# Patient Record
Sex: FEMALE | HISPANIC OR LATINO | Employment: FULL TIME | URBAN - METROPOLITAN AREA
[De-identification: names, ages, dates, MRNs, and addresses within clinical notes are randomized per-mention and may not be internally consistent; named-entity substitution may affect disease eponyms.]

---

## 2017-07-14 ENCOUNTER — HOSPITAL ENCOUNTER (OUTPATIENT)
Dept: RADIOLOGY | Facility: MEDICAL CENTER | Age: 52
End: 2017-07-14
Attending: FAMILY MEDICINE
Payer: COMMERCIAL

## 2017-07-14 DIAGNOSIS — N93.9 VAGINAL HEMORRHAGE: ICD-10-CM

## 2017-07-14 PROCEDURE — 76830 TRANSVAGINAL US NON-OB: CPT

## 2017-09-09 ENCOUNTER — HOSPITAL ENCOUNTER (OUTPATIENT)
Dept: LAB | Facility: MEDICAL CENTER | Age: 52
End: 2017-09-09
Attending: FAMILY MEDICINE
Payer: COMMERCIAL

## 2017-09-09 LAB
ALBUMIN SERPL BCP-MCNC: 3.9 G/DL (ref 3.2–4.9)
ALBUMIN/GLOB SERPL: 1.3 G/DL
ALP SERPL-CCNC: 100 U/L (ref 30–99)
ALT SERPL-CCNC: 19 U/L (ref 2–50)
ANION GAP SERPL CALC-SCNC: 10 MMOL/L (ref 0–11.9)
AST SERPL-CCNC: 17 U/L (ref 12–45)
BILIRUB SERPL-MCNC: 0.6 MG/DL (ref 0.1–1.5)
BUN SERPL-MCNC: 16 MG/DL (ref 8–22)
CALCIUM SERPL-MCNC: 9.2 MG/DL (ref 8.5–10.5)
CHLORIDE SERPL-SCNC: 105 MMOL/L (ref 96–112)
CHOLEST SERPL-MCNC: 159 MG/DL (ref 100–199)
CO2 SERPL-SCNC: 25 MMOL/L (ref 20–33)
CREAT SERPL-MCNC: 0.61 MG/DL (ref 0.5–1.4)
GFR SERPL CREATININE-BSD FRML MDRD: >60 ML/MIN/1.73 M 2
GLOBULIN SER CALC-MCNC: 3 G/DL (ref 1.9–3.5)
GLUCOSE SERPL-MCNC: 108 MG/DL (ref 65–99)
HDLC SERPL-MCNC: 33 MG/DL
LDLC SERPL CALC-MCNC: 67 MG/DL
POTASSIUM SERPL-SCNC: 3.6 MMOL/L (ref 3.6–5.5)
PROT SERPL-MCNC: 6.9 G/DL (ref 6–8.2)
SODIUM SERPL-SCNC: 140 MMOL/L (ref 135–145)
TRIGL SERPL-MCNC: 295 MG/DL (ref 0–149)
TSH SERPL DL<=0.005 MIU/L-ACNC: 0.96 UIU/ML (ref 0.3–3.7)

## 2017-09-09 PROCEDURE — 36415 COLL VENOUS BLD VENIPUNCTURE: CPT

## 2017-09-09 PROCEDURE — 84443 ASSAY THYROID STIM HORMONE: CPT

## 2017-09-09 PROCEDURE — 80061 LIPID PANEL: CPT

## 2017-09-09 PROCEDURE — 80053 COMPREHEN METABOLIC PANEL: CPT

## 2017-09-16 ENCOUNTER — HOSPITAL ENCOUNTER (OUTPATIENT)
Dept: LAB | Facility: MEDICAL CENTER | Age: 52
End: 2017-09-16
Attending: OBSTETRICS & GYNECOLOGY
Payer: COMMERCIAL

## 2017-09-16 LAB
ESTRADIOL SERPL-MCNC: 32 PG/ML
FSH SERPL-ACNC: 24.7 MIU/ML
LH SERPL-ACNC: 17 IU/L

## 2017-09-16 PROCEDURE — 83001 ASSAY OF GONADOTROPIN (FSH): CPT

## 2017-09-16 PROCEDURE — 82670 ASSAY OF TOTAL ESTRADIOL: CPT

## 2017-09-16 PROCEDURE — 86695 HERPES SIMPLEX TYPE 1 TEST: CPT

## 2017-09-16 PROCEDURE — 86694 HERPES SIMPLEX NES ANTBDY: CPT

## 2017-09-16 PROCEDURE — 83002 ASSAY OF GONADOTROPIN (LH): CPT

## 2017-09-16 PROCEDURE — 86696 HERPES SIMPLEX TYPE 2 TEST: CPT

## 2017-09-16 PROCEDURE — 36415 COLL VENOUS BLD VENIPUNCTURE: CPT

## 2017-09-18 LAB
HSV1 GG IGG SER-ACNC: 44.6 IV
HSV1+2 IGG SER IA-ACNC: >22.4 IV
HSV2 GG IGG SER-ACNC: 19.5 IV

## 2018-01-31 ENCOUNTER — HOSPITAL ENCOUNTER (OUTPATIENT)
Dept: LAB | Facility: MEDICAL CENTER | Age: 53
End: 2018-01-31
Attending: OBSTETRICS & GYNECOLOGY
Payer: COMMERCIAL

## 2018-01-31 PROCEDURE — 87624 HPV HI-RISK TYP POOLED RSLT: CPT

## 2018-01-31 PROCEDURE — 88175 CYTOPATH C/V AUTO FLUID REDO: CPT

## 2018-02-02 LAB
CYTOLOGY REG CYTOL: NORMAL
HPV HR 12 DNA CVX QL NAA+PROBE: NEGATIVE
HPV16 DNA SPEC QL NAA+PROBE: NEGATIVE
HPV18 DNA SPEC QL NAA+PROBE: NEGATIVE
SPECIMEN SOURCE: NORMAL

## 2018-02-03 ENCOUNTER — HOSPITAL ENCOUNTER (OUTPATIENT)
Dept: LAB | Facility: MEDICAL CENTER | Age: 53
End: 2018-02-03
Attending: FAMILY MEDICINE
Payer: COMMERCIAL

## 2018-02-03 LAB
ALBUMIN SERPL BCP-MCNC: 4.2 G/DL (ref 3.2–4.9)
ALBUMIN/GLOB SERPL: 1.4 G/DL
ALP SERPL-CCNC: 83 U/L (ref 30–99)
ALT SERPL-CCNC: 17 U/L (ref 2–50)
ANION GAP SERPL CALC-SCNC: 9 MMOL/L (ref 0–11.9)
AST SERPL-CCNC: 20 U/L (ref 12–45)
BILIRUB SERPL-MCNC: 0.6 MG/DL (ref 0.1–1.5)
BUN SERPL-MCNC: 11 MG/DL (ref 8–22)
CALCIUM SERPL-MCNC: 9.2 MG/DL (ref 8.5–10.5)
CHLORIDE SERPL-SCNC: 103 MMOL/L (ref 96–112)
CHOLEST SERPL-MCNC: 202 MG/DL (ref 100–199)
CO2 SERPL-SCNC: 26 MMOL/L (ref 20–33)
CREAT SERPL-MCNC: 0.71 MG/DL (ref 0.5–1.4)
GLOBULIN SER CALC-MCNC: 3 G/DL (ref 1.9–3.5)
GLUCOSE SERPL-MCNC: 100 MG/DL (ref 65–99)
HDLC SERPL-MCNC: 40 MG/DL
LDLC SERPL CALC-MCNC: 125 MG/DL
POTASSIUM SERPL-SCNC: 3.6 MMOL/L (ref 3.6–5.5)
PROT SERPL-MCNC: 7.2 G/DL (ref 6–8.2)
SODIUM SERPL-SCNC: 138 MMOL/L (ref 135–145)
TRIGL SERPL-MCNC: 184 MG/DL (ref 0–149)

## 2018-02-03 PROCEDURE — 36415 COLL VENOUS BLD VENIPUNCTURE: CPT

## 2018-02-03 PROCEDURE — 80053 COMPREHEN METABOLIC PANEL: CPT

## 2018-02-03 PROCEDURE — 80061 LIPID PANEL: CPT

## 2018-02-05 ENCOUNTER — HOSPITAL ENCOUNTER (OUTPATIENT)
Dept: RADIOLOGY | Facility: MEDICAL CENTER | Age: 53
End: 2018-02-05

## 2018-02-08 ENCOUNTER — HOSPITAL ENCOUNTER (OUTPATIENT)
Dept: RADIOLOGY | Facility: MEDICAL CENTER | Age: 53
End: 2018-02-08
Attending: OBSTETRICS & GYNECOLOGY
Payer: COMMERCIAL

## 2018-02-08 DIAGNOSIS — Z12.39 SCREENING BREAST EXAMINATION: ICD-10-CM

## 2018-02-08 PROCEDURE — 77063 BREAST TOMOSYNTHESIS BI: CPT

## 2018-10-02 ENCOUNTER — IMMUNIZATION (OUTPATIENT)
Dept: SOCIAL WORK | Facility: CLINIC | Age: 53
End: 2018-10-02
Payer: COMMERCIAL

## 2018-10-02 DIAGNOSIS — Z23 NEED FOR VACCINATION: ICD-10-CM

## 2018-10-02 PROCEDURE — 90686 IIV4 VACC NO PRSV 0.5 ML IM: CPT | Performed by: REGISTERED NURSE

## 2018-10-02 PROCEDURE — 90471 IMMUNIZATION ADMIN: CPT | Performed by: REGISTERED NURSE

## 2018-12-04 ENCOUNTER — OFFICE VISIT (OUTPATIENT)
Dept: URGENT CARE | Facility: PHYSICIAN GROUP | Age: 53
End: 2018-12-04
Payer: COMMERCIAL

## 2018-12-04 ENCOUNTER — HOSPITAL ENCOUNTER (OUTPATIENT)
Dept: RADIOLOGY | Facility: MEDICAL CENTER | Age: 53
End: 2018-12-04
Attending: PHYSICIAN ASSISTANT
Payer: COMMERCIAL

## 2018-12-04 VITALS
HEIGHT: 57 IN | WEIGHT: 150 LBS | RESPIRATION RATE: 18 BRPM | DIASTOLIC BLOOD PRESSURE: 84 MMHG | SYSTOLIC BLOOD PRESSURE: 146 MMHG | HEART RATE: 78 BPM | TEMPERATURE: 98.2 F | BODY MASS INDEX: 32.36 KG/M2 | OXYGEN SATURATION: 97 %

## 2018-12-04 DIAGNOSIS — S69.92XA INJURY OF LEFT WRIST, INITIAL ENCOUNTER: ICD-10-CM

## 2018-12-04 DIAGNOSIS — S52.502A CLOSED FRACTURE OF DISTAL END OF LEFT RADIUS, UNSPECIFIED FRACTURE MORPHOLOGY, INITIAL ENCOUNTER: ICD-10-CM

## 2018-12-04 PROCEDURE — 99204 OFFICE O/P NEW MOD 45 MIN: CPT | Performed by: PHYSICIAN ASSISTANT

## 2018-12-04 PROCEDURE — 73110 X-RAY EXAM OF WRIST: CPT | Mod: LT

## 2018-12-04 ASSESSMENT — ENCOUNTER SYMPTOMS
TINGLING: 0
FALLS: 1
RESPIRATORY NEGATIVE: 1
NUMBNESS: 0
CARDIOVASCULAR NEGATIVE: 1
NEUROLOGICAL NEGATIVE: 1
MUSCLE WEAKNESS: 0
CONSTITUTIONAL NEGATIVE: 1
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
## 2018-12-04 NOTE — LETTER
December 4, 2018         Patient: Kary Kevin   YOB: 1965   Date of Visit: 12/4/2018           To Whom it May Concern:    Kary Kevin was seen in my clinic on 12/4/2018. She was diagnosed with a minor fracture of the left wrist. She may work but please limit heavy lifting.    If you have any questions or concerns, please don't hesitate to call.        Sincerely,           Gregorio Ventura P.A.-C.  Electronically Signed

## 2018-12-10 ENCOUNTER — OFFICE VISIT (OUTPATIENT)
Dept: MEDICAL GROUP | Facility: CLINIC | Age: 53
End: 2018-12-10
Payer: COMMERCIAL

## 2018-12-10 VITALS
HEART RATE: 82 BPM | SYSTOLIC BLOOD PRESSURE: 118 MMHG | DIASTOLIC BLOOD PRESSURE: 76 MMHG | TEMPERATURE: 97.6 F | WEIGHT: 150 LBS | HEIGHT: 57 IN | BODY MASS INDEX: 32.36 KG/M2 | RESPIRATION RATE: 18 BRPM | OXYGEN SATURATION: 96 %

## 2018-12-10 DIAGNOSIS — S52.509A: ICD-10-CM

## 2018-12-10 PROCEDURE — 25600 CLTX DST RDL FX/EPHYS SEP WO: CPT | Mod: LT | Performed by: FAMILY MEDICINE

## 2018-12-10 ASSESSMENT — ENCOUNTER SYMPTOMS
NAUSEA: 0
HEADACHES: 0
DIZZINESS: 1
SHORTNESS OF BREATH: 0
VOMITING: 0
CHILLS: 0
FEVER: 0

## 2018-12-10 NOTE — LETTER
December 10, 2018         Patient: Kary Kevin   YOB: 1965   Date of Visit: 12/10/2018           To Whom it May Concern:    Kary Kevin was seen in my clinic on 12/10/2018. She may return to work, but she has limited use of the LEFT hand until removal of her cast in 4 weeks.    If you have any questions or concerns, please don't hesitate to call.        Sincerely,           Dustin Benitez M.D.  Electronically Signed
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
## 2018-12-21 ENCOUNTER — OFFICE VISIT (OUTPATIENT)
Dept: MEDICAL GROUP | Facility: CLINIC | Age: 53
End: 2018-12-21
Payer: COMMERCIAL

## 2018-12-21 VITALS
RESPIRATION RATE: 20 BRPM | OXYGEN SATURATION: 96 % | WEIGHT: 150 LBS | DIASTOLIC BLOOD PRESSURE: 82 MMHG | HEART RATE: 84 BPM | SYSTOLIC BLOOD PRESSURE: 130 MMHG | HEIGHT: 57 IN | BODY MASS INDEX: 32.36 KG/M2 | TEMPERATURE: 97.8 F

## 2018-12-21 DIAGNOSIS — S52.509D: ICD-10-CM

## 2018-12-21 PROCEDURE — 99024 POSTOP FOLLOW-UP VISIT: CPT | Performed by: FAMILY MEDICINE

## 2018-12-21 PROCEDURE — 29075 APPL CST ELBW FNGR SHORT ARM: CPT | Mod: 58,LT | Performed by: FAMILY MEDICINE

## 2018-12-21 NOTE — LETTER
December 21, 2018         Patient: Kary Kevin   YOB: 1965   Date of Visit: 12/21/2018           To Whom it May Concern:    Kary Kevin was seen in my clinic on 12/21/2018. She may return to work, but she has limited use of her left hand secondary to injury/cast.  She should not be working overtime.    If you have any questions or concerns, please don't hesitate to call.        Sincerely,           Dustin Benitez M.D.  Electronically Signed
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
## 2019-01-07 ENCOUNTER — OFFICE VISIT (OUTPATIENT)
Dept: MEDICAL GROUP | Facility: CLINIC | Age: 54
End: 2019-01-07
Payer: COMMERCIAL

## 2019-01-07 VITALS
RESPIRATION RATE: 18 BRPM | TEMPERATURE: 98.7 F | HEART RATE: 81 BPM | BODY MASS INDEX: 32.36 KG/M2 | HEIGHT: 57 IN | OXYGEN SATURATION: 94 % | WEIGHT: 150 LBS

## 2019-01-07 DIAGNOSIS — S52.509D: ICD-10-CM

## 2019-01-07 DIAGNOSIS — M25.632 WRIST STIFFNESS, LEFT: ICD-10-CM

## 2019-01-07 PROCEDURE — 99212 OFFICE O/P EST SF 10 MIN: CPT | Mod: 24 | Performed by: FAMILY MEDICINE
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
## 2019-03-19 ENCOUNTER — HOSPITAL ENCOUNTER (OUTPATIENT)
Dept: LAB | Facility: MEDICAL CENTER | Age: 54
End: 2019-03-19
Attending: OBSTETRICS & GYNECOLOGY
Payer: COMMERCIAL

## 2019-03-19 PROCEDURE — 87624 HPV HI-RISK TYP POOLED RSLT: CPT

## 2019-03-19 PROCEDURE — 87491 CHLMYD TRACH DNA AMP PROBE: CPT

## 2019-03-19 PROCEDURE — 88175 CYTOPATH C/V AUTO FLUID REDO: CPT

## 2019-03-19 PROCEDURE — 87591 N.GONORRHOEAE DNA AMP PROB: CPT

## 2019-03-20 LAB — AMBIGUOUS DTTM AMBI4: NORMAL

## 2019-03-22 LAB
C TRACH DNA GENITAL QL NAA+PROBE: NEGATIVE
CYTOLOGY REG CYTOL: NORMAL
HPV HR 12 DNA CVX QL NAA+PROBE: NEGATIVE
HPV16 DNA SPEC QL NAA+PROBE: NEGATIVE
HPV18 DNA SPEC QL NAA+PROBE: NEGATIVE
N GONORRHOEA DNA GENITAL QL NAA+PROBE: NEGATIVE
SPECIMEN SOURCE: NORMAL
SPECIMEN SOURCE: NORMAL

## 2019-04-03 ENCOUNTER — HOSPITAL ENCOUNTER (OUTPATIENT)
Dept: RADIOLOGY | Facility: MEDICAL CENTER | Age: 54
End: 2019-04-03
Attending: OBSTETRICS & GYNECOLOGY
Payer: COMMERCIAL

## 2019-04-03 DIAGNOSIS — Z12.31 VISIT FOR SCREENING MAMMOGRAM: ICD-10-CM

## 2019-04-03 PROCEDURE — 77063 BREAST TOMOSYNTHESIS BI: CPT

## 2019-12-24 ENCOUNTER — OFFICE VISIT (OUTPATIENT)
Dept: URGENT CARE | Facility: PHYSICIAN GROUP | Age: 54
End: 2019-12-24
Payer: COMMERCIAL

## 2019-12-24 VITALS
HEART RATE: 69 BPM | TEMPERATURE: 97.8 F | DIASTOLIC BLOOD PRESSURE: 90 MMHG | WEIGHT: 156 LBS | OXYGEN SATURATION: 96 % | SYSTOLIC BLOOD PRESSURE: 152 MMHG | BODY MASS INDEX: 33.66 KG/M2 | HEIGHT: 57 IN

## 2019-12-24 DIAGNOSIS — H11.32 SUBCONJUNCTIVAL HEMORRHAGE OF LEFT EYE: ICD-10-CM

## 2019-12-24 PROCEDURE — 99214 OFFICE O/P EST MOD 30 MIN: CPT | Performed by: PHYSICIAN ASSISTANT

## 2019-12-24 RX ORDER — IBUPROFEN 200 MG
200 TABLET ORAL EVERY 6 HOURS PRN
COMMUNITY
End: 2020-02-26

## 2019-12-24 ASSESSMENT — ENCOUNTER SYMPTOMS
PALPITATIONS: 0
BLURRED VISION: 0
EYE REDNESS: 1
PHOTOPHOBIA: 0
HEADACHES: 0
CHILLS: 0
SHORTNESS OF BREATH: 0
DOUBLE VISION: 0
SORE THROAT: 0
FEVER: 0
SINUS PAIN: 0
COUGH: 0

## 2019-12-24 NOTE — PROGRESS NOTES
"Subjective:      Kary Kevin is a 54 y.o. female who presents with Eye Problem (left eye redness, poss scratched eye, feels like theres something on eyelid)            Eye Problem    The left eye is affected. This is a new problem. The current episode started yesterday (after rubbing her eyes). The problem has been unchanged. There was no injury mechanism. The patient is experiencing no pain. Associated symptoms include eye redness. Pertinent negatives include no blurred vision, double vision, fever or photophobia. She has tried nothing for the symptoms.       Review of Systems   Constitutional: Negative for chills, fever and malaise/fatigue.   HENT: Negative for congestion, ear pain, sinus pain and sore throat.    Eyes: Positive for redness. Negative for blurred vision, double vision and photophobia.   Respiratory: Negative for cough and shortness of breath.    Cardiovascular: Negative for chest pain and palpitations.   Skin: Negative for rash.   Neurological: Negative for headaches.   All other systems reviewed and are negative.    PMH:  has a past medical history of Depression.  MEDS:   Current Outpatient Medications:   •  Naproxen Sodium (ALEVE PO), Take  by mouth., Disp: , Rfl:   •  ibuprofen (MOTRIN) 200 MG Tab, Take 200 mg by mouth every 6 hours as needed., Disp: , Rfl:   ALLERGIES: No Known Allergies  SURGHX:   Past Surgical History:   Procedure Laterality Date   • PRIMARY C SECTION       SOCHX:  reports that she has never smoked. She has never used smokeless tobacco. She reports that she does not drink alcohol or use drugs.  FH: Family history was reviewed, no pertinent findings to report  Medications, Allergies, and current problem list reviewed today in Epic         Objective:     Blood Pressure 152/90   Pulse 69   Temperature 36.6 °C (97.8 °F)   Height 1.448 m (4' 9\")   Weight 70.8 kg (156 lb)   Oxygen Saturation 96%   Body Mass Index 33.76 kg/m²      Physical Exam  Vitals signs reviewed. "   Constitutional:       General: She is not in acute distress.     Appearance: She is well-developed. She is not ill-appearing or toxic-appearing.   HENT:      Head: Normocephalic and atraumatic.      Right Ear: Hearing, tympanic membrane, ear canal and external ear normal.      Left Ear: Hearing, tympanic membrane, ear canal and external ear normal.      Nose: Nose normal.      Mouth/Throat:      Pharynx: Uvula midline. No oropharyngeal exudate.   Eyes:      General: Lids are normal. Lids are everted, no foreign bodies appreciated.      Conjunctiva/sclera:      Left eye: Hemorrhage present.      Pupils: Pupils are equal, round, and reactive to light.   Neck:      Musculoskeletal: Full passive range of motion without pain, normal range of motion and neck supple.   Cardiovascular:      Rate and Rhythm: Regular rhythm.      Heart sounds: Normal heart sounds. No murmur. No friction rub. No gallop.    Pulmonary:      Effort: Pulmonary effort is normal. No respiratory distress.      Breath sounds: Normal breath sounds. No decreased breath sounds, wheezing or rales.   Chest:      Chest wall: No tenderness.   Musculoskeletal: Normal range of motion.         General: No tenderness or deformity.   Skin:     General: Skin is warm and dry.      Findings: No rash.   Neurological:      Mental Status: She is alert and oriented to person, place, and time.   Psychiatric:         Speech: Speech normal.         Behavior: Behavior normal.         Thought Content: Thought content normal.         Judgment: Judgment normal.                 Assessment/Plan:       1. Subconjunctival hemorrhage of left eye  -OTC supportive care    Differential diagnosis, natural history, supportive care discussed. Follow-up with primary care provider within 7-10 days, emergency room precautions discussed.  Patient and/or family appears understanding of information.  Handout and review of patients diagnosis and treatment was discussed extensively.

## 2019-12-24 NOTE — PATIENT INSTRUCTIONS
Hemorragia subconjuntival  (Subconjunctival Hemorrhage)  La hemorragia subconjuntival es el sangrado que se produce entre la parte maryam del low (esclerótica) y la membrana transparente que recubre la parte externa de emanuel órgano (conjuntiva). Cerca de la superficie del low hay muchos vasos sanguíneos diminutos. La hemorragia subconjuntival ocurre cuando marlee o más de estos vasos sanguíneos se rompen y sangran, lo que deriva en la aparición de zunilda cosme tomasa en el low. Esta es similar a un hematoma.  En función de la magnitud del sangrado, la cosme tomasa puede cubrir únicamente zunilda pequeña dennys del low o la totalidad de la parte visible de la esclerótica. Si se acumula mucha itzel debajo de la conjuntiva, también puede jalil inflamación. Las hemorragias subconjuntivales no afectan la visión ni causan dolor, katia puede jalil sensación de irritación ocular si hay inflamación. Generalmente, las hemorragias subconjuntivales no requieren tratamiento y desaparecen solas en el término de dos semanas.  CAUSAS  Esta afección puede ser causada por lo siguiente:  · Un traumatismo leve, timmy frotarse los ojos con mucha fuerza.  · Un traumatismo grave o zunilda contusión.  · Toser, estornudar o vomitar.  · Realizar esfuerzos, timmy ocurre al levantar un objeto pesado.  · Hipertensión arterial.  · Zunilda cirugía ocular reciente.  · Antecedentes de diabetes.  · Algunos medicamentos, especialmente los anticoagulantes.  · Otras afecciones, timmy los tumores en los ojos, los trastornos hemorrágicos o las anomalías de los vasos sanguíneos.  Las hemorragias subconjuntivales pueden producirse sin zunilda causa aparente.  SÍNTOMAS  Los síntomas de esta afección incluyen lo siguiente:  · Zunilda cosme de color lobo oscuro o brillante en la parte maryam del low.  ¨ La dennys enrojecida se puede extender hasta cubrir un área más frank del low antes de desaparecer.  ¨ La dennys enrojecida puede tornarse de color marrón amarillento antes de  desaparecer.  · Hinchazón.  · Irritación leve del low.  DIAGNÓSTICO  Esta afección se diagnostica mediante un examen físico. Si la hemorragia subconjuntival fue causada por un traumatismo, el médico puede derivarlo a un oculista (oftalmólogo) o a otro especialista para que lo examinen en busca de otras lesiones. Pueden hacerle otros estudios, por ejemplo:  · Un examen ocular.  · Un control de la presión arterial.  · Análisis de itzel para detectar la presencia de trastornos hemorrágicos.  Si la hemorragia subconjuntival fue causada por un traumatismo, pueden hacerle radiografías o zunilda tomografía computarizada (TC) para determinar si hay otras lesiones.  TRATAMIENTO  Por lo general, no se necesita tratamiento. El médico puede recomendarle que se aplique gotas oftálmicas o compresas frías para aliviar las molestias.  INSTRUCCIONES PARA EL CUIDADO EN EL HOGAR  · Mokena los medicamentos de venta josh y los recetados solamente timmy se lo haya indicado el médico.  · Aplique las gotas oftálmicas o las compresas frías para aliviar las molestias timmy se lo haya indicado el médico.  · Evite las actividades, las cosas y los entornos que pueden causarle irritación o lesiones en el low.  · Concurra a todas las visitas de control timmy se lo haya indicado el médico. Dur[place] es importante.  SOLICITE ATENCIÓN MÉDICA SI:  · Siente dolor en el low.  · El sangrado no desaparece en el término de 3 semanas.  · Sigue teniendo hemorragias subconjuntivales.  SOLICITE ATENCIÓN MÉDICA DE INMEDIATO SI:  · Tiene cambios en la visión o dificultad para raysa.  · Repentinamente, tiene mucha sensibilidad a la bere.  · Tiene dolor de jose intenso, vómitos persistentes, confusión o un cansancio que no es normal (letargia).  · Parece que el low sobresale o se protruye de la órbita.  · Le aparecen hematomas en el cuerpo sin motivo.  · Tiene sangrado en otra parte del cuerpo sin motivo.  Esta información no tiene timmy fin reemplazar el consejo del médico.  Asegúrese de hacerle al médico cualquier pregunta que tenga.  Document Released: 09/27/2006 Document Revised: 09/07/2016 Document Reviewed: 02/24/2016  Elsevier Interactive Patient Education © 2017 [employer]

## 2019-12-24 NOTE — LETTER
December 24, 2019         Patient: Kary Kevin   YOB: 1965   Date of Visit: 12/24/2019           To Whom it May Concern:    Kary Kevin was seen in my clinic on 12/24/2019. She may return to work on 12/26.  She does not have an infectious condition.  If you have any questions or concerns, please don't hesitate to call.        Sincerely,           Bala Buckner P.A.-C.  Electronically Signed

## 2020-02-26 ENCOUNTER — OFFICE VISIT (OUTPATIENT)
Dept: MEDICAL GROUP | Facility: PHYSICIAN GROUP | Age: 55
End: 2020-02-26
Payer: COMMERCIAL

## 2020-02-26 VITALS
HEART RATE: 86 BPM | OXYGEN SATURATION: 97 % | WEIGHT: 158 LBS | RESPIRATION RATE: 14 BRPM | TEMPERATURE: 97.9 F | HEIGHT: 57 IN | SYSTOLIC BLOOD PRESSURE: 124 MMHG | DIASTOLIC BLOOD PRESSURE: 70 MMHG | BODY MASS INDEX: 34.09 KG/M2

## 2020-02-26 DIAGNOSIS — R10.12 LEFT UPPER QUADRANT PAIN: ICD-10-CM

## 2020-02-26 DIAGNOSIS — Z12.11 SCREENING FOR COLORECTAL CANCER: ICD-10-CM

## 2020-02-26 DIAGNOSIS — Z00.00 PREVENTATIVE HEALTH CARE: ICD-10-CM

## 2020-02-26 DIAGNOSIS — Z12.12 SCREENING FOR COLORECTAL CANCER: ICD-10-CM

## 2020-02-26 DIAGNOSIS — Z76.89 ENCOUNTER TO ESTABLISH CARE: ICD-10-CM

## 2020-02-26 DIAGNOSIS — K21.9 GASTROESOPHAGEAL REFLUX DISEASE WITHOUT ESOPHAGITIS: ICD-10-CM

## 2020-02-26 DIAGNOSIS — M19.042 PRIMARY OSTEOARTHRITIS OF BOTH HANDS: ICD-10-CM

## 2020-02-26 DIAGNOSIS — Z13.29 SCREENING FOR ENDOCRINE, METABOLIC AND IMMUNITY DISORDER: ICD-10-CM

## 2020-02-26 DIAGNOSIS — Z11.59 NEED FOR HEPATITIS C SCREENING TEST: ICD-10-CM

## 2020-02-26 DIAGNOSIS — Z13.0 SCREENING FOR ENDOCRINE, METABOLIC AND IMMUNITY DISORDER: ICD-10-CM

## 2020-02-26 DIAGNOSIS — Z13.220 SCREENING FOR LIPID DISORDERS: ICD-10-CM

## 2020-02-26 DIAGNOSIS — M19.041 PRIMARY OSTEOARTHRITIS OF BOTH HANDS: ICD-10-CM

## 2020-02-26 DIAGNOSIS — I10 ESSENTIAL HYPERTENSION: ICD-10-CM

## 2020-02-26 DIAGNOSIS — Z23 NEED FOR VACCINATION: ICD-10-CM

## 2020-02-26 DIAGNOSIS — E66.9 OBESITY (BMI 30-39.9): ICD-10-CM

## 2020-02-26 DIAGNOSIS — Z13.228 SCREENING FOR ENDOCRINE, METABOLIC AND IMMUNITY DISORDER: ICD-10-CM

## 2020-02-26 PROCEDURE — 90471 IMMUNIZATION ADMIN: CPT | Performed by: NURSE PRACTITIONER

## 2020-02-26 PROCEDURE — 90715 TDAP VACCINE 7 YRS/> IM: CPT | Performed by: NURSE PRACTITIONER

## 2020-02-26 PROCEDURE — 99214 OFFICE O/P EST MOD 30 MIN: CPT | Mod: 25 | Performed by: NURSE PRACTITIONER

## 2020-02-26 RX ORDER — LISINOPRIL 10 MG/1
10 TABLET ORAL DAILY
Qty: 30 TAB | Refills: 1 | Status: SHIPPED | OUTPATIENT
Start: 2020-02-26 | End: 2020-03-25 | Stop reason: SDUPTHER

## 2020-02-26 RX ORDER — ZOSTER VACCINE RECOMBINANT, ADJUVANTED 50 MCG/0.5
0.5 KIT INTRAMUSCULAR ONCE
Qty: 0.5 ML | Refills: 0 | Status: SHIPPED
Start: 2020-02-26 | End: 2020-02-26

## 2020-02-26 ASSESSMENT — PATIENT HEALTH QUESTIONNAIRE - PHQ9: CLINICAL INTERPRETATION OF PHQ2 SCORE: 0

## 2020-02-26 NOTE — ASSESSMENT & PLAN NOTE
Chronic ongoing. New to examiner. Patient reports that she was diagnosed 5 years ago. Her previous PCP had referred to orthopaedics doctor who diagnosed with osteoarthritis. Patient is currently taking Naproxen BID for pain. Pain in hands worse at night after resting. 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

## 2020-02-26 NOTE — PROGRESS NOTES
Subjective:     CC:  Diagnoses of Essential hypertension, Primary osteoarthritis of both hands, Gastroesophageal reflux disease without esophagitis, Left upper quadrant pain, Obesity (BMI 30-39.9), Screening for colorectal cancer, Screening for lipid disorders, Screening for endocrine, metabolic and immunity disorder, Preventative health care, Need for hepatitis C screening test, Need for vaccination, and Encounter to establish care were pertinent to this visit.    HISTORY OF THE PRESENT ILLNESS: Patient is a 54 y.o. female. This pleasant patient is here today to establish care and discuss the following. Her prior PCP was Saumya Barnhart.    Primary osteoarthritis of both hands  Chronic ongoing. New to examiner. Patient reports that she was diagnosed 5 years ago. Her previous PCP had referred to orthopaedics doctor who diagnosed with osteoarthritis. Patient is currently taking Naproxen BID for pain. Pain in hands worse at night after resting. 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    Essential hypertension  Chronic ongoing. New to examiner. Patient is currently taking Lisinopril 10 mg daily. She has been unable to take medicine for the last 6 months. She had taken a prescription from [facility] that was equivalent to Lisinopril but ran out of prescription and was unable to get more refills. She does not check home blood pressures. She will occasionally use the store BP, but she does not remember the numbers.  Her BP today is 124/70.  Her last charted blood pressure from 12/24/2019 was 152/90.    Gastroesophageal reflux disease without esophagitis  Chronic onging. New to examiner. Patient is not currenlty taking any medication. She uses diet to control and avoids spicy foods. No issues today.     Left upper quadrant pain  Chronic ongoing.  New to examiner.  Patient reports that for the last 4 months she has had some generalized left upper abdominal pain with  decreased appetite.  She denies any history of falls or trauma to the area.  Denies any nausea, vomiting, fever, chills, constipation, loss of bowels or bladder function, hematuria, bloody stool, weight loss or night sweats.  She has not had any recent lab work in 2 years.    Obesity (BMI 30-39.9)  Chronic ongoing.  New to examiner.  BMI today 34.19.  Patient reports that she has been trying to watch her diet.  For breakfast she will try to drink smoothies made from fruit or green vegetables.  She does make her own cucumber, celery, and green vegetable salad that she eats.  She tries to eat lean meats.  She drinks mostly water.  She tries to avoid sugar as much as possible.  She is not currently exercising.      Allergies: Patient has no known allergies.    Current Outpatient Medications Ordered in Epic   Medication Sig Dispense Refill   • lisinopril (PRINIVIL) 10 MG Tab Take 1 Tab by mouth every day. 30 Tab 1   • Zoster Vac Recomb Adjuvanted (SHINGRIX) 50 MCG/0.5ML Recon Susp 0.5 mL by Intramuscular route Once for 1 dose. 0.5 mL 0   • Naproxen Sodium (ALEVE PO) Take  by mouth.       No current Epic-ordered facility-administered medications on file.        Past Medical History:   Diagnosis Date   • GERD (gastroesophageal reflux disease)    • Hypertension    • Osteoarthritis of both hands 2015       Past Surgical History:   Procedure Laterality Date   • PRIMARY C SECTION      x2       Social History     Tobacco Use   • Smoking status: Never Smoker   • Smokeless tobacco: Never Used   Substance Use Topics   • Alcohol use: No   • Drug use: No       Social History     Social History Narrative   • Not on file       Family History   Problem Relation Age of Onset   • Heart Disease Mother 64        heart attack   • Arthritis Mother    • Heart Disease Father 65        heart attack   • Other Sister         blood clot   • Heart Disease Brother         heart attack   • Heart Disease Brother         heart attack       Health  "Maintenance: Colonoscopy ordered.  Shingrix prescription given today.  Tdap vaccine given today.  Hepatitis C screen ordered today.  Last Pap 3/2019.    ROS:   Gen: no fevers/chills, no changes in weight, no temperature intolerances  Eyes: no changes in vision  ENT: no sore throat, no ear pain  Pulm: no sob, no cough  CV: no chest pain, no palpitations  GI: no nausea/vomiting, no diarrhea, no constipation, no loss of bowels, no melena, +left upper abdominal pain  : no dysuria, no hematuria  MSk: no myalgias, no trauma or fall, +bilateral finger joint pain  Skin: no rash  Neuro: no headaches, no numbness/tingling, no dizziness  Heme/Lymph: no easy bruising      Objective:     Vital signs reviewed.   Exam: /70 (BP Location: Right arm, Patient Position: Sitting, BP Cuff Size: Large adult)   Pulse 86   Temp 36.6 °C (97.9 °F) (Temporal)   Resp 14   Ht 1.448 m (4' 9\")   Wt 71.7 kg (158 lb)   SpO2 97%  Body mass index is 34.19 kg/m².    General: Normal appearing. No distress.  HENT: Normocephalic. Ears normal shape and contour, canals are clear bilaterally, tympanic membranes are benign, nasal mucosa benign, oropharynx is without erythema, edema or exudates. Upper partial in place.  Eyes: Eyes conjunctiva clear lids without ptosis, pupils equal and reactive to light accommodation, lids normal.  Neck: Supple without JVD. Thyroid is not enlarged.  Pulmonary: Clear to ausculation.  Normal effort. No rales, ronchi, or wheezing.  Cardiovascular: Regular rate and rhythm without murmur. Radial pulses are intact and equal bilaterally.  Abdomen: Soft, nontender, obese, nondistended. Normal bowel sounds. Liver and spleen are not palpable. No CVA tenderness. +left upper quadrant pain with deep palpation. No rebound tenderness or guarding. No pain with examiner bumping into exam table.   Neurologic: Grossly nonfocal  Lymph: No cervical or supraclavicular lymph nodes are palpable  Skin: Warm and dry.  No obvious " lesions.  Musculoskeletal: Normal gait. No extremity cyanosis, clubbing, or edema.  Psych: Normal mood and affect. Alert and oriented x3. Judgment and insight is normal.    Assessment & Plan:   54 y.o. female with the following -    1. Essential hypertension  Chronic stable.  New to me.  Continue lisinopril 10 mg daily.  Will have patient return in 1 month for blood pressure recheck.  BP is at goal today 124/70. Check labs, orders placed. Monitor and follow.  - lisinopril (PRINIVIL) 10 MG Tab; Take 1 Tab by mouth every day.  Dispense: 30 Tab; Refill: 1    2. Primary osteoarthritis of both hands  Chronic stable.  New to me.  Continue naproxen as needed for arthritis pain.  Discussed to not mix naproxen with ibuprofen, Aleve, Motrin, or Advil as these are all in the same medication class.  Patient verbalized understanding.  Monitor.    3. Gastroesophageal reflux disease without esophagitis  Chronic stable.  New to me.  Continue dietary modifications and avoiding spicy foods or triggers.  Monitor.    4. Left upper quadrant pain  Chronic stable. New to me. Patient does not exhibit any signs/symptoms of acute abdomen today. Patient is afebrile and normotensive. Abdominal exam negative for guarding or rebound tenderness. I will check labs, check liver and kidney function, electrolytes, cbc. If any abnormal's consider abdominal ultrasound. Monitor and follow.     5. Obesity (BMI 30-39.9)  Chronic stable.  New to me.  Discussed diet and lifestyle modifications.  Patient due for lab work.  Orders placed.  Monitor and follow.  - Patient identified as having weight management issue.  Appropriate orders and counseling given.  - LIPID PANEL  - VITAMIN D,25 HYDROXY; Future  - HEMOGLOBIN A1C; Future  - TSH; Future    6. Screening for colorectal cancer  Screening indicated.  Patient is agreement.  Orders placed for referral.  Monitor.  - REFERRAL TO GI FOR COLONOSCOPY    7. Screening for lipid disorders  Screening indicated.   Orders placed.  Monitor and follow.  - LIPID PANEL    8. Screening for endocrine, metabolic and immunity disorder  Screening indicated.  Orders placed.  Monitor and follow.  - VITAMIN D,25 HYDROXY; Future  - HEMOGLOBIN A1C; Future  - TSH; Future    9. Preventative health care  New issue to me.  Patient is due for lab work.  Last lab completed .  Orders placed.  Monitor and follow.  - CBC WITH DIFFERENTIAL; Future  - Comp Metabolic Panel; Future    10. Need for hepatitis C screening test  Screening indicated.  Patient is in agreement.  Orders placed.  Monitor and follow.  - HCV Scrn ( [id number] 1xLife); Future    11. Need for vaccination  Vaccine indicated.  Prescription given for Shingrix.  - Zoster Vac Recomb Adjuvanted (SHINGRIX) 50 MCG/0.5ML Recon Susp; 0.5 mL by Intramuscular route Once for 1 dose.  Dispense: 0.5 mL; Refill: 0    -Vaccine indicated.  Patient is in agreement.  I have placed the below orders and discussed them with an approved delegating provider. The MA is performing the below orders under the direction of Dr. Baca.  - Tdap Vaccine =>8YO IM    12. Encounter to establish care  New issue to me.  Care established.  Patient is due for lab work.  Orders placed.  Monitor and follow.       Return in about 1 month (around 3/26/2020) for HTN follow-up, left abdominal pain.    Please note that this dictation was created using voice recognition software. I have made every reasonable attempt to correct obvious errors, but I expect that there are errors of grammar and possibly content that I did not discover before finalizing the note.

## 2020-02-27 NOTE — ASSESSMENT & PLAN NOTE
Chronic ongoing.  New to examiner.  Patient reports that for the last 4 months she has had some generalized left upper abdominal pain with decreased appetite.  She denies any history of falls or trauma to the area.  Denies any nausea, vomiting, fever, chills, constipation, loss of bowels or bladder function, hematuria, bloody stool, weight loss or night sweats.  She has not had any recent lab work in 2 years.

## 2020-02-27 NOTE — ASSESSMENT & PLAN NOTE
Chronic ongoing. New to examiner. Patient is currently taking Lisinopril 10 mg daily. She has been unable to take medicine for the last 6 months. She had taken a prescription from [facility] that was equivalent to Lisinopril but ran out of prescription and was unable to get more refills. She does not check home blood pressures. She will occasionally use the store BP, but she does not remember the numbers.  Her BP today is 124/70.  Her last charted blood pressure from 12/24/2019 was 152/90.

## 2020-02-27 NOTE — ASSESSMENT & PLAN NOTE
Chronic ongoing.  New to examiner.  BMI today 34.19.  Patient reports that she has been trying to watch her diet.  For breakfast she will try to drink smoothies made from fruit or green vegetables.  She does make her own cucumber, celery, and green vegetable salad that she eats.  She tries to eat lean meats.  She drinks mostly water.  She tries to avoid sugar as much as possible.  She is not currently exercising.

## 2020-02-27 NOTE — ASSESSMENT & PLAN NOTE
Chronic onging. New to examiner. Patient is not currenlty taking any medication. She uses diet to control and avoids spicy foods. No issues today.

## 2020-02-29 ENCOUNTER — HOSPITAL ENCOUNTER (OUTPATIENT)
Dept: LAB | Facility: MEDICAL CENTER | Age: 55
End: 2020-02-29
Attending: NURSE PRACTITIONER
Payer: COMMERCIAL

## 2020-02-29 DIAGNOSIS — Z13.228 SCREENING FOR ENDOCRINE, METABOLIC AND IMMUNITY DISORDER: ICD-10-CM

## 2020-02-29 DIAGNOSIS — Z11.59 NEED FOR HEPATITIS C SCREENING TEST: ICD-10-CM

## 2020-02-29 DIAGNOSIS — Z00.00 PREVENTATIVE HEALTH CARE: ICD-10-CM

## 2020-02-29 DIAGNOSIS — Z13.29 SCREENING FOR ENDOCRINE, METABOLIC AND IMMUNITY DISORDER: ICD-10-CM

## 2020-02-29 DIAGNOSIS — Z13.0 SCREENING FOR ENDOCRINE, METABOLIC AND IMMUNITY DISORDER: ICD-10-CM

## 2020-02-29 DIAGNOSIS — E66.9 OBESITY (BMI 30-39.9): ICD-10-CM

## 2020-02-29 LAB
ALBUMIN SERPL BCP-MCNC: 3.6 G/DL (ref 3.2–4.9)
ALBUMIN/GLOB SERPL: 1.1 G/DL
ALP SERPL-CCNC: 102 U/L (ref 30–99)
ALT SERPL-CCNC: 24 U/L (ref 2–50)
ANION GAP SERPL CALC-SCNC: 9 MMOL/L (ref 0–11.9)
AST SERPL-CCNC: 21 U/L (ref 12–45)
BASOPHILS # BLD AUTO: 0.8 % (ref 0–1.8)
BASOPHILS # BLD: 0.05 K/UL (ref 0–0.12)
BILIRUB SERPL-MCNC: 0.7 MG/DL (ref 0.1–1.5)
BUN SERPL-MCNC: 11 MG/DL (ref 8–22)
CALCIUM SERPL-MCNC: 9 MG/DL (ref 8.5–10.5)
CHLORIDE SERPL-SCNC: 105 MMOL/L (ref 96–112)
CHOLEST SERPL-MCNC: 179 MG/DL (ref 100–199)
CO2 SERPL-SCNC: 25 MMOL/L (ref 20–33)
CREAT SERPL-MCNC: 0.63 MG/DL (ref 0.5–1.4)
EOSINOPHIL # BLD AUTO: 0.2 K/UL (ref 0–0.51)
EOSINOPHIL NFR BLD: 3.2 % (ref 0–6.9)
ERYTHROCYTE [DISTWIDTH] IN BLOOD BY AUTOMATED COUNT: 41.8 FL (ref 35.9–50)
EST. AVERAGE GLUCOSE BLD GHB EST-MCNC: 189 MG/DL
FASTING STATUS PATIENT QL REPORTED: NORMAL
GLOBULIN SER CALC-MCNC: 3.4 G/DL (ref 1.9–3.5)
GLUCOSE SERPL-MCNC: 189 MG/DL (ref 65–99)
HBA1C MFR BLD: 8.2 % (ref 0–5.6)
HCT VFR BLD AUTO: 42.2 % (ref 37–47)
HCV AB S/CO SERPL IA: NEGATIVE
HDLC SERPL-MCNC: 34 MG/DL
HGB BLD-MCNC: 14.6 G/DL (ref 12–16)
IMM GRANULOCYTES # BLD AUTO: 0.04 K/UL (ref 0–0.11)
IMM GRANULOCYTES NFR BLD AUTO: 0.6 % (ref 0–0.9)
LDLC SERPL CALC-MCNC: 87 MG/DL
LYMPHOCYTES # BLD AUTO: 1.1 K/UL (ref 1–4.8)
LYMPHOCYTES NFR BLD: 17.7 % (ref 22–41)
MCH RBC QN AUTO: 31.5 PG (ref 27–33)
MCHC RBC AUTO-ENTMCNC: 34.6 G/DL (ref 33.6–35)
MCV RBC AUTO: 90.9 FL (ref 81.4–97.8)
MONOCYTES # BLD AUTO: 0.55 K/UL (ref 0–0.85)
MONOCYTES NFR BLD AUTO: 8.9 % (ref 0–13.4)
NEUTROPHILS # BLD AUTO: 4.26 K/UL (ref 2–7.15)
NEUTROPHILS NFR BLD: 68.8 % (ref 44–72)
NRBC # BLD AUTO: 0 K/UL
NRBC BLD-RTO: 0 /100 WBC
PLATELET # BLD AUTO: 242 K/UL (ref 164–446)
PMV BLD AUTO: 10.4 FL (ref 9–12.9)
POTASSIUM SERPL-SCNC: 4 MMOL/L (ref 3.6–5.5)
PROT SERPL-MCNC: 7 G/DL (ref 6–8.2)
RBC # BLD AUTO: 4.64 M/UL (ref 4.2–5.4)
SODIUM SERPL-SCNC: 139 MMOL/L (ref 135–145)
TRIGL SERPL-MCNC: 292 MG/DL (ref 0–149)
TSH SERPL DL<=0.005 MIU/L-ACNC: 1.91 UIU/ML (ref 0.38–5.33)
WBC # BLD AUTO: 6.2 K/UL (ref 4.8–10.8)

## 2020-02-29 PROCEDURE — 80061 LIPID PANEL: CPT

## 2020-02-29 PROCEDURE — 36415 COLL VENOUS BLD VENIPUNCTURE: CPT

## 2020-02-29 PROCEDURE — G0472 HEP C SCREEN HIGH RISK/OTHER: HCPCS

## 2020-02-29 PROCEDURE — 85025 COMPLETE CBC W/AUTO DIFF WBC: CPT

## 2020-02-29 PROCEDURE — 84443 ASSAY THYROID STIM HORMONE: CPT

## 2020-02-29 PROCEDURE — 80053 COMPREHEN METABOLIC PANEL: CPT

## 2020-02-29 PROCEDURE — 82306 VITAMIN D 25 HYDROXY: CPT

## 2020-02-29 PROCEDURE — 83036 HEMOGLOBIN GLYCOSYLATED A1C: CPT

## 2020-03-02 DIAGNOSIS — E11.9 TYPE 2 DIABETES MELLITUS WITHOUT COMPLICATION, WITHOUT LONG-TERM CURRENT USE OF INSULIN (HCC): ICD-10-CM

## 2020-03-02 LAB — 25(OH)D3 SERPL-MCNC: 15 NG/ML (ref 30–100)

## 2020-03-03 NOTE — PROGRESS NOTES
A1c 8.2%.  I will start metformin 500 mg twice daily, she will start 500 mg daily for the first week then increase to twice a day.  I will place referral today but educator.

## 2020-03-04 ENCOUNTER — TELEPHONE (OUTPATIENT)
Dept: MEDICAL GROUP | Facility: PHYSICIAN GROUP | Age: 55
End: 2020-03-04

## 2020-03-04 NOTE — TELEPHONE ENCOUNTER
----- Message from ESTEBAN Dooley sent at 3/2/2020  6:49 PM PST -----  Please call patient and inform her that her her hepatitis screen is negative, her thyroid level is stable, her blood count is stable, her electrolytes, kidney function and liver functions are stable.  Her blood sugar is elevated on her chemistry panel as well as her A1c.  Her A1c is 8.2%, this means she has diabetes.  I will send a prescription to her pharmacy to start metformin 500 mg twice a day.  She will start for the first week taking 500 mg once daily and then increase the second week to twice a day.  I will refer her to a diabetes educator as well.  Her vitamin D level is low, I recommend starting over-the-counter vitamin D supplement 1000 units daily.  We can discuss this further at her appointment 3/25/2020.

## 2020-03-04 NOTE — LETTER
March 10, 2020         Kary Kevin  [address] [place] [id number]        Dear Kary:  The test results show that:   Your hepatitis screen is negative, your thyroid level is stable, your blood count is stable, your electrolytes, kidney function and liver functions are stable.  Your blood sugar is elevated on your chemistry panel as well as your A1c.  Your A1c is 8.2%, this means you have diabetes.  I will send a prescription to your pharmacy to start metformin 500 mg twice a day.  You will start for the first week taking 500 mg once daily and then increase the second week to twice a day.  I will refer you to a diabetes educator as well.  Your vitamin D level is low, I recommend starting over-the-counter vitamin D supplement 1000 units daily.  We can discuss this further at your appointment 3/25/2020.    Below are the results from your recent visit:    Resulted Orders   HCV Scrn ( [id number] 1xLife)   Result Value Ref Range    Hepatitis C Antibody Negative Negative      Comment:      The [employer] Cent[facility] HCV assay is limited to the detection of IgG  antibodies to hepatitis C virus in human serum.  The results  from this or any other diagnostic kit should be used and interpreted  only in the context of the overall clinical picture.  A negative  test result does not exclude the possibility of exposure to  hepatitis C virus.      Narrative    Request patient fasting?->Yes  Fasting Instructions:->Fast 8-10 hours, OK to drink water as  needed during fast, take medications per your provider's  instruction.   TSH   Result Value Ref Range    TSH 1.910 0.380 - 5.330 uIU/mL      Comment:      Please note new reference ranges effective 2017 10:00 AM  Pregnant Females, 1st Trimester  0.050-3.700  Pregnant Females, 2nd Trimester  0.310-4.350  Pregnant Females, 3rd Trimester  0.410-5.180      Narrative    Request patient fasting?->Yes  Fasting Instructions:->Fast 8-10 hours, OK to drink water as  needed  during fast, take medications per your provider's  instruction.   HEMOGLOBIN A1C   Result Value Ref Range    Glycohemoglobin 8.2 (H) 0.0 - 5.6 %      Comment:      Increased risk for diabetes:  5.7 -6.4%  Diabetes:  >6.4%  Glycemic control for adults with diabetes:  <7.0%  The above interpretations are per ADA guidelines.  Diagnosis  of diabetes mellitus on the basis of elevated Hemoglobin A1c  should be confirmed by repeating the Hb A1c test.      Est Avg Glucose 189 mg/dL      Comment:      The eAG calculation is based on the A1c-Derived Daily Glucose  (ADAG) study.  See the ADA's website for additional information.      Narrative    Request patient fasting?->Yes  Fasting Instructions:->Fast 8-10 hours, OK to drink water as  needed during fast, take medications per your provider's  instruction.   VITAMIN D,25 HYDROXY   Result Value Ref Range    25-Hydroxy   Vitamin D 25 15 (L) 30 - 100 ng/mL      Comment:      Adult Ranges:   <20 ng/mL - Deficiency  20-29 ng/mL - Insufficiency   ng/mL - Sufficiency  The [employer] Centaur Vitamin D Assay is standardized to the  [facility] reference measurement procedures, a  reference method for the Vitamin D Standardization Program  (VDSP).  The VDSP aligns patient results among 25 (OH)  Vitamin D methods.      Narrative    Request patient fasting?->Yes  Fasting Instructions:->Fast 8-10 hours, OK to drink water as  needed during fast, take medications per your provider's  instruction.   Comp Metabolic Panel   Result Value Ref Range    Sodium 139 135 - 145 mmol/L    Potassium 4.0 3.6 - 5.5 mmol/L    Chloride 105 96 - 112 mmol/L    Co2 25 20 - 33 mmol/L    Anion Gap 9.0 0.0 - 11.9    Glucose 189 (H) 65 - 99 mg/dL    Bun 11 8 - 22 mg/dL    Creatinine 0.63 0.50 - 1.40 mg/dL    Calcium 9.0 8.5 - 10.5 mg/dL    AST(SGOT) 21 12 - 45 U/L    ALT(SGPT) 24 2 - 50 U/L    Alkaline Phosphatase 102 (H) 30 - 99 U/L    Total Bilirubin 0.7 0.1 - 1.5 mg/dL    Albumin 3.6 3.2 - 4.9 g/dL     Total Protein 7.0 6.0 - 8.2 g/dL    Globulin 3.4 1.9 - 3.5 g/dL    A-G Ratio 1.1 g/dL    Narrative    Request patient fasting?->Yes  Fasting Instructions:->Fast 8-10 hours, OK to drink water as  needed during fast, take medications per your provider's  instruction.   CBC WITH DIFFERENTIAL   Result Value Ref Range    WBC 6.2 4.8 - 10.8 K/uL    RBC 4.64 4.20 - 5.40 M/uL    Hemoglobin 14.6 12.0 - 16.0 g/dL    Hematocrit 42.2 37.0 - 47.0 %    MCV 90.9 81.4 - 97.8 fL    MCH 31.5 27.0 - 33.0 pg    MCHC 34.6 33.6 - 35.0 g/dL    RDW 41.8 35.9 - 50.0 fL    Platelet Count 242 164 - 446 K/uL    MPV 10.4 9.0 - 12.9 fL    Neutrophils-Polys 68.80 44.00 - 72.00 %    Lymphocytes 17.70 (L) 22.00 - 41.00 %    Monocytes 8.90 0.00 - 13.40 %    Eosinophils 3.20 0.00 - 6.90 %    Basophils 0.80 0.00 - 1.80 %    Immature Granulocytes 0.60 0.00 - 0.90 %    Nucleated RBC 0.00 /100 WBC    Neutrophils (Absolute) 4.26 2.00 - 7.15 K/uL      Comment:      Includes immature neutrophils, if present.    Lymphs (Absolute) 1.10 1.00 - 4.80 K/uL    Monos (Absolute) 0.55 0.00 - 0.85 K/uL    Eos (Absolute) 0.20 0.00 - 0.51 K/uL    Baso (Absolute) 0.05 0.00 - 0.12 K/uL    Immature Granulocytes (abs) 0.04 0.00 - 0.11 K/uL    NRBC (Absolute) 0.00 K/uL    Narrative    Request patient fasting?->Yes  Fasting Instructions:->Fast 8-10 hours, OK to drink water as  needed during fast, take medications per your provider's  instruction.   FASTING STATUS   Result Value Ref Range    Fasting Status Fasting     Narrative    Request patient fasting?->Yes  Fasting Instructions:->Fast 8-10 hours, OK to drink water as  needed during fast, take medications per your provider's  instruction.   Lipid Profile   Result Value Ref Range    Cholesterol,Tot 179 100 - 199 mg/dL    Triglycerides 292 (H) 0 - 149 mg/dL    HDL 34 (A) >=40 mg/dL    LDL 87 <100 mg/dL    Narrative    Request patient fasting?->Yes  Fasting Instructions:->Fast 8-10 hours, OK to drink water as  needed during  fast, take medications per your provider's  instruction.   ESTIMATED GFR   Result Value Ref Range    GFR If African American >60 >60 mL/min/1.73 m 2    GFR If Non African American >60 >60 mL/min/1.73 m 2    Narrative    Request patient fasting?->Yes  Fasting Instructions:->Fast 8-10 hours, OK to drink water as  needed during fast, take medications per your provider's  instruction.         If you have any questions or concerns, please don't hesitate to call.        Sincerely,      SURYA Dooley.    Electronically Signed

## 2020-03-04 NOTE — TELEPHONE ENCOUNTER
Phone Number Called: [phone] (home)       Call outcome: Did not leave a detailed message. Requested patient to call back.    Message: asked pt to return call

## 2020-03-05 NOTE — TELEPHONE ENCOUNTER
Phone Number Called: [phone] (home)       Call outcome: Did not leave a detailed message. Requested patient to call back.    Message: Results

## 2020-03-09 NOTE — TELEPHONE ENCOUNTER
Phone Number Called:[phone] (home)        Call outcome: Did not leave a detailed message. Requested patient to call back.    Message: Results

## 2020-03-11 DIAGNOSIS — E55.9 VITAMIN D DEFICIENCY: ICD-10-CM

## 2020-03-11 RX ORDER — ERGOCALCIFEROL 1.25 MG/1
50000 CAPSULE ORAL
Qty: 8 CAP | Refills: 0 | Status: SHIPPED | OUTPATIENT
Start: 2020-03-11 | End: 2020-04-30

## 2020-03-11 NOTE — TELEPHONE ENCOUNTER
Phone Number Called: [phone] (home)       Call outcome: Did not leave a detailed message. Requested patient to call back.

## 2020-03-11 NOTE — TELEPHONE ENCOUNTER
Phone Number Called: [phone] (home)       Call outcome: Spoke to patient regarding message below.    Message: Pt advised of result note. Pt was wondering if you would be able to send in a prescription for vitamin d 1000 units.

## 2020-03-11 NOTE — PROGRESS NOTES
Patient requested rx. Order placed for ergocalciferol 50,000 units weekly x 8 weeks. Vitamin D level is 15.

## 2020-03-12 NOTE — TELEPHONE ENCOUNTER
Phone Number Called: [phone] (home)       Call outcome: Left detailed message for patient. Informed to call back with any additional questions.

## 2020-03-25 ENCOUNTER — OFFICE VISIT (OUTPATIENT)
Dept: MEDICAL GROUP | Facility: PHYSICIAN GROUP | Age: 55
End: 2020-03-25
Payer: COMMERCIAL

## 2020-03-25 VITALS
DIASTOLIC BLOOD PRESSURE: 74 MMHG | SYSTOLIC BLOOD PRESSURE: 118 MMHG | OXYGEN SATURATION: 97 % | HEART RATE: 83 BPM | TEMPERATURE: 97.5 F | BODY MASS INDEX: 33.87 KG/M2 | WEIGHT: 157 LBS | HEIGHT: 57 IN | RESPIRATION RATE: 14 BRPM

## 2020-03-25 DIAGNOSIS — E11.9 TYPE 2 DIABETES MELLITUS WITHOUT COMPLICATION, WITHOUT LONG-TERM CURRENT USE OF INSULIN (HCC): ICD-10-CM

## 2020-03-25 DIAGNOSIS — M25.541 ARTHRALGIA OF BOTH HANDS: ICD-10-CM

## 2020-03-25 DIAGNOSIS — H04.203 WATERY EYES: ICD-10-CM

## 2020-03-25 DIAGNOSIS — M25.542 ARTHRALGIA OF BOTH HANDS: ICD-10-CM

## 2020-03-25 DIAGNOSIS — I10 ESSENTIAL HYPERTENSION: ICD-10-CM

## 2020-03-25 DIAGNOSIS — M19.042 PRIMARY OSTEOARTHRITIS OF BOTH HANDS: ICD-10-CM

## 2020-03-25 DIAGNOSIS — M19.041 PRIMARY OSTEOARTHRITIS OF BOTH HANDS: ICD-10-CM

## 2020-03-25 DIAGNOSIS — R10.12 LEFT UPPER QUADRANT PAIN: ICD-10-CM

## 2020-03-25 DIAGNOSIS — Z23 NEED FOR VACCINATION: ICD-10-CM

## 2020-03-25 PROCEDURE — 99214 OFFICE O/P EST MOD 30 MIN: CPT | Mod: 25 | Performed by: NURSE PRACTITIONER

## 2020-03-25 PROCEDURE — 90686 IIV4 VACC NO PRSV 0.5 ML IM: CPT | Performed by: NURSE PRACTITIONER

## 2020-03-25 PROCEDURE — 90471 IMMUNIZATION ADMIN: CPT | Performed by: NURSE PRACTITIONER

## 2020-03-25 RX ORDER — LISINOPRIL 10 MG/1
10 TABLET ORAL DAILY
Qty: 90 TAB | Refills: 3 | Status: SHIPPED | OUTPATIENT
Start: 2020-03-25 | End: 2021-07-28

## 2020-03-25 ASSESSMENT — FIBROSIS 4 INDEX: FIB4 SCORE: 0.97

## 2020-03-25 NOTE — PROGRESS NOTES
Subjective:     CC: eye problem, abdominal pain, and medication refill.     HPI:   Kary presents today with her son for:     Essential hypertension  Chronic medical problem.  Patient currently taking lisinopril 10 mg daily.  She would like a medication refill.  She does not check her blood pressure at home.  She will occasionally check her BP at [facility] and states her readings are 140s or less over 60s.  Denies any chest pain, shortness of breath, palpitations, or dizziness.    Left upper quadrant pain  Chronic medical problem.  Patient reports that she has scheduled EGD with [facility] for 4/1/2020 which will evaluate her left upper quadrant pain.  Her pain is intermittent and resolves on its own.  Denies any nausea, vomiting, fever, chills, night sweats, bloody stools, or unexplained weight loss.    Arthralgia of both hands  Chronic medical problem.  New to examiner.  Patient reports that her arthritis has worsened.  She reports pain to her fingers and hand.  She does have history of osteoarthritis in both hands.  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  She would like to know if there other medications for pain she can use.  She has been able to decrease her work to 4 days a week and 8-hour days.    Watery eyes  Acute medical problem. New to examiner. Patient reports and when she wakes up in the morning she will have watery eyes. Denies recent sick contacts, new face lotions, itchiness, crusting, or drainage. No history of allergies.     Type 2 diabetes mellitus without complication, without long-term current use of insulin ([facility])  Chronic medical problem.  Patient had lab work completed on 2/29/2020 which showed an A1c of 8.2%.  Patient started her metformin 500 mg daily last week and has recently started taking metformin 500 mg twice a week.  She has not had follow-up with diabetic education at this time.      Past Medical  "History:   Diagnosis Date   • GERD (gastroesophageal reflux disease)    • Hypertension    • Osteoarthritis of both hands 2015       Social History     Tobacco Use   • Smoking status: Never Smoker   • Smokeless tobacco: Never Used   Substance Use Topics   • Alcohol use: No   • Drug use: No       Current Outpatient Medications Ordered in Epic   Medication Sig Dispense Refill   • lisinopril (PRINIVIL) 10 MG Tab Take 1 Tab by mouth every day. 90 Tab 3   • Diclofenac Sodium 1 % Gel Apply 1 Application to affected area(s) 4 times a day as needed (joint pain to hands). 1 Tube 1   • ergocalciferol (DRISDOL) 83995 UNIT capsule Take 1 Cap by mouth every 7 days for 8 doses. 8 Cap 0   • metFORMIN (GLUCOPHAGE) 500 MG Tab Take 1 Tab by mouth 2 times a day, with meals. For the first week take 500 mg by mouth daily, the second week increase to 2 times a day. 60 Tab 2   • Naproxen Sodium (ALEVE PO) Take  by mouth.       No current Epic-ordered facility-administered medications on file.        Allergies:  Patient has no known allergies.    Health Maintenance: Up-to-date.  She has colonoscopy scheduled for 4/20/2020.    ROS:  Gen: no fevers/chills, no unexplained weight loss, no night sweats  Eyes: no changes in vision  ENT: no sore throat, no ear pain, no congestion, no itchy eyes, no eye drainage, + watery eyes  Pulm: no sob, no cough  CV: no chest pain, no palpitations  GI: no nausea/vomiting, no diarrhea, no bloody stools  : no dysuria, no urgency, no increased frequency  MSk: no myalgias  Skin: no rash  Neuro: no headaches, no dizziness  Heme/Lymph: no easy bruising      Objective:     Vital signs reviewed  Exam:  /74   Pulse 83   Temp 36.4 °C (97.5 °F) (Temporal)   Resp 14   Ht 1.448 m (4' 9\")   Wt 71.2 kg (157 lb)   SpO2 97%   BMI 33.97 kg/m²  Body mass index is 33.97 kg/m².    Gen: Alert and oriented, No apparent distress.  EYES: Extraocular movements intact, pupils equal and reactive to light. No erythema, " swelling, drainage or crusting present.   Neck: Neck is supple without lymphadenopathy.  Lungs: Normal effort, CTA bilaterally, no wheezes, rhonchi, or rales  CV: Regular rate and rhythm. No murmurs, rubs, or gallops.  Ext: No clubbing, cyanosis, edema. Tenderness on palpation to first and fourth digit DIP and PIP on both right and left hands. Tenderness on palpation to first and fourth metacarpals on both right and left hands. bilaterl DIP on first finger has nodule.      Assessment & Plan:     54 y.o. female with the following -     1. Type 2 diabetes mellitus without complication, without long-term current use of insulin (HCC)  Chronic unstable. Continue metformin.  She will be due for A1c repeat around 6/1/2020.  I reprinted referral information so patient may schedule appointment with diabetes educator.  Monitor and follow.    2. Left upper quadrant pain  Chronic stable. Continue follow-up with GI.  Monitor and follow.    3. Arthralgia of both hands  Chronic unstable.  New to me.  Patient reports having increased pain to both metacarpals, PIP and DIP.  She does have a history of osteoarthritis.  I will order labs and x-rays rule out RA.  For pain improvement, will try diclofenac gel.  Monitor and follow.  - RHEUMATOID ARTHRITIS FACTOR; Future  - CCP; Future  - Sed Rate; Future  - DX-JOINT SURVEY-HANDS SINGLE VIEW; Future  - Diclofenac Sodium 1 % Gel; Apply 1 Application to affected area(s) 4 times a day as needed (joint pain to hands).  Dispense: 1 Tube; Refill: 1    4. Essential hypertension  Chronic stable.  Continue lisinopril, medication refilled.  Initial BP was 140/78, on repeat at end of exam /74.  Monitor and follow.  - lisinopril (PRINIVIL) 10 MG Tab; Take 1 Tab by mouth every day.  Dispense: 90 Tab; Refill: 3    5. Primary osteoarthritis of both hands  Chronic unstable.  Continue naproxen.  Patient having intermittent increased pain, will trial diclofenac to see if this helps improve.  Monitor  and follow.  - Diclofenac Sodium 1 % Gel; Apply 1 Application to affected area(s) 4 times a day as needed (joint pain to hands).  Dispense: 1 Tube; Refill: 1    6. Watery eyes  Acute unstable.  New to me.  I do not believe this is infectious.  Exam within normal limits.  Discussed the patient she may trial warm compresses and washing with baby soap to see if this helps improve.  Patient verbalized understanding.  Monitor and follow.    7. Need for vaccination  Patient requesting to have influenza vaccine given.  I have placed the below orders and discussed them with an approved delegating provider. The MA is performing the below orders under the direction of Dr. Buckley.  - Influenza Vaccine Quad Injection (PF)      Return in about 3 months (around 6/25/2020) for Diabetes, A1C, HTN.    Please note that this dictation was created using voice recognition software. I have made every reasonable attempt to correct obvious errors, but I expect that there are errors of grammar and possibly content that I did not discover before finalizing the note.

## 2020-03-25 NOTE — PATIENT INSTRUCTIONS
Diabetic Education      [facility]  [address], [address]  [place] [address]  Phone: [phone]

## 2020-03-26 ENCOUNTER — HOSPITAL ENCOUNTER (OUTPATIENT)
Dept: RADIOLOGY | Facility: MEDICAL CENTER | Age: 55
End: 2020-03-26
Attending: NURSE PRACTITIONER
Payer: COMMERCIAL

## 2020-03-26 ENCOUNTER — HOSPITAL ENCOUNTER (OUTPATIENT)
Dept: LAB | Facility: MEDICAL CENTER | Age: 55
End: 2020-03-26
Attending: NURSE PRACTITIONER
Payer: COMMERCIAL

## 2020-03-26 DIAGNOSIS — M25.541 ARTHRALGIA OF BOTH HANDS: ICD-10-CM

## 2020-03-26 DIAGNOSIS — M25.542 ARTHRALGIA OF BOTH HANDS: ICD-10-CM

## 2020-03-26 LAB
ERYTHROCYTE [SEDIMENTATION RATE] IN BLOOD BY WESTERGREN METHOD: 7 MM/HOUR (ref 0–30)
RHEUMATOID FACT SER IA-ACNC: 83 IU/ML (ref 0–14)

## 2020-03-26 PROCEDURE — 36415 COLL VENOUS BLD VENIPUNCTURE: CPT

## 2020-03-26 PROCEDURE — 86200 CCP ANTIBODY: CPT

## 2020-03-26 PROCEDURE — 85652 RBC SED RATE AUTOMATED: CPT

## 2020-03-26 PROCEDURE — 77077 JOINT SURVEY SINGLE VIEW: CPT

## 2020-03-26 PROCEDURE — 86431 RHEUMATOID FACTOR QUANT: CPT

## 2020-03-26 NOTE — ASSESSMENT & PLAN NOTE
Chronic medical problem.  Patient had lab work completed on 2/29/2020 which showed an A1c of 8.2%.  Patient started her metformin 500 mg daily last week and has recently started taking metformin 500 mg twice a week.  She has not had follow-up with diabetic education at this time.

## 2020-03-26 NOTE — ASSESSMENT & PLAN NOTE
Chronic medical problem.  Patient reports that she has scheduled EGD with [facility] for 4/1/2020 which will evaluate her left upper quadrant pain.  Her pain is intermittent and resolves on its own.  Denies any nausea, vomiting, fever, chills, night sweats, bloody stools, or unexplained weight loss.

## 2020-03-26 NOTE — ASSESSMENT & PLAN NOTE
Acute medical problem. New to examiner. Patient reports and when she wakes up in the morning she will have watery eyes. Denies recent sick contacts, new face lotions, itchiness, crusting, or drainage. No history of allergies.

## 2020-03-26 NOTE — ASSESSMENT & PLAN NOTE
Chronic medical problem.  New to examiner.  Patient reports that her arthritis has worsened.  She reports pain to her fingers and hand.  She does have history of osteoarthritis in both hands.  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  She would like to know if there other medications for pain she can use.  She has been able to decrease her work to 4 days a week and 8-hour days.

## 2020-03-28 LAB — CCP IGG SERPL-ACNC: 93 UNITS (ref 0–19)

## 2020-03-30 ENCOUNTER — TELEPHONE (OUTPATIENT)
Dept: MEDICAL GROUP | Facility: PHYSICIAN GROUP | Age: 55
End: 2020-03-30

## 2020-03-30 DIAGNOSIS — R76.8 POSITIVE ANTI-CCP TEST: ICD-10-CM

## 2020-03-30 DIAGNOSIS — R76.8 RHEUMATOID FACTOR POSITIVE: ICD-10-CM

## 2020-03-30 NOTE — TELEPHONE ENCOUNTER
Phone Number Called: [phone] (home)       Call outcome: Spoke to patient regarding message below.    Message: Pt advised of note. Pt was wondering if there is any medication that will help with her inflammation in her hands? Kary would also like to thank you for caring about her health so much. She is very thankful that she has found you as a primary.

## 2020-03-30 NOTE — TELEPHONE ENCOUNTER
----- Message from ESTEBAN Dooley sent at 3/30/2020  9:47 AM PDT -----  Please call patient and inform her that her lab results are highly suspicious for rheumatoid arthritis, an autoimmune disease that affects the joints. Her hand xray's did not show signs of rheumatoid arthritis but did show osteoarthritis (which she already has).  I will refer her to Rheumatology.   Thank you  ESTEBAN Dooley

## 2020-04-01 NOTE — TELEPHONE ENCOUNTER
Phone Number Called: [phone] (home)       Call outcome: Left detailed message for patient. Informed to call back with any additional questions.    Message: left vm

## 2020-07-01 ENCOUNTER — OFFICE VISIT (OUTPATIENT)
Dept: MEDICAL GROUP | Facility: PHYSICIAN GROUP | Age: 55
End: 2020-07-01
Payer: COMMERCIAL

## 2020-07-01 VITALS
SYSTOLIC BLOOD PRESSURE: 120 MMHG | BODY MASS INDEX: 32.58 KG/M2 | OXYGEN SATURATION: 94 % | RESPIRATION RATE: 14 BRPM | HEART RATE: 85 BPM | WEIGHT: 151 LBS | TEMPERATURE: 98 F | DIASTOLIC BLOOD PRESSURE: 64 MMHG | HEIGHT: 57 IN

## 2020-07-01 DIAGNOSIS — Z12.31 ENCOUNTER FOR SCREENING MAMMOGRAM FOR BREAST CANCER: ICD-10-CM

## 2020-07-01 DIAGNOSIS — R76.8 CYCLIC CITRULLINATED PEPTIDE (CCP) ANTIBODY POSITIVE: ICD-10-CM

## 2020-07-01 DIAGNOSIS — R42 DIZZINESS: ICD-10-CM

## 2020-07-01 DIAGNOSIS — E55.9 VITAMIN D DEFICIENCY: ICD-10-CM

## 2020-07-01 DIAGNOSIS — H61.21 RIGHT EAR IMPACTED CERUMEN: ICD-10-CM

## 2020-07-01 DIAGNOSIS — I10 ESSENTIAL HYPERTENSION: ICD-10-CM

## 2020-07-01 DIAGNOSIS — E11.9 TYPE 2 DIABETES MELLITUS WITHOUT COMPLICATION, WITHOUT LONG-TERM CURRENT USE OF INSULIN (HCC): ICD-10-CM

## 2020-07-01 LAB
HBA1C MFR BLD: 7.1 % (ref 0–5.6)
INT CON NEG: NEGATIVE
INT CON POS: POSITIVE

## 2020-07-01 PROCEDURE — 92250 FUNDUS PHOTOGRAPHY W/I&R: CPT | Mod: TC | Performed by: NURSE PRACTITIONER

## 2020-07-01 PROCEDURE — 83036 HEMOGLOBIN GLYCOSYLATED A1C: CPT | Performed by: NURSE PRACTITIONER

## 2020-07-01 PROCEDURE — 99214 OFFICE O/P EST MOD 30 MIN: CPT | Performed by: NURSE PRACTITIONER

## 2020-07-01 RX ORDER — PANTOPRAZOLE SODIUM 20 MG/1
TABLET, DELAYED RELEASE ORAL
COMMUNITY
Start: 2020-05-26 | End: 2021-07-21

## 2020-07-01 RX ORDER — DICLOFENAC SODIUM 75 MG/1
TABLET, DELAYED RELEASE ORAL
COMMUNITY
Start: 2020-06-29 | End: 2021-07-21

## 2020-07-01 RX ORDER — ERGOCALCIFEROL 1.25 MG/1
50000 CAPSULE ORAL
Qty: 12 CAP | Refills: 0 | Status: SHIPPED | OUTPATIENT
Start: 2020-07-01 | End: 2021-01-07

## 2020-07-01 ASSESSMENT — FIBROSIS 4 INDEX: FIB4 SCORE: 0.97

## 2020-07-01 NOTE — ASSESSMENT & PLAN NOTE
Chronic medical problem.  She has followed up with [facility] for her elevated CCP antibody.  She reports that she was recently prescribed hydro chloroquine by her rheumatologist.  She has not started the medication, she will  the new medication tomorrow. She has questions regarding side effects from the medication and vision loss. She has not reached out to her prescribing rheumatologist.  Her next appointment is in September.

## 2020-07-01 NOTE — PROGRESS NOTES
Subjective:     CC: Diabetes, medication problem, medication refill, and dizziness    HPI:   Kary presents today with the following:  A  was used for beginning of the visit until the  device stopped working.  's name was Sydnee and her number was [id number].      Type 2 diabetes mellitus without complication, without long-term current use of insulin (HCC)  Chronic medical problem.  She is taking metformin 500 mg twice daily with meals.  She is tolerating medication.  Denies any diarrhea.  She is here today for diabetes health maintenance.  She is checking her blood sugars twice a day and reports that her morning fasting blood sugars are in the 130s. She would like a medication refill. She reports that she has made diet changes. She has decreased her tortilla and rice intake. She is eating mostly salads, banana, orange and drinking water. She is down 6 pounds from her last office visit.     Cyclic citrullinated peptide (CCP) antibody positive  Chronic medical problem.  She has followed up with [facility] for her elevated CCP antibody.  She reports that she was recently prescribed hydro chloroquine by her rheumatologist.  She has not started the medication, she will  the new medication tomorrow. She has questions regarding side effects from the medication and vision loss. She has not reached out to her prescribing rheumatologist.  Her next appointment is in September.    Dizziness  New problem to examiner.  Patient states originally started 4 days ago.  The dizziness is intermittent.  She states the room does not spin.  She has not fallen.  She has not started any new medications recently.    Essential hypertension  Chronic medical problem.  She is taking lisinopril 10 mg daily.  Her BP is at goal today.  She does not check her blood pressure at home.  Denies any chest pain, shortness of breath, palpitations, or headaches.  She does have intermittent dizziness  "for the last 4 days.      Past Medical History:   Diagnosis Date   • GERD (gastroesophageal reflux disease)    • Hypertension    • Osteoarthritis of both hands 2015       Social History     Tobacco Use   • Smoking status: Never Smoker   • Smokeless tobacco: Never Used   Substance Use Topics   • Alcohol use: No   • Drug use: No       Current Outpatient Medications Ordered in Epic   Medication Sig Dispense Refill   • metFORMIN (GLUCOPHAGE) 500 MG Tab Take 1 Tab by mouth 2 times a day, with meals. 180 Tab 2   • ergocalciferol (DRISDOL) 31982 UNIT capsule Take 1 Cap by mouth every 7 days. 12 Cap 0   • carbamide peroxide (DEBROX) 6.5 % Solution Place 6 Drops in right ear 2 times a day. Administer drops in both ears. 1 Bottle 1   • lisinopril (PRINIVIL) 10 MG Tab Take 1 Tab by mouth every day. 90 Tab 3   • Diclofenac Sodium 1 % Gel Apply 1 Application to affected area(s) 4 times a day as needed (joint pain to hands). 1 Tube 1   • diclofenac EC (VOLTAREN) 75 MG Tablet Delayed Response      • pantoprazole (PROTONIX) 20 MG tablet TAKE 1 TABLET BY MOUTH ONCE DAILY 30 MINUTES BEFORE BREAKFAST MEAL       No current Epic-ordered facility-administered medications on file.        Allergies:  Patient has no known allergies.    Health Maintenance: Due for diabetes health maintenance.  Due for mammogram.  Due for zoster and Pneumovax vaccine.    ROS:  Gen: no fevers/chills, no changes in weight  Eyes: no changes in vision  ENT: no sore throat, no ear pain  Pulm: no sob, no cough  CV: no chest pain, no palpitations, no leg swelling  GI: no nausea/vomiting, no diarrhea  : no dysuria  MSk: no myalgias, no falls, + bilateral hand pain  Skin: no rash  Neuro: no headaches, + dizziness      Objective:     Vital signs reviewed  Exam:  /64 (BP Location: Right arm, Patient Position: Sitting, BP Cuff Size: Large adult)   Pulse 85   Temp 36.7 °C (98 °F) (Temporal)   Resp 14   Ht 1.448 m (4' 9\")   Wt 68.5 kg (151 lb)   SpO2 94%   " BMI 32.68 kg/m²  Body mass index is 32.68 kg/m².    Gen: Alert and oriented, No apparent distress.  Neck: Neck is supple without lymphadenopathy.  Ears:   Left ear canal is clear, tympanic membrane pearly gray.  Right ear canal has impacted cerumen and unable to visualize tympanic membrane.  Lungs: Normal effort, CTA bilaterally, no wheezes, rhonchi, or rales  CV: Regular rate and rhythm. No murmurs, rubs, or gallops.  Ext: No clubbing, cyanosis, edema.    Monofilament testing with a 10 gram force: sensation intact: intact bilaterally  Visual Inspection: Feet without maceration, ulcers, fissures.  She does have dry heels.  Pedal pulses: intact bilaterally      Labs: A1C 7.1%    Assessment & Plan:     54 y.o. female with the following -     1. Type 2 diabetes mellitus without complication, without long-term current use of insulin ([facility])  Chronic stable medical problem.  Her A1c today has improved to 7.1%.  She will continue with her metformin, medication refilled.  Continue diet lifestyle modifications.  We did order diabetic health maintenance today as below.  - POCT A1C  - metFORMIN (GLUCOPHAGE) 500 MG Tab; Take 1 Tab by mouth 2 times a day, with meals.  Dispense: 180 Tab; Refill: 2  - Diabetic Monofilament Lower Extremity Exam  - MICROALBUMIN CREAT RATIO URINE (LAB COLLECT); Future  - POCT Retinal Eye Exam    2. Essential hypertension  Chronic stable medical problem.  Continue lisinopril.  BP at goal today.  Monitor and follow.    3. Right ear impacted cerumen  New problem to examiner.  Discussed starting Debrox to her right ear.  See if this helps her dizziness.  Red flags discussed.  Monitor and follow.  - carbamide peroxide (DEBROX) 6.5 % Solution; Place 6 Drops in right ear 2 times a day. Administer drops in both ears.  Dispense: 1 Bottle; Refill: 1    4. Dizziness  New problem to examiner.  Her BP is at goal today.  Her labs from 2/29/2020 were stable.  Try Debrox to right ear with impacted cerumen and see if  this helps.  Red flags discussed.  Monitor and follow.    5. Cyclic citrullinated peptide (CCP) antibody positive  Chronic stable medical problem.  Continue follow-up with rheumatology.  Start Plaquenil per rheumatology.  I did discuss with her common side effects of the medication.  Discussed that she should follow-up with optometrist to get baseline vision to help monitor during course of Plaquenil medication.  She verbalized understanding.  Monitor and follow.    6. Vitamin D deficiency  Chronic unstable medical problem.  She was not taking over-the-counter vitamin D.  She is asking for prescription vitamin D.  Start ergocalciferol.  Repeat labs after 12 weeks.  Monitor and follow.  - ergocalciferol (DRISDOL) 00536 UNIT capsule; Take 1 Cap by mouth every 7 days.  Dispense: 12 Cap; Refill: 0  - VITAMIN D,25 HYDROXY; Future    7. Encounter for screening mammogram for breast cancer  New problem to examiner.  Screening indicated.  Patient in agreement.  Orders placed.  Monitor and follow.  - MA-SCREENING MAMMO BILAT W/CAD; Future    Return in about 6 months (around 1/1/2021) for Diabetes, A1C.    Please note that this dictation was created using voice recognition software. I have made every reasonable attempt to correct obvious errors, but I expect that there are errors of grammar and possibly content that I did not discover before finalizing the note.

## 2020-07-01 NOTE — ASSESSMENT & PLAN NOTE
Chronic medical problem.  She is taking metformin 500 mg twice daily with meals.  She is tolerating medication.  Denies any diarrhea.  She is here today for diabetes health maintenance.  She is checking her blood sugars twice a day and reports that her morning fasting blood sugars are in the 130s. She would like a medication refill. She reports that she has made diet changes. She has decreased her tortilla and rice intake. She is eating mostly salads, banana, orange and drinking water. She is down 6 pounds from her last office visit.

## 2020-07-01 NOTE — LETTER
[facility]  ESTEBAN Dooley  [address]  [facility] [address]  Fax: [phone]   Authorization for Release/Disclosure of   Protected Health Information   Name: KARY TERESA : 1965 SSN: [id number]   Address: [address] [place] [id number] Phone:    [phone] (home)    I authorize the entity listed below to release/disclose the PHI below to:   [facility]/ESTEBAN Dooley and ESTEBAN Dooley   Provider or Entity Name:  [facility]   Address   City, State, Zip   Phone:      Fax:     Reason for request: continuity of care   Information to be released:    [  ] LAST COLONOSCOPY,  including any PATH REPORT and follow-up  [  ] LAST FIT/COLOGUARD RESULT [  ] LAST DEXA  [  ] LAST MAMMOGRAM  [  ] LAST PAP  [  ] LAST LABS [  ] RETINA EXAM REPORT  [  ] IMMUNIZATION RECORDS  [  ] Release all info      [  ] Check here and initial the line next to each item to release ALL health information INCLUDING  _____ Care and treatment for drug and / or alcohol abuse  _____ HIV testing, infection status, or AIDS  _____ Genetic Testing    DATES OF SERVICE OR TIME PERIOD TO BE DISCLOSED: _____________  I understand and acknowledge that:  * This Authorization may be revoked at any time by you in writing, except if your health information has already been used or disclosed.  * Your health information that will be used or disclosed as a result of you signing this authorization could be re-disclosed by the recipient. If this occurs, your re-disclosed health information may no longer be protected by State or Federal laws.  * You may refuse to sign this Authorization. Your refusal will not affect your ability to obtain treatment.  * This Authorization becomes effective upon signing and will  on (date) __________.      If no date is indicated, this Authorization will  one (1) year from the signature date.    Name: Kary  Dorita    Signature:   Date:     7/1/2020       PLEASE FAX REQUESTED RECORDS BACK TO: [phone]

## 2020-07-01 NOTE — PATIENT INSTRUCTIONS
Stop Aleve, use Diclofenac    Repeat Vitamin D level in 3 months after medication completed     medications:  Debrox  Diclofenac   Metformin  Plaquenil  Ergocalciferol (Vitamin D)

## 2020-07-02 LAB — RETINAL SCREEN: NEGATIVE

## 2020-07-02 NOTE — ASSESSMENT & PLAN NOTE
New problem to examiner.  Patient states originally started 4 days ago.  The dizziness is intermittent.  She states the room does not spin.  She has not fallen.  She has not started any new medications recently.

## 2020-07-02 NOTE — ASSESSMENT & PLAN NOTE
Chronic medical problem.  She is taking lisinopril 10 mg daily.  Her BP is at goal today.  She does not check her blood pressure at home.  Denies any chest pain, shortness of breath, palpitations, or headaches.  She does have intermittent dizziness for the last 4 days.

## 2020-07-06 ENCOUNTER — TELEPHONE (OUTPATIENT)
Dept: MEDICAL GROUP | Facility: PHYSICIAN GROUP | Age: 55
End: 2020-07-06

## 2020-07-06 NOTE — TELEPHONE ENCOUNTER
----- Message from ESTEBAN Dooley sent at 7/2/2020  6:01 PM PDT -----  Please call patient and inform her that she does not have diabetic retinopathy. This is good. We can continue to check this annually.     Thank you

## 2020-07-06 NOTE — TELEPHONE ENCOUNTER
Phone Number Called:[phone] (home)        Call outcome: Spoke to patient regarding message below.    Message: Results of eye exam

## 2020-07-13 ENCOUNTER — NON-PROVIDER VISIT (OUTPATIENT)
Dept: MEDICAL GROUP | Facility: PHYSICIAN GROUP | Age: 55
End: 2020-07-13
Payer: COMMERCIAL

## 2020-07-13 DIAGNOSIS — Z23 NEED FOR VACCINATION: ICD-10-CM

## 2020-07-13 PROCEDURE — 90471 IMMUNIZATION ADMIN: CPT | Performed by: NURSE PRACTITIONER

## 2020-07-13 PROCEDURE — 90732 PPSV23 VACC 2 YRS+ SUBQ/IM: CPT | Performed by: NURSE PRACTITIONER

## 2020-07-13 NOTE — NON-PROVIDER
"Kary Kevin is a 55 y.o. female here for a non-provider visit for:   PNEUMOVAX (PPSV23)    Reason for immunization: Overdue/Provider Recommended  Immunization records indicate need for vaccine: Yes, confirmed with Epic  Minimum interval has been met for this vaccine: Yes  ABN completed: Not Indicated    Order and dose verified by: ml  VIS Dated  10/30/19 was given to patient: Yes  All IAC Questionnaire questions were answered \"No.\"    Patient tolerated injection and no adverse effects were observed or reported: Yes    Pt scheduled for next dose in series: Not Indicated      "

## 2020-07-25 ENCOUNTER — HOSPITAL ENCOUNTER (OUTPATIENT)
Dept: RADIOLOGY | Facility: MEDICAL CENTER | Age: 55
End: 2020-07-25
Attending: OBSTETRICS & GYNECOLOGY
Payer: COMMERCIAL

## 2020-07-25 DIAGNOSIS — Z12.31 ENCOUNTER FOR MAMMOGRAM TO ESTABLISH BASELINE MAMMOGRAM: ICD-10-CM

## 2020-07-25 PROCEDURE — 77067 SCR MAMMO BI INCL CAD: CPT

## 2020-10-01 ENCOUNTER — IMMUNIZATION (OUTPATIENT)
Dept: SOCIAL WORK | Facility: CLINIC | Age: 55
End: 2020-10-01
Payer: COMMERCIAL

## 2020-10-01 DIAGNOSIS — Z23 NEED FOR VACCINATION: ICD-10-CM

## 2020-10-01 PROCEDURE — 90686 IIV4 VACC NO PRSV 0.5 ML IM: CPT | Performed by: REGISTERED NURSE

## 2020-10-01 PROCEDURE — 90471 IMMUNIZATION ADMIN: CPT | Performed by: REGISTERED NURSE

## 2020-11-16 ENCOUNTER — OFFICE VISIT (OUTPATIENT)
Dept: URGENT CARE | Facility: PHYSICIAN GROUP | Age: 55
End: 2020-11-16
Payer: COMMERCIAL

## 2020-11-16 VITALS
HEIGHT: 59 IN | BODY MASS INDEX: 29.43 KG/M2 | RESPIRATION RATE: 14 BRPM | TEMPERATURE: 96.7 F | OXYGEN SATURATION: 94 % | HEART RATE: 78 BPM | DIASTOLIC BLOOD PRESSURE: 80 MMHG | WEIGHT: 146 LBS | SYSTOLIC BLOOD PRESSURE: 132 MMHG

## 2020-11-16 DIAGNOSIS — M25.511 ACUTE PAIN OF RIGHT SHOULDER: ICD-10-CM

## 2020-11-16 PROCEDURE — 99214 OFFICE O/P EST MOD 30 MIN: CPT | Performed by: PHYSICIAN ASSISTANT

## 2020-11-16 RX ORDER — DICLOFENAC SODIUM 75 MG/1
75 TABLET, DELAYED RELEASE ORAL 2 TIMES DAILY
Qty: 60 TAB | Refills: 0 | Status: SHIPPED | OUTPATIENT
Start: 2020-11-16 | End: 2021-01-07

## 2020-11-16 ASSESSMENT — ENCOUNTER SYMPTOMS
JOINT SWELLING: 0
ABDOMINAL PAIN: 0
TINGLING: 0
HEADACHES: 0
DOUBLE VISION: 0
DIZZINESS: 0
FALLS: 0
NAUSEA: 0
NUMBNESS: 0
FATIGUE: 0
MYALGIAS: 1
COUGH: 0
SENSORY CHANGE: 0
FEVER: 0
BLURRED VISION: 0
NECK PAIN: 0
PALPITATIONS: 0
WEAKNESS: 1
SHORTNESS OF BREATH: 0
CHILLS: 0
DIAPHORESIS: 0
VOMITING: 0

## 2020-11-16 ASSESSMENT — FIBROSIS 4 INDEX: FIB4 SCORE: 0.99

## 2020-11-16 NOTE — PROGRESS NOTES
Subjective:   Kary Kevin is a 55 y.o. female who presents for Shoulder Pain (R shoulder [id number])      Shoulder Pain  This is a new (Right shoulder.  Patient was lifting a heavy bag of groceries and noticed sharp pain to the lateral aspect of his shoulder over the deltoid.  Patient has had continued pain over the last 24 hours.  Difficulty with shoulder flexion or abduction.  ) problem. The current episode started yesterday. The problem occurs constantly. The problem has been unchanged. Associated symptoms include myalgias and weakness (Right shoulder). Pertinent negatives include no abdominal pain, chest pain, chills, coughing, diaphoresis, fatigue, fever, headaches, joint swelling, nausea, neck pain, numbness or vomiting. Exacerbated by: Shoulder flexion and abduction.  Palpation over the bicipital groove and lateral deltoid/supraspinatus. She has tried acetaminophen for the symptoms. The treatment provided mild relief.       Review of Systems   Constitutional: Negative for chills, diaphoresis, fatigue, fever and malaise/fatigue.   Eyes: Negative for blurred vision and double vision.   Respiratory: Negative for cough and shortness of breath.    Cardiovascular: Negative for chest pain and palpitations.   Gastrointestinal: Negative for abdominal pain, nausea and vomiting.   Musculoskeletal: Positive for joint pain (Right shoulder pain) and myalgias. Negative for falls, joint swelling and neck pain.   Skin:        Denies any overlying erythema or ecchymosis.   Neurological: Positive for weakness (Right shoulder). Negative for dizziness, tingling, sensory change, numbness and headaches.       Medications:    • carbamide peroxide Soln  • diclofenac DR Tbec  • Diclofenac Sodium Gel  • ergocalciferol  • lisinopril Tabs  • metFORMIN Tabs  • pantoprazole    Allergies: Patient has no known allergies.    Problem List: Kary Kevin has Primary osteoarthritis of both hands; Essential hypertension;  "Obesity (BMI 30-39.9); Gastroesophageal reflux disease without esophagitis; Left upper quadrant pain; Arthralgia of both hands; Watery eyes; Type 2 diabetes mellitus without complication, without long-term current use of insulin ([facility]); Cyclic citrullinated peptide (CCP) antibody positive; and Dizziness on their problem list.    Surgical History:  Past Surgical History:   Procedure Laterality Date   • PRIMARY C SECTION      x2       Past Social Hx: Kary Kevin  reports that she has never smoked. She has never used smokeless tobacco. She reports that she does not drink alcohol or use drugs.     Past Family Hx:  Kary Kevin family history includes Arthritis in her mother; Heart Disease in her brother and brother; Heart Disease (age of onset: 64) in her mother; Heart Disease (age of onset: 65) in her father; Other in her sister.     Problem list, medications, and allergies reviewed by myself today in Epic.     Objective:     /80   Pulse 78   Temp 35.9 °C (96.7 °F) (Temporal)   Resp 14   Ht 1.499 m (4' 11\")   Wt 66.2 kg (146 lb)   SpO2 94%   BMI 29.49 kg/m²     Physical Exam  Constitutional:       General: She is not in acute distress.     Appearance: Normal appearance. She is not ill-appearing, toxic-appearing or diaphoretic.   HENT:      Head: Normocephalic and atraumatic.   Eyes:      Conjunctiva/sclera: Conjunctivae normal.   Neck:      Musculoskeletal: Normal range of motion. No neck rigidity or muscular tenderness.   Cardiovascular:      Rate and Rhythm: Normal rate and regular rhythm.      Pulses: Normal pulses.      Heart sounds: Normal heart sounds.   Pulmonary:      Effort: Pulmonary effort is normal.      Breath sounds: Normal breath sounds. No wheezing.   Musculoskeletal:      Right shoulder: She exhibits decreased range of motion and tenderness. She exhibits no bony tenderness, no swelling, no effusion and no crepitus.        Arms:       Comments: Right shoulder:  No bony " tenderness to palpation.  No obvious joint effusion or ecchymosis.  Tenderness to palpation over the bicipital groove.  Tenderness to palpation over the lateral deltoid and supraspinatus insertion.  Active range of motion limited to 90 degrees shoulder flexion and 70 degrees shoulder abduction.  Full passive range of motion.  Full elbow flexion and extension/supination pronation   strength 5/5.  Positive empty can.  Positive speeds test   Skin:     General: Skin is warm.      Capillary Refill: Capillary refill takes less than 2 seconds.   Neurological:      General: No focal deficit present.      Mental Status: She is alert. Mental status is at baseline.   Psychiatric:         Mood and Affect: Mood normal.         Behavior: Behavior normal.         Thought Content: Thought content normal.           Assessment/Plan:     Diagnosis and associated orders:     1. Acute pain of right shoulder  REFERRAL TO SPORTS MEDICINE    diclofenac DR (VOLTAREN) 75 MG Tablet Delayed Response      Comments/MDM:       • Patient had a nontraumatic right shoulder injury occurred yesterday.  Patient was lifting a bag of groceries when she noticed immediate pain to the lateral aspect of her right shoulder.  Denies any direct trauma.  Patient had tenderness over the bicipital groove and supraspinatus insertion.  Limited active range of motion to shoulder flexion and abduction.  Positive speeds.  Positive empty cans.  Differentials include biceps tendinitis versus supraspinatus injury.  A referral was placed to follow-up with sports medicine.  • May take anti-inflammatory medication twice daily as prescribed.  Tylenol for any breakthrough pain.  • Recommended alternating ice and heat.  • Gentle range of motion and pendulum exercises encouraged.           Differential diagnosis, natural history, supportive care, and indications for immediate follow-up discussed.    Advised the patient to follow-up with the primary care physician for  recheck, reevaluation, and consideration of further management.    Please note that this dictation was created using voice recognition software. I have made reasonable attempt to correct obvious errors, but I expect that there are errors of grammar and possibly content that I did not discover before finalizing the note.    This note was electronically signed by MITESH Smallwood PA-C

## 2020-11-16 NOTE — LETTER
[facility] [place]  [address] [address]     November 16, 2020    Patient: Kary Kevin   YOB: 1965   Date of Visit: 11/16/2020       To Whom It May Concern:    Kary Kevin was seen and treated in our department on 11/16/2020.  Please excuse from work on 11/16/2020 through 11/18/2020.  Please call with any questions or concerns at [phone].    Sincerely,     Janes Smallwood P.A.-C.

## 2020-11-18 ENCOUNTER — TELEPHONE (OUTPATIENT)
Dept: URGENT CARE | Facility: PHYSICIAN GROUP | Age: 55
End: 2020-11-18

## 2020-11-18 NOTE — TELEPHONE ENCOUNTER
Kary Brannon called today, her work needs a letter saying that she can go back to work with her shoulder.

## 2021-01-07 ENCOUNTER — OFFICE VISIT (OUTPATIENT)
Dept: MEDICAL GROUP | Facility: PHYSICIAN GROUP | Age: 56
End: 2021-01-07
Payer: COMMERCIAL

## 2021-01-07 VITALS
OXYGEN SATURATION: 92 % | HEIGHT: 57 IN | BODY MASS INDEX: 32.15 KG/M2 | SYSTOLIC BLOOD PRESSURE: 150 MMHG | RESPIRATION RATE: 16 BRPM | HEART RATE: 75 BPM | WEIGHT: 149 LBS | TEMPERATURE: 97.3 F | DIASTOLIC BLOOD PRESSURE: 80 MMHG

## 2021-01-07 DIAGNOSIS — I10 ESSENTIAL HYPERTENSION: ICD-10-CM

## 2021-01-07 DIAGNOSIS — E11.9 TYPE 2 DIABETES MELLITUS WITHOUT COMPLICATION, WITHOUT LONG-TERM CURRENT USE OF INSULIN (HCC): ICD-10-CM

## 2021-01-07 DIAGNOSIS — M25.542 ARTHRALGIA OF BOTH HANDS: ICD-10-CM

## 2021-01-07 DIAGNOSIS — M79.18 PAIN IN DELTOID, BILATERAL: ICD-10-CM

## 2021-01-07 DIAGNOSIS — M25.541 ARTHRALGIA OF BOTH HANDS: ICD-10-CM

## 2021-01-07 DIAGNOSIS — E55.9 VITAMIN D DEFICIENCY: ICD-10-CM

## 2021-01-07 PROBLEM — M79.602 BILATERAL ARM PAIN: Status: ACTIVE | Noted: 2021-01-07

## 2021-01-07 PROBLEM — M79.601 BILATERAL ARM PAIN: Status: ACTIVE | Noted: 2021-01-07

## 2021-01-07 LAB
HBA1C MFR BLD: 7.8 % (ref 0–5.6)
INT CON NEG: NEGATIVE
INT CON POS: POSITIVE

## 2021-01-07 PROCEDURE — 83036 HEMOGLOBIN GLYCOSYLATED A1C: CPT | Performed by: NURSE PRACTITIONER

## 2021-01-07 PROCEDURE — 99214 OFFICE O/P EST MOD 30 MIN: CPT | Performed by: NURSE PRACTITIONER

## 2021-01-07 ASSESSMENT — PATIENT HEALTH QUESTIONNAIRE - PHQ9: CLINICAL INTERPRETATION OF PHQ2 SCORE: 0

## 2021-01-07 ASSESSMENT — FIBROSIS 4 INDEX: FIB4 SCORE: 0.99

## 2021-01-07 NOTE — PROGRESS NOTES
Subjective:     CC: bilateral arm pain, diabetes follow-up, medication refill.       HPI:   Kary presents today with the following.     A Wallisian interpretor Farzad ID number [id number] was used for this appointment.     Pain in deltoid, bilateral  New problem to examiner. 2 months ago she was seen in Urgent Care. She was prescribed diclofenac and referral placed to sports therapy. She reported relief of pain with diclofenac. She has not followed up with sports therapy. She reports that her pain is improving.    Arthralgia of both hands  Chronic medical problem. She has followed up with rheumatology. She was prescribed diclofenac but unable to take diclofenac due to her stomach. She has not had follow-up. She does not have pain today.    Type 2 diabetes mellitus without complication, without long-term current use of insulin ([facility])  Chronic medical problem. She is taking metformin 500 mg BID with meals. She is tolerating the medication, no diarrhea. She is due for A1C. She will be due for labs in February 2021.     Essential hypertension  Chronic medical problem. She is taking lisinopril 10 mg daily. She does not check her blood pressure at home. Her initial BP today is 150/72.     Vitamin D deficiency  Chronic medical problem.  She has completed her ergocalciferol course. She would like a medication refill. She did not complete her labs and is due for updated labs.      Past Medical History:   Diagnosis Date   • GERD (gastroesophageal reflux disease)    • Hypertension    • Osteoarthritis of both hands 2015       Social History     Tobacco Use   • Smoking status: Never Smoker   • Smokeless tobacco: Never Used   Substance Use Topics   • Alcohol use: No   • Drug use: No       Current Outpatient Medications Ordered in Epic   Medication Sig Dispense Refill   • Omega-3 Fatty Acids (OMEGA 3 PO) Take  by mouth.     • metFORMIN (GLUCOPHAGE) 850 MG Tab Take 1 Tab by mouth 2 times a day, with meals. 180 Tab 1   • pantoprazole  "(PROTONIX) 20 MG tablet TAKE 1 TABLET BY MOUTH ONCE DAILY 30 MINUTES BEFORE BREAKFAST MEAL     • lisinopril (PRINIVIL) 10 MG Tab Take 1 Tab by mouth every day. 90 Tab 3   • Diclofenac Sodium 1 % Gel Apply 1 Application to affected area(s) 4 times a day as needed (joint pain to hands). 1 Tube 1   • diclofenac EC (VOLTAREN) 75 MG Tablet Delayed Response        No current Epic-ordered facility-administered medications on file.        Allergies:  Patient has no known allergies.    Health Maintenance: Due for diabetes health maintenance in February 2021.  Due for A1c screening.  Due for colonoscopy, she will call and schedule.    ROS:  Gen: no fevers/chills, no changes in weight  Eyes: no changes in vision  ENT: no sore throat  Pulm: no sob, no cough  CV: no chest pain, no palpitations  GI: no nausea/vomiting, no diarrhea  : no dysuria  MSk: + Improving bilateral bicep pain  Skin: no rash  Neuro: no headaches, no dizziness    Objective:     Vital signs reviewed   Exam:  /80   Pulse 75   Temp 36.3 °C (97.3 °F) (Temporal)   Resp 16   Ht 1.448 m (4' 9\")   Wt 67.6 kg (149 lb)   SpO2 92%   BMI 32.24 kg/m²  Body mass index is 32.24 kg/m².    Gen: Alert and oriented, No apparent distress.  Neck: Neck is supple without lymphadenopathy.  Lungs: Normal effort, CTA bilaterally, no wheezes, rhonchi, or rales  CV: Regular rate and rhythm. No murmurs, rubs, or gallops.  Ext: No clubbing, cyanosis, edema.  Left shoulder: normal ROM. No pain with palpation. Negative empty can test.   Right shoulder: normal ROM. No pain with palpation. Negative empty can test.     Labs: A1C 7.8%.  Discussed and reviewed with patient.    Assessment & Plan:     55 y.o. female with the following -     1. Type 2 diabetes mellitus without complication, without long-term current use of insulin ([facility])  Chronic unstable medical problem.  A1c increased to 7.8%.  Will increase Metformin 250 mg twice a day with meals.  Discussed diet last " modifications with patient.  Discussed to avoid regular soda, she has been drinking regular soda.  She is due for health maintenance labs around February 2021, orders placed.  She will return in 3 months for A1c.  Monitor and follow.  - POCT Hemoglobin A1C  - metFORMIN (GLUCOPHAGE) 850 MG Tab; Take 1 Tab by mouth 2 times a day, with meals.  Dispense: 180 Tab; Refill: 1  - CBC WITH DIFFERENTIAL; Future  - Comp Metabolic Panel; Future  - Lipid Profile; Future  - MICROALBUMIN CREAT RATIO URINE; Future    2. Essential hypertension  Chronic unstable medical problem.  On repeat by me her BP was 150/80.  She will continue with her lisinopril 10 mg daily.  She will return in 1 week for MA visit.  If her BP is greater than 140/90 we will increase her lisinopril to 20 mg daily.  She is in agreement.  Red flags discussed.  Monitor and follow.    3. Pain in deltoid, bilateral  New problem to examiner.  Continue rest, heat pack, ice, and stretching.  Continue diclofenac as needed.  Discussed scheduling appointment with sports therapy, she will schedule appointment.  Monitor and follow.    4. Arthralgia of both hands  Chronic stable medical problem.  Continue NSAIDs as needed.  Discussed following up with rheumatology.  Monitor and follow.    5. Vitamin D deficiency  Chronic unstable medical problem.  Discussed with patient that she should repeat her labs before medication is refilled.  She verbalized understanding.  Monitor and follow.  - VITAMIN D,25 HYDROXY; Future      Return in about 3 months (around 4/7/2021) for Diabetes, MA visit in 1 week for BP.    Please note that this dictation was created using voice recognition software. I have made every reasonable attempt to correct obvious errors, but I expect that there are errors of grammar and possibly content that I did not discover before finalizing the note.

## 2021-01-07 NOTE — ASSESSMENT & PLAN NOTE
Chronic medical problem. She is taking metformin 500 mg BID with meals. She is tolerating the medication, no diarrhea. She is due for A1C. She will be due for labs in February 2021.

## 2021-01-07 NOTE — ASSESSMENT & PLAN NOTE
Chronic medical problem. She has followed up with rheumatology. She was prescribed diclofenac but unable to take diclofenac due to her stomach. She has not had follow-up. She does not have pain today.

## 2021-01-07 NOTE — PATIENT INSTRUCTIONS
DR. ELAM   [facility]  [address] [place] [address]  Phone: [phone]      COLONOSOCOPY   [facility]  [address] [place] [address]. [address]  Phone: [phone]  Fax: [phone]      DIABETES EDUCATION   [facility]  [address], [address]  [place] [address]  Phone: [phone]

## 2021-01-07 NOTE — ASSESSMENT & PLAN NOTE
New problem to examiner. 2 months ago she was seen in Urgent Care. She was prescribed diclofenac and referral placed to sports therapy. She reported relief of pain with diclofenac. She has not followed up with sports therapy. She reports that her pain is improving.

## 2021-01-07 NOTE — ASSESSMENT & PLAN NOTE
Chronic medical problem.  She has completed her ergocalciferol course. She would like a medication refill. She did not complete her labs and is due for updated labs.

## 2021-01-07 NOTE — ASSESSMENT & PLAN NOTE
Chronic medical problem. She is taking lisinopril 10 mg daily. She does not check her blood pressure at home. Her initial BP today is 150/72.

## 2021-01-15 ENCOUNTER — NON-PROVIDER VISIT (OUTPATIENT)
Dept: MEDICAL GROUP | Facility: PHYSICIAN GROUP | Age: 56
End: 2021-01-15
Payer: COMMERCIAL

## 2021-01-15 VITALS — DIASTOLIC BLOOD PRESSURE: 78 MMHG | SYSTOLIC BLOOD PRESSURE: 130 MMHG

## 2021-01-15 NOTE — NON-PROVIDER
Kary PatricJudy is a 55 y.o. female here for a non-provider visit for bp check    Vitals:    01/15/21 1413   BP: 130/78   BP Location: Right arm   Patient Position: Sitting   BP Cuff Size: Adult     If abnormal, was an in office provider notified today? Not Indicated  Routed to PCP? Yes

## 2021-01-15 NOTE — Clinical Note
Pt states that she feels good on the medication. Pt was wondering if her dosage needs to be increased or remain the same. She also mentions that you haven't prescribed her the medication because you wanted to see how she would do with medication first?

## 2021-02-12 ENCOUNTER — OFFICE VISIT (OUTPATIENT)
Dept: MEDICAL GROUP | Facility: CLINIC | Age: 56
End: 2021-02-12
Payer: COMMERCIAL

## 2021-02-12 ENCOUNTER — APPOINTMENT (OUTPATIENT)
Dept: RADIOLOGY | Facility: IMAGING CENTER | Age: 56
End: 2021-02-12
Attending: FAMILY MEDICINE
Payer: COMMERCIAL

## 2021-02-12 VITALS
BODY MASS INDEX: 32.15 KG/M2 | HEIGHT: 57 IN | DIASTOLIC BLOOD PRESSURE: 74 MMHG | WEIGHT: 149 LBS | SYSTOLIC BLOOD PRESSURE: 118 MMHG | TEMPERATURE: 98.9 F | RESPIRATION RATE: 18 BRPM | OXYGEN SATURATION: 95 % | HEART RATE: 90 BPM

## 2021-02-12 DIAGNOSIS — M25.512 ACUTE PAIN OF LEFT SHOULDER: ICD-10-CM

## 2021-02-12 DIAGNOSIS — M75.82 TENDINITIS OF LEFT ROTATOR CUFF: ICD-10-CM

## 2021-02-12 PROCEDURE — 73030 X-RAY EXAM OF SHOULDER: CPT | Mod: TC,LT | Performed by: FAMILY MEDICINE

## 2021-02-12 PROCEDURE — 99214 OFFICE O/P EST MOD 30 MIN: CPT | Mod: 25 | Performed by: FAMILY MEDICINE

## 2021-02-12 RX ORDER — TRIAMCINOLONE ACETONIDE 40 MG/ML
40 INJECTION, SUSPENSION INTRA-ARTICULAR; INTRAMUSCULAR ONCE
Status: COMPLETED | OUTPATIENT
Start: 2021-02-12 | End: 2021-02-12

## 2021-02-12 RX ADMIN — TRIAMCINOLONE ACETONIDE 40 MG: 40 INJECTION, SUSPENSION INTRA-ARTICULAR; INTRAMUSCULAR at 15:44

## 2021-02-12 ASSESSMENT — ENCOUNTER SYMPTOMS
VOMITING: 0
CHILLS: 0
SHORTNESS OF BREATH: 0
FEVER: 0
DIZZINESS: 0
NAUSEA: 0

## 2021-02-12 ASSESSMENT — FIBROSIS 4 INDEX: FIB4 SCORE: 0.99

## 2021-02-12 NOTE — PROGRESS NOTES
Subjective:      Kary Kevin is a 55 y.o. female who presents with Arm Pain (EP/ Bilateral arm pain )       NEW problem BILATERAL arm pain    HPI   BILATERAL arm pain (L > R)  3 months, intermittent   Unable to abduct the LEFT shoulder  No specific trauma  Worse at night when sleeping on the LEFT side and worse with use of the LEFT shoulder  Improved with rest and keeping it still  Has tried anti-inflammatories as well as topical medications including diclofenac, Arnica and Tylenol which did NOT help  Denies any remote issues with her shoulders  Denies any cervical spine issues although she has had some cervical spine discomfort in the recent past    She has seen rheumatology in the past for BILATERAL hand pain    She works as a seamstress    Review of Systems   Constitutional: Negative for chills and fever.   Respiratory: Negative for shortness of breath.    Cardiovascular: Negative for chest pain.   Gastrointestinal: Negative for nausea and vomiting.   Neurological: Negative for dizziness.     PMH:  has a past medical history of GERD (gastroesophageal reflux disease), Hypertension, and Osteoarthritis of both hands (2015).  MEDS:   Current Outpatient Medications:   •  Omega-3 Fatty Acids (OMEGA 3 PO), Take  by mouth., Disp: , Rfl:   •  metFORMIN (GLUCOPHAGE) 850 MG Tab, Take 1 Tab by mouth 2 times a day, with meals., Disp: 180 Tab, Rfl: 1  •  diclofenac EC (VOLTAREN) 75 MG Tablet Delayed Response, , Disp: , Rfl:   •  pantoprazole (PROTONIX) 20 MG tablet, TAKE 1 TABLET BY MOUTH ONCE DAILY 30 MINUTES BEFORE BREAKFAST MEAL, Disp: , Rfl:   •  lisinopril (PRINIVIL) 10 MG Tab, Take 1 Tab by mouth every day., Disp: 90 Tab, Rfl: 3  •  Diclofenac Sodium 1 % Gel, Apply 1 Application to affected area(s) 4 times a day as needed (joint pain to hands)., Disp: 1 Tube, Rfl: 1  ALLERGIES: No Known Allergies  SURGHX:   Past Surgical History:   Procedure Laterality Date   • PRIMARY C SECTION      x2     SOCHX:  reports that  "she has never smoked. She has never used smokeless tobacco. She reports that she does not drink alcohol and does not use drugs.  FH: Family history was reviewed, no pertinent findings to report       Objective:     /74 (BP Location: Left arm, Patient Position: Sitting, BP Cuff Size: Adult)   Pulse 90   Temp 37.2 °C (98.9 °F) (Temporal)   Resp 18   Ht 1.448 m (4' 9\")   Wt 67.6 kg (149 lb)   SpO2 95%   BMI 32.24 kg/m²      Physical Exam     No acute distress    Cervical spine:  Range of motion INTACT  Spurling's testing POSITIVE for local cervical spine discomfort and some periscapular discomfort on the LEFT compared to the right   No cervical spine tenderness  Strength testing NORMAL deltoid, bicep, tricep, wrist extension, wrist flexion and finger abduction  DTRs: 2 out of 4 bilaterally for biceps, brachial radialis  Hutchinson's testing NEGATIVE    BILATERAL shoulder exam:  Range of motion markedly decreased on the LEFT compared to right  Strength testing 3/5 with empty can on the LEFT compared to 5/5, internal rotation, external rotation  Lift off testing was difficult to perform secondary to patient discomfort  NO tenderness of the supraspinatus, infraspinatus or biceps tendon  Apprehension testing is positive on the LEFT compared to right     Assessment/Plan:         1. Tendinitis of left rotator cuff  DX-SHOULDER 2+ LEFT    triamcinolone acetonide (KENALOG-40) injection 40 mg    REFERRAL TO PHYSICAL THERAPY     3 months, intermittent   Unable to abduct the LEFT shoulder  No specific injury, symptoms began gradually and were worse over time    Corticosteroid injection of the LEFT subacromial bursa performed the office TODAY (February 12, 2021)    Physical therapy was ordered and patient was provided with home exercise program to start while she gets set up with therapy    Return in about 4 weeks (around 3/12/2021).  See how she is doing after LEFT subacromial corticosteroid injection and see if she " started her formal physical therapy        2/12/2021 3:00 PM     HISTORY/REASON FOR EXAM:  Atraumatic Pain/Swelling/Deformity.  LEFT shoulder pain.     TECHNIQUE/EXAM DESCRIPTION AND NUMBER OF VIEWS:  3 views of the LEFT shoulder.     COMPARISON: None     FINDINGS:  Clavicle is intact.  AC joint is preserved.  Visualized proximal humerus is intact and normally located.  Mild narrowing of glenohumeral joint with minimal osteophyte formation inferiorly.  Visualized LEFT chest is unremarkable.     IMPRESSION:     1.  No fracture or dislocation of LEFT shoulder.  2.  Mild degenerative changes.        Interpreted in the office today with the patient    Thank you MITESH Smallwood PA-C for allowing me to participate in caring for your patient.

## 2021-02-20 ENCOUNTER — HOSPITAL ENCOUNTER (OUTPATIENT)
Dept: LAB | Facility: MEDICAL CENTER | Age: 56
End: 2021-02-20
Attending: NURSE PRACTITIONER
Payer: COMMERCIAL

## 2021-02-20 DIAGNOSIS — E55.9 VITAMIN D DEFICIENCY: ICD-10-CM

## 2021-02-20 DIAGNOSIS — E11.9 TYPE 2 DIABETES MELLITUS WITHOUT COMPLICATION, WITHOUT LONG-TERM CURRENT USE OF INSULIN (HCC): ICD-10-CM

## 2021-02-20 LAB
25(OH)D3 SERPL-MCNC: 29 NG/ML (ref 30–100)
ALBUMIN SERPL BCP-MCNC: 3.8 G/DL (ref 3.2–4.9)
ALBUMIN/GLOB SERPL: 1.3 G/DL
ALP SERPL-CCNC: 106 U/L (ref 30–99)
ALT SERPL-CCNC: 19 U/L (ref 2–50)
ANION GAP SERPL CALC-SCNC: 9 MMOL/L (ref 7–16)
AST SERPL-CCNC: 14 U/L (ref 12–45)
BASOPHILS # BLD AUTO: 0.5 % (ref 0–1.8)
BASOPHILS # BLD: 0.05 K/UL (ref 0–0.12)
BILIRUB SERPL-MCNC: 0.7 MG/DL (ref 0.1–1.5)
BUN SERPL-MCNC: 18 MG/DL (ref 8–22)
CALCIUM SERPL-MCNC: 9.3 MG/DL (ref 8.5–10.5)
CHLORIDE SERPL-SCNC: 101 MMOL/L (ref 96–112)
CHOLEST SERPL-MCNC: 192 MG/DL (ref 100–199)
CO2 SERPL-SCNC: 26 MMOL/L (ref 20–33)
CREAT SERPL-MCNC: 0.67 MG/DL (ref 0.5–1.4)
CREAT UR-MCNC: 98.16 MG/DL
EOSINOPHIL # BLD AUTO: 0.09 K/UL (ref 0–0.51)
EOSINOPHIL NFR BLD: 0.9 % (ref 0–6.9)
ERYTHROCYTE [DISTWIDTH] IN BLOOD BY AUTOMATED COUNT: 47 FL (ref 35.9–50)
FASTING STATUS PATIENT QL REPORTED: NORMAL
GLOBULIN SER CALC-MCNC: 2.9 G/DL (ref 1.9–3.5)
GLUCOSE SERPL-MCNC: 140 MG/DL (ref 65–99)
HCT VFR BLD AUTO: 42.8 % (ref 37–47)
HDLC SERPL-MCNC: 44 MG/DL
HGB BLD-MCNC: 14.6 G/DL (ref 12–16)
IMM GRANULOCYTES # BLD AUTO: 0.12 K/UL (ref 0–0.11)
IMM GRANULOCYTES NFR BLD AUTO: 1.2 % (ref 0–0.9)
LDLC SERPL CALC-MCNC: 108 MG/DL
LYMPHOCYTES # BLD AUTO: 1.82 K/UL (ref 1–4.8)
LYMPHOCYTES NFR BLD: 18.4 % (ref 22–41)
MCH RBC QN AUTO: 32.2 PG (ref 27–33)
MCHC RBC AUTO-ENTMCNC: 34.1 G/DL (ref 33.6–35)
MCV RBC AUTO: 94.3 FL (ref 81.4–97.8)
MICROALBUMIN UR-MCNC: 2 MG/DL
MICROALBUMIN/CREAT UR: 20 MG/G (ref 0–30)
MONOCYTES # BLD AUTO: 0.88 K/UL (ref 0–0.85)
MONOCYTES NFR BLD AUTO: 8.9 % (ref 0–13.4)
NEUTROPHILS # BLD AUTO: 6.95 K/UL (ref 2–7.15)
NEUTROPHILS NFR BLD: 70.1 % (ref 44–72)
NRBC # BLD AUTO: 0 K/UL
NRBC BLD-RTO: 0 /100 WBC
PLATELET # BLD AUTO: 298 K/UL (ref 164–446)
PMV BLD AUTO: 10 FL (ref 9–12.9)
POTASSIUM SERPL-SCNC: 4 MMOL/L (ref 3.6–5.5)
PROT SERPL-MCNC: 6.7 G/DL (ref 6–8.2)
RBC # BLD AUTO: 4.54 M/UL (ref 4.2–5.4)
SODIUM SERPL-SCNC: 136 MMOL/L (ref 135–145)
TRIGL SERPL-MCNC: 199 MG/DL (ref 0–149)
WBC # BLD AUTO: 9.9 K/UL (ref 4.8–10.8)

## 2021-02-20 PROCEDURE — 82306 VITAMIN D 25 HYDROXY: CPT

## 2021-02-20 PROCEDURE — 82043 UR ALBUMIN QUANTITATIVE: CPT

## 2021-02-20 PROCEDURE — 80053 COMPREHEN METABOLIC PANEL: CPT

## 2021-02-20 PROCEDURE — 82570 ASSAY OF URINE CREATININE: CPT

## 2021-02-20 PROCEDURE — 36415 COLL VENOUS BLD VENIPUNCTURE: CPT

## 2021-02-20 PROCEDURE — 80061 LIPID PANEL: CPT

## 2021-02-20 PROCEDURE — 85025 COMPLETE CBC W/AUTO DIFF WBC: CPT

## 2021-02-25 ENCOUNTER — TELEPHONE (OUTPATIENT)
Dept: MEDICAL GROUP | Facility: PHYSICIAN GROUP | Age: 56
End: 2021-02-25

## 2021-02-26 NOTE — TELEPHONE ENCOUNTER
----- Message from Jagdish Quezada Ass't sent at 2/23/2021  8:32 AM PST -----    ----- Message -----  From: ESTEBAN Dooley  Sent: 2/22/2021   6:21 PM PST  To: Christiano Lemons    Pu[place]ican-speaking    Please call patient and inform her that her urine microalbumin test for diabetes is normal.  Her vitamin D level is one-point below normal range of 30.  She can take over-the-counter vitamin D supplementation 1000 units daily.  Her electrolytes, kidney function, liver function and blood counts are stable.  Her cholesterol has improved.  Her LDL, bad cholesterol, a little bit elevated. We recommend decreasing saturated fat which are found in meats that come from a cow or pig, and found in creams, cheeses, butter, mazariegos, and fried foods. We recommend more vegetables, fruits, fish, nuts, olive oil and exercising 5 times a week for 30 minutes.  We can discuss more at her upcoming appointment in April.    Thank you

## 2021-02-26 NOTE — TELEPHONE ENCOUNTER
Phone Number Called:[phone] (home)        Call outcome: Did not leave a detailed message. Requested patient to call back.    Message: Please return our call to go over results

## 2021-03-12 ENCOUNTER — OFFICE VISIT (OUTPATIENT)
Dept: MEDICAL GROUP | Facility: CLINIC | Age: 56
End: 2021-03-12
Payer: COMMERCIAL

## 2021-03-12 VITALS
BODY MASS INDEX: 32.15 KG/M2 | HEART RATE: 98 BPM | WEIGHT: 149 LBS | RESPIRATION RATE: 16 BRPM | TEMPERATURE: 98.6 F | SYSTOLIC BLOOD PRESSURE: 124 MMHG | DIASTOLIC BLOOD PRESSURE: 84 MMHG | HEIGHT: 57 IN | OXYGEN SATURATION: 98 %

## 2021-03-12 DIAGNOSIS — M75.82 TENDINITIS OF LEFT ROTATOR CUFF: ICD-10-CM

## 2021-03-12 PROCEDURE — 99213 OFFICE O/P EST LOW 20 MIN: CPT | Performed by: FAMILY MEDICINE

## 2021-03-12 ASSESSMENT — FIBROSIS 4 INDEX: FIB4 SCORE: 0.6

## 2021-03-12 NOTE — PROGRESS NOTES
"Subjective:      Kary Kevin is a 55 y.o. female who presents with Arm Pain (EP/ Bilateral arm pain )      NEW problem BILATERAL arm pain    HPI   BILATERAL arm pain (L > R)  intermittent since about December 2020  Unable to abduct the LEFT shoulder  No specific trauma  Worse at night when sleeping on the LEFT side and worse with use of the LEFT shoulder  Improved with rest and keeping it still    Corticosteroid injection performed at initial visit HELPED    She has seen rheumatology in the past for BILATERAL hand pain    She works as a seamstress     Objective:     /84 (BP Location: Left arm, Patient Position: Sitting, BP Cuff Size: Adult)   Pulse 98   Temp 37 °C (98.6 °F) (Temporal)   Resp 16   Ht 1.448 m (4' 9\")   Wt 67.6 kg (149 lb)   SpO2 98%   BMI 32.24 kg/m²     Physical Exam     No acute distress    BILATERAL shoulder exam:  Range of motion markedly decreased on the LEFT compared to right  Strength testing 5/5 with empty can on the LEFT compared to 5/5, internal rotation, external rotation  Lift off testing was difficult to perform secondary to patient discomfort  NO tenderness of the supraspinatus, infraspinatus or biceps tendon  Apprehension testing is positive on the LEFT compared to right     Assessment/Plan:     1. Tendinitis of left rotator cuff       intermittent since about December 2020  Unable to abduct the LEFT shoulder  No specific injury, symptoms began gradually and were worse over time    Corticosteroid injection of the LEFT subacromial bursa (February 12, 2021) HELPED    Physical therapy and she will be starting the next 1 to 2 weeks    Follow-up as needed        2/12/2021 3:00 PM     HISTORY/REASON FOR EXAM:  Atraumatic Pain/Swelling/Deformity.  LEFT shoulder pain.     TECHNIQUE/EXAM DESCRIPTION AND NUMBER OF VIEWS:  3 views of the LEFT shoulder.     COMPARISON: None     FINDINGS:  Clavicle is intact.  AC joint is preserved.  Visualized proximal humerus is intact and " normally located.  Mild narrowing of glenohumeral joint with minimal osteophyte formation inferiorly.  Visualized LEFT chest is unremarkable.     IMPRESSION:     1.  No fracture or dislocation of LEFT shoulder.  2.  Mild degenerative changes.        Thank you MITESH Smallwood PA-C for allowing me to participate in caring for your patient.

## 2021-03-12 NOTE — Clinical Note
Gustavo SAGASTUME,  We saw Kary in follow-up today.  Fortunately, her shoulder is doing BETTER after her subacromial injection.  She also has upcoming physical therapy evaluation.  Since she is doing better at this point, we are going to have her follow-up as needed if she has having issues after starting therapy.  Thanks!  L

## 2021-03-15 DIAGNOSIS — Z23 NEED FOR VACCINATION: ICD-10-CM

## 2021-03-24 ENCOUNTER — APPOINTMENT (OUTPATIENT)
Dept: PHYSICAL THERAPY | Facility: REHABILITATION | Age: 56
End: 2021-03-24
Attending: FAMILY MEDICINE
Payer: COMMERCIAL

## 2021-03-26 ENCOUNTER — APPOINTMENT (OUTPATIENT)
Dept: PHYSICAL THERAPY | Facility: REHABILITATION | Age: 56
End: 2021-03-26
Attending: FAMILY MEDICINE
Payer: COMMERCIAL

## 2021-03-30 ENCOUNTER — APPOINTMENT (OUTPATIENT)
Dept: PHYSICAL THERAPY | Facility: REHABILITATION | Age: 56
End: 2021-03-30
Attending: FAMILY MEDICINE
Payer: COMMERCIAL

## 2021-04-02 ENCOUNTER — APPOINTMENT (OUTPATIENT)
Dept: PHYSICAL THERAPY | Facility: REHABILITATION | Age: 56
End: 2021-04-02
Attending: FAMILY MEDICINE
Payer: COMMERCIAL

## 2021-04-06 ENCOUNTER — APPOINTMENT (OUTPATIENT)
Dept: PHYSICAL THERAPY | Facility: REHABILITATION | Age: 56
End: 2021-04-06
Attending: FAMILY MEDICINE
Payer: COMMERCIAL

## 2021-04-08 ENCOUNTER — OFFICE VISIT (OUTPATIENT)
Dept: MEDICAL GROUP | Facility: PHYSICIAN GROUP | Age: 56
End: 2021-04-08
Payer: COMMERCIAL

## 2021-04-08 VITALS
WEIGHT: 146 LBS | OXYGEN SATURATION: 96 % | TEMPERATURE: 97.8 F | HEIGHT: 56 IN | HEART RATE: 77 BPM | SYSTOLIC BLOOD PRESSURE: 150 MMHG | DIASTOLIC BLOOD PRESSURE: 76 MMHG | BODY MASS INDEX: 32.84 KG/M2

## 2021-04-08 DIAGNOSIS — E78.5 DYSLIPIDEMIA: ICD-10-CM

## 2021-04-08 DIAGNOSIS — E11.9 TYPE 2 DIABETES MELLITUS WITHOUT COMPLICATION, WITHOUT LONG-TERM CURRENT USE OF INSULIN (HCC): ICD-10-CM

## 2021-04-08 DIAGNOSIS — L85.3 DRY SKIN: ICD-10-CM

## 2021-04-08 DIAGNOSIS — I10 ESSENTIAL HYPERTENSION: ICD-10-CM

## 2021-04-08 LAB
HBA1C MFR BLD: 6.9 % (ref 0–5.6)
INT CON NEG: ABNORMAL
INT CON POS: ABNORMAL

## 2021-04-08 PROCEDURE — 83036 HEMOGLOBIN GLYCOSYLATED A1C: CPT | Performed by: NURSE PRACTITIONER

## 2021-04-08 PROCEDURE — 99214 OFFICE O/P EST MOD 30 MIN: CPT | Performed by: NURSE PRACTITIONER

## 2021-04-08 RX ORDER — ATORVASTATIN CALCIUM 10 MG/1
10 TABLET, FILM COATED ORAL EVERY EVENING
Qty: 90 TABLET | Refills: 1 | Status: SHIPPED | OUTPATIENT
Start: 2021-04-08 | End: 2021-07-21

## 2021-04-08 RX ORDER — METFORMIN HYDROCHLORIDE 750 MG/1
750 TABLET, EXTENDED RELEASE ORAL DAILY
Qty: 90 TABLET | Refills: 1 | Status: SHIPPED | OUTPATIENT
Start: 2021-04-08 | End: 2021-07-23

## 2021-04-08 ASSESSMENT — FIBROSIS 4 INDEX: FIB4 SCORE: 0.6

## 2021-04-08 NOTE — PROGRESS NOTES
Subjective:     CC: Rash on hands, diabetes, hypertension    HPI:   Kary presents today with the following:    Type 2 diabetes mellitus without complication, without long-term current use of insulin (HCC)  Chronic medical problem.  She is taking Metformin 850 mg twice daily with meals. Since starting the medication she has noticed that she is having 2 diarrhea stools a day. She is due for A1c today. She is exercising around her house. Last lab results:  Results for KARY ORTIZ (MRN [id number]) as of 4/8/2021 14:25   Ref. Range 1/7/2021 16:08   Glycohemoglobin Latest Ref Range: 0.0 - 5.6 % 7.8 (A)       Essential hypertension  Chronic medical problem.  She is taking lisinopril 10 mg daily.  Her initial blood pressure today is 160/84. She does not check her blood pressure at home. Denies chest pain, shortness of breath, dizziness or headaches.     Dry skin  Acute medical problem. She is having dry skin and peeling to both of her hands. This problem started 1 month ago.  At this not cause any pain other than when she uses hot water to wash her hands.  She initially tried Eucerin lotion that did not help. She has recently started CeraVe diabetes skin relief hand lotion. At home when she cleans using  she wears gloves. She does use hand  frequently when at work.  She denies any fever, chill, new soaps, new lotions, new detergents or any new chemical products at work.    Dyslipidemia  Chronic medical problem.  She does not take any medication. The 10-year ASCVD risk score ([employer] EVI Jr., et al., 2013) is: 9%.  Last lab results:  Results for KARY ORTIZ (MRN [id number]) as of 4/8/2021 15:05   Ref. Range 2/20/2021 08:08   Cholesterol,Tot Latest Ref Range: 100 - 199 mg/dL 192   Triglycerides Latest Ref Range: 0 - 149 mg/dL 199 (H)   HDL Latest Ref Range: >=40 mg/dL 44   LDL Latest Ref Range: <100 mg/dL 108 (H)           Past Medical History:   Diagnosis Date   • GERD (gastroesophageal reflux  "disease)    • Hypertension    • Osteoarthritis of both hands 2015       Social History     Tobacco Use   • Smoking status: Never Smoker   • Smokeless tobacco: Never Used   Substance Use Topics   • Alcohol use: No   • Drug use: No       Current Outpatient Medications Ordered in Epic   Medication Sig Dispense Refill   • metFORMIN ER (GLUCOPHAGE XR) 750 MG TABLET SR 24 HR Take 1 tablet by mouth every day. 90 tablet 1   • atorvastatin (LIPITOR) 10 MG Tab Take 1 tablet by mouth every evening. 90 tablet 1   • Omega-3 Fatty Acids (OMEGA 3 PO) Take  by mouth.     • diclofenac EC (VOLTAREN) 75 MG Tablet Delayed Response      • pantoprazole (PROTONIX) 20 MG tablet TAKE 1 TABLET BY MOUTH ONCE DAILY 30 MINUTES BEFORE BREAKFAST MEAL     • lisinopril (PRINIVIL) 10 MG Tab Take 1 Tab by mouth every day. 90 Tab 3   • Diclofenac Sodium 1 % Gel Apply 1 Application to affected area(s) 4 times a day as needed (joint pain to hands). 1 Tube 1     No current Epic-ordered facility-administered medications on file.       Allergies:  Patient has no known allergies.    Health Maintenance: She has upcoming Covid vaccine scheduled, will hold off on vaccines at this time.    ROS:  Per HPI above    Objective:     Vital signs reviewed  Exam:  /76   Pulse 77   Temp 36.6 °C (97.8 °F) (Temporal)   Ht 1.425 m (4' 8.1\")   Wt 66.2 kg (146 lb)   SpO2 96%   BMI 32.61 kg/m²  Body mass index is 32.61 kg/m².    Gen: Alert and oriented, No apparent distress.  Neck: Neck is supple without lymphadenopathy.  Lungs: Normal effort, CTA bilaterally, no wheezes, rhonchi, or rales  CV: Regular rate and rhythm. No murmurs, rubs, or gallops.  Ext: No clubbing, cyanosis, edema.  Bilateral palms with dry excoriated skin with several fissures to fingers.  No increased redness, warmth, or discharge present.    Labs: A1C is 6.9%      Assessment & Plan:     55 y.o. female with the following -     1. Type 2 diabetes mellitus without complication, without long-term " current use of insulin (HCC)  Chronic exacerbated problem.  We will have her stop her Metformin immediate release and will start her on extended release.  Discussed and reviewed her A1c today.  Discussed diet last modifications.  She will due for updated A1c in 3 months.  - POCT Hemoglobin A1C  - metFORMIN ER (GLUCOPHAGE XR) 750 MG TABLET SR 24 HR; Take 1 tablet by mouth every day.  Dispense: 90 tablet; Refill: 1  - atorvastatin (LIPITOR) 10 MG Tab; Take 1 tablet by mouth every evening.  Dispense: 90 tablet; Refill: 1  - HEMOGLOBIN A1C; Future  - Lipid Profile; Future    2. Essential hypertension  Chronic exacerbated problem.  On repeat by me her blood pressure is 150/76.  She will return in 1 week for MA BP check.  She will continue with her lisinopril.  Discussed that if her blood pressure remains elevated at MA appointment we will need to increase lisinopril, she reports understanding.  She had recent labs that we reviewed today.    3. Dyslipidemia  Chronic medical problem.  She had recent labs to review today.  She does have history of diabetes.  She is in agreement to start atorvastatin. The 10-year ASCVD risk score ([place] DC Jr., et al., 2013) is: 9%.  She will repeat her labs in 3 months.  - atorvastatin (LIPITOR) 10 MG Tab; Take 1 tablet by mouth every evening.  Dispense: 90 tablet; Refill: 1  - Comp Metabolic Panel; Future  - Lipid Profile; Future    4. Dry skin  Acute uncomplicated problem.  Discussed that she should use more creams and ointment based products.  Recommended she try over-the-counter Cetaphil or CeraVe.  Discussed she may also use Vaseline at nighttime and wear close to help with increased absorption.  She verbalized understanding.        Return in about 3 months (around 7/8/2021) for Labs, Diabetes, cholesterol. 1 week MA visit for BP.    Please note that this dictation was created using voice recognition software. I have made every reasonable attempt to correct obvious errors, but I expect  that there are errors of grammar and possibly content that I did not discover before finalizing the note.

## 2021-04-08 NOTE — ASSESSMENT & PLAN NOTE
Chronic medical problem.  She does not take any medication. The 10-year ASCVD risk score ([place] EVI Jr., et al., 2013) is: 9%.  Last lab results:  Results for MIRTA ORTIZ (MRN [id number]) as of 4/8/2021 15:05   Ref. Range 2/20/2021 08:08   Cholesterol,Tot Latest Ref Range: 100 - 199 mg/dL 192   Triglycerides Latest Ref Range: 0 - 149 mg/dL 199 (H)   HDL Latest Ref Range: >=40 mg/dL 44   LDL Latest Ref Range: <100 mg/dL 108 (H)

## 2021-04-08 NOTE — ASSESSMENT & PLAN NOTE
Acute medical problem. She is having dry skin and peeling to both of her hands. This problem started 1 month ago.  At this not cause any pain other than when she uses hot water to wash her hands.  She initially tried Eucerin lotion that did not help. She has recently started CeraVe diabetes skin relief hand lotion. At home when she cleans using  she wears gloves. She does use hand  frequently when at work.  She denies any fever, chill, new soaps, new lotions, new detergents or any new chemical products at work.

## 2021-04-08 NOTE — ASSESSMENT & PLAN NOTE
Chronic medical problem.  She is taking Metformin 850 mg twice daily with meals. Since starting the medication she has noticed that she is having 2 diarrhea stools a day. She is due for A1c today. She is exercising around her house. Last lab results:  Results for MIRTA ORTIZ (MRN [id number]) as of 4/8/2021 14:25   Ref. Range 1/7/2021 16:08   Glycohemoglobin Latest Ref Range: 0.0 - 5.6 % 7.8 (A)

## 2021-04-08 NOTE — ASSESSMENT & PLAN NOTE
Chronic medical problem.  She is taking lisinopril 10 mg daily.  Her initial blood pressure today is 160/84. She does not check her blood pressure at home. Denies chest pain, shortness of breath, dizziness or headaches.

## 2021-04-09 ENCOUNTER — APPOINTMENT (OUTPATIENT)
Dept: PHYSICAL THERAPY | Facility: REHABILITATION | Age: 56
End: 2021-04-09
Attending: FAMILY MEDICINE
Payer: COMMERCIAL

## 2021-05-17 ENCOUNTER — HOSPITAL ENCOUNTER (OUTPATIENT)
Dept: RADIOLOGY | Facility: MEDICAL CENTER | Age: 56
End: 2021-05-17
Attending: FAMILY MEDICINE
Payer: COMMERCIAL

## 2021-05-17 ENCOUNTER — OFFICE VISIT (OUTPATIENT)
Dept: MEDICAL GROUP | Facility: CLINIC | Age: 56
End: 2021-05-17
Payer: COMMERCIAL

## 2021-05-17 VITALS
HEIGHT: 56 IN | WEIGHT: 146 LBS | DIASTOLIC BLOOD PRESSURE: 82 MMHG | RESPIRATION RATE: 18 BRPM | BODY MASS INDEX: 32.84 KG/M2 | OXYGEN SATURATION: 97 % | TEMPERATURE: 98.6 F | SYSTOLIC BLOOD PRESSURE: 142 MMHG | HEART RATE: 94 BPM

## 2021-05-17 DIAGNOSIS — S80.02XA CONTUSION OF LEFT KNEE, INITIAL ENCOUNTER: ICD-10-CM

## 2021-05-17 DIAGNOSIS — M79.672 FOOT PAIN, LEFT: ICD-10-CM

## 2021-05-17 DIAGNOSIS — M25.531 WRIST PAIN, RIGHT: ICD-10-CM

## 2021-05-17 DIAGNOSIS — M79.642 HAND PAIN, LEFT: ICD-10-CM

## 2021-05-17 DIAGNOSIS — M25.461 KNEE EFFUSION, RIGHT: ICD-10-CM

## 2021-05-17 PROCEDURE — 73562 X-RAY EXAM OF KNEE 3: CPT | Mod: RT

## 2021-05-17 PROCEDURE — 73110 X-RAY EXAM OF WRIST: CPT | Mod: RT

## 2021-05-17 PROCEDURE — 73130 X-RAY EXAM OF HAND: CPT | Mod: RT

## 2021-05-17 PROCEDURE — 73630 X-RAY EXAM OF FOOT: CPT | Mod: LT

## 2021-05-17 PROCEDURE — 73562 X-RAY EXAM OF KNEE 3: CPT | Mod: LT

## 2021-05-17 PROCEDURE — 99214 OFFICE O/P EST MOD 30 MIN: CPT | Performed by: FAMILY MEDICINE

## 2021-05-17 RX ORDER — TRAMADOL HYDROCHLORIDE 50 MG/1
50 TABLET ORAL EVERY 8 HOURS PRN
Qty: 30 TABLET | Refills: 0 | Status: SHIPPED | OUTPATIENT
Start: 2021-05-17 | End: 2021-05-27

## 2021-05-17 ASSESSMENT — FIBROSIS 4 INDEX: FIB4 SCORE: 0.6

## 2021-05-17 NOTE — PROGRESS NOTES
"Subjective:      Kary Kevin is a 55 y.o. female who presents with Arm Pain (EP/ Bilateral arm pain )     Patient is here for NEW problem  Fall approximately 6 wks ago   Inversion injury of the LEFT after stumbling on a uneven piece of concrete falling onto the concrete outside her home onto BILATERAL flexed knees and onto an outstretched hands    Inversion of the LEFT FOOt with swelling and both knees/landed on flexed knee  Swelling of the RIGHT knee  Landed on flexed knees  Pain is predominantly at the medial aspect of the LEFT knee and posteriorly RIGHT knee  Pressure and achiness    BILATERAL knee pain with swelling on the RIGHT  Achy and sharp pain at the anterior and medial knees  Pressure in the back of the RIGHT knee  No radiation  Worse with ambulation and flexion of the knee  Improved with rest  Denies any prior issues with the knee    RIGHT wrist pain  Ulnar aspect of the wrist  Achy  Sharp with palpation  Worse with movement and use of the wrist  Improved with rest and immobilization    LEFT hand pain associated with swelling  Pain along the metacarpal and MCP joint regions  Worse RIGHT pinky finger which is painfull and achy with gripping  Ibuprofen and meloxicam NOT helping    Now having numbness and tingling of both hands at HS  Unable to  due to pain with swelling    When she fell she also twisted her LEFT ankle  She has been experiencing pain in the lateral aspect of the LEFT foot  Associated with swelling  Worse with ambulation  Improved with rest and elevation  She has failed ice and oral medications as well as topical medications    She works as a seamstress     Objective:     /82 (BP Location: Left arm, Patient Position: Sitting, BP Cuff Size: Adult)   Pulse 94   Temp 37 °C (98.6 °F) (Temporal)   Resp 18   Ht 1.425 m (4' 8.1\")   Wt 66.2 kg (146 lb)   SpO2 97%   BMI 32.62 kg/m²     Physical Exam    No acute distress  Normal respiratory effort    Knee exam:  RIGHT " KNEE:  Normal alignment  POSITIVE visual swelling with 3+ effusion   Anterior knee tender  Minimal apprehension  POSITIVE joint line tenderness medially and mild tenderness laterally  Lucas's testing is negative medially and laterally  Lachman's testing is trace with good endpoint  No laxity to varus and valgus stress  The legs otherwise neurovascularly intact    LEFT KNEE:  Normal alignment  No visual swelling or deformity  Anterior knee with POSITIVE tenderness  POSITIVE apprehension  No  joint line tenderness medially or laterally  Lucas's testing is negative medially and laterally  Lachman's testing is trace with good endpoint  No laxity to varus and valgus stress  The legs otherwise neurovascularly intact    BILATERAL WRIST exam  Range of motion limited all planes on the RIGHT compared to left  No tenderness along scaphoid, TFCC insertion, distal radius with POSITIVE tenderness at the distal ulna on the RIGHT compared to the left  Tinel's testing is NEGATIVE  The hand is otherwise neurovascularly intact    LEFT ANKLE:  There is NO swelling noted at the ankle  Range of motion intact with dorsiflexion and plantarflexion, inversion and eversion  There is NO tenderness of the ATFL, CF or PTF ligament  There is NO tenderness of the lateral malleolus or medial malleolus  Anterior drawer testing is NEGATIVE  Talar tilt testing is NEGATIVE  The foot and ankle is otherwise neurovascularly intact    LEFT FOOT:  There is POSITIVE swelling noted at the foot  There is POSITIVE tenderness at the base of the fifth metatarsal, without tenderness at the cuboid, or tarsal navicular  There is POSITIVE pain with metatarsal squeeze test    NEUTRAL stance  Able to ambulate with ANTALGIC gait     Assessment/Plan:     1. Knee effusion, right  traMADol (ULTRAM) 50 MG Tab    DX-KNEE 3 VIEWS RIGHT   2. Contusion of left knee, initial encounter  traMADol (ULTRAM) 50 MG Tab    DX-KNEE 3 VIEWS LEFT   3. Wrist pain, right (ulnar-sided)   traMADol (ULTRAM) 50 MG Tab    DX-WRIST-COMPLETE 3+ RIGHT   4. Hand pain, left  traMADol (ULTRAM) 50 MG Tab    DX-HAND 3+ RIGHT   5. Foot pain, left  traMADol (ULTRAM) 50 MG Tab    DX-FOOT-COMPLETE 3+ LEFT     Fall approximately 6 wks ago   Inversion injury of the LEFT after stumbling on a uneven piece of concrete falling onto the concrete outside her home onto BILATERAL flexed knees and onto an outstretched hands    She has significant bony tenderness of the RIGHT wrist on the ulnar side, the LEFT fifth metatarsal base as well as the LEFT fourth metacarpal  L hand (MCP joints    Patient has failed over-the-counter medications including topical medication.  She has failed meloxicam.  She continues to have bony pain and tenderness as well as swelling.  Tramadol prescribed    Check x-rays  Follow-up after x-rays to discuss results and further management option  We will likely perform an ultrasound-guided aspiration/injection of the RIGHT knee given her significant effusion          2/12/2021 3:00 PM     HISTORY/REASON FOR EXAM:  Atraumatic Pain/Swelling/Deformity.  LEFT shoulder pain.     TECHNIQUE/EXAM DESCRIPTION AND NUMBER OF VIEWS:  3 views of the LEFT shoulder.     COMPARISON: None     FINDINGS:  Clavicle is intact.  AC joint is preserved.  Visualized proximal humerus is intact and normally located.  Mild narrowing of glenohumeral joint with minimal osteophyte formation inferiorly.  Visualized LEFT chest is unremarkable.     IMPRESSION:     1.  No fracture or dislocation of LEFT shoulder.  2.  Mild degenerative changes.         Thank you MITESH Smallwood PA-C for allowing me to participate in caring for your patient.    Greater than 30 minutes was spent reviewing patient history, discussing current issue, physical examination, reviewing results and documenting the visit.

## 2021-05-20 ENCOUNTER — OFFICE VISIT (OUTPATIENT)
Dept: MEDICAL GROUP | Facility: CLINIC | Age: 56
End: 2021-05-20
Payer: COMMERCIAL

## 2021-05-20 VITALS
HEART RATE: 104 BPM | SYSTOLIC BLOOD PRESSURE: 150 MMHG | DIASTOLIC BLOOD PRESSURE: 88 MMHG | RESPIRATION RATE: 12 BRPM | BODY MASS INDEX: 29.84 KG/M2 | OXYGEN SATURATION: 99 % | TEMPERATURE: 98.7 F | HEIGHT: 59 IN | WEIGHT: 148 LBS

## 2021-05-20 DIAGNOSIS — M79.642 HAND PAIN, LEFT: ICD-10-CM

## 2021-05-20 DIAGNOSIS — M79.672 FOOT PAIN, LEFT: ICD-10-CM

## 2021-05-20 DIAGNOSIS — M25.50 MULTIPLE JOINT PAIN: ICD-10-CM

## 2021-05-20 DIAGNOSIS — M25.531 WRIST PAIN, RIGHT: ICD-10-CM

## 2021-05-20 DIAGNOSIS — M25.461 KNEE EFFUSION, RIGHT: ICD-10-CM

## 2021-05-20 DIAGNOSIS — S80.02XA CONTUSION OF LEFT KNEE, INITIAL ENCOUNTER: ICD-10-CM

## 2021-05-20 PROCEDURE — 20611 DRAIN/INJ JOINT/BURSA W/US: CPT | Mod: RT | Performed by: FAMILY MEDICINE

## 2021-05-20 PROCEDURE — 99214 OFFICE O/P EST MOD 30 MIN: CPT | Mod: 25 | Performed by: FAMILY MEDICINE

## 2021-05-20 RX ORDER — TRIAMCINOLONE ACETONIDE 40 MG/ML
40 INJECTION, SUSPENSION INTRA-ARTICULAR; INTRAMUSCULAR ONCE
Status: COMPLETED | OUTPATIENT
Start: 2021-05-20 | End: 2021-05-20

## 2021-05-20 RX ORDER — PREDNISONE 50 MG/1
50 TABLET ORAL DAILY
Qty: 14 TABLET | Refills: 0 | Status: SHIPPED | OUTPATIENT
Start: 2021-05-20 | End: 2021-05-25

## 2021-05-20 RX ADMIN — TRIAMCINOLONE ACETONIDE 40 MG: 40 INJECTION, SUSPENSION INTRA-ARTICULAR; INTRAMUSCULAR at 15:50

## 2021-05-20 ASSESSMENT — FIBROSIS 4 INDEX: FIB4 SCORE: 0.6

## 2021-05-20 NOTE — PROCEDURES
PROCEDURE NOTE:  right knee ASPIRATION/WITH CORTICOSTEROID  injection  Consent was obtained, using sterile technique the knee was prepped  Supra-lateral patellar approach.   18 G needle for aspiration of 25 cc of straw colored fluid and the needle withdrawn.    Using the same port 5 cc marcaine and 40 mg kenalog injected into the knee joint  The procedure was well tolerated.    Watch for fever, or increased swelling or persistent pain in knee. Call or return to clinic prn if such symptoms occur or the knee fails to improve as anticipated.

## 2021-05-20 NOTE — LETTER
May 20, 2021         Patient: Kary Shah   YOB: 1965   Date of Visit: 5/20/2021           To Whom it May Concern:    Kary Shah was seen in my clinic on 5/20/2021. She must use wrist brace and has limited use of her RIGHT hand until re-evaluation in 2 weeks.    If you have any questions or concerns, please don't hesitate to call.        Sincerely,           Dustin Benitez M.D.  Electronically Signed

## 2021-05-20 NOTE — PROGRESS NOTES
"Subjective:      Kary Kevin is a 55 y.o. female who presents with Arm Pain (EP/ Bilateral arm pain )     Follow-up for multiple joint pain   fall approximately early April 2021  Inversion injury of the LEFT after stumbling on a uneven piece of concrete falling onto the concrete outside her home onto BILATERAL flexed knees and onto an outstretched hands    Inversion of the LEFT FOOt with swelling and both knees/landed on flexed knee  Swelling of the RIGHT knee  Landed on flexed knees  Pain is predominantly at the medial aspect of the LEFT knee and posteriorly RIGHT knee  Pressure and achiness    BILATERAL knee pain with swelling on the RIGHT  Achy and sharp pain at the anterior and medial knees  Pressure in the back of the RIGHT knee persists  No radiation  Worse with ambulation and flexion of the knee  Improved with rest  Denies any prior issues with the knee    RIGHT wrist pain  Ulnar aspect of the wrist  Achy  Sharp with palpation  Worse with movement and use of the wrist  Improved with rest and immobilization    LEFT hand pain associated with swelling  Pain along the metacarpal and MCP joint regions  Worse RIGHT pinky finger which is painfull and achy with gripping  Ibuprofen and meloxicam NOT helping    Now having numbness and tingling of both hands at HS  Unable to  due to pain with swelling    When she fell she also twisted her LEFT ankle  She has been experiencing pain in the lateral aspect of the LEFT foot  Associated with swelling  Worse with ambulation  Improved with rest and elevation  She has failed ice and oral medications as well as topical medications    She has been unable to work secondary to her pain  She works as a seamstress     Objective:     /88   Pulse (!) 104   Temp 37.1 °C (98.7 °F) (Temporal)   Resp 12   Ht 1.499 m (4' 11\")   Wt 67.1 kg (148 lb)   SpO2 99%   BMI 29.89 kg/m²     Physical Exam    No acute distress  Normal respiratory effort    Knee exam:  RIGHT " KNEE:  Normal alignment  POSITIVE visual swelling with 3+ effusion   Anterior knee tender  Minimal apprehension  POSITIVE joint line tenderness medially and mild tenderness laterally  Lucas's testing is negative medially and laterally  Lachman's testing is trace with good endpoint  No laxity to varus and valgus stress  The legs otherwise neurovascularly intact    LEFT KNEE:  Normal alignment  No visual swelling or deformity  Anterior knee with POSITIVE tenderness  POSITIVE apprehension  No  joint line tenderness medially or laterally  Lucas's testing is negative medially and laterally  Lachman's testing is trace with good endpoint  No laxity to varus and valgus stress  The legs otherwise neurovascularly intact    BILATERAL WRIST exam  Range of motion limited all planes on the RIGHT compared to left  No tenderness along scaphoid, TFCC insertion, distal radius with POSITIVE tenderness at the distal ulna on the RIGHT compared to the left  Tinel's testing is NEGATIVE  The hand is otherwise neurovascularly intact    NEUTRAL stance  Able to ambulate with normal gait in cam walker boot      Assessment/Plan:     1. Knee effusion, right  triamcinolone acetonide (KENALOG-40) injection 40 mg   2. Contusion of left knee, initial encounter     3. Wrist pain, right (ulnar-sided)     4. Hand pain, left     5. Foot pain, left     6. Multiple joint pain  predniSONE (DELTASONE) 50 MG Tab     Fall approximately early April 2021  Inversion injury of the LEFT after stumbling on a uneven piece of concrete falling onto the concrete outside her home onto BILATERAL flexed knees and onto an outstretched hands    She continues to have RIGHT ulnar-sided wrist pain and swelling  She was fitted with a regular wrist splint in the office    Tramadol did NOT help her pain  We prescribed prednisone, 50 mg to be taken daily    ultrasound-guided aspiration/injection of the RIGHT knee was performed in the office TODAY (May 28, 2021), Aspiration  of 25 cc, straw-colored fluid and injected corticosteroid into the RIGHT knee under ultrasound guidance    Work limitations include limited use of RIGHT hand and wrist splint     Return in about 2 weeks (around 6/3/2021).  To see how she is doing            5/17/2021 5:06 PM     HISTORY/REASON FOR EXAM:  Pain/Deformity Following Trauma; knee effusion and patellofemoral pain        TECHNIQUE/EXAM DESCRIPTION AND NUMBER OF VIEWS:  3 views of the RIGHT knee.     COMPARISON: None     FINDINGS:  There is no evidence of fracture or dislocation. Alignment is maintained. There is a small joint effusion. No periosteal new bone formation or osseous erosion is identified.     IMPRESSION:     No evidence of acute fracture or dislocation.     Small joint effusion.                       5/17/2021 5:05 PM     HISTORY/REASON FOR EXAM:  Atraumatic Pain/Swelling/Deformity; patellofemoral pain        TECHNIQUE/EXAM DESCRIPTION AND NUMBER OF VIEWS:  3 views of the LEFT knee.     COMPARISON: None     FINDINGS:  There is no evidence of fracture or dislocation. Alignment is maintained. No joint effusion is seen. No periosteal new bone formation or osseous erosion is identified.     IMPRESSION:     No evidence of acute fracture or dislocation.            5/17/2021 5:06 PM     HISTORY/REASON FOR EXAM:  Pain/Deformity Following Trauma; Wrist pain after fall, distal ulna tenderness.  Pain after fall     TECHNIQUE/EXAM DESCRIPTION AND NUMBER OF VIEWS:  4 views of the RIGHT wrist.     COMPARISON: None     FINDINGS:  There is no fracture or dislocation.  The visualized osseous structures are in anatomic alignment.  The joint spaces are preserved.  Bone mineralization is decreased..        IMPRESSION:     No acute osseous abnormality.               5/17/2021 5:05 PM     HISTORY/REASON FOR EXAM:  Pain/Deformity Following Trauma; Hand swelling and fourth metacarpal tenderness after fall        TECHNIQUE/EXAM DESCRIPTION AND NUMBER OF VIEWS:  3  views of the RIGHT hand.     COMPARISON:  3/26/2020     FINDINGS:  There is no evidence of fracture or dislocation. Bones are demineralized, limiting evaluation. There is joint space narrowing and spurring particularly at the distal interphalangeal joints of the third and fifth digits. There is mild radiocarpal joint   space narrowing. There is soft tissue swelling of the distal third digit.        IMPRESSION:     No evidence of acute fracture or dislocation.     Osteoarthritis as above described particularly involving the distal interphalangeal joints of the third and fifth digits.     Mild soft tissue swelling about the distal third digit.     Demineralization which limits evaluation.           5/17/2021 5:05 PM     HISTORY/REASON FOR EXAM:  Pain/Deformity Following Trauma; Base of fifth metatarsal tenderness after inversion injury        TECHNIQUE/EXAM DESCRIPTION AND NUMBER OF VIEWS:  3 nonweightbearing views of the LEFT foot.     COMPARISON:  None     FINDINGS:  There is no evidence of fracture or dislocation. There is joint space narrowing and spurring at the first MTP joint. There is interphalangeal joint space narrowing. There is soft tissue swelling along the medial foot. There is spurring of the calcaneus   at the insertion of the Achilles tendon and plantar fascia.        IMPRESSION:     Degenerative changes as above described, most prominent at the first MTP joint.     Soft tissue swelling along the medial foot.     Calcaneal spurring.        2/12/2021 3:00 PM     HISTORY/REASON FOR EXAM:  Atraumatic Pain/Swelling/Deformity.  LEFT shoulder pain.     TECHNIQUE/EXAM DESCRIPTION AND NUMBER OF VIEWS:  3 views of the LEFT shoulder.     COMPARISON: None     FINDINGS:  Clavicle is intact.  AC joint is preserved.  Visualized proximal humerus is intact and normally located.  Mild narrowing of glenohumeral joint with minimal osteophyte formation inferiorly.  Visualized LEFT chest is  unremarkable.     IMPRESSION:     1.  No fracture or dislocation of LEFT shoulder.  2.  Mild degenerative changes.         Thank you MITESH Smallwood PA-C for allowing me to participate in caring for your patient.

## 2021-06-08 ENCOUNTER — OFFICE VISIT (OUTPATIENT)
Dept: MEDICAL GROUP | Facility: CLINIC | Age: 56
End: 2021-06-08
Payer: COMMERCIAL

## 2021-06-08 VITALS
BODY MASS INDEX: 29.84 KG/M2 | TEMPERATURE: 97.2 F | HEART RATE: 101 BPM | OXYGEN SATURATION: 96 % | SYSTOLIC BLOOD PRESSURE: 128 MMHG | RESPIRATION RATE: 18 BRPM | WEIGHT: 148 LBS | DIASTOLIC BLOOD PRESSURE: 78 MMHG | HEIGHT: 59 IN

## 2021-06-08 DIAGNOSIS — M25.461 KNEE EFFUSION, RIGHT: ICD-10-CM

## 2021-06-08 DIAGNOSIS — M05.79 RHEUMATOID ARTHRITIS INVOLVING MULTIPLE SITES WITH POSITIVE RHEUMATOID FACTOR (HCC): ICD-10-CM

## 2021-06-08 DIAGNOSIS — M79.642 HAND PAIN, LEFT: ICD-10-CM

## 2021-06-08 DIAGNOSIS — M25.50 MULTIPLE JOINT PAIN: ICD-10-CM

## 2021-06-08 DIAGNOSIS — M25.531 WRIST PAIN, RIGHT: ICD-10-CM

## 2021-06-08 DIAGNOSIS — S80.02XA CONTUSION OF LEFT KNEE, INITIAL ENCOUNTER: ICD-10-CM

## 2021-06-08 DIAGNOSIS — M79.672 FOOT PAIN, LEFT: ICD-10-CM

## 2021-06-08 PROCEDURE — 99214 OFFICE O/P EST MOD 30 MIN: CPT | Performed by: FAMILY MEDICINE

## 2021-06-08 RX ORDER — PREDNISONE 20 MG/1
20 TABLET ORAL DAILY
Qty: 30 TABLET | Refills: 1 | Status: SHIPPED | OUTPATIENT
Start: 2021-06-08 | End: 2021-07-02 | Stop reason: SDUPTHER

## 2021-06-08 ASSESSMENT — FIBROSIS 4 INDEX: FIB4 SCORE: 0.6

## 2021-06-08 NOTE — PROGRESS NOTES
"Subjective:      Kary Kevin is a 55 y.o. female who presents with Arm Pain (EP/ Bilateral arm pain )     Follow-up for multiple joint pain   fall approximately early April 2021  Inversion injury of the LEFT after stumbling on a uneven piece of concrete falling onto the concrete outside her home onto BILATERAL flexed knees and onto an outstretched hands    Inversion of the LEFT FOOt with swelling and both knees/landed on flexed knee  LEFT ankle/foot pain is IMPROVED  Mostly localized to the lateral ankle  Achy pain with minimal swelling  Not limiting activity  Overall, multijoint pain has IMPROVED with prednisone    She has been unable to work secondary to her pain  She works as a seamstress     Objective:     /78 (BP Location: Left arm, Patient Position: Sitting, BP Cuff Size: Adult)   Pulse (!) 101   Temp 36.2 °C (97.2 °F) (Temporal)   Resp 18   Ht 1.499 m (4' 11\")   Wt 67.1 kg (148 lb)   SpO2 96%   BMI 29.89 kg/m²     Physical Exam    No acute distress  Normal respiratory effort    Knee exam:  RIGHT KNEE:  Normal alignment  NO swelling    BILATERAL WRIST exam  Range of motion limited all planes on the RIGHT compared to left    LEFT ANKLE:  There is minimal swelling noted at the ankle  Range of motion intact with dorsiflexion and plantarflexion, inversion and eversion  There is minimal tenderness of the tenderness of the ATFL, without tenderness of the CF or PTF ligament  There is NO tenderness of the lateral malleolus or medial malleolus  Anterior drawer testing is NEGATIVE  Talar tilt testing is NEGATIVE  The foot and ankle is otherwise neurovascularly intact    LEFT FOOT:  There is NO swelling noted at the foot  There is NO tenderness at the base of the fifth metatarsal, cuboid, or tarsal navicular  There is NO pain with metatarsal squeeze test    NEUTRAL stance  Able to ambulate with NORMAL gait     Assessment/Plan:     1. Knee effusion, right     2. Contusion of left knee, initial " encounter     3. Wrist pain, right (ulnar-sided)     4. Hand pain, left     5. Foot pain, left     6. Multiple joint pain  predniSONE (DELTASONE) 20 MG Tab   7. Rheumatoid arthritis involving multiple sites with positive rheumatoid factor (HCC)  predniSONE (DELTASONE) 20 MG Tab     Tramadol did NOT help her pain  We prescribed prednisone, 50 mg to be taken daily    Fortunately, Deltasone has helped  We will decrease her dose to 20 mg/day    ultrasound-guided aspiration/injection of the RIGHT knee was performed (May 28, 2021), Aspiration of 25 cc, straw-colored fluid and injected corticosteroid into the RIGHT knee under ultrasound guidance  HELPED     Work limitations include limited use of RIGHT hand and wrist splint     Return in about 3 weeks (around 6/29/2021).  To see how she is doing on Deltasone 20 mg/day      Ccp Antibodies 0 - 19 Units 93High       Rheumatoid Factor -Neph- 0 - 14 IU/mL 83High                     5/17/2021 5:06 PM     HISTORY/REASON FOR EXAM:  Pain/Deformity Following Trauma; knee effusion and patellofemoral pain        TECHNIQUE/EXAM DESCRIPTION AND NUMBER OF VIEWS:  3 views of the RIGHT knee.     COMPARISON: None     FINDINGS:  There is no evidence of fracture or dislocation. Alignment is maintained. There is a small joint effusion. No periosteal new bone formation or osseous erosion is identified.     IMPRESSION:     No evidence of acute fracture or dislocation.     Small joint effusion.                       5/17/2021 5:05 PM     HISTORY/REASON FOR EXAM:  Atraumatic Pain/Swelling/Deformity; patellofemoral pain        TECHNIQUE/EXAM DESCRIPTION AND NUMBER OF VIEWS:  3 views of the LEFT knee.     COMPARISON: None     FINDINGS:  There is no evidence of fracture or dislocation. Alignment is maintained. No joint effusion is seen. No periosteal new bone formation or osseous erosion is identified.     IMPRESSION:     No evidence of acute fracture or dislocation.            5/17/2021 5:06  PM     HISTORY/REASON FOR EXAM:  Pain/Deformity Following Trauma; Wrist pain after fall, distal ulna tenderness.  Pain after fall     TECHNIQUE/EXAM DESCRIPTION AND NUMBER OF VIEWS:  4 views of the RIGHT wrist.     COMPARISON: None     FINDINGS:  There is no fracture or dislocation.  The visualized osseous structures are in anatomic alignment.  The joint spaces are preserved.  Bone mineralization is decreased..        IMPRESSION:     No acute osseous abnormality.               5/17/2021 5:05 PM     HISTORY/REASON FOR EXAM:  Pain/Deformity Following Trauma; Hand swelling and fourth metacarpal tenderness after fall        TECHNIQUE/EXAM DESCRIPTION AND NUMBER OF VIEWS:  3 views of the RIGHT hand.     COMPARISON:  3/26/2020     FINDINGS:  There is no evidence of fracture or dislocation. Bones are demineralized, limiting evaluation. There is joint space narrowing and spurring particularly at the distal interphalangeal joints of the third and fifth digits. There is mild radiocarpal joint   space narrowing. There is soft tissue swelling of the distal third digit.        IMPRESSION:     No evidence of acute fracture or dislocation.     Osteoarthritis as above described particularly involving the distal interphalangeal joints of the third and fifth digits.     Mild soft tissue swelling about the distal third digit.     Demineralization which limits evaluation.           5/17/2021 5:05 PM     HISTORY/REASON FOR EXAM:  Pain/Deformity Following Trauma; Base of fifth metatarsal tenderness after inversion injury        TECHNIQUE/EXAM DESCRIPTION AND NUMBER OF VIEWS:  3 nonweightbearing views of the LEFT foot.     COMPARISON:  None     FINDINGS:  There is no evidence of fracture or dislocation. There is joint space narrowing and spurring at the first MTP joint. There is interphalangeal joint space narrowing. There is soft tissue swelling along the medial foot. There is spurring of the calcaneus   at the insertion of the Achilles  tendon and plantar fascia.        IMPRESSION:     Degenerative changes as above described, most prominent at the first MTP joint.     Soft tissue swelling along the medial foot.     Calcaneal spurring.        2/12/2021 3:00 PM     HISTORY/REASON FOR EXAM:  Atraumatic Pain/Swelling/Deformity.  LEFT shoulder pain.     TECHNIQUE/EXAM DESCRIPTION AND NUMBER OF VIEWS:  3 views of the LEFT shoulder.     COMPARISON: None     FINDINGS:  Clavicle is intact.  AC joint is preserved.  Visualized proximal humerus is intact and normally located.  Mild narrowing of glenohumeral joint with minimal osteophyte formation inferiorly.  Visualized LEFT chest is unremarkable.     IMPRESSION:     1.  No fracture or dislocation of LEFT shoulder.  2.  Mild degenerative changes.         Thank you MITESH Smallwood PA-C for allowing me to participate in caring for your patient.

## 2021-07-02 ENCOUNTER — OFFICE VISIT (OUTPATIENT)
Dept: MEDICAL GROUP | Facility: CLINIC | Age: 56
End: 2021-07-02
Payer: COMMERCIAL

## 2021-07-02 VITALS
SYSTOLIC BLOOD PRESSURE: 128 MMHG | TEMPERATURE: 98.3 F | RESPIRATION RATE: 18 BRPM | HEIGHT: 59 IN | HEART RATE: 86 BPM | OXYGEN SATURATION: 96 % | BODY MASS INDEX: 29.84 KG/M2 | WEIGHT: 148 LBS | DIASTOLIC BLOOD PRESSURE: 78 MMHG

## 2021-07-02 DIAGNOSIS — M25.50 MULTIPLE JOINT PAIN: ICD-10-CM

## 2021-07-02 DIAGNOSIS — M05.79 RHEUMATOID ARTHRITIS INVOLVING MULTIPLE SITES WITH POSITIVE RHEUMATOID FACTOR (HCC): ICD-10-CM

## 2021-07-02 PROCEDURE — 99213 OFFICE O/P EST LOW 20 MIN: CPT | Performed by: FAMILY MEDICINE

## 2021-07-02 RX ORDER — PREDNISONE 20 MG/1
20 TABLET ORAL DAILY
Qty: 90 TABLET | Refills: 0 | Status: SHIPPED | OUTPATIENT
Start: 2021-07-02 | End: 2021-07-23

## 2021-07-02 ASSESSMENT — FIBROSIS 4 INDEX: FIB4 SCORE: 0.6

## 2021-07-02 NOTE — PROGRESS NOTES
"Subjective:      Kary Kevin is a 55 y.o. female who presents with Arm Pain (EP/ Bilateral arm pain )     Follow-up for multiple joint pain   fall approximately early April 2021  Inversion injury of the LEFT after stumbling on a uneven piece of concrete falling onto the concrete outside her home onto BILATERAL flexed knees and onto an outstretched hands    Inversion of the LEFT FOOt with swelling and both knees/landed on flexed knee  Overall, multijoint pain has IMPROVED with prednisone    She is now down to 20 mg/day and is pleased with her progress    She has been tolerating work  She works as a seamstress     Objective:     /78 (BP Location: Left arm, Patient Position: Sitting, BP Cuff Size: Adult)   Pulse 86   Temp 36.8 °C (98.3 °F) (Temporal)   Resp 18   Ht 1.499 m (4' 11\")   Wt 67.1 kg (148 lb)   SpO2 96%   BMI 29.89 kg/m²       Physical Exam    No acute distress  Normal respiratory effort    HIP EXAM:  NORMAL gait    Right hip: Range of motion is intact  NEGATIVE pain with internal rotation  sam's test is NEGATIVE  NO tenderness of the trochanteric bursa  NO tenderness of the gluteus medius  Mary's test is NEGATIVE    Left hip: Range of motion is intact  NEGATIVE pain with internal rotation  sam's test is NEGATIVE  NO tenderness of the trochanteric bursa  NO tenderness of the gluteus medius  Mary's test is NEGATIVE    Knee exam:  RIGHT KNEE:  Normal alignment  NO swelling    LEFT FOOT:  There is NO swelling noted at the foot  There is NO tenderness at the base of the fifth metatarsal, cuboid, or tarsal navicular  There is NO pain with metatarsal squeeze test    NEUTRAL stance  Able to ambulate with NORMAL gait     Assessment/Plan:     1. Multiple joint pain  predniSONE (DELTASONE) 20 MG Tab   2. Rheumatoid arthritis involving multiple sites with positive rheumatoid factor (HCC)  predniSONE (DELTASONE) 20 MG Tab     Tramadol did NOT help her pain  Diclofenac did NOT help her " pain    She initially started prednisone, 50 mg to be taken daily  She is now down to 20 mg of prednisone per day    She has been encouraged to cut down to 10 mg/day and possibly discontinue if she can tolerate that.    ultrasound-guided aspiration/injection of the RIGHT knee was performed (May 28, 2021), Aspiration of 25 cc, straw-colored fluid and injected corticosteroid into the RIGHT knee under ultrasound guidance  HELPED     Return in about 3 months (around 10/2/2021).   To see how she is doing on Deltasone 20 mg/day, and hopefully she will be down to 10 mg/day or discontinued at that point      Ccp Antibodies 0 - 19 Units 93High       Rheumatoid Factor -Neph- 0 - 14 IU/mL 83High                     5/17/2021 5:06 PM     HISTORY/REASON FOR EXAM:  Pain/Deformity Following Trauma; knee effusion and patellofemoral pain        TECHNIQUE/EXAM DESCRIPTION AND NUMBER OF VIEWS:  3 views of the RIGHT knee.     COMPARISON: None     FINDINGS:  There is no evidence of fracture or dislocation. Alignment is maintained. There is a small joint effusion. No periosteal new bone formation or osseous erosion is identified.     IMPRESSION:     No evidence of acute fracture or dislocation.     Small joint effusion.                       5/17/2021 5:05 PM     HISTORY/REASON FOR EXAM:  Atraumatic Pain/Swelling/Deformity; patellofemoral pain        TECHNIQUE/EXAM DESCRIPTION AND NUMBER OF VIEWS:  3 views of the LEFT knee.     COMPARISON: None     FINDINGS:  There is no evidence of fracture or dislocation. Alignment is maintained. No joint effusion is seen. No periosteal new bone formation or osseous erosion is identified.     IMPRESSION:     No evidence of acute fracture or dislocation.            5/17/2021 5:06 PM     HISTORY/REASON FOR EXAM:  Pain/Deformity Following Trauma; Wrist pain after fall, distal ulna tenderness.  Pain after fall     TECHNIQUE/EXAM DESCRIPTION AND NUMBER OF VIEWS:  4 views of the RIGHT wrist.     COMPARISON:  None     FINDINGS:  There is no fracture or dislocation.  The visualized osseous structures are in anatomic alignment.  The joint spaces are preserved.  Bone mineralization is decreased..        IMPRESSION:     No acute osseous abnormality.               5/17/2021 5:05 PM     HISTORY/REASON FOR EXAM:  Pain/Deformity Following Trauma; Hand swelling and fourth metacarpal tenderness after fall        TECHNIQUE/EXAM DESCRIPTION AND NUMBER OF VIEWS:  3 views of the RIGHT hand.     COMPARISON:  3/26/2020     FINDINGS:  There is no evidence of fracture or dislocation. Bones are demineralized, limiting evaluation. There is joint space narrowing and spurring particularly at the distal interphalangeal joints of the third and fifth digits. There is mild radiocarpal joint   space narrowing. There is soft tissue swelling of the distal third digit.        IMPRESSION:     No evidence of acute fracture or dislocation.     Osteoarthritis as above described particularly involving the distal interphalangeal joints of the third and fifth digits.     Mild soft tissue swelling about the distal third digit.     Demineralization which limits evaluation.           5/17/2021 5:05 PM     HISTORY/REASON FOR EXAM:  Pain/Deformity Following Trauma; Base of fifth metatarsal tenderness after inversion injury        TECHNIQUE/EXAM DESCRIPTION AND NUMBER OF VIEWS:  3 nonweightbearing views of the LEFT foot.     COMPARISON:  None     FINDINGS:  There is no evidence of fracture or dislocation. There is joint space narrowing and spurring at the first MTP joint. There is interphalangeal joint space narrowing. There is soft tissue swelling along the medial foot. There is spurring of the calcaneus   at the insertion of the Achilles tendon and plantar fascia.        IMPRESSION:     Degenerative changes as above described, most prominent at the first MTP joint.     Soft tissue swelling along the medial foot.     Calcaneal spurring.        2/12/2021 3:00  PM     HISTORY/REASON FOR EXAM:  Atraumatic Pain/Swelling/Deformity.  LEFT shoulder pain.     TECHNIQUE/EXAM DESCRIPTION AND NUMBER OF VIEWS:  3 views of the LEFT shoulder.     COMPARISON: None     FINDINGS:  Clavicle is intact.  AC joint is preserved.  Visualized proximal humerus is intact and normally located.  Mild narrowing of glenohumeral joint with minimal osteophyte formation inferiorly.  Visualized LEFT chest is unremarkable.     IMPRESSION:     1.  No fracture or dislocation of LEFT shoulder.  2.  Mild degenerative changes.         Thank you MITESH Smallwood PA-C for allowing me to participate in caring for your patient.

## 2021-07-17 ENCOUNTER — HOSPITAL ENCOUNTER (OUTPATIENT)
Dept: LAB | Facility: MEDICAL CENTER | Age: 56
End: 2021-07-17
Attending: NURSE PRACTITIONER
Payer: COMMERCIAL

## 2021-07-17 DIAGNOSIS — E78.5 DYSLIPIDEMIA: ICD-10-CM

## 2021-07-17 DIAGNOSIS — E11.9 TYPE 2 DIABETES MELLITUS WITHOUT COMPLICATION, WITHOUT LONG-TERM CURRENT USE OF INSULIN (HCC): ICD-10-CM

## 2021-07-17 LAB
ALBUMIN SERPL BCP-MCNC: 3.8 G/DL (ref 3.2–4.9)
ALBUMIN/GLOB SERPL: 1.4 G/DL
ALP SERPL-CCNC: 126 U/L (ref 30–99)
ALT SERPL-CCNC: 10 U/L (ref 2–50)
ANION GAP SERPL CALC-SCNC: 13 MMOL/L (ref 7–16)
AST SERPL-CCNC: 9 U/L (ref 12–45)
BILIRUB SERPL-MCNC: 0.7 MG/DL (ref 0.1–1.5)
BUN SERPL-MCNC: 10 MG/DL (ref 8–22)
CALCIUM SERPL-MCNC: 8.9 MG/DL (ref 8.5–10.5)
CHLORIDE SERPL-SCNC: 103 MMOL/L (ref 96–112)
CHOLEST SERPL-MCNC: 206 MG/DL (ref 100–199)
CO2 SERPL-SCNC: 23 MMOL/L (ref 20–33)
CREAT SERPL-MCNC: 0.49 MG/DL (ref 0.5–1.4)
EST. AVERAGE GLUCOSE BLD GHB EST-MCNC: 229 MG/DL
GLOBULIN SER CALC-MCNC: 2.7 G/DL (ref 1.9–3.5)
GLUCOSE SERPL-MCNC: 216 MG/DL (ref 65–99)
HBA1C MFR BLD: 9.6 % (ref 4–5.6)
HDLC SERPL-MCNC: 40 MG/DL
LDLC SERPL CALC-MCNC: 118 MG/DL
POTASSIUM SERPL-SCNC: 3.9 MMOL/L (ref 3.6–5.5)
PROT SERPL-MCNC: 6.5 G/DL (ref 6–8.2)
SODIUM SERPL-SCNC: 139 MMOL/L (ref 135–145)
TRIGL SERPL-MCNC: 238 MG/DL (ref 0–149)

## 2021-07-17 PROCEDURE — 80061 LIPID PANEL: CPT

## 2021-07-17 PROCEDURE — 36415 COLL VENOUS BLD VENIPUNCTURE: CPT

## 2021-07-17 PROCEDURE — 83036 HEMOGLOBIN GLYCOSYLATED A1C: CPT

## 2021-07-17 PROCEDURE — 80053 COMPREHEN METABOLIC PANEL: CPT

## 2021-07-21 ENCOUNTER — TELEPHONE (OUTPATIENT)
Dept: MEDICAL GROUP | Facility: PHYSICIAN GROUP | Age: 56
End: 2021-07-21

## 2021-07-21 ENCOUNTER — OFFICE VISIT (OUTPATIENT)
Dept: URGENT CARE | Facility: PHYSICIAN GROUP | Age: 56
End: 2021-07-21
Payer: COMMERCIAL

## 2021-07-21 VITALS
OXYGEN SATURATION: 95 % | TEMPERATURE: 96.8 F | DIASTOLIC BLOOD PRESSURE: 72 MMHG | SYSTOLIC BLOOD PRESSURE: 122 MMHG | RESPIRATION RATE: 16 BRPM | WEIGHT: 152 LBS | BODY MASS INDEX: 30.64 KG/M2 | HEIGHT: 59 IN | HEART RATE: 78 BPM

## 2021-07-21 DIAGNOSIS — M25.542 ARTHRALGIA OF BOTH HANDS: ICD-10-CM

## 2021-07-21 DIAGNOSIS — M25.541 ARTHRALGIA OF BOTH HANDS: ICD-10-CM

## 2021-07-21 DIAGNOSIS — E11.69 TYPE 2 DIABETES MELLITUS WITH OTHER SPECIFIED COMPLICATION, WITHOUT LONG-TERM CURRENT USE OF INSULIN (HCC): ICD-10-CM

## 2021-07-21 PROCEDURE — 99214 OFFICE O/P EST MOD 30 MIN: CPT | Performed by: PHYSICIAN ASSISTANT

## 2021-07-21 RX ORDER — HYDROXYCHLOROQUINE SULFATE 200 MG/1
TABLET, FILM COATED ORAL
COMMUNITY
Start: 2021-05-10 | End: 2021-07-23

## 2021-07-21 RX ORDER — NAPROXEN 250 MG/1
250 TABLET ORAL 2 TIMES DAILY WITH MEALS
COMMUNITY
End: 2022-01-04

## 2021-07-21 RX ORDER — HYDROCODONE BITARTRATE AND ACETAMINOPHEN 5; 325 MG/1; MG/1
1 TABLET ORAL EVERY 8 HOURS PRN
Qty: 15 TABLET | Refills: 0 | Status: SHIPPED | OUTPATIENT
Start: 2021-07-21 | End: 2021-07-26

## 2021-07-21 ASSESSMENT — PAIN SCALES - GENERAL: PAINLEVEL: 10=SEVERE PAIN

## 2021-07-21 ASSESSMENT — FIBROSIS 4 INDEX: FIB4 SCORE: 0.54

## 2021-07-21 ASSESSMENT — ENCOUNTER SYMPTOMS
FEVER: 0
FOCAL WEAKNESS: 1
FALLS: 0
JOINT SWELLING: 1

## 2021-07-21 NOTE — TELEPHONE ENCOUNTER
----- Message from ESTEBAN Dooley sent at 7/19/2021  6:01 PM PDT -----  Please call patient and inform her that we will discuss her recent labs at her upcoming appointment on 7/23/2021.  Thank you.

## 2021-07-21 NOTE — PROGRESS NOTES
Subjective:      Kary Shah is a 56 y.o. female who presents with Hand Swelling (bilateral hand/arm swelling, [id number] )  Of note an  was utilized throughout the duration of the visit today.          Other  This is a new problem. The current episode started 1 to 4 weeks ago. The problem occurs constantly. The problem has been gradually worsening. Associated symptoms include joint swelling. Pertinent negatives include no fever. Exacerbated by: Using her hands, gripping. Treatments tried: As above.     Patient is a 56-year-old female who presents to urgent care with worsening pain to her bilateral hands-left wrist in particular along with the second third and fourth digits on the right hand.  Patient is tearful and reports that pain has significantly worsened over the last week now keeping her up at night.  It does appear that patient has had had history of same in the past-she has been followed by sports medicine over the last few months status post fall-of which it was documented at her last visit earlier this month that patient is with RA with multiple joint site involvement.  Patient continues on low-dose prednisone at this time 10 mg.  She also reports that she is utilizing ibuprofen and intermittent usage of diclofenac.  Specifically she denies any fall, trauma or new injury.  She does report associated joint swelling as well.  Patient has taken tramadol in the past for similar symptoms however this has not made a difference in the past.  She is inquiring about injections in office today.    Also of note patient does have history of type 2 diabetes currently on Metformin of which she is compliant with medication-A1c in April was 6.9-unfortunately recent A1c reveals 9.6 drawn 3 days ago.  She does have upcoming appointment with her PCP in 2 days.  She denies history of gout that she is aware of.        Review of Systems   Constitutional: Negative for fever.   Musculoskeletal: Positive for  "joint pain and joint swelling. Negative for falls.   Neurological: Positive for focal weakness.        Reports weakness to bilateral    All other systems reviewed and are negative.         Objective:     /72   Pulse 78   Temp 36 °C (96.8 °F) (Temporal)   Resp 16   Ht 1.499 m (4' 11\")   Wt 68.9 kg (152 lb)   SpO2 95%   BMI 30.70 kg/m²    PMH:  has a past medical history of GERD (gastroesophageal reflux disease), Hypertension, and Osteoarthritis of both hands (2015).  MEDS: Reviewed .   ALLERGIES: No Known Allergies  SURGHX:   Past Surgical History:   Procedure Laterality Date   • PRIMARY C SECTION      x2     SOCHX:  reports that she has never smoked. She has never used smokeless tobacco. She reports that she does not drink alcohol and does not use drugs.  FH: Family history was reviewed, no pertinent findings to report    Physical Exam  Vitals reviewed.   Constitutional:       General: She is not in acute distress.     Appearance: She is well-developed.   HENT:      Head: Normocephalic and atraumatic.   Eyes:      Conjunctiva/sclera: Conjunctivae normal.      Pupils: Pupils are equal, round, and reactive to light.   Neck:      Trachea: No tracheal deviation.   Cardiovascular:      Rate and Rhythm: Normal rate.   Pulmonary:      Effort: Pulmonary effort is normal. No respiratory distress.      Breath sounds: Normal breath sounds.   Musculoskeletal:         General: Swelling and deformity present.      Cervical back: Normal range of motion and neck supple.      Comments: Left wrist-hand: Mild swelling to the left wrist with slight increased warmth extending to the hand.  Noted swelling with deformity to the left second DIP.  Notable swelling, faint erythema and increased warmth to the third and fourth PIP joints.  Diffusely tender.   is weak.  Right hand: Mild swelling to the second third and fourth metacarpals-without bony step-off or noted deformity.  Deformity noted to the third and fourth " DIPs.   Skin:     General: Skin is warm.   Neurological:      Mental Status: She is alert and oriented to person, place, and time.   Psychiatric:         Behavior: Behavior normal.         Thought Content: Thought content normal.         Judgment: Judgment normal.                        Assessment/Plan:        1. Arthralgia of both hands  - HYDROcodone-acetaminophen (NORCO) 5-325 MG Tab per tablet; Take 1 tablet by mouth every 8 hours as needed (Caution as may cause sedation, avoid while driving.) for up to 5 days.  Dispense: 15 tablet; Refill: 0    2. Type 2 diabetes mellitus with other specified complication, without long-term current use of insulin (HCC)    Appears that patient is most likely with both RA and osteoarthritic changes-age-related.  Does appear current flare is more related to RA as patient is with swelling, warmth, faint redness.  Unfortunately patient has had significant improvement in the past with prednisone although her A1c is significantly climbed and is uncontrolled.  She is taken tramadol without relief in the past along with NSAIDs.  She does have upcoming appointment with her PCP in 2 days.  Will write for short course of the above and will defer any further steroid usage to her PCP as of course this will make her sugars worse.  Also strongly encourage patient to follow-up with sports medicine for further discussion of various injections if patient would like to pursue such.    NARXCHECK was reviewed by myself-  Document does not reveal any concerning patterns. Pt. was advised to avoid the operation of heavy machine along with driving while on such medications. Finally pt. was advised to use medication only as prescribed.     DDX, Supportive care, and indications for immediate follow-up discussed with patient.    Instructed to return to clinic or nearest emergency department if we are not available for any change in condition, further concerns, or worsening of symptoms.  Of note greater  than 30 min. Used discussing the above, reviewing previous x-rays, previous visits with sports medicine.    The patient and/or guardian demonstrated a good understanding and agreed with the treatment plan.    Please note that this dictation was created using voice recognition software. I have made every reasonable attempt to correct obvious errors, but I expect that there are errors of grammar and possibly content that I did not discover before finalizing the note.

## 2021-07-21 NOTE — TELEPHONE ENCOUNTER
Phone Number Called: [phone] (home)       Call outcome: Left detailed message for patient. Informed to call back with any additional questions.    Message: Please call patient and inform her that we will discuss her recent labs at her upcoming appointment on 7/23/2021.  Thank you.

## 2021-07-21 NOTE — LETTER
July 21, 2021         Patient: Kary Shah   YOB: 1965   Date of Visit: 7/21/2021           To Whom it May Concern:    Kary Shah was seen in my clinic on 7/21/2021. Please excuse her from work on 7/21/2021 and  7/22/2021.  If you have any questions or concerns, please don't hesitate to call.        Sincerely,           Jan Horta P.A.-C.  Electronically Signed

## 2021-07-23 ENCOUNTER — OFFICE VISIT (OUTPATIENT)
Dept: MEDICAL GROUP | Facility: PHYSICIAN GROUP | Age: 56
End: 2021-07-23
Payer: COMMERCIAL

## 2021-07-23 VITALS
WEIGHT: 136 LBS | TEMPERATURE: 98.3 F | RESPIRATION RATE: 18 BRPM | BODY MASS INDEX: 27.42 KG/M2 | HEIGHT: 59 IN | OXYGEN SATURATION: 91 % | HEART RATE: 94 BPM | DIASTOLIC BLOOD PRESSURE: 68 MMHG | SYSTOLIC BLOOD PRESSURE: 138 MMHG

## 2021-07-23 DIAGNOSIS — M25.541 ARTHRALGIA OF BOTH HANDS: ICD-10-CM

## 2021-07-23 DIAGNOSIS — M25.542 ARTHRALGIA OF BOTH HANDS: ICD-10-CM

## 2021-07-23 DIAGNOSIS — M05.9 SEROPOSITIVE RHEUMATOID ARTHRITIS (HCC): ICD-10-CM

## 2021-07-23 DIAGNOSIS — E78.5 DYSLIPIDEMIA: ICD-10-CM

## 2021-07-23 DIAGNOSIS — E11.65 TYPE 2 DIABETES MELLITUS WITH HYPERGLYCEMIA, WITHOUT LONG-TERM CURRENT USE OF INSULIN (HCC): ICD-10-CM

## 2021-07-23 PROCEDURE — 99214 OFFICE O/P EST MOD 30 MIN: CPT | Performed by: NURSE PRACTITIONER

## 2021-07-23 RX ORDER — PREDNISONE 20 MG/1
20 TABLET ORAL DAILY
Qty: 90 TABLET | Refills: 0
Start: 2021-07-23 | End: 2021-10-21

## 2021-07-23 RX ORDER — PREDNISONE 20 MG/1
10 TABLET ORAL DAILY
Qty: 45 TABLET | Refills: 0
Start: 2021-07-23 | End: 2021-07-23

## 2021-07-23 RX ORDER — HYDROXYCHLOROQUINE SULFATE 200 MG/1
200 TABLET, FILM COATED ORAL DAILY
Qty: 30 TABLET | Refills: 2 | Status: SHIPPED | OUTPATIENT
Start: 2021-07-23 | End: 2021-10-19 | Stop reason: SDUPTHER

## 2021-07-23 RX ORDER — METFORMIN HYDROCHLORIDE 500 MG/1
1000 TABLET, EXTENDED RELEASE ORAL DAILY
Qty: 90 TABLET | Refills: 1 | Status: SHIPPED | OUTPATIENT
Start: 2021-07-23 | End: 2021-10-19

## 2021-07-23 RX ORDER — IBUPROFEN 200 MG
200 TABLET ORAL EVERY 6 HOURS PRN
COMMUNITY
End: 2022-01-04

## 2021-07-23 ASSESSMENT — FIBROSIS 4 INDEX: FIB4 SCORE: 0.54

## 2021-07-23 ASSESSMENT — PAIN SCALES - GENERAL: PAINLEVEL: 10=SEVERE PAIN

## 2021-07-23 NOTE — LETTER
[facility]  [address] [address]     July 23, 2021    Patient: Kary Shah   YOB: 1965   Date of Visit: 7/23/2021       To Whom It May Concern:    Kary Shah was seen and treated in our department on 7/23/2021. Please excuse her from work today 7/23/2021.      Sincerely,         SURYA Dooley.

## 2021-07-23 NOTE — ASSESSMENT & PLAN NOTE
Chronic medical problem. She has seen the rheumatologist once and did not return as she did not like the rheumatologist. She has had arthralgia of both of her hands for 10 days. She has been to Urgent Care who prescribed Norco and was advised to stop her steroid. She was prescribed prednisone 20 mg daily by sports medicine that was helping with her pain but was told to take half dose as did not want her being used to the high dose. She reports that she has been taking 10 mg prednisone since July appointment. She was seen by [facility] last in July 2020 but has not followed up. She completed 3 months of hydroxychloroquine that was initially prescribed by [facility] and has been off medication after she ran a refills.

## 2021-07-23 NOTE — ASSESSMENT & PLAN NOTE
Chronic medical problem.  She is not any lipid-lowering medication at this time.  She had recent labs completed that she like to review today. The 10-year ASCVD risk score (Bravo EVI Jr., et al., 2013) is: 9.6%. Last lab results:  Results for MIRTA ORTIZ (MRN [id number]) as of 7/23/2021 15:04   Ref. Range 7/17/2021 10:52   Cholesterol,Tot Latest Ref Range: 100 - 199 mg/dL 206 (H)   Triglycerides Latest Ref Range: 0 - 149 mg/dL 238 (H)   HDL Latest Ref Range: >=40 mg/dL 40   LDL Latest Ref Range: <100 mg/dL 118 (H)

## 2021-07-23 NOTE — ASSESSMENT & PLAN NOTE
Chronic medical problem.  She is taking metformin  mg daily.  She has recently been on prednisone 10 mg for her rheumatoid arthritis.  Last lab results:  Results for MIRTA ORTIZ (MRN [id number]) as of 7/23/2021 15:04   Ref. Range 7/17/2021 10:52   Glycohemoglobin Latest Ref Range: 4.0 - 5.6 % 9.6 (H)   Estim. Avg Glu Latest Units: mg/dL 229

## 2021-07-23 NOTE — PROGRESS NOTES
Subjective:     CC: Hand pain and work note    HPI:   Kary presents today with the following.  A  was used for this appointment Chloe, ID number [id number].    Arthralgia of both hands  Chronic medical problem. She has seen the rheumatologist once and did not return as she did not like the rheumatologist. She has had arthralgia of both of her hands for 10 days. She has been to Urgent Care who prescribed Norco and was advised to stop her steroid. She was prescribed prednisone 20 mg daily by sports medicine that was helping with her pain but was told to take half dose as did not want her being used to the high dose. She reports that she has been taking 10 mg prednisone since July appointment. She was seen by [facility] last in July 2020 but has not followed up. She completed 3 months of hydroxychloroquine that was initially prescribed by [facility] and has been off medication after she ran a refills.     Type 2 diabetes mellitus with hyperglycemia, without long-term current use of insulin (HCC)  Chronic medical problem.  She is taking metformin  mg daily.  She has recently been on prednisone 10 mg for her rheumatoid arthritis.  Last lab results:  Results for KARY ORTIZ (MRN [id number]) as of 7/23/2021 15:04   Ref. Range 7/17/2021 10:52   Glycohemoglobin Latest Ref Range: 4.0 - 5.6 % 9.6 (H)   Estim. Avg Glu Latest Units: mg/dL 229       Dyslipidemia  Chronic medical problem.  She is not any lipid-lowering medication at this time.  She had recent labs completed that she like to review today. The 10-year ASCVD risk score (Bravo EVI Jr., et al., 2013) is: 9.6%. Last lab results:  Results for KARY ORTIZ (MRN [id number]) as of 7/23/2021 15:04   Ref. Range 7/17/2021 10:52   Cholesterol,Tot Latest Ref Range: 100 - 199 mg/dL 206 (H)   Triglycerides Latest Ref Range: 0 - 149 mg/dL 238 (H)   HDL Latest Ref Range: >=40 mg/dL 40   LDL Latest Ref Range: <100 mg/dL 118 (H)  "      Past Medical History:   Diagnosis Date   • GERD (gastroesophageal reflux disease)    • Hypertension    • Osteoarthritis of both hands 2015       Social History     Tobacco Use   • Smoking status: Never Smoker   • Smokeless tobacco: Never Used   Vaping Use   • Vaping Use: Never used   Substance Use Topics   • Alcohol use: No   • Drug use: No       Current Outpatient Medications Ordered in Epic   Medication Sig Dispense Refill   • hydroxychloroquine (PLAQUENIL) 200 MG Tab Take 1 tablet by mouth every day. 30 tablet 2   • metFORMIN ER (GLUCOPHAGE XR) 500 MG TABLET SR 24 HR Take 2 Tablets by mouth every day. 90 tablet 1   • predniSONE (DELTASONE) 20 MG Tab Take 0.5 Tablets by mouth every day for 90 days. 45 tablet 0   • HYDROcodone-acetaminophen (NORCO) 5-325 MG Tab per tablet Take 1 tablet by mouth every 8 hours as needed (Caution as may cause sedation, avoid while driving.) for up to 5 days. 15 tablet 0   • ibuprofen (MOTRIN) 200 MG Tab Take 200 mg by mouth every 6 hours as needed. (Patient not taking: Reported on 7/23/2021)     • naproxen (NAPROSYN) 250 MG Tab Take 250 mg by mouth 2 times a day with meals. (Patient not taking: Reported on 7/23/2021)     • lisinopril (PRINIVIL) 10 MG Tab Take 1 Tab by mouth every day. (Patient not taking: Reported on 7/23/2021) 90 Tab 3     No current Epic-ordered facility-administered medications on file.       Allergies:  Patient has no known allergies.    Health Maintenance: Deferred    ROS:  Gen: no fevers/chills, no changes in weight  Eyes: no changes in vision  Pulm: no shortness of breath  CV: no chest pain, no palpitations  GI: no nausea/vomiting  MSk: no myalgias, + arthralgia of Both hands  Skin: no rash  Neuro: no headaches, no dizziness    Objective:     Vital signs reviewed  Exam:  /68 (BP Location: Left arm, Patient Position: Sitting, BP Cuff Size: Adult)   Pulse 94   Temp 36.8 °C (98.3 °F) (Temporal)   Resp 18   Ht 1.499 m (4' 11\")   Wt 61.7 kg (136 lb) "   SpO2 91%   BMI 27.47 kg/m²  Body mass index is 27.47 kg/m².    Gen: Alert and oriented, No apparent distress.  Tearful in exam room due to the pain in her hands.  Neck: Neck is supple without lymphadenopathy.  Lungs: Normal effort, CTA bilaterally, no wheezes, rhonchi, or rales  CV: Regular rate and rhythm. No murmurs, rubs, or gallops.  Ext: No clubbing, cyanosis, edema.  Left hand with mild swelling and tender to touch to second-fifth DIP, weak .  Right hand with mild swelling and tender to touch to the second-fifth DIP, weak .    Assessment & Plan:     56 y.o. female with the following -     1. Type 2 diabetes mellitus with hyperglycemia, without long-term current use of insulin (HCC)  Chronic exacerbated problem.  Discussed reviewed her recent A1c.  A1c has increased, believe this is due to her long-term prednisone use.  We will increase her Metformin ER to 1000 mg daily.  She will continue with diet last modifications.  She will recheck her A1c in 3 months.  - metFORMIN ER (GLUCOPHAGE XR) 500 MG TABLET SR 24 HR; Take 2 Tablets by mouth every day.  Dispense: 90 tablet; Refill: 1  - Lipid Profile; Future  - HEMOGLOBIN A1C; Future    2. Seropositive rheumatoid arthritis (HCC)  Chronic exacerbated problem.  I encouraged her to follow-up with rheumatology.  New referral is in place that she did like her previous rheumatologist.  We will start her back on her hydroxychloroquine.  I discussed with patient that she will need to establish with rheumatology for long-term management of her rheumatoid arthritis.  She verbalized understanding.  We discussed continuing with her prednisone 20 mg daily dosing she did have previous pain relief with the higher dose.  We have increased her Metformin dose given her increase in A1c.  - REFERRAL TO RHEUMATOLOGY  - hydroxychloroquine (PLAQUENIL) 200 MG Tab; Take 1 tablet by mouth every day.  Dispense: 30 tablet; Refill: 2  - predniSONE (DELTASONE) 20 MG Tab; Take 1 tablet  by mouth every day for 90 days.  Dispense: 90 tablet; Refill: 0    3. Arthralgia of both hands  See #2 above  - REFERRAL TO RHEUMATOLOGY  - predniSONE (DELTASONE) 20 MG Tab; Take 1 tablet by mouth every day for 90 days.  Dispense: 90 tablet; Refill: 0    4. Dyslipidemia  Chronic exacerbated problem.  Discussed and reviewed her recent lab results.  She has had recent increase in A1c due to her chronic prednisone use.  I have increased her Metformin and added on hydroxychloroquine.  I will have her recheck her labs in 3 months and if still elevated would start cholesterol medicine.  I will hold off on starting cholesterol medicine at this time as I am starting 2 new medications for patient.  - Lipid Profile; Future        Return in about 3 months (around 10/23/2021) for Diabetes, A1C.    Educated in proper administration of medication(s) ordered today including safety, possible SE, risks, benefits, rationale and alternatives to therapy.     Please note that this dictation was created using voice recognition software. I have made every reasonable attempt to correct obvious errors, but I expect that there are errors of grammar and possibly content that I did not discover before finalizing the note.

## 2021-07-27 DIAGNOSIS — I10 ESSENTIAL HYPERTENSION: ICD-10-CM

## 2021-07-28 RX ORDER — LISINOPRIL 10 MG/1
TABLET ORAL
Qty: 90 TABLET | Refills: 3 | Status: SHIPPED | OUTPATIENT
Start: 2021-07-28 | End: 2023-01-25 | Stop reason: SDUPTHER

## 2021-07-28 NOTE — TELEPHONE ENCOUNTER
Requested Prescriptions     Signed Prescriptions Disp Refills   • lisinopril (PRINIVIL) 10 MG Tab 90 tablet 3     Sig: Take 1 tablet by mouth once daily     Authorizing Provider: BASSAM RODRIGUEZ A.P.R.N.

## 2021-10-19 ENCOUNTER — OFFICE VISIT (OUTPATIENT)
Dept: MEDICAL GROUP | Facility: PHYSICIAN GROUP | Age: 56
End: 2021-10-19
Payer: COMMERCIAL

## 2021-10-19 VITALS
WEIGHT: 137 LBS | HEART RATE: 92 BPM | TEMPERATURE: 98.2 F | DIASTOLIC BLOOD PRESSURE: 70 MMHG | OXYGEN SATURATION: 98 % | HEIGHT: 59 IN | BODY MASS INDEX: 27.62 KG/M2 | SYSTOLIC BLOOD PRESSURE: 152 MMHG

## 2021-10-19 DIAGNOSIS — Z12.12 SCREENING FOR COLORECTAL CANCER: ICD-10-CM

## 2021-10-19 DIAGNOSIS — Z12.11 SCREENING FOR COLORECTAL CANCER: ICD-10-CM

## 2021-10-19 DIAGNOSIS — I10 ESSENTIAL HYPERTENSION: ICD-10-CM

## 2021-10-19 DIAGNOSIS — E78.5 DYSLIPIDEMIA: ICD-10-CM

## 2021-10-19 DIAGNOSIS — Z12.31 ENCOUNTER FOR SCREENING MAMMOGRAM FOR BREAST CANCER: ICD-10-CM

## 2021-10-19 DIAGNOSIS — M05.9 SEROPOSITIVE RHEUMATOID ARTHRITIS (HCC): ICD-10-CM

## 2021-10-19 DIAGNOSIS — E11.65 TYPE 2 DIABETES MELLITUS WITH HYPERGLYCEMIA, WITHOUT LONG-TERM CURRENT USE OF INSULIN (HCC): ICD-10-CM

## 2021-10-19 DIAGNOSIS — Z23 NEED FOR VACCINATION: ICD-10-CM

## 2021-10-19 PROBLEM — R10.12 LEFT UPPER QUADRANT PAIN: Status: RESOLVED | Noted: 2020-02-26 | Resolved: 2021-10-19

## 2021-10-19 LAB
HBA1C MFR BLD: 8 % (ref 0–5.6)
INT CON NEG: NEGATIVE
INT CON POS: POSITIVE

## 2021-10-19 PROCEDURE — 83036 HEMOGLOBIN GLYCOSYLATED A1C: CPT | Performed by: NURSE PRACTITIONER

## 2021-10-19 PROCEDURE — 99215 OFFICE O/P EST HI 40 MIN: CPT | Mod: 25 | Performed by: NURSE PRACTITIONER

## 2021-10-19 PROCEDURE — 90471 IMMUNIZATION ADMIN: CPT | Performed by: NURSE PRACTITIONER

## 2021-10-19 PROCEDURE — 90686 IIV4 VACC NO PRSV 0.5 ML IM: CPT | Performed by: NURSE PRACTITIONER

## 2021-10-19 RX ORDER — KETOROLAC TROMETHAMINE 10 MG/1
TABLET, FILM COATED ORAL
COMMUNITY
Start: 2021-08-02 | End: 2021-10-19

## 2021-10-19 RX ORDER — FOLIC ACID 1 MG/1
1 TABLET ORAL
COMMUNITY
Start: 2021-08-19 | End: 2022-03-07 | Stop reason: SDUPTHER

## 2021-10-19 RX ORDER — HYDROXYCHLOROQUINE SULFATE 200 MG/1
400 TABLET, FILM COATED ORAL DAILY
Qty: 30 TABLET | Refills: 2 | Status: SHIPPED | OUTPATIENT
Start: 2021-10-19 | End: 2022-03-07 | Stop reason: SDUPTHER

## 2021-10-19 RX ORDER — ATORVASTATIN CALCIUM 20 MG/1
20 TABLET, FILM COATED ORAL EVERY EVENING
Qty: 90 TABLET | Refills: 1 | Status: SHIPPED | OUTPATIENT
Start: 2021-10-19 | End: 2023-01-25 | Stop reason: SDUPTHER

## 2021-10-19 RX ORDER — METFORMIN HYDROCHLORIDE 750 MG/1
750 TABLET, EXTENDED RELEASE ORAL 2 TIMES DAILY
Qty: 180 TABLET | Refills: 1 | Status: SHIPPED | OUTPATIENT
Start: 2021-10-19 | End: 2023-01-25 | Stop reason: SDUPTHER

## 2021-10-19 ASSESSMENT — FIBROSIS 4 INDEX: FIB4 SCORE: 0.54

## 2021-10-19 NOTE — ASSESSMENT & PLAN NOTE
Chronic medical problem.  Her initial blood pressure today is 152/70.  She is taking lisinopril 10 mg daily. She has had her dose today. She does not take her blood pressure at home. Denies chest pain, shortness of pain, dizziness, blurry vision or headache.

## 2021-10-19 NOTE — PROGRESS NOTES
Subjective:     CC: diabetes and arthritis     HPI:   Kary presents today with the following. A [place] interpretor Cheo, ID number [id number], was used during this appointment.       Essential hypertension  Chronic medical problem.  Her initial blood pressure today is 152/70.  She is taking lisinopril 10 mg daily. She has had her dose today. She does not take her blood pressure at home. Denies chest pain, shortness of pain, dizziness, blurry vision or headache.      Type 2 diabetes mellitus with hyperglycemia, without long-term current use of insulin (HCC)  Chronic medical problem.  She is taking Metformin ER 1000 mg daily.  She is due for A1c today.  She has been watching her diet.  She is not eating any red meat. She is walking in her house and outside 40 minutes daily. She ran out of Metformin  mg tablets for one week and has been taking an old prescription of Metformin  mg BID for 1 week.     Seropositive rheumatoid arthritis (HCC)  Chronic medical problem. She continues with prednisone 20 mg and reports she is taking hydroxychloroquine 200 mg BID.  She states that the bottle says twice a day dosing. When she has pain she has shortness of breath and can not move her hands. Letting her arms dangle helps with the pain. She has not followed up with a rheumatologist. She states that she had her appointment cancelled the day of the appointment. She is a poor historian with her medications.    Dyslipidemia  Chronic medical problem.  She does not take any cholesterol medication.  She does have history of diabetes.      Past Medical History:   Diagnosis Date   • GERD (gastroesophageal reflux disease)    • Hypertension    • Osteoarthritis of both hands 2015       Social History     Tobacco Use   • Smoking status: Never Smoker   • Smokeless tobacco: Never Used   Vaping Use   • Vaping Use: Never used   Substance Use Topics   • Alcohol use: No   • Drug use: No       Current Outpatient Medications Ordered in  "[facility]   Medication Sig Dispense Refill   • VITAMIN D PO Take  by mouth.     • folic acid (FOLVITE) 1 MG Tab Take 1 mg by mouth.     • hydroxychloroquine (PLAQUENIL) 200 MG Tab Take 2 Tablets by mouth every day. 30 Tablet 2   • metFORMIN ER (GLUCOPHAGE XR) 750 MG TABLET SR 24 HR Take 1 Tablet by mouth 2 times a day. 180 Tablet 1   • atorvastatin (LIPITOR) 20 MG Tab Take 1 Tablet by mouth every evening. 90 Tablet 1   • lisinopril (PRINIVIL) 10 MG Tab Take 1 tablet by mouth once daily 90 tablet 3   • ibuprofen (MOTRIN) 200 MG Tab Take 200 mg by mouth every 6 hours as needed.     • predniSONE (DELTASONE) 20 MG Tab Take 1 tablet by mouth every day for 90 days. 90 tablet 0   • naproxen (NAPROSYN) 250 MG Tab Take 250 mg by mouth 2 times a day with meals.       No current Epic-ordered facility-administered medications on file.       Allergies:  Patient has no known allergies.    Health Maintenance: Reviewed    ROS:  Per HPI    Objective:     Vital signs reviewed   Exam:  /70 (BP Location: Left arm, Patient Position: Sitting, BP Cuff Size: Adult)   Pulse 92   Temp 36.8 °C (98.2 °F) (Temporal)   Ht 1.499 m (4' 11\")   Wt 62.1 kg (137 lb)   SpO2 98%   BMI 27.67 kg/m²  Body mass index is 27.67 kg/m².    Gen: Alert and oriented, No apparent distress. Tearful when discussing her hand pain, tearful frequently throughout the appointment.   Neck: Neck is supple, full ROM.  Lungs: Normal effort, no audible wheezes  CV: Skin pink, warm and dry. Pedal pulses 2+ bilaterally.  Ext: No clubbing, cyanosis, edema.    Monofilament testing with a 10 gram force: sensation intact: intact bilaterally  Visual Inspection: Feet without maceration, ulcers, fissures.  Pedal pulses: intact bilaterally      Labs:     Results for MIRTA ORTIZ (MRN [id number]) as of 10/19/2021 15:27   Ref. Range 10/19/2021 15:18   Glycohemoglobin Latest Ref Range: 0.0 - 5.6 % 8.0 (A)       Assessment & Plan:     56 y.o. female with the following -     1. " Type 2 diabetes mellitus with hyperglycemia, without long-term current use of insulin (HCC)  Chronic stable problem.  Reviewed her A1c today.  Her A1c is improved but is still above goal of 7%.  We did discuss that in the future she should not take any medications for all prescriptions without clarification from her providers, she verbalized understanding.  We discussed increasing her Metformin extended release to 750 mg twice daily.  Monofilament exam completed today.  We discussed restarting cholesterol medication given her diabetes, she is in agreement.  She will be due for updated A1c and lipid panel in 3 months around January 2022.  She will return in 3 months.  We attempted to do her retinal screening today but our retinal machine is out of order.  - POCT Hemoglobin A1C  - metFORMIN ER (GLUCOPHAGE XR) 750 MG TABLET SR 24 HR; Take 1 Tablet by mouth 2 times a day.  Dispense: 180 Tablet; Refill: 1  - Diabetic Monofilament Lower Extremity Exam  - atorvastatin (LIPITOR) 20 MG Tab; Take 1 Tablet by mouth every evening.  Dispense: 90 Tablet; Refill: 1  - HEMOGLOBIN A1C; Future  - Lipid Profile; Future    2. Essential hypertension  Chronic exacerbated problem.  Encouraged her to continue with her lisinopril 10 mg daily, limit her salt intake and continue with exercise.    3. Seropositive rheumatoid arthritis (HCC)  Chronic exacerbated problem.  Discussed with patient at length that the only prescription on file currently that is twice a day dosing is her naproxen.  She states that she will clarify her prescription bottle when she gets home.  I encouraged her to continue calling rheumatology to schedule an establishing appointment.  She verbalized understanding.  I will increase her hydroxychloroquine to 400 mg daily.  She will return in 3 months for follow-up.  - hydroxychloroquine (PLAQUENIL) 200 MG Tab; Take 2 Tablets by mouth every day.  Dispense: 30 Tablet; Refill: 2    4. Dyslipidemia  Chronic exacerbated  problem.  Will restart atorvastatin 20 mg every evening.  She will check updated labs in 3 months.  - atorvastatin (LIPITOR) 20 MG Tab; Take 1 Tablet by mouth every evening.  Dispense: 90 Tablet; Refill: 1  - Lipid Profile; Future    5. Encounter for screening mammogram for breast cancer  Chronic stable problem. Screening indicated.  Patient is in agreement.  Orders placed.  - MA-SCREENING MAMMO BILAT W/TOMOSYNTHESIS W/CAD; Future    6. Screening for colorectal cancer  Chronic stable problem.  Screening indicated.  Patient is in agreement.  Orders placed.  - OCCULT BLOOD FECES IMMUNOASSAY (FIT); Future    7. Need for vaccination  Acute uncomplicated problem.  Vaccine indicated.  Patient is in agreement. I have placed the below orders and discussed them with an approved delegating provider.  The MA is performing the below orders under the direction of Dr. Bronson.  - INFLUENZA VACCINE QUAD INJ (PF)      Total 33 minutes face-to-face time spent with patient, with greater than 50% of the total time discussing patient's issues and symptoms as listed above in assessment and plan, as well as managing coordination of care for future evaluation and treatment.      Return in 3 months (on 1/19/2022) for Diabetes.    Please note that this dictation was created using voice recognition software. I have made every reasonable attempt to correct obvious errors, but I expect that there are errors of grammar and possibly content that I did not discover before finalizing the note.

## 2021-10-19 NOTE — ASSESSMENT & PLAN NOTE
Chronic medical problem.  She is taking Metformin ER 1000 mg daily.  She is due for A1c today.  She has been watching her diet.  She is not eating any red meat. She is walking in her house and outside 40 minutes daily. She ran out of Metformin  mg tablets for one week and has been taking an old prescription of Metformin  mg BID for 1 week.

## 2021-10-19 NOTE — ASSESSMENT & PLAN NOTE
Chronic medical problem. She continues with prednisone 20 mg and reports she is taking hydroxychloroquine 200 mg BID.  She states that the bottle says twice a day dosing. When she has pain she has shortness of breath and can not move her hands. Letting her arms dangle helps with the pain. She has not followed up with a rheumatologist. She states that she had her appointment cancelled the day of the appointment. She is a poor historian with her medications.

## 2021-10-19 NOTE — ASSESSMENT & PLAN NOTE
Chronic medical problem.  She does not take any cholesterol medication.  She does have history of diabetes.

## 2021-10-22 ENCOUNTER — HOSPITAL ENCOUNTER (OUTPATIENT)
Dept: LAB | Facility: MEDICAL CENTER | Age: 56
End: 2021-10-22
Attending: NURSE PRACTITIONER
Payer: COMMERCIAL

## 2021-10-22 DIAGNOSIS — E11.65 TYPE 2 DIABETES MELLITUS WITH HYPERGLYCEMIA, WITHOUT LONG-TERM CURRENT USE OF INSULIN (HCC): ICD-10-CM

## 2021-10-22 DIAGNOSIS — E78.5 DYSLIPIDEMIA: ICD-10-CM

## 2021-10-22 LAB
CHOLEST SERPL-MCNC: 186 MG/DL (ref 100–199)
EST. AVERAGE GLUCOSE BLD GHB EST-MCNC: 174 MG/DL
FASTING STATUS PATIENT QL REPORTED: NORMAL
HBA1C MFR BLD: 7.7 % (ref 4–5.6)
HDLC SERPL-MCNC: 40 MG/DL
LDLC SERPL CALC-MCNC: 112 MG/DL
TRIGL SERPL-MCNC: 168 MG/DL (ref 0–149)

## 2021-10-22 PROCEDURE — 83036 HEMOGLOBIN GLYCOSYLATED A1C: CPT

## 2021-10-22 PROCEDURE — 36415 COLL VENOUS BLD VENIPUNCTURE: CPT

## 2021-10-22 PROCEDURE — 80061 LIPID PANEL: CPT

## 2021-10-23 ENCOUNTER — HOSPITAL ENCOUNTER (OUTPATIENT)
Facility: MEDICAL CENTER | Age: 56
End: 2021-10-23
Attending: NURSE PRACTITIONER
Payer: COMMERCIAL

## 2021-10-23 PROCEDURE — 82274 ASSAY TEST FOR BLOOD FECAL: CPT

## 2021-10-23 NOTE — RESULT ENCOUNTER NOTE
Please call the patient and let her know that her lipid profile has improved from 3 months ago. She should continue atorvastatin 20 mg nightly and follow up with [facility] if she has any questions.    Thank you,    SURYA Villarreal.

## 2021-10-25 ENCOUNTER — TELEPHONE (OUTPATIENT)
Dept: MEDICAL GROUP | Facility: PHYSICIAN GROUP | Age: 56
End: 2021-10-25

## 2021-10-25 NOTE — LETTER
October 25, 2021         Kary Shah  [address] [place] [id number]        Dear Kary:      Below are the results from your recent visit:    Please call the patient and let her know that her lipid profile has improved from 3 months ago. She should continue atorvastatin 20 mg nightly and follow up with El[facility] if she has any questions.     Thank you,     ESTEBAN Villarreal     Resulted Orders   Lipid Profile   Result Value Ref Range    Cholesterol,Tot 186 100 - 199 mg/dL    Triglycerides 168 (H) 0 - 149 mg/dL    HDL 40 >=40 mg/dL     (H) <100 mg/dL    Narrative    Request patient fasting?->Yes  Fasting Instructions:->Fast 8-10 hours, OK to drink water as  needed during fast, take medications per your provider's  instruction.   HEMOGLOBIN A1C   Result Value Ref Range    Glycohemoglobin 7.7 (H) 4.0 - 5.6 %      Comment:      Increased risk for diabetes:  5.7 -6.4%  Diabetes:  >6.4%  Glycemic control for adults with diabetes:  <7.0%    The above interpretations are per ADA guidelines.  Diagnosis  of diabetes mellitus on the basis of elevated Hemoglobin A1c  should be confirmed by repeating the Hb A1c test.      Est Avg Glucose 174 mg/dL      Comment:      The eAG calculation is based on the A1c-Derived Daily Glucose  (ADAG) study.  See the ADA's website for additional information.      Narrative    Request patient fasting?->Yes  Fasting Instructions:->Fast 8-10 hours, OK to drink water as  needed during fast, take medications per your provider's  instruction.   FASTING STATUS   Result Value Ref Range    Fasting Status Fasting     Narrative    Request patient fasting?->Yes  Fasting Instructions:->Fast 8-10 hours, OK to drink water as  needed during fast, take medications per your provider's  instruction.     If you have any questions or concerns, please don't hesitate to call.    Electronically Signed

## 2021-10-25 NOTE — TELEPHONE ENCOUNTER
----- Message from ESTEBAN Villarreal sent at 10/23/2021 11:41 AM PDT -----  Please call the patient and let her know that her lipid profile has improved from 3 months ago. She should continue atorvastatin 20 mg nightly and follow up with [facility] if she has any questions.    Thank you,    ESTEBAN Villarreal

## 2021-10-26 DIAGNOSIS — Z12.12 SCREENING FOR COLORECTAL CANCER: ICD-10-CM

## 2021-10-26 DIAGNOSIS — Z12.11 SCREENING FOR COLORECTAL CANCER: ICD-10-CM

## 2021-10-26 LAB — AMBIGUOUS SPECIMEN AMBIS: NORMAL

## 2021-10-28 LAB — IMM ASSAY OCC BLD FITOB: NEGATIVE

## 2021-11-05 ENCOUNTER — OFFICE VISIT (OUTPATIENT)
Dept: SPORTS MEDICINE | Facility: CLINIC | Age: 56
End: 2021-11-05
Payer: COMMERCIAL

## 2021-11-05 VITALS
RESPIRATION RATE: 18 BRPM | BODY MASS INDEX: 27.62 KG/M2 | TEMPERATURE: 97.1 F | HEART RATE: 97 BPM | WEIGHT: 137 LBS | DIASTOLIC BLOOD PRESSURE: 78 MMHG | OXYGEN SATURATION: 93 % | HEIGHT: 59 IN | SYSTOLIC BLOOD PRESSURE: 118 MMHG

## 2021-11-05 DIAGNOSIS — M25.50 MULTIPLE JOINT PAIN: ICD-10-CM

## 2021-11-05 DIAGNOSIS — M25.541 ARTHRALGIA OF BOTH HANDS: ICD-10-CM

## 2021-11-05 DIAGNOSIS — M05.9 SEROPOSITIVE RHEUMATOID ARTHRITIS (HCC): ICD-10-CM

## 2021-11-05 DIAGNOSIS — M25.542 ARTHRALGIA OF BOTH HANDS: ICD-10-CM

## 2021-11-05 PROCEDURE — 99215 OFFICE O/P EST HI 40 MIN: CPT | Performed by: FAMILY MEDICINE

## 2021-11-05 RX ORDER — FOLIC ACID 1 MG/1
1 TABLET ORAL DAILY
Qty: 90 TABLET | Refills: 0 | Status: SHIPPED | OUTPATIENT
Start: 2021-11-05 | End: 2022-02-03

## 2021-11-05 ASSESSMENT — FIBROSIS 4 INDEX: FIB4 SCORE: 0.54

## 2021-11-05 NOTE — Clinical Note
"Gustavo Rowe,  I saw Kary for follow-up for her multijoint pain.  She seems to be getting worse and her pain is starting to affect her daily life/function, work as well as sleep.  It seems the corticosteroid is not helping and I noticed that you started hydroxychloroquine which does not seem to be helping either.  I am wondering if we should start methotrexate, but honestly this is not something I do regularly in my practice.  She should be managed by rheumatology and it sounds like she had a scheduled appointment which was canceled due to provider not being available the day she had her appointment scheduled.  They have told her \"they will call her\" to schedule her annual appointment, but she continues to wait and she is desperate.  I am not sure what is going on with the rheumatology office, but I was hoping we can come up with a plan together to help her.  Any thoughts?  L"

## 2021-11-05 NOTE — PROGRESS NOTES
"Subjective:      Kary Kevin is a 55 y.o. female who presents with Arm Pain (EP/ Bilateral arm pain )     Follow-up for multiple joint pain   fall approximately early April 2021    BILATERAL knee pain and particularly BILATERAL hand and wrist pain is WORSENING  She is no longer seeing benefit from prednisone  She was instructed by another provider to discontinue  She is starting to experience thinning her lower extremities and noticing water retention    She seems to be getting WORSE   Pain predominantly at the hands which is debilitating, she cannot do her work or even  simple things    She is now down to 20 mg/day and is pleased with her progress    She has been working, but they have downgraded her position to avoid needing to use her hands so aggressively  She works as a seamstress     Objective:     /78 (BP Location: Left arm, Patient Position: Sitting, BP Cuff Size: Adult)   Pulse 97   Temp 36.2 °C (97.1 °F) (Temporal)   Resp 18   Ht 1.499 m (4' 11\")   Wt 62.1 kg (137 lb)   SpO2 93%   BMI 27.67 kg/m²       Physical Exam    No acute distress  Normal respiratory effort    HIP EXAM:  NORMAL gait    Deformed arthritic hands    NEUTRAL stance  Able to ambulate with NORMAL gait     Assessment/Plan:     1. Seropositive rheumatoid arthritis (HCC)  Methotrexate Sodium 7.5 MG Tab   2. Multiple joint pain        Tramadol did NOT help her pain  Diclofenac did NOT help her pain    20 mg of prednisone per day is NO longer helping her pain  Has been advised to discontinue/wean prednisone    She did have a scheduled appointment with rheumatology, but apparently the provider did not show up the day of her appointment for personal reasons and patient has NOW been rescheduled  She was advised that they would \"call her\" to reschedule, but she is still waiting      Hydroxychloroquine by her PCP which does NOT seem to be helping  Patient is in some much pain she has agreed to proceed with trying " methotrexate.  We also refilled her folic acid to be taken 1 mg daily    ultrasound-guided aspiration/injection of the RIGHT knee was performed (May 28, 2021), helped        Ccp Antibodies 0 - 19 Units 93High       Rheumatoid Factor -Neph- 0 - 14 IU/mL 83High                     5/17/2021 5:06 PM     HISTORY/REASON FOR EXAM:  Pain/Deformity Following Trauma; knee effusion and patellofemoral pain        TECHNIQUE/EXAM DESCRIPTION AND NUMBER OF VIEWS:  3 views of the RIGHT knee.     COMPARISON: None     FINDINGS:  There is no evidence of fracture or dislocation. Alignment is maintained. There is a small joint effusion. No periosteal new bone formation or osseous erosion is identified.     IMPRESSION:     No evidence of acute fracture or dislocation.     Small joint effusion.                       5/17/2021 5:05 PM     HISTORY/REASON FOR EXAM:  Atraumatic Pain/Swelling/Deformity; patellofemoral pain        TECHNIQUE/EXAM DESCRIPTION AND NUMBER OF VIEWS:  3 views of the LEFT knee.     COMPARISON: None     FINDINGS:  There is no evidence of fracture or dislocation. Alignment is maintained. No joint effusion is seen. No periosteal new bone formation or osseous erosion is identified.     IMPRESSION:     No evidence of acute fracture or dislocation.            5/17/2021 5:06 PM     HISTORY/REASON FOR EXAM:  Pain/Deformity Following Trauma; Wrist pain after fall, distal ulna tenderness.  Pain after fall     TECHNIQUE/EXAM DESCRIPTION AND NUMBER OF VIEWS:  4 views of the RIGHT wrist.     COMPARISON: None     FINDINGS:  There is no fracture or dislocation.  The visualized osseous structures are in anatomic alignment.  The joint spaces are preserved.  Bone mineralization is decreased..        IMPRESSION:     No acute osseous abnormality.               5/17/2021 5:05 PM     HISTORY/REASON FOR EXAM:  Pain/Deformity Following Trauma; Hand swelling and fourth metacarpal tenderness after fall        TECHNIQUE/EXAM DESCRIPTION AND  NUMBER OF VIEWS:  3 views of the RIGHT hand.     COMPARISON:  3/26/2020     FINDINGS:  There is no evidence of fracture or dislocation. Bones are demineralized, limiting evaluation. There is joint space narrowing and spurring particularly at the distal interphalangeal joints of the third and fifth digits. There is mild radiocarpal joint   space narrowing. There is soft tissue swelling of the distal third digit.        IMPRESSION:     No evidence of acute fracture or dislocation.     Osteoarthritis as above described particularly involving the distal interphalangeal joints of the third and fifth digits.     Mild soft tissue swelling about the distal third digit.     Demineralization which limits evaluation.           5/17/2021 5:05 PM     HISTORY/REASON FOR EXAM:  Pain/Deformity Following Trauma; Base of fifth metatarsal tenderness after inversion injury        TECHNIQUE/EXAM DESCRIPTION AND NUMBER OF VIEWS:  3 nonweightbearing views of the LEFT foot.     COMPARISON:  None     FINDINGS:  There is no evidence of fracture or dislocation. There is joint space narrowing and spurring at the first MTP joint. There is interphalangeal joint space narrowing. There is soft tissue swelling along the medial foot. There is spurring of the calcaneus   at the insertion of the Achilles tendon and plantar fascia.        IMPRESSION:     Degenerative changes as above described, most prominent at the first MTP joint.     Soft tissue swelling along the medial foot.     Calcaneal spurring.        2/12/2021 3:00 PM     HISTORY/REASON FOR EXAM:  Atraumatic Pain/Swelling/Deformity.  LEFT shoulder pain.     TECHNIQUE/EXAM DESCRIPTION AND NUMBER OF VIEWS:  3 views of the LEFT shoulder.     COMPARISON: None     FINDINGS:  Clavicle is intact.  AC joint is preserved.  Visualized proximal humerus is intact and normally located.  Mild narrowing of glenohumeral joint with minimal osteophyte formation inferiorly.  Visualized LEFT chest is  unremarkable.     IMPRESSION:     1.  No fracture or dislocation of LEFT shoulder.  2.  Mild degenerative changes.         Thank you MITESH Smallwood PA-C for allowing me to participate in caring for your patient.      Greater than 40 minutes was spent reviewing patient prior treatment, discussing current issue, physical examination, reviewing results, coordinating with her PCP and documenting the visit.

## 2021-11-05 NOTE — PROGRESS NOTES
Phone Number Called: [phone] (home)       Call outcome: Did not leave a detailed message. Requested patient to call back.    Message: I left a message for Kary to return my call

## 2021-11-05 NOTE — PROGRESS NOTES
Received message from Dr. Benitez who states that patient continues to have severe pain in her hands that is now affecting her daily functions.  Her previous rheumatology referral was as routine I am updating to urgent.  She has been rescheduled and has not been able to schedule an appointment with rheumatology.

## 2021-11-10 ENCOUNTER — TELEPHONE (OUTPATIENT)
Dept: MEDICAL GROUP | Facility: PHYSICIAN GROUP | Age: 56
End: 2021-11-10

## 2021-11-10 ENCOUNTER — TELEPHONE (OUTPATIENT)
Dept: SPORTS MEDICINE | Facility: CLINIC | Age: 56
End: 2021-11-10

## 2021-11-10 NOTE — TELEPHONE ENCOUNTER
----- Message from ESTEBAN Dooley sent at 11/10/2021  1:52 PM PST -----  Regarding: FW: Starting methotrexate  [phone] and see if we can schedule an appointment ASAP for her? The Urgent referral is still under review but her previous referral has been approved.     Thanks,   Soledad  ----- Message -----  From: Dustin Benitez M.D.  Sent: 11/5/2021   5:53 PM PST  To: ESTEBAN Dooley  Subject: Starting methotrexate                            Hi Soledad,  Thank you for your response.  Patient seems so miserable that I think I am going to start her on methotrexate/lowest dose.  I did look to see if there was an interaction with hydroxychloroquine which there does NOT seem to be.    I appreciate you and your staff looking into the rheumatology referral, as I feel that that is likely where she needs to be.    Meanwhile, let me know if you have any concerns/thoughts regarding the plan.    Thanks!  SB

## 2021-11-10 NOTE — TELEPHONE ENCOUNTER
Phone Number Called: [phone]    Call outcome: Left detailed message for Rheumatology    Message: Informed the manager of the situation and to please call patient and schedule them or provided my number to call back.

## 2021-11-11 NOTE — TELEPHONE ENCOUNTER
[facility] called in regards to the stat referral and trying to schedule an appointment. There was some confusion re: the prior appointment scheduled and the provider was not in clinic (due to an emergency). The patient and son showed up to the visit and instructions were provided for the patient to call back to reschedule.  Due to the language barrier, the patient may have misunderstood what she needed to do. The referral as been accepted by their office. Per [facility], they will try to contact the son to schedule an appointment.    Please recheck with the patient to see if they have an appointment scheduled.    Delia

## 2022-01-03 ENCOUNTER — OFFICE VISIT (OUTPATIENT)
Dept: RHEUMATOLOGY | Facility: MEDICAL CENTER | Age: 57
End: 2022-01-03
Payer: COMMERCIAL

## 2022-01-03 VITALS
SYSTOLIC BLOOD PRESSURE: 160 MMHG | HEART RATE: 68 BPM | TEMPERATURE: 97.9 F | WEIGHT: 140 LBS | HEIGHT: 57 IN | RESPIRATION RATE: 14 BRPM | DIASTOLIC BLOOD PRESSURE: 72 MMHG | OXYGEN SATURATION: 97 % | BODY MASS INDEX: 30.2 KG/M2

## 2022-01-03 DIAGNOSIS — Z79.631 METHOTREXATE, LONG TERM, CURRENT USE: ICD-10-CM

## 2022-01-03 DIAGNOSIS — E11.65 TYPE 2 DIABETES MELLITUS WITH HYPERGLYCEMIA, WITHOUT LONG-TERM CURRENT USE OF INSULIN (HCC): ICD-10-CM

## 2022-01-03 DIAGNOSIS — I10 ESSENTIAL HYPERTENSION: ICD-10-CM

## 2022-01-03 DIAGNOSIS — M19.90 OSTEOARTHRITIS, UNSPECIFIED OSTEOARTHRITIS TYPE, UNSPECIFIED SITE: ICD-10-CM

## 2022-01-03 DIAGNOSIS — M06.9 RHEUMATOID ARTHRITIS, INVOLVING UNSPECIFIED SITE, UNSPECIFIED WHETHER RHEUMATOID FACTOR PRESENT (HCC): ICD-10-CM

## 2022-01-03 DIAGNOSIS — Z79.899 LONG-TERM USE OF PLAQUENIL: ICD-10-CM

## 2022-01-03 PROCEDURE — 99205 OFFICE O/P NEW HI 60 MIN: CPT | Performed by: INTERNAL MEDICINE

## 2022-01-03 RX ORDER — FOLIC ACID 1 MG/1
1 TABLET ORAL DAILY
Qty: 90 TABLET | Refills: 3 | Status: SHIPPED | OUTPATIENT
Start: 2022-01-03 | End: 2022-01-04

## 2022-01-03 RX ORDER — LEUCOVORIN CALCIUM 10 MG/1
TABLET ORAL
Qty: 12 TABLET | Refills: 3 | Status: SHIPPED | OUTPATIENT
Start: 2022-01-03 | End: 2022-01-04

## 2022-01-03 RX ORDER — METHOTREXATE 2.5 MG/1
TABLET ORAL
Qty: 10 TABLET | Refills: 0 | Status: SHIPPED | OUTPATIENT
Start: 2022-01-03 | End: 2022-03-07 | Stop reason: SDUPTHER

## 2022-01-03 ASSESSMENT — JOINT PAIN
TOTAL NUMBER OF TENDER JOINTS: 2
TOTAL NUMBER OF SWOLLEN JOINTS: 0

## 2022-01-03 ASSESSMENT — FIBROSIS 4 INDEX: FIB4 SCORE: 0.54

## 2022-01-03 NOTE — ASSESSMENT & PLAN NOTE
All information done through     This is a new patient evaluation.  Patient referred for evaluation of RA. Patient was dx with RA about 3 months ago by PCP with symptoms of finfer swelling and found to have a positive RF and CCP.. Patient states symptoms started 4/2021 post fall at her home. Currently on MTX 7.5 mg po qweek for 2 months    PMHX  Diabetes  HTN      Fam Hx  F-passed CAD/MI  M-passed CAD/MI, RA  Sx3 one passed from CVA/blood clot in brain  Bx3 one passed secondary ETOH and CAD/MI    Soc Hx  No tob  ETOH once/year  No blood tx  No tattoos    RF 83 3/2020  CCP 93 3/2020

## 2022-01-03 NOTE — PROGRESS NOTES
Chief Complaint-joint pain and swelling    Chief Complaint   Patient presents with   • New Patient     Kary Shah is a 56 y.o. female here as a new Rheumatology Consult referred by SURYA Dooley.  HPI:     All information done through     This is a new patient evaluation.  This is a new patient referral, patient referred for evaluation of rheumatoid arthritis.  Patient states she is told she has rheumatoid arthritis about 3 months ago by her PCP with symptoms of finger swelling as well as ankle swelling.  Patient states she has had symptoms since about April 2021 status post fall at her home with resulting knee swelling for which symptoms have persisted and progressed to hands and feet.  Patient has been on methotrexate 7.5 mg p.o. weekly as well as Plaquenil 200 mg p.o. twice daily started by patient's PCP, patient's been on this now for about 2 months.   Patient denies any side effects from the medication, denies any unexplained weight loss, denies any fevers of unknown etiology, denies any GI upset, denies any rashes, denies any new joint swelling, denies recurrent infections.  There is a positive history of rheumatoid arthritis in the mother.    PMHX  Diabetes  HTN      Fam Hx  F-passed CAD/MI  M-passed CAD/MI, RA  Sx3 one passed from CVA/blood clot in brain  Bx3 one passed secondary ETOH and CAD/MI    Soc Hx  No tob  ETOH once/year  No blood tx  No tattoos      Hemoglobin A1c 7.7 10/2021  RF 83 3/2020  CCP 93 3/2020  Right Hand x-rays 5/2021-IMPRESSION:   No evidence of acute fracture or dislocation.   Osteoarthritis as above described particularly involving the distal interphalangeal joints of the third and fifth digits.  Mild soft tissue swelling about the distal third digit.  Demineralization which limits evaluation.  Hand x-rays 3/2020-IMPRESSION:  1.  Asymmetric degenerative change of the IP joints diffusely which may represent asymmetric osteoarthritic change.  Differential diagnosis includes psoriatic arthritis.  2.  No erosive arthritic change.    Left Foot x-ray 5/2021-IMPRESSION:  Degenerative changes as above described, most prominent at the first MTP joint.  Soft tissue swelling along the medial foot.  Calcaneal spurring.-      Current medicines (including changes today)  Current Outpatient Medications   Medication Sig Dispense Refill   • methotrexate 2.5 MG tablet 6 tabs po qweek (every Sunday) 10 Tablet 0   • Leucovorin Calcium 10 MG Tab 1 tab po qweek 12 hours after methotrexate dose 12 Tablet 3   • folic acid (FOLVITE) 1 MG Tab Take 1 Tablet by mouth every day. 90 Tablet 3   • Methotrexate Sodium 7.5 MG Tab Take 1 Tablet by mouth every 7 days. (Patient taking differently: Take 7.5 mg by mouth every 7 days. 3tablets weekly) 4 Tablet 2   • folic acid (FOLVITE) 1 MG Tab Take 1 Tablet by mouth every day for 90 days. 90 Tablet 0   • VITAMIN D PO Take  by mouth.     • folic acid (FOLVITE) 1 MG Tab Take 1 mg by mouth.     • metFORMIN ER (GLUCOPHAGE XR) 750 MG TABLET SR 24 HR Take 1 Tablet by mouth 2 times a day. 180 Tablet 1   • atorvastatin (LIPITOR) 20 MG Tab Take 1 Tablet by mouth every evening. 90 Tablet 1   • lisinopril (PRINIVIL) 10 MG Tab Take 1 tablet by mouth once daily 90 tablet 3   • ibuprofen (MOTRIN) 200 MG Tab Take 200 mg by mouth every 6 hours as needed.     • naproxen (NAPROSYN) 250 MG Tab Take 250 mg by mouth 2 times a day with meals.     • hydroxychloroquine (PLAQUENIL) 200 MG Tab Take 2 Tablets by mouth every day. (Patient not taking: Reported on 1/3/2022) 30 Tablet 2     No current facility-administered medications for this visit.     She  has a past medical history of GERD (gastroesophageal reflux disease), Hypertension, and Osteoarthritis of both hands (2015).  She  has a past surgical history that includes primary c section.  Family History   Problem Relation Age of Onset   • Heart Disease Mother 64        heart attack   • Arthritis Mother    •  "Heart Disease Father 65        heart attack   • Other Sister         blood clot   • Heart Disease Brother         heart attack   • Heart Disease Brother         heart attack     Family Status   Relation Name Status   • Mo     • Fa     • Sis  (Not Specified)   • Bro     • Bro       Social History     Tobacco Use   • Smoking status: Never Smoker   • Smokeless tobacco: Never Used   Vaping Use   • Vaping Use: Never used   Substance Use Topics   • Alcohol use: No   • Drug use: No     Social History     Social History Narrative   • Not on file       ROS   Other than what is mentioned in HPI or physical exam, there is no history of headaches, double vision or blurred vision. No temporal tenderness or jaw claudication.  No trouble swallowing difficulties or sore throats.  No chest complaints including chest pain, cough or sputum production. No GI complaints including nausea, vomiting, change in bowel habits, or past peptic ulcer disease. No history of blood in the stools. No urinary complaints including dysuria or frequency. No history of alopecia, photosensitivity, oral ulcerations, Raynaud's phenomena.       Objective:     /72   Pulse 68   Temp 36.6 °C (97.9 °F) (Temporal)   Resp 14   Ht 1.448 m (4' 9\")   Wt 63.5 kg (140 lb)   SpO2 97%  Body mass index is 30.3 kg/m².  Physical Exam:    Constitutional: Alert and oriented X3, no distress.Skin: Warm, dry, good turgor, no rashes in visible areas evidence of scleredema patible with poor diabetes control on upper back and lower extremities  Eye: Equal, round and reactive, conjunctiva clear, lids normal EOM intactENMT: Lips without lesions, good dentition, no oropharyngeal ulcers, moist buccal mucosa, pinna without deformityNeck: Trachea midline, no masses, no thyromegaly.Lymph:  No cervical lymphadenopathy, no axillary lymphadenopathy, no supraclavicular lymphadenopathyRespiratory: Unlabored respiratory effort, lungs clear to " auscultation, no wheezes, no ronchi.Cardiovascular: Normal S1, S2, no murmur, no edema.Abdomen: Soft, non-tender, no masses, no hepatosplenomegaly, significant central obesity.Psych: Alert and oriented x3, normal affect and mood.Neuro Cranial nerves 2-12 are grossly intact, no loss of sensation LEExt:no joint laxity noted in bilateral arms, no joint laxity noted in bilateral legs, Heberden's nodes on most DIP joints both hands, hands without any swan-neck or boutonniere deformities no sausage digits, toes without crossover toes without splay toes, very poor range of motion of both shoulders abduction about 90 degrees bilaterally    Lab Results   Component Value Date/Time    HEPCAB Negative 02/29/2020 07:20 AM     Lab Results   Component Value Date/Time    SODIUM 139 07/17/2021 10:52 AM    POTASSIUM 3.9 07/17/2021 10:52 AM    CHLORIDE 103 07/17/2021 10:52 AM    CO2 23 07/17/2021 10:52 AM    GLUCOSE 216 (H) 07/17/2021 10:52 AM    BUN 10 07/17/2021 10:52 AM    CREATININE 0.49 (L) 07/17/2021 10:52 AM      Lab Results   Component Value Date/Time    WBC 9.9 02/20/2021 08:08 AM    RBC 4.54 02/20/2021 08:08 AM    HEMOGLOBIN 14.6 02/20/2021 08:08 AM    HEMATOCRIT 42.8 02/20/2021 08:08 AM    MCV 94.3 02/20/2021 08:08 AM    MCH 32.2 02/20/2021 08:08 AM    MCHC 34.1 02/20/2021 08:08 AM    MPV 10.0 02/20/2021 08:08 AM    NEUTSPOLYS 70.10 02/20/2021 08:08 AM    LYMPHOCYTES 18.40 (L) 02/20/2021 08:08 AM    MONOCYTES 8.90 02/20/2021 08:08 AM    EOSINOPHILS 0.90 02/20/2021 08:08 AM    BASOPHILS 0.50 02/20/2021 08:08 AM      Lab Results   Component Value Date/Time    CALCIUM 8.9 07/17/2021 10:52 AM    ASTSGOT 9 (L) 07/17/2021 10:52 AM    ALTSGPT 10 07/17/2021 10:52 AM    ALKPHOSPHAT 126 (H) 07/17/2021 10:52 AM    TBILIRUBIN 0.7 07/17/2021 10:52 AM    ALBUMIN 3.8 07/17/2021 10:52 AM    TOTPROTEIN 6.5 07/17/2021 10:52 AM     Lab Results   Component Value Date/Time    RHEUMFACTN 83 (H) 03/26/2020 02:20 PM    CCPANTIBODY 93 (H)  03/26/2020 02:20 PM     Lab Results   Component Value Date/Time    SEDRATEWES 7 03/26/2020 02:20 PM     Lab Results   Component Value Date/Time    HBA1C 7.7 (H) 10/22/2021 08:59 AM     Lab Results   Component Value Date/Time    25HYDROXY 29 (L) 02/20/2021 08:08 AM     Lab Results   Component Value Date/Time    TSHULTRASEN 1.910 02/29/2020 07:20 AM   Results for orders placed during the hospital encounter of 03/26/20    DX-JOINT SURVEY-HANDS SINGLE VIEW    Impression  1.  Asymmetric degenerative change of the IP joints diffusely which may represent asymmetric osteoarthritic change. Differential diagnosis includes psoriatic arthritis.    2.  No erosive arthritic change.    Results for orders placed during the hospital encounter of 05/17/21    DX-FOOT-COMPLETE 3+ LEFT    Impression  Degenerative changes as above described, most prominent at the first MTP joint.    Soft tissue swelling along the medial foot.    Calcaneal spurring.      Results for orders placed in visit on 02/12/21    DX-SHOULDER 2+ LEFT    Impression  1.  No fracture or dislocation of LEFT shoulder.  2.  Mild degenerative changes.    Results for orders placed during the hospital encounter of 05/17/21    DX-HAND 3+ RIGHT    Impression  No evidence of acute fracture or dislocation.    Osteoarthritis as above described particularly involving the distal interphalangeal joints of the third and fifth digits.    Mild soft tissue swelling about the distal third digit.    Demineralization which limits evaluation.    Results for orders placed during the hospital encounter of 05/17/21    DX-WRIST-COMPLETE 3+ RIGHT    Impression  No acute osseous abnormality.        Assessment and Plan:  1. Rheumatoid arthritis, involving unspecified site, unspecified whether rheumatoid factor present ([facility])  Recommend increasing methotrexate from 7.5 mg p.o. weekly to 15 mg p.o. weekly, continue folic acid 1 mg a day and leucovorin 10 mg 12 hours after methotrexate dose.  Also  continue Plaquenil 200 mg p.o. twice daily.  - methotrexate 2.5 MG tablet; 6 tabs po qweek (every Sunday)  Dispense: 10 Tablet; Refill: 0  - Leucovorin Calcium 10 MG Tab; 1 tab po qweek 12 hours after methotrexate dose  Dispense: 12 Tablet; Refill: 3  - folic acid (FOLVITE) 1 MG Tab; Take 1 Tablet by mouth every day.  Dispense: 90 Tablet; Refill: 3  - Referral to Ophthalmology  - Comp Metabolic Panel; Standing  - CBC WITH DIFFERENTIAL; Standing  - Sed Rate; Standing    2. Methotrexate, long term, current use  Currently on methotrexate at 7.5 mg p.o. weekly, we will increase to methotrexate 15 mg p.o. weekly, continue folic acid 1 mg a day, add leucovorin 10 mg 12 hours after methotrexate dose.  Patient will need monitoring labs every 3 months, patient due for labs this month January 2022, standing orders written for patient  - Referral to Ophthalmology  - Comp Metabolic Panel; Standing  - CBC WITH DIFFERENTIAL; Standing  - Sed Rate; Standing    3. Long-term use of Plaquenil  On Plaquenil 200 mg p.o. twice daily, we will continue as such, check G6PD levels,  Patient does need ophthalmology evaluation every year, patient states her last eye exam was about 2020, referral submitted for ophthalmology evaluation  Patient will need monitoring labs every 6 months, next labs due about April 2022, will order at next visit  - Referral to Ophthalmology  - Comp Metabolic Panel; Standing  - CBC WITH DIFFERENTIAL; Standing  - Sed Rate; Standing    4. Osteoarthritis, unspecified osteoarthritis type, unspecified site  As seen by Heberden's nodes on DIP joints most fingers, Tylenol or NSAIDs as needed  - Referral to Ophthalmology  - Comp Metabolic Panel; Standing  - CBC WITH DIFFERENTIAL; Standing  - Sed Rate; Standing    5. Essential hypertension  May impact the type of medications we can use for this patient's arthritis. We will have to keep this under advisement.    6. Type 2 diabetes mellitus with hyperglycemia, without long-term  current use of insulin (HCC)  Followed by patients PCP, high blood sugar can exacerbate inflammatory state of patient's arthritis, discussed with patient the need to monitor and control blood sugars, patient states understanding    Records requested.  Followup: Return in about 2 months (around 3/3/2022). or sooner prn    Patient was seen 60 minutes face-to-face (excluding time for procedures)  of which more than 50% the time was spent counseling the patient regarding  rheumatological conditions and care. Therapy was discussed in detail.  Thank you for this referral.    Please note that this dictation was created using voice recognition software. I have made every reasonable attempt to correct obvious errors, but I expect that there are errors of grammar and possibly content that I did not discover before finalizing the note.

## 2022-01-03 NOTE — LETTER
[facility]   [address] [place] [address], [address]  [place] [address]  Phone: [phone]  Fax: [phone]              Encounter Date: 1/3/2022    Dear Dr. Martel,    It was a pleasure seeing your patient, Kary Shah, on 1/3/2022. Diagnoses of Rheumatoid arthritis, involving unspecified site, unspecified whether rheumatoid factor present (HCC), Methotrexate, long term, current use, Long-term use of Plaquenil, Osteoarthritis, unspecified osteoarthritis type, unspecified site, Essential hypertension, and Type 2 diabetes mellitus with hyperglycemia, without long-term current use of insulin (HCC) were pertinent to this visit.     Please find attached progress note which includes the history I obtained from Ms. Patric Shah, my physical examination findings, my impression and recommendations.      Once again, it was a pleasure participating in your patient's care.  Please feel free to contact me if you have any questions or if I can be of any further assistance to your patients.      Sincerely,    Malathi Che M.D.  Electronically Signed          PROGRESS NOTE:  No notes on file

## 2022-01-03 NOTE — LETTER
January 3, 2022        Kary Shah    To Whom it may Concern,  This patient was seen in Rheumaology Clinic today, this AM 1/3/2022      Malathi Che M.D.

## 2022-01-04 ENCOUNTER — HOSPITAL ENCOUNTER (OUTPATIENT)
Dept: RADIOLOGY | Facility: MEDICAL CENTER | Age: 57
End: 2022-01-04
Attending: NURSE PRACTITIONER
Payer: OTHER MISCELLANEOUS

## 2022-01-04 ENCOUNTER — HOSPITAL ENCOUNTER (EMERGENCY)
Facility: MEDICAL CENTER | Age: 57
End: 2022-01-04
Attending: STUDENT IN AN ORGANIZED HEALTH CARE EDUCATION/TRAINING PROGRAM
Payer: OTHER MISCELLANEOUS

## 2022-01-04 ENCOUNTER — OCCUPATIONAL MEDICINE (OUTPATIENT)
Dept: URGENT CARE | Facility: PHYSICIAN GROUP | Age: 57
End: 2022-01-04
Payer: OTHER MISCELLANEOUS

## 2022-01-04 VITALS
TEMPERATURE: 97 F | BODY MASS INDEX: 30.2 KG/M2 | HEART RATE: 74 BPM | SYSTOLIC BLOOD PRESSURE: 158 MMHG | WEIGHT: 140 LBS | HEIGHT: 57 IN | RESPIRATION RATE: 16 BRPM | OXYGEN SATURATION: 95 % | DIASTOLIC BLOOD PRESSURE: 93 MMHG

## 2022-01-04 VITALS
HEART RATE: 88 BPM | BODY MASS INDEX: 30.2 KG/M2 | TEMPERATURE: 98.3 F | RESPIRATION RATE: 18 BRPM | SYSTOLIC BLOOD PRESSURE: 138 MMHG | HEIGHT: 57 IN | OXYGEN SATURATION: 97 % | WEIGHT: 140 LBS | DIASTOLIC BLOOD PRESSURE: 68 MMHG

## 2022-01-04 DIAGNOSIS — Z02.1 PRE-EMPLOYMENT DRUG SCREENING: Primary | ICD-10-CM

## 2022-01-04 DIAGNOSIS — M79.601 PAIN OF RIGHT UPPER EXTREMITY: ICD-10-CM

## 2022-01-04 DIAGNOSIS — S42.213A: Primary | ICD-10-CM
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
## 2022-01-04 DIAGNOSIS — M25.519 ACUTE SHOULDER PAIN, UNSPECIFIED LATERALITY: ICD-10-CM
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
## 2022-01-04 DIAGNOSIS — S42.201A CLOSED FRACTURE OF PROXIMAL END OF RIGHT HUMERUS, UNSPECIFIED FRACTURE MORPHOLOGY, INITIAL ENCOUNTER: ICD-10-CM

## 2022-01-04 LAB
BREATH ALCOHOL COMMENT: NORMAL
POC BREATHALIZER: 0 PERCENT (ref 0–0.01)

## 2022-01-04 PROCEDURE — 99284 EMERGENCY DEPT VISIT MOD MDM: CPT

## 2022-01-04 PROCEDURE — 82075 ASSAY OF BREATH ETHANOL: CPT | Mod: 29 | Performed by: NURSE PRACTITIONER

## 2022-01-04 PROCEDURE — 73030 X-RAY EXAM OF SHOULDER: CPT | Mod: RT

## 2022-01-04 PROCEDURE — A9270 NON-COVERED ITEM OR SERVICE: HCPCS | Performed by: STUDENT IN AN ORGANIZED HEALTH CARE EDUCATION/TRAINING PROGRAM

## 2022-01-04 PROCEDURE — 73060 X-RAY EXAM OF HUMERUS: CPT | Mod: RT

## 2022-01-04 PROCEDURE — 700102 HCHG RX REV CODE 250 W/ 637 OVERRIDE(OP): Performed by: STUDENT IN AN ORGANIZED HEALTH CARE EDUCATION/TRAINING PROGRAM

## 2022-01-04 PROCEDURE — 99214 OFFICE O/P EST MOD 30 MIN: CPT | Mod: 29 | Performed by: NURSE PRACTITIONER

## 2022-01-04 RX ORDER — OXYCODONE HYDROCHLORIDE AND ACETAMINOPHEN 5; 325 MG/1; MG/1
1 TABLET ORAL ONCE
Status: COMPLETED | OUTPATIENT
Start: 2022-01-04 | End: 2022-01-04

## 2022-01-04 RX ORDER — ACETAMINOPHEN 500 MG
1000 TABLET ORAL ONCE
Status: COMPLETED | OUTPATIENT
Start: 2022-01-04 | End: 2022-01-04

## 2022-01-04 RX ORDER — OXYCODONE HYDROCHLORIDE AND ACETAMINOPHEN 5; 325 MG/1; MG/1
1 TABLET ORAL EVERY 4 HOURS PRN
Qty: 8 TABLET | Refills: 0 | Status: SHIPPED | OUTPATIENT
Start: 2022-01-04 | End: 2022-01-12

## 2022-01-04 RX ADMIN — Medication 1000 MG: at 13:05

## 2022-01-04 RX ADMIN — OXYCODONE HYDROCHLORIDE AND ACETAMINOPHEN 1 TABLET: 5; 325 TABLET ORAL at 20:03

## 2022-01-04 ASSESSMENT — ENCOUNTER SYMPTOMS
NAUSEA: 0
NECK PAIN: 0
VOMITING: 0
SORE THROAT: 0
FEVER: 0
BLURRED VISION: 0
ABDOMINAL PAIN: 0
DOUBLE VISION: 0
CHILLS: 0
SENSORY CHANGE: 0
FALLS: 1
NECK PAIN: 0
HEADACHES: 0
SPEECH CHANGE: 0
COUGH: 0
MYALGIAS: 0
LOSS OF CONSCIOUSNESS: 0
LOSS OF CONSCIOUSNESS: 0
FALLS: 0
TINGLING: 0
SHORTNESS OF BREATH: 0

## 2022-01-04 ASSESSMENT — LIFESTYLE VARIABLES: SUBSTANCE_ABUSE: 0

## 2022-01-04 ASSESSMENT — FIBROSIS 4 INDEX
FIB4 SCORE: 0.54
FIB4 SCORE: 0.54

## 2022-01-04 ASSESSMENT — PAIN SCALES - GENERAL: PAINLEVEL: 10=SEVERE PAIN

## 2022-01-04 NOTE — ED TRIAGE NOTES
Kary Shah  56 y.o.  female  Chief Complaint   Patient presents with   • Arm Injury     Today, tripped over a string when getting up from her desk & fell on right side, injuring right arm.   • Sent from Urgent Care     Xrays done at  today , shows right humeral fracture. Given tylenol. In sling. CSM intact. Pain 10/10.     Patient educated on triage process, to alert staff if any changes in condition.    Pt NPO since 10AM.

## 2022-01-04 NOTE — LETTER
[facility]  [address] [place] [address]  Phone:  [phone] - Fax:  [phone]   [facility] Progress Report and Disability Certification  Date of Service: 1/4/2022   No Show:  No  Date / Time of Next Visit:   Go to  ER for further treatment    Claim Information   Patient Name: Kary Shah  Claim Number:     Employer:   [employer] Date of Injury: 1/4/2022     Insurer / TPA: Misc Workers Comp  ID / SSN:     Occupation: COSTURERA  Diagnosis: Diagnoses of Fall due to stumbling, initial encounter, Acute shoulder pain, unspecified laterality, Pain of right upper extremity, Work related injury, and Closed fracture of proximal end of right humerus, unspecified fracture morphology, initial encounter were pertinent to this visit.    Medical Information   Related to Industrial Injury? Yes    Subjective Complaints:  DOI 01/04/21 @ 0600 ( initial visit)   ELLE:  She was being called by her supervisor. She stood up and her foot caught on a string which caused her to fall. She fell onto cement floor. She was assisted up. No LOC.pain in shoulder started 25 min after and then she started to sweat. Denies numbness or tingling in hands arm. Cannot lift arm. Pain from elbow to shoulder, severe 10/10.   Treatments tried: warm pack, spray to put on her skin.The heat helped a little.   No previous injuries.   Denies head, neck or back pain.   No other employers.   She is right hand dominant.   Pain in her right shoulder.    Objective Findings: Physical Exam  Vitals and nursing note reviewed.   Constitutional:       General: She is not in acute distress.     Appearance: She is well-developed. She is not toxic-appearing.   HENT:      Head: Normocephalic.   Cardiovascular:      Rate and Rhythm: Normal rate and regular rhythm.      Pulses: Normal pulses.      Heart sounds: Normal heart sounds.   Pulmonary:      Effort: Pulmonary effort is normal.      Breath sounds: Normal  breath sounds.   Musculoskeletal:      Right shoulder: Swelling, tenderness, bony tenderness and crepitus present. No deformity or effusion. Decreased range of motion. Decreased strength. Normal pulse.      Right upper arm: Swelling, edema, tenderness and bony tenderness present.      Right elbow: Normal.      Right wrist: Normal.      Cervical back: Neck supple. No rigidity or tenderness.   Skin:     General: Skin is warm and dry.      Capillary Refill: Capillary refill takes less than 2 seconds.   Neurological:      General: No focal deficit present.      Mental Status: She is alert and oriented to person, place, and time.      Sensory: Sensation is intact.      Motor: Motor function is intact.      Coordination: Coordination is intact.      Gait: Gait is intact.   Psychiatric:         Mood and Affect: Mood normal.         Behavior: Behavior normal. Behavior is cooperative.        Pre-Existing Condition(s):     Assessment:   Initial Visit    Status: Additional Care Required  Comments:Patient is being sent to the emergency room for additional evaluation management  Permanent Disability:No    Plan:      Diagnostics: X-ray    Comments:       Disability Information   Status: Temporarily Totally Disabled  Comments:Patient is being sent to the emergency room for additional evaluation and management.  Disability will be be determined at that time.    From:  1/4/2022  Through:   Restrictions are: Temporary   Physical Restrictions   Sitting:    Standing:    Stooping:    Bending:      Squatting:    Walking:    Climbing:    Pushing:      Pulling:    Other:    Reaching Above Shoulder (L):   Reaching Above Shoulder (R):       Reaching Below Shoulder (L):    Reaching Below Shoulder (R):      Not to exceed Weight Limits   Carrying(hrs):   Weight Limit(lb):   Lifting(hrs):   Weight  Limit(lb):     Comments: Shoulder sling placed for comfort.  Tylenol given in urgent care today for pain.  Patient is being sent to the emergency  room for additional evaluation and management of her acute humeral fracture.      Repetitive Actions   Hands: i.e. Fine Manipulations from Grasping:     Feet: i.e. Operating Foot Controls:     Driving / Operate Machinery:     Health Care Provider’s Original or Electronic Signature  MICHELET Guadarrama Health Care Provider’s Original or Electronic Signature    Martin Vigil MD         Clinic Name / Location: [address] [address] Clinic Phone Number: Dept: [phone]   Appointment Time: 8:35 Am Visit Start Time: 9:26 AM   Check-In Time:  8:50 Am Visit Discharge Time:  11:51am    Original-Treating Physician or Chiropractor    Page 2-Insurer/TPA    Page 3-Employer    Page 4-Employee

## 2022-01-04 NOTE — LETTER
"  FORM C-4:  EMPLOYEE’S CLAIM FOR COMPENSATION/ REPORT OF INITIAL TREATMENT  EMPLOYEE’S CLAIM - PROVIDE ALL INFORMATION REQUESTED   First Name Kary Last Name Patric Shah Birthdate 1965  Sex female Claim Number   Home Address [address]             Zip [address]                                   Age  56 y.o. 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 (if applicable) ESTABA TRABAJANDO Y CUANDO ME LEVANTE ME ENREDE EL PIE EN [place]    How did this injury or occupational disease occur? Be specific and answer in detail. Use additional sheet if necessary)  ME LLAMABA MI SUPERVISORA Y CUANDO ME LEVANTE DE [place] SEME ERREDO EL PIE EM [place] BENNY   If you believe that you have an occupational disease, when did you first have knowledge of the disability and it relationship to your employment? NO Witnesses to the Accident  SEB, KARLI, ANDREA   Nature of Injury or Occupational Disease  Workers' Compensation Part(s) of Body Injured or Affected  Shoulder (R), N/A, N/A    I CERTIFY THAT THE ABOVE IS TRUE AND CORRECT TO THE BEST OF MY KNOWLEDGE AND THAT I HAVE PROVIDED THIS INFORMATION IN ORDER TO OBTAIN THE BENEFITS OF [place] " [place]  I HEREBY AUTHORIZE ANY PHYSICIAN, CHIROPRACTOR, SURGEON, PRACTITIONER, OR OTHER PERSON, ANY HOSPITAL, INCLUDING [facility] OR [facility], ANY MEDICAL SERVICE ORGANIZATION, ANY INSURANCE COMPANY, OR OTHER INSTITUTION OR ORGANIZATION TO RELEASE TO EACH OTHER, ANY MEDICAL OR OTHER INFORMATION, INCLUDING BENEFITS PAID OR PAYABLE, PERTINENT TO THIS INJURY OR DISEASE, EXCEPT INFORMATION RELATIVE TO DIAGNOSIS, TREATMENT AND/OR COUNSELING FOR AIDS, PSYCHOLOGICAL CONDITIONS, ALCOHOL OR CONTROLLED SUBSTANCES, FOR WHICH I MUST GIVE SPECIFIC AUTHORIZATION.  A PHOTOSTAT OF THIS AUTHORIZATION SHALL BE AS VALID AS THE ORIGINAL.  Date                                      Place                                                                             Employee’s Signature   THIS REPORT MUST BE COMPLETED AND MAILED WITHIN 3 WORKING DAYS OF TREATMENT   Place [facility], EMERGENCY DEPT                       Name of Facility [facility]   Date  1/4/2022 Diagnosis  (S42.213A) Closed displaced fracture of surgical neck of humerus, unspecified fracture morphology, unspecified laterality, initial encounter  (primary encounter diagnosis) Is there evidence the injured employee was under the influence of alcohol and/or another controlled substance at the time of accident?   Hour  7:35 PM Description of Injury or Disease  Closed displaced fracture of surgical neck of humerus, unspecified fracture morphology, unspecified laterality, initial encounter     Treatment     Have you advised the patient to remain off work five days or more?             X-Ray Findings    If Yes   From Date    To Date      From information given by the employee, together with medical evidence, can you directly connect this injury or occupational disease as job incurred?   If No, is employee capable of: Full Duty    Modified Duty    "   Is additional medical care by a physician indicated?   If Modified Duty, Specify any Limitations / Restrictions       Do you know of any previous injury or disease contributing to this condition or occupational disease?      Date 1/4/2022 Print Doctor’s Name Bassem Shi LAVERNE certify the employer’s copy of this form was mailed on:   Address [address] [place] [address]  [place] [id number] [phone] INSURER’S USE ONLY   Provider’s Tax ID Number [id number] Telephone Dept: [phone]    Doctor’s Signature   Degree        Form C-4 (rev.10/07)                                                                         BRIEF DESCRIPTION OF RIGHTS AND BENEFITS  (Pursuant to [place] 616[place])    Notice of Injury or Occupational Disease (Incident Report Form C-1): If an injury or occupational disease (OD) arises out of and in the course of employment, you must provide written notice to your employer as soon as practicable, but no later than 7 days after the accident or OD. Your employer shall maintain a sufficient supply of the required forms.    Claim for Compensation (Form C-4): If medical treatment is sought, the form C-4 is available at the place of initial treatment. A completed \"Claim for Compensation\" (Form C-4) must be filed within 90 days after an accident or OD. The treating physician or chiropractor must, within 3 working days after treatment, complete and mail to the employer, the employer's insurer and third-party , the Claim for Compensation.    Medical Treatment: If you require medical treatment for your on-the-job injury or OD, you may be required to select a physician or chiropractor from a list provided by your workers’ compensation insurer, if it has contracted with an Organization for Managed Care (MCO) or Preferred Provider Organization (PPO) or providers of health care. If your employer has not entered into a contract with an MCO or PPO, you may select a physician or chiropractor from the Panel " of Physicians and Chiropractors. Any medical costs related to your industrial injury or OD will be paid by your insurer.    Temporary Total Disability (TTD): If your doctor has certified that you are unable to work for a period of at least 5 consecutive days, or 5 cumulative days in a 20-day period, or places restrictions on you that your employer does not accommodate, you may be entitled to TTD compensation.    Temporary Partial Disability (TPD): If the wage you receive upon reemployment is less than the compensation for TTD to which you are entitled, the insurer may be required to pay you TPD compensation to make up the difference. TPD can only be paid for a maximum of 24 months.    Permanent Partial Disability (PPD): When your medical condition is stable and there is an indication of a PPD as a result of your injury or OD, within 30 days, your insurer must arrange for an evaluation by a rating physician or chiropractor to determine the degree of your PPD. The amount of your PPD award depends on the date of injury, the results of the PPD evaluation, your age and wage.    Permanent Total Disability (PTD): If you are medically certified by a treating physician or chiropractor as permanently and totally disabled and have been granted a PTD status by your insurer, you are entitled to receive monthly benefits not to exceed 66 2/3% of your average monthly wage. The amount of your PTD payments is subject to reduction if you previously received a lump-sum PPD award.    Vocational Rehabilitation Services: You may be eligible for vocational rehabilitation services if you are unable to return to the job due to a permanent physical impairment or permanent restrictions as a result of your injury or occupational disease.    Transportation and Per [place] Reimbursement: You may be eligible for travel expenses and per [place] associated with medical treatment.    Reopening: You may be able to reopen your claim if your condition worsens  after claim closure.     Appeal Process: If you disagree with a written determination issued by the insurer or the insurer does not respond to your request, you may appeal to the Department of Administration, , by following the instructions contained in your determination letter. You must appeal the determination within 70 days from the date of the determination letter at [address], [address], [place], [place] [address], or [address], [place], [place] [address]. If you disagree with the  decision, you may appeal to the Department of Administration, . You must file your appeal within 30 days from the date of the  decision letter at [address], [address], [place], [place] [address], or [address] [facility] [address] [facility] [address], [place], [place] [address]. If you disagree with a decision of an , you may file a petition for judicial review with the District Court. You must do so within 30 days of the Appeal Officer’s decision. You may be represented by an  at your own expense or you may contact the [facility] for possible representation.    [place]  for Injured Workers (NAIW): If you disagree with a  decision, you may request that NAIW represent you without charge at an  Hearing. For information regarding denial of benefits, you may contact the NA at: [address], [address], [place] [address], [phone], or [address] [facility] [facility] [address], [address], [place] [address], [phone]    To File a Complaint with the Division: If you wish to file a complaint with the  of the Division of Industrial Relations (DIR),  please contact the Workers’ Compensation Section, [address], [address], [place], [place] [address], telephone (907) [phone], or [address], [place], [place] [address], telephone [phone].    For assistance  with Workers’ Compensation Issues: You may contact the [facility] Office for Consumer Health Assistance, [facility] [facility] [address], [facility] [address] [facility] [address], [place] [address], Toll Free 1-369.224.4892, Web site: http://[facility].[place].gov/Programs/BENNIE E-mail: bennie@[facility].nv.gov  D-2 (rev. 10/20)              __________________________________________________________________                                    _________________            Employee Name / Signature                                                                                                                            Date

## 2022-01-04 NOTE — LETTER
"EMPLOYEE’S CLAIM FOR COMPENSATION/ REPORT OF INITIAL TREATMENT  FORM C-4    EMPLOYEE’S CLAIM - PROVIDE ALL INFORMATION REQUESTED   First Name  Kary Last Name  Patric Shah Birthdate                    1965                Sex  female Claim Number (Insurer’s Use Only)    Home Address  [address] Age  56 y.o. Height  1.448 m (4' 9\") Weight  63.5 kg (140 lb) [facility]     [facility] Zip  [address] Telephone  [phone] (home)    Mailing Address  [address] [facility] Zip  [address] Primary Language Spoken  Hungarian    Insurer   Third-Party   Misc Workers Comp   Employee's Occupation (Job Title) When Injury or Occupational Disease Occurred  [employer]    Employer's Name/Company Name    [employer]  Telephone  [phone]    Office Mail Address (Number and Street)   [address] [facility]  Zip  [address]    Date of Injury  1/4/2022               Hours Injury  6:00 AM Date Employer Notified  1/4/2022 Last Day of Work after Injury     or Occupational Disease  1/4/2022 Supervisor to Whom Injury     Reported  SEB   Address or Location of Accident (if applicable)  Work [1]   What were you doing at the time of accident? (if applicable)  ESTABA TRABAJANDO Y CUANDO ME LEVANTE ME ENREDE EL PIE EN [place]    How did this injury or occupational disease occur? (Be specific an answer in detail. Use additional sheet if necessary)  ME LLAMABA MI SUPERVISORA Y CUANDO ME LEVANTE DE [place] SEME ERREDO EL PIE EM [place] BEN[place]   If you believe that you have an occupational disease, when did you first have knowledge of the disability and it relationship to your employment?  NO Witnesses to the Accident  SEB, KARLI, ANDREA      Nature of Injury or Occupational Disease  Workers' Compensation  Part(s) of Body Injured or Affected  Shoulder (R), N/A, N/A    I certify that the above is true and " correct to the best of my knowledge and that I have provided this information in order to obtain the benefits of [place]  I hereby authorize any physician, chiropractor, surgeon, practitioner, or other person, any hospital, including [facility] or [facility], any medical service organization, any insurance company, or other institution or organization to release to each other, any medical or other information, including benefits paid or payable, pertinent to this injury or disease, except information relative to diagnosis, treatment and/or counseling for AIDS, psychological conditions, alcohol or controlled substances, for which I must give specific authorization.  A Photostat of this authorization shall be as valid as the original.     Date   Place Employee’s Original or  *Electronic Signature   THIS REPORT MUST BE COMPLETED AND MAILED WITHIN 3 WORKING DAYS OF TREATMENT   Place  [facility]  Name of Facility  [facility]   Date  1/4/2022 Diagnosis and Description of Injury or Occupational Disease  (W01.0XXA) Fall due to stumbling, initial encounter  (M25.519) Acute shoulder pain, unspecified laterality  (M79.601) Pain of right upper extremity  (Y99.0) Work related injury  (S42.201A) Closed fracture of proximal end of right humerus, unspecified fracture morphology, initial encounter Is there evidence the injured employee was under the influence of alcohol and/or another controlled substance at the time of accident?  ? No ? Yes (if yes, please explain)    Hour  9:26 AM   Diagnoses of Fall due to stumbling, initial encounter, Acute shoulder pain, unspecified laterality, Pain of right upper extremity, Work related injury, and Closed fracture of proximal end of right humerus, unspecified fracture morphology, initial encounter were pertinent to this visit. No   Treatment  To the emergency  room for further evaluation and management of acute upper arm fracture  Patient is currently considered totally disabled but that will need to be reevaluated when she is seen by orthopedic in the emergency room today.  Have you advised the patient to remain off work five days or     more?    X-Ray Findings  Positive   ? Yes Indicate dates:   From 1/4/2022 To      From information given by the employee, together with medical evidence, can        you directly connect this injury or occupational disease as job incurred?  Yes ? No If no, is the injured employee capable of:  ? full duty  No ? modified duty  No   Is additional medical care by a physician indicated?  Yes If Modified Duty, Specify any Limitations / Restrictions      Do you know of any previous injury or disease contributing to this condition or occupational disease?  ? Yes ? No (Explain if yes)                          No   Date  1/4/2022 Print Health Care Provider's   MICHELET Guadarrama I certify the employer’s copy of  this form was mailed on:   Address  [address] Insurer’s Use Only     [facility] Zip  [address]    Provider’s Tax ID Number  [id number] Telephone  Dept: [phone]             Health Care Provider’s Original or Electronic Signature  [id number] JeanPVAHE Degree (MD,DO, DC,PA-C,APRN)   APRN      * Complete and attach Release of Information (Form C-4A) when injured employee signs C-4 Form electronically  ORIGINAL - TREATING HEALTHCARE PROVIDER PAGE 2 - INSURER/TPA PAGE 3 - EMPLOYER PAGE 4 - EMPLOYEE             Form C-4 (rev.08/21)           BRIEF DESCRIPTION OF RIGHTS AND BENEFITS  (Pursuant to [place] 616C.050)    Notice of Injury or Occupational Disease (Incident Report Form C-1): If an injury or occupational disease (OD) arises out of and in the course of employment, you must provide written notice to your employer as soon as practicable, but no later than 7 days after the accident or  "OD. Your employer shall maintain a sufficient supply of the required forms.    Claim for Compensation (Form C-4): If medical treatment is sought, the form C-4 is available at the place of initial treatment. A completed \"Claim for Compensation\" (Form C-4) must be filed within 90 days after an accident or OD. The treating physician or chiropractor must, within 3 working days after treatment, complete and mail to the employer, the employer's insurer and third-party , the Claim for Compensation.    Medical Treatment: If you require medical treatment for your on-the-job injury or OD, you may be required to select a physician or chiropractor from a list provided by your workers’ compensation insurer, if it has contracted with an Organization for Managed Care (MCO) or Preferred Provider Organization (PPO) or providers of health care. If your employer has not entered into a contract with an MCO or PPO, you may select a physician or chiropractor from the Panel of Physicians and Chiropractors. Any medical costs related to your industrial injury or OD will be paid by your insurer.    Temporary Total Disability (TTD): If your doctor has certified that you are unable to work for a period of at least 5 consecutive days, or 5 cumulative days in a 20-day period, or places restrictions on you that your employer does not accommodate, you may be entitled to TTD compensation.    Temporary Partial Disability (TPD): If the wage you receive upon reemployment is less than the compensation for TTD to which you are entitled, the insurer may be required to pay you TPD compensation to make up the difference. TPD can only be paid for a maximum of 24 months.    Permanent Partial Disability (PPD): When your medical condition is stable and there is an indication of a PPD as a result of your injury or OD, within 30 days, your insurer must arrange for an evaluation by a rating physician or chiropractor to determine the degree of your " PPD. The amount of your PPD award depends on the date of injury, the results of the PPD evaluation, your age and wage.    Permanent Total Disability (PTD): If you are medically certified by a treating physician or chiropractor as permanently and totally disabled and have been granted a PTD status by your insurer, you are entitled to receive monthly benefits not to exceed 66 2/3% of your average monthly wage. The amount of your PTD payments is subject to reduction if you previously received a lump-sum PPD award.    Vocational Rehabilitation Services: You may be eligible for vocational rehabilitation services if you are unable to return to the job due to a permanent physical impairment or permanent restrictions as a result of your injury or occupational disease.    Transportation and Per [place] Reimbursement: You may be eligible for travel expenses and per [place] associated with medical treatment.    Reopening: You may be able to reopen your claim if your condition worsens after claim closure.     Appeal Process: If you disagree with a written determination issued by the insurer or the insurer does not respond to your request, you may appeal to the Department of Administration, , by following the instructions contained in your determination letter. You must appeal the determination within 70 days from the date of the determination letter at [address], [address], [place], [place] [address], or [address], [place] [address]. If you disagree with the  decision, you may appeal to the Department of Administration, . You must file your appeal within 30 days from the date of the  decision letter at [address], [address], [place] [address], or [address] [facility] [facility] [address], [place] [address]. If you disagree with a decision of an , you may file a petition for judicial review with the  [facility] Court. You must do so within 30 days of the Appeal Officer’s decision. You may be represented by an  at your own expense or you may contact the [facility] for possible representation.    [place]  for Injured Workers (NAIW): If you disagree with a  decision, you may request that NAIW represent you without charge at an  Hearing. For information regarding denial of benefits, you may contact the [facility] at: [address], [address], [place] [address], [phone], or [address] [facility], [address], [place] [address], [phone]    To File a Complaint with the Division: If you wish to file a complaint with the  of the Division of Industrial Relations (DIR),  please contact the Workers’ Compensation Section, [address], [address], [place], [place] [address], telephone [phone], or [address], [address], [place], [place] [address], telephone [phone].    For assistance with Workers’ Compensation Issues: You may contact the [facility] Office for Consumer Health Assistance, [address] [facility] [address], [address], [place], [place] [address], Toll Free 1-463.323.4667, Web site: http://[facility].nv.gov/Programs/ABRAM E-mail: abram@[facility].[email or web address].[email or web address]              __________________________________________________________________                                    _________________            Employee Name / Signature                                                                                                                            Date                                                                                                                                                                                                                              D-2 (rev. 10/20)

## 2022-01-04 NOTE — Clinical Note
Kary Shah was seen and treated in our emergency department on 1/4/2022.  She may return to work on 01/20/2022.  Patient may return when she has been cleared by orthopaedic surgery.     If you have any questions or concerns, please don't hesitate to call.      Bassem Shi M.D.

## 2022-01-04 NOTE — PROGRESS NOTES
"Subjective     Kary Shah is a 56 y.o. female who presents with Fall (rigth arm pain post fall at work today)   used through the out the entire visit      DOI 01/04/21 @ 0600 ( initial visit)   ELLE:  She was being called by her supervisor. She stood up and her foot caught on a string which caused her to fall. She fell onto cement floor. She was assisted up. No LOC.pain in shoulder started 25 min after and then she started to sweat. Denies numbness or tingling in hands arm. Cannot lift arm. Pain from elbow to shoulder, severe 10/10.   Treatments tried: warm pack, spray to put on her skin.The heat helped a little.   No previous injuries.   Denies head, neck or back pain.   No other employers.   She is right hand dominant.   Pain in her right shoulder.      HPI    Review of Systems   Musculoskeletal: Positive for falls and joint pain. Negative for myalgias and neck pain.   Neurological: Negative for tingling, sensory change, speech change and loss of consciousness.   Psychiatric/Behavioral: Negative for substance abuse.              Objective     /68   Pulse 88   Temp 36.8 °C (98.3 °F)   Resp 18   Ht 1.448 m (4' 9\")   Wt 63.5 kg (140 lb)   SpO2 97%   BMI 30.30 kg/m²      Physical Exam  Vitals and nursing note reviewed.   Constitutional:       General: She is not in acute distress.     Appearance: She is well-developed. She is not toxic-appearing.   HENT:      Head: Normocephalic.   Cardiovascular:      Rate and Rhythm: Normal rate and regular rhythm.      Pulses: Normal pulses.      Heart sounds: Normal heart sounds.   Pulmonary:      Effort: Pulmonary effort is normal.      Breath sounds: Normal breath sounds.   Musculoskeletal:      Right shoulder: Swelling, tenderness, bony tenderness and crepitus present. No deformity or effusion. Decreased range of motion. Decreased strength. Normal pulse.      Right upper arm: Swelling, edema, tenderness and bony tenderness present.      " Right elbow: Normal.      Right wrist: Normal.      Cervical back: Neck supple. No rigidity or tenderness.   Skin:     General: Skin is warm and dry.      Capillary Refill: Capillary refill takes less than 2 seconds.   Neurological:      General: No focal deficit present.      Mental Status: She is alert and oriented to person, place, and time.      Sensory: Sensation is intact.      Motor: Motor function is intact.      Coordination: Coordination is intact.      Gait: Gait is intact.   Psychiatric:         Mood and Affect: Mood normal.         Behavior: Behavior normal. Behavior is cooperative.                      RADIOLOGY RESULTS   DX-HUMERUS 2+ RIGHT    Result Date: 1/4/2022 1/4/2022 10:41 AM HISTORY/REASON FOR EXAM: Injury with pain TECHNIQUE/EXAM DESCRIPTION AND NUMBER OF VIEWS:  2 views of the RIGHT humerus. COMPARISON: None FINDINGS: Please refer to the shoulder report for details related to the proximal humerus No displaced fracture is seen. There is likely osteopenia No foreign body or soft tissue swelling     No radiographic evidence of acute traumatic injury.  ------------------------------------------------------------------------------      DX-SHOULDER 2+ RIGHT    Result Date: 1/4/2022 1/4/2022 10:41 AM HISTORY/REASON FOR EXAM: Pain. Pain/Deformity Following Trauma TECHNIQUE/EXAM DESCRIPTION AND NUMBER OF VIEWS:  3 views of the RIGHT shoulder. COMPARISON: None FINDINGS: There is no evidence of  dislocation. There is cortical buckling at the humeral neck/greater tuberosity junction seen best on the Grashey view. There is degrees rotation of the fracture indicating considerable impaction. No acromioclavicular joint widening. There is lateral acromial downsloping and inferior spurring There is mild glenohumeral marginal spurring No acute abnormality of the visualized hemithorax is noted.     Surgical proximal humeral neck impacted fracture with extension to the greater tuberosity and up to 90 degrees  rotation of fragments                  Assessment & Plan        1. Fall due to stumbling, initial encounter  DX-HUMERUS 2+ RIGHT    DX-SHOULDER 2+ RIGHT    acetaminophen (TYLENOL) tablet 1,000 mg   2. Acute shoulder pain, unspecified laterality  DX-HUMERUS 2+ RIGHT    DX-SHOULDER 2+ RIGHT    acetaminophen (TYLENOL) tablet 1,000 mg   3. Pain of right upper extremity  DX-HUMERUS 2+ RIGHT    DX-SHOULDER 2+ RIGHT    acetaminophen (TYLENOL) tablet 1,000 mg   4. Work related injury     5. Closed fracture of proximal end of right humerus, unspecified fracture morphology, initial encounter         TO ER for further evaluation and management of the proximal right humerus fracture.  Patient is advised to remain n.p.o. until she is seen by ERP/orthopedic at the ER.  She verbalizes understanding.    Pt's clinical presentation and exam today indicate a need for higher level of care with further evaluation and/or diagnostics and speciality care..   [facility] transfer center was  called to arrange transfer to higher level of care in ER.  Pt is to be transported via POV with her friend.

## 2022-01-04 NOTE — LETTER
"  FORM C-4:  EMPLOYEE’S CLAIM FOR COMPENSATION/ REPORT OF INITIAL TREATMENT  EMPLOYEE’S CLAIM - PROVIDE ALL INFORMATION REQUESTED   First Name Kary Last Name Patric Shah Birthdate 1965  Sex female Claim Number   Home Address [address]             [place] [address]                                   Age  56 y.o. 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 Telephone [phone]    Employer Address [address] [facility] [[address]] Zip [address]   Date of Injury  1/4/2022       Hour of Injury  6:00 AM Date Employer Notified  1/4/2022 Last Day of Work after Injury or Occupational Disease  1/4/2022 Supervisor to Whom Injury Reported  SEB   Address or Location of Accident (if applicable) Work [1]   What were you doing at the time of accident? (if applicable) ESTABA TRABAJANDO Y CUANDO ME LEVANTE ME ENREDE EL PIE EN [place]    How did this injury or occupational disease occur? Be specific and answer in detail. Use additional sheet if necessary)  ME LLAMABA MI SUPERVISORA Y CUANDO ME LEVANTE DE [place] SEME ERREDO EL PIE EM [place] BENNY   If you believe that you have an occupational disease, when did you first have knowledge of the disability and it relationship to your employment? NO Witnesses to the Accident  SEB, KARLI, ANDREA   Nature of Injury or Occupational Disease  Workers' Compensation Part(s) of Body Injured or Affected  Shoulder (R), N/A, N/A    I CERTIFY THAT THE ABOVE IS TRUE AND CORRECT TO THE BEST OF MY KNOWLEDGE AND THAT I HAVE PROVIDED THIS INFORMATION IN ORDER TO OBTAIN THE BENEFITS OF [place]).  I HEREBY AUTHORIZE ANY PHYSICIAN, CHIROPRACTOR, SURGEON, PRACTITIONER, OR OTHER PERSON, ANY HOSPITAL, INCLUDING [facility] OR [facility], ANY MEDICAL SERVICE ORGANIZATION, ANY INSURANCE COMPANY, OR OTHER INSTITUTION OR ORGANIZATION TO RELEASE TO EACH OTHER, ANY MEDICAL OR OTHER INFORMATION, INCLUDING BENEFITS PAID OR PAYABLE, PERTINENT TO THIS INJURY OR DISEASE, EXCEPT INFORMATION RELATIVE TO DIAGNOSIS, TREATMENT AND/OR COUNSELING FOR AIDS, PSYCHOLOGICAL CONDITIONS, ALCOHOL OR CONTROLLED SUBSTANCES, FOR WHICH I MUST GIVE SPECIFIC AUTHORIZATION.  A PHOTOSTAT OF THIS AUTHORIZATION SHALL BE AS VALID AS THE ORIGINAL.  Date      01/04/2022                      Place                         [facility]                                        Employee’s Signature   THIS REPORT MUST BE COMPLETED AND MAILED WITHIN 3 WORKING DAYS OF TREATMENT   Place [facility], EMERGENCY DEPT                       Name of Facility [facility]   Date  1/4/2022 Diagnosis  (S42.213A) Closed displaced fracture of surgical neck of humerus, unspecified fracture morphology, unspecified laterality, initial encounter  (primary encounter diagnosis) Is there evidence the injured employee was under the influence of alcohol and/or another controlled substance at the time of accident?   Hour  7:43 PM Description of Injury or Disease  Closed displaced fracture of surgical neck of humerus, unspecified fracture morphology, unspecified laterality, initial encounter No   Treatment  Pain control, sling, follow up with orthopaedic surgery  Have you advised the patient to remain off work five days or more?         Yes   X-Ray Findings  Positive If Yes   From Date  1/4/2022 To Date  1/18/2022   From information given by the employee, together with medical evidence, can you directly connect this injury or occupational  "disease as job incurred? Yes If No, is employee capable of: Full Duty  No Modified Duty  No   Is additional medical care by a physician indicated? Yes If Modified Duty, Specify any Limitations / Restrictions       Do you know of any previous injury or disease contributing to this condition or occupational disease? No    Date 1/4/2022 Print Doctor’s Name Jose Guadalupe Ailyn GALLOWAY certify the employer’s copy of this form was mailed on:   Address [address] [place] [id number] [phone] INSURER’S USE ONLY   Provider’s Tax ID Number [id number] Telephone Dept: [phone]    Doctor’s Signature e-AILYN Christianson M.D. Degree  MD      Form C-4 (rev.10/07)                                                                         BRIEF DESCRIPTION OF RIGHTS AND BENEFITS  (Pursuant to [place] 616[place].050)    Notice of Injury or Occupational Disease (Incident Report Form C-1): If an injury or occupational disease (OD) arises out of and in the course of employment, you must provide written notice to your employer as soon as practicable, but no later than 7 days after the accident or OD. Your employer shall maintain a sufficient supply of the required forms.    Claim for Compensation (Form C-4): If medical treatment is sought, the form C-4 is available at the place of initial treatment. A completed \"Claim for Compensation\" (Form C-4) must be filed within 90 days after an accident or OD. The treating physician or chiropractor must, within 3 working days after treatment, complete and mail to the employer, the employer's insurer and third-party , the Claim for Compensation.    Medical Treatment: If you require medical treatment for your on-the-job injury or OD, you may be required to select a physician or chiropractor from a list provided by your workers’ compensation insurer, if it has contracted with an Organization for Managed Care (MCO) or Preferred Provider Organization (PPO) or providers of health care. " If your employer has not entered into a contract with an MCO or PPO, you may select a physician or chiropractor from the Panel of Physicians and Chiropractors. Any medical costs related to your industrial injury or OD will be paid by your insurer.    Temporary Total Disability (TTD): If your doctor has certified that you are unable to work for a period of at least 5 consecutive days, or 5 cumulative days in a 20-day period, or places restrictions on you that your employer does not accommodate, you may be entitled to TTD compensation.    Temporary Partial Disability (TPD): If the wage you receive upon reemployment is less than the compensation for TTD to which you are entitled, the insurer may be required to pay you TPD compensation to make up the difference. TPD can only be paid for a maximum of 24 months.    Permanent Partial Disability (PPD): When your medical condition is stable and there is an indication of a PPD as a result of your injury or OD, within 30 days, your insurer must arrange for an evaluation by a rating physician or chiropractor to determine the degree of your PPD. The amount of your PPD award depends on the date of injury, the results of the PPD evaluation, your age and wage.    Permanent Total Disability (PTD): If you are medically certified by a treating physician or chiropractor as permanently and totally disabled and have been granted a PTD status by your insurer, you are entitled to receive monthly benefits not to exceed 66 2/3% of your average monthly wage. The amount of your PTD payments is subject to reduction if you previously received a lump-sum PPD award.    Vocational Rehabilitation Services: You may be eligible for vocational rehabilitation services if you are unable to return to the job due to a permanent physical impairment or permanent restrictions as a result of your injury or occupational disease.    Transportation and Per Yamila Reimbursement: You may be eligible for travel  expenses and per eloina associated with medical treatment.    Reopening: You may be able to reopen your claim if your condition worsens after claim closure.     Appeal Process: If you disagree with a written determination issued by the insurer or the insurer does not respond to your request, you may appeal to the Department of Administration, , by following the instructions contained in your determination letter. You must appeal the determination within 70 days from the date of the determination letter at [address], [address], [place], [place] [address], or [address], [address], [place], [place] [address]. If you disagree with the  decision, you may appeal to the Department of Administration, . You must file your appeal within 30 days from the date of the  decision letter at [address], [address], [place], [place] [address], or [address], [facility] [address] [facility] [address], [place], [place] [address]. If you disagree with a decision of an , you may file a petition for judicial review with the District Court. You must do so within 30 days of the Appeal Officer’s decision. You may be represented by an  at your own expense or you may contact the [facility] for possible representation.    [place]  for Injured Workers (NAIW): If you disagree with a  decision, you may request that NAIW represent you without charge at an  Hearing. For information regarding denial of benefits, you may contact the [facility] at: [address], [address], [place] [address], [phone], or [address], [facility] [address], [place] [address], [phone]    To File a Complaint with the Division: If you wish to file a complaint with the  of the Division of Industrial Relations (DIR),  please contact the Workers’ Compensation Section, [address], [address], [place], [place] [address],  telephone [phone]) [phone], or [address], [address], [place], [place] [address], telephone [phone].    For assistance with Workers’ Compensation Issues: You may contact the [facility] for Consumer Health Assistance, [address], [address], [place] [address], Toll Free [phone], Web site: http://[facility].[place].gov/Programs/BENNIE E-mail: bennie@[facility].[place].gov  D-2 (rev. 10/20)              __________________________________________________________________                                    _________________            Employee Name / Signature                                                                                                                            Date

## 2022-01-05 NOTE — ED PROVIDER NOTES
ED Provider Note    Chief Complaint:   Shoulder pain    HPI:  Kary Shah is a very pleasant 56-year-old female with past medical history of hypertension and prediabetes and osteoarthritis who presents following ground-level fall with pain to the right shoulder which is severe but improved with Tylenol.  Patient denies numbness or weakness of the right upper extremity distally.  Patient reports diagnosis of a shoulder fracture at urgent care and been referred here for surgery.  Patient reports dull pain in the right shoulder which worsens with movement, moderate intensity at this time, occurred immediately after the injury.    Review of Systems:  Review of Systems   Constitutional: Negative for chills and fever.   HENT: Negative for congestion and sore throat.    Eyes: Negative for blurred vision and double vision.   Respiratory: Negative for cough and shortness of breath.    Cardiovascular: Negative for chest pain and leg swelling.   Gastrointestinal: Negative for abdominal pain, nausea and vomiting.   Genitourinary: Negative for dysuria and hematuria.   Musculoskeletal: Positive for joint pain. Negative for falls and neck pain.   Skin: Negative for itching and rash.   Neurological: Negative for loss of consciousness and headaches.       Past Medical History:   has a past medical history of GERD (gastroesophageal reflux disease), Hypertension, and Osteoarthritis of both hands (2015).    Social History:  Social History     Tobacco Use   • Smoking status: Never Smoker   • Smokeless tobacco: Never Used   Vaping Use   • Vaping Use: Never used   Substance and Sexual Activity   • Alcohol use: No   • Drug use: No   • Sexual activity: Not Currently       Surgical History:   has a past surgical history that includes primary c section.    Current Medications:  Home Medications     Reviewed by Lenore Chadwick R.N. (Registered Nurse) on 01/04/22 at 1406  Med List Status: Not Addressed   Medication Last Dose Status  "  atorvastatin (LIPITOR) 20 MG Tab  Active   folic acid (FOLVITE) 1 MG Tab  Active   folic acid (FOLVITE) 1 MG Tab  Active   hydroxychloroquine (PLAQUENIL) 200 MG Tab  Active   lisinopril (PRINIVIL) 10 MG Tab  Active   metFORMIN ER (GLUCOPHAGE XR) 750 MG TABLET SR 24 HR  Active   methotrexate 2.5 MG tablet  Active                Allergies:  No Known Allergies    Physical Exam:  Vital Signs: /95   Pulse 72   Temp 36.1 °C (96.9 °F) (Temporal)   Resp 18   Ht 1.448 m (4' 9\")   Wt 63.5 kg (140 lb)   SpO2 97%   BMI 30.30 kg/m²   Physical Exam  Vitals and nursing note reviewed.   Constitutional:       Comments: Patient is lying in bed supine, pleasant, conversant, speaking in complete sentences   HENT:      Head: Normocephalic and atraumatic.   Eyes:      Extraocular Movements: Extraocular movements intact.      Conjunctiva/sclera: Conjunctivae normal.      Pupils: Pupils are equal, round, and reactive to light.   Cardiovascular:      Pulses: Normal pulses.      Comments: HR 82  Pulmonary:      Effort: Pulmonary effort is normal. No respiratory distress.   Musculoskeletal:         General: No swelling. Normal range of motion.      Cervical back: Normal range of motion. No rigidity.      Comments: Patient's right upper extremity is in a sling, tenderness to palpation over the right humeral head. Distal affected extremity demonstrates intact sensation, motor, cap refill less than 2 seconds and 2+ pulses, warm and well perfused, no signs of tendon rupture, no crepitus on palpation.     Skin:     General: Skin is warm and dry.      Capillary Refill: Capillary refill takes less than 2 seconds.   Neurological:      Mental Status: She is alert.         Medical records reviewed for continuity of care.     Results for orders placed or performed in visit on 01/04/22   POCT Breath Alcohol Test   Result Value Ref Range    POC Breathalizer 0 0.00 - 0.01 Percent    Breath Alcohol Comment valid        Radiology:  No orders " to display        ED Medications Administered:  Medications - No data to display    MDM:  I spoke to Dr. Montesinos of orthopedic surgery who recommends follow-up in 1 to 2 weeks, patient is nonop per his recommendation at this time.  Patient is neurovascularly intact on reevaluation.  Patient given instructions for Tylenol, ibuprofen and Percocet for breakthrough pain.    Electronically signed by: Bassem Shi M.D., 1/4/2022, 6:47 PM    Repeat physical exam benign.  I doubt any serious emergency process at this time.  Patient and/or family, friends given strict return precautions and care instructions. They have demonstrated understanding of discharge instructions through teach back mechanism. Advised PCP follow-up in 1-2 days.  Patient/family/friend expresses understanding and agrees to plan.    This dictation has been created using voice recognition software. I am continuously working with the software to minimize the number of voice recognition errors and I have made every attempt to manually correct the errors within my dictation. However errors  related to this voice recognition software may still exist and should be interpreted within the appropriate context.     Electronically signed by: Bassem Shi M.D., 1/4/2022 7:48 PM        Disposition:  Home    Final Impression:  1. Closed displaced fracture of surgical neck of humerus, unspecified fracture morphology, unspecified laterality, initial encounter

## 2022-01-05 NOTE — ED NOTES
Patient provided with discharge instructions. Education complete, all questions answered. Patient medicated per MAR and verbalized that she will not be driving.   Vitals stable. All personal belongings accounted for.   Patient ambulated out of the ER with family.

## 2022-01-05 NOTE — ED NOTES
Pt brought back to [address] from triage. Pt able to transfer self to bed, call light in reach. ERP at bedside.

## 2022-01-14 PROBLEM — S42.254A CLOSED NONDISPLACED FRACTURE OF GREATER TUBEROSITY OF RIGHT HUMERUS: Status: ACTIVE | Noted: 2022-01-14

## 2022-03-07 ENCOUNTER — OFFICE VISIT (OUTPATIENT)
Dept: RHEUMATOLOGY | Facility: MEDICAL CENTER | Age: 57
End: 2022-03-07
Payer: COMMERCIAL

## 2022-03-07 VITALS
DIASTOLIC BLOOD PRESSURE: 60 MMHG | OXYGEN SATURATION: 98 % | HEART RATE: 88 BPM | RESPIRATION RATE: 14 BRPM | WEIGHT: 136 LBS | SYSTOLIC BLOOD PRESSURE: 140 MMHG | TEMPERATURE: 98.3 F | BODY MASS INDEX: 29.43 KG/M2

## 2022-03-07 DIAGNOSIS — I10 ESSENTIAL HYPERTENSION: ICD-10-CM

## 2022-03-07 DIAGNOSIS — M19.90 OSTEOARTHRITIS, UNSPECIFIED OSTEOARTHRITIS TYPE, UNSPECIFIED SITE: ICD-10-CM

## 2022-03-07 DIAGNOSIS — Z79.631 METHOTREXATE, LONG TERM, CURRENT USE: ICD-10-CM

## 2022-03-07 DIAGNOSIS — E11.65 TYPE 2 DIABETES MELLITUS WITH HYPERGLYCEMIA, WITHOUT LONG-TERM CURRENT USE OF INSULIN (HCC): ICD-10-CM

## 2022-03-07 DIAGNOSIS — M06.9 RHEUMATOID ARTHRITIS, INVOLVING UNSPECIFIED SITE, UNSPECIFIED WHETHER RHEUMATOID FACTOR PRESENT (HCC): ICD-10-CM

## 2022-03-07 DIAGNOSIS — Z79.899 LONG-TERM USE OF PLAQUENIL: ICD-10-CM

## 2022-03-07 PROCEDURE — 99214 OFFICE O/P EST MOD 30 MIN: CPT | Performed by: INTERNAL MEDICINE

## 2022-03-07 RX ORDER — HYDROXYCHLOROQUINE SULFATE 200 MG/1
TABLET, FILM COATED ORAL
Qty: 180 TABLET | Refills: 0 | Status: SHIPPED | OUTPATIENT
Start: 2022-03-07 | End: 2022-06-09 | Stop reason: SDUPTHER

## 2022-03-07 RX ORDER — FOLIC ACID 1 MG/1
TABLET ORAL
Qty: 90 TABLET | Refills: 3 | Status: SHIPPED | OUTPATIENT
Start: 2022-03-07 | End: 2022-06-09 | Stop reason: SDUPTHER

## 2022-03-07 RX ORDER — SULINDAC 200 MG/1
TABLET ORAL
Qty: 180 TABLET | Refills: 0 | Status: SHIPPED | OUTPATIENT
Start: 2022-03-07 | End: 2023-01-25

## 2022-03-07 RX ORDER — METHOTREXATE 2.5 MG/1
TABLET ORAL
Qty: 72 TABLET | Refills: 0 | Status: SHIPPED | OUTPATIENT
Start: 2022-03-07 | End: 2022-06-09 | Stop reason: SDUPTHER

## 2022-03-07 ASSESSMENT — JOINT PAIN
TOTAL NUMBER OF TENDER JOINTS: 15
TOTAL NUMBER OF SWOLLEN JOINTS: 0
TOTAL NUMBER OF TENDER JOINTS: 11

## 2022-03-07 ASSESSMENT — FIBROSIS 4 INDEX: FIB4 SCORE: 0.54

## 2022-03-07 NOTE — PROGRESS NOTES
Chief Complaint- joint pain     Subjective:   Kary Shah is a 56 y.o. female here today for follow up of rheumatological issues      All information done through        This is a follow-up visit, second visit with us for this patient who is seen in this clinic for serologically positive rheumatoid arthritis.  Patient was diagnosed with rheumatoid arthritis about October 2021 with symptoms of finger swelling and ankle swelling and positive serologies.  Patient states that she thinks she has had symptoms since about April 2021.  Patient had been on methotrexate at 7.5 mg p.o. weekly with Plaquenil 20 mg p.o. twice daily but that was not managing patient's arthritis at last visit we increased patient's methotrexate to 15 mg p.o. weekly in combination with the Plaquenil 200 mg p.o. twice daily.  However complicating factors is that patient lost her insurance temporarily and was unable to start medications.  Patient's been off medication since January 2022.  Patient now has her insurance again and wants to start medications.  Of note patient's last ophthalmology evaluation coming up March 2022.    Comorbidities include fracture of right clavicle status post evaluation by orthopedics currently healing.       PMHX  Diabetes  HTN        Fam Hx  F-passed CAD/MI  M-passed CAD/MI, RA  Sx3 one passed from CVA/blood clot in brain  Bx3 one passed secondary ETOH and CAD/MI     Soc Hx  No tob  ETOH once/year  No blood tx  No tattoos    Addendum 3/24/2022  G6PD 13.9 adequate 3/2022        Hemoglobin A1c 7.7 10/2021  RF 83 3/2020  CCP 93 3/2020  Hand x-rays 3/2020-IMPRESSION:  1.  Asymmetric degenerative change of the IP joints diffusely which may represent asymmetric osteoarthritic change. Differential diagnosis includes psoriatic arthritis.  2.  No erosive arthritic change.  Right Hand x-rays 5/2021-IMPRESSION:   No evidence of acute fracture or dislocation.   Osteoarthritis as above described  particularly involving the distal interphalangeal joints of the third and fifth digits.  Mild soft tissue swelling about the distal third digit.  Demineralization which limits evaluation.    Left Foot x-ray 5/2021-IMPRESSION:  Degenerative changes as above described, most prominent at the first MTP joint.  Soft tissue swelling along the medial foot.  Calcaneal spurring.-     Current Outpatient Medications   Medication Sig Dispense Refill   • methotrexate 2.5 MG tablet 6 tabs po qweek (every Sunday) 72 Tablet 0   • folic acid (FOLVITE) 1 MG Tab 1 tab po qday 90 Tablet 3   • hydroxychloroquine (PLAQUENIL) 200 MG Tab 1 tab po bid 180 Tablet 0   • sulindac (CLINORIL) 200 MG Tab 1 tab po bid prn with food joint pain 180 Tablet 0   • metFORMIN ER (GLUCOPHAGE XR) 750 MG TABLET SR 24 HR Take 1 Tablet by mouth 2 times a day. 180 Tablet 1   • atorvastatin (LIPITOR) 20 MG Tab Take 1 Tablet by mouth every evening. 90 Tablet 1   • lisinopril (PRINIVIL) 10 MG Tab Take 1 tablet by mouth once daily 90 tablet 3     No current facility-administered medications for this visit.     She  has a past medical history of GERD (gastroesophageal reflux disease), Hypertension, and Osteoarthritis of both hands (2015).    ROS   Other than what is mentioned in HPI or physical exam, there is no history of headaches, double vision or blurred vision. No temporal tenderness or jaw claudication. No trouble swallowing difficulties or sore throats.  No chest complaints including chest pain, cough or sputum production. No GI complaints including nausea, vomiting, change in bowel habits, or past peptic ulcer disease. No history of blood in the stools. No urinary complaints including dysuria or frequency. No history of alopecia, photosensitivity, oral ulcerations, Raynaud's phenomena.       Objective:     /60   Pulse 88   Temp 36.8 °C (98.3 °F) (Temporal)   Resp 14   Wt 61.7 kg (136 lb)   SpO2 98%  Body mass index is 29.43 kg/m².   Physical  Exam:    Constitutional: Alert and oriented X3, patient is talkative with good eye contact.Skin: Warm, dry, good turgor, no rashes in visible areas.Eye: Equal, round and reactive, conjunctiva clear, lids normal EOM intactENMT: Lips without lesions, good dentition, no oropharyngeal ulcers, moist buccal mucosa, pinna without deformityNeck: Trachea midline, no masses, no thyromegaly.Lymph:  No cervical lymphadenopathy, no axillary lymphadenopathy, no supraclavicular lymphadenopathyRespiratory: Unlabored respiratory effort, lungs clear to auscultation, no wheezes, no ronchi.Cardiovascular: Normal S1, S2, .Abdomen: Soft, non-tender, no masses, no hepatosplenomegaly.Psych: Alert and oriented x3, normal affect and mood.Neuro: Cranial nerves 2-12 are grossly intact, no loss of sensation LEExt:no joint laxity noted in bilateral arms, no joint laxity noted in bilateral legs, full range of motion of left shoulder but poor muscular architecture, right shoulder limited range of motion secondary to clavicular fracture, patient does have synovitis in the right knee as well as synovial thickening of bilateral ankles        Lab Results   Component Value Date/Time    HEPCAB Negative 02/29/2020 07:20 AM     Lab Results   Component Value Date/Time    SODIUM 139 07/17/2021 10:52 AM    POTASSIUM 3.9 07/17/2021 10:52 AM    CHLORIDE 103 07/17/2021 10:52 AM    CO2 23 07/17/2021 10:52 AM    GLUCOSE 216 (H) 07/17/2021 10:52 AM    BUN 10 07/17/2021 10:52 AM    CREATININE 0.49 (L) 07/17/2021 10:52 AM      Lab Results   Component Value Date/Time    WBC 9.9 02/20/2021 08:08 AM    RBC 4.54 02/20/2021 08:08 AM    HEMOGLOBIN 14.6 02/20/2021 08:08 AM    HEMATOCRIT 42.8 02/20/2021 08:08 AM    MCV 94.3 02/20/2021 08:08 AM    MCH 32.2 02/20/2021 08:08 AM    MCHC 34.1 02/20/2021 08:08 AM    MPV 10.0 02/20/2021 08:08 AM    NEUTSPOLYS 70.10 02/20/2021 08:08 AM    LYMPHOCYTES 18.40 (L) 02/20/2021 08:08 AM    MONOCYTES 8.90 02/20/2021 08:08 AM    EOSINOPHILS  0.90 02/20/2021 08:08 AM    BASOPHILS 0.50 02/20/2021 08:08 AM      Lab Results   Component Value Date/Time    CALCIUM 8.9 07/17/2021 10:52 AM    ASTSGOT 9 (L) 07/17/2021 10:52 AM    ALTSGPT 10 07/17/2021 10:52 AM    ALKPHOSPHAT 126 (H) 07/17/2021 10:52 AM    TBILIRUBIN 0.7 07/17/2021 10:52 AM    ALBUMIN 3.8 07/17/2021 10:52 AM    TOTPROTEIN 6.5 07/17/2021 10:52 AM     Lab Results   Component Value Date/Time    RHEUMFACTN 83 (H) 03/26/2020 02:20 PM    CCPANTIBODY 93 (H) 03/26/2020 02:20 PM     Lab Results   Component Value Date/Time    SEDRATEWES 7 03/26/2020 02:20 PM     Lab Results   Component Value Date/Time    HBA1C 7.7 (H) 10/22/2021 08:59 AM     Assessment and Plan:     1. Rheumatoid arthritis, involving unspecified site, unspecified whether rheumatoid factor present (HCC)  Restart methotrexate 15 mg p.o. weekly folic acid 1 mg a day and Plaquenil at 200 mg p.o. twice daily, prescriptions resubmitted to patient's pharmacy  Offered a Medrol Dosepak but patient opted out for fear of increasing her blood sugar levels.  We opted do a trial of sulindac 150 mg p.o. twice daily as an anti-inflammatory while the methotrexate and Plaquenil become established  - methotrexate 2.5 MG tablet; 6 tabs po qweek (every Sunday)  Dispense: 72 Tablet; Refill: 0  - folic acid (FOLVITE) 1 MG Tab; 1 tab po qday  Dispense: 90 Tablet; Refill: 3  - hydroxychloroquine (PLAQUENIL) 200 MG Tab; 1 tab po bid  Dispense: 180 Tablet; Refill: 0  - sulindac (CLINORIL) 200 MG Tab; 1 tab po bid prn with food joint pain  Dispense: 180 Tablet; Refill: 0    2. Methotrexate, long term, current use  Restart methotrexate 15 mg p.o. weekly with folic acid 1 mg a day  Patient needs monitoring labs every 3 months, next labs due now, labs ordered at last visit but not done, labs reprinted out for patient to do now.  - methotrexate 2.5 MG tablet; 6 tabs po qweek (every Sunday)  Dispense: 72 Tablet; Refill: 0  - folic acid (FOLVITE) 1 MG Tab; 1 tab po qday   Dispense: 90 Tablet; Refill: 3  - hydroxychloroquine (PLAQUENIL) 200 MG Tab; 1 tab po bid  Dispense: 180 Tablet; Refill: 0  - sulindac (CLINORIL) 200 MG Tab; 1 tab po bid prn with food joint pain  Dispense: 180 Tablet; Refill: 0    3. Long-term use of Plaquenil  Restart Plaquenil 200 mg p.o. twice daily, at last visit G6PD levels were ordered but not done as of yet, we reprinted orders out for patient to do, patient also due for CMP and CBC those lab orders were ordered at last visit not done those were reprinted for patient to do.  Patient needs ophthalmology evaluation every year, patient has ophthalmology evaluation pending March 2022.  - methotrexate 2.5 MG tablet; 6 tabs po qweek (every Sunday)  Dispense: 72 Tablet; Refill: 0  - folic acid (FOLVITE) 1 MG Tab; 1 tab po qday  Dispense: 90 Tablet; Refill: 3  - hydroxychloroquine (PLAQUENIL) 200 MG Tab; 1 tab po bid  Dispense: 180 Tablet; Refill: 0  - sulindac (CLINORIL) 200 MG Tab; 1 tab po bid prn with food joint pain  Dispense: 180 Tablet; Refill: 0    4. Osteoarthritis, unspecified osteoarthritis type, unspecified site  Do trial of sulindac 150 mg p.o. twice daily as needed, will need to check labs about every 6 months, patient due for labs now, labs ordered at last visit but not done, labs reprinted for patient to do now.  - methotrexate 2.5 MG tablet; 6 tabs po qweek (every Sunday)  Dispense: 72 Tablet; Refill: 0  - folic acid (FOLVITE) 1 MG Tab; 1 tab po qday  Dispense: 90 Tablet; Refill: 3  - hydroxychloroquine (PLAQUENIL) 200 MG Tab; 1 tab po bid  Dispense: 180 Tablet; Refill: 0  - sulindac (CLINORIL) 200 MG Tab; 1 tab po bid prn with food joint pain  Dispense: 180 Tablet; Refill: 0    5. Essential hypertension  May impact the type of medications we can use for this patient's arthritis. We will have to keep this under advisement.  - methotrexate 2.5 MG tablet; 6 tabs po qweek (every Sunday)  Dispense: 72 Tablet; Refill: 0  - folic acid (FOLVITE) 1 MG Tab;  1 tab po qday  Dispense: 90 Tablet; Refill: 3  - hydroxychloroquine (PLAQUENIL) 200 MG Tab; 1 tab po bid  Dispense: 180 Tablet; Refill: 0  - sulindac (CLINORIL) 200 MG Tab; 1 tab po bid prn with food joint pain  Dispense: 180 Tablet; Refill: 0    6. Type 2 diabetes mellitus with hyperglycemia, without long-term current use of insulin (HCC)  Followed by patients PCP, high blood sugar can exacerbate inflammatory state of patient's arthritis, discussed with patient the need to monitor and control blood sugars, patient states understanding    Followup: Return in about 3 months (around 6/7/2022). or sooner prn    Kary Shah  was seen 30 minutes face-to-face of which more than 50% of the time was spent counseling the patient (excluding time for procedures)  regarding  rheumatological condition and care. Therapy was discussed in detail.      Please note that this dictation was created using voice recognition software. I have made every reasonable attempt to correct obvious errors, but I expect that there are errors of grammar and possibly content that I did not discover before finalizing the note.

## 2022-03-12 ENCOUNTER — HOSPITAL ENCOUNTER (OUTPATIENT)
Dept: LAB | Facility: MEDICAL CENTER | Age: 57
End: 2022-03-12
Attending: INTERNAL MEDICINE
Payer: COMMERCIAL

## 2022-03-12 DIAGNOSIS — Z79.631 METHOTREXATE, LONG TERM, CURRENT USE: ICD-10-CM

## 2022-03-12 DIAGNOSIS — M19.90 OSTEOARTHRITIS, UNSPECIFIED OSTEOARTHRITIS TYPE, UNSPECIFIED SITE: ICD-10-CM

## 2022-03-12 DIAGNOSIS — M06.9 RHEUMATOID ARTHRITIS, INVOLVING UNSPECIFIED SITE, UNSPECIFIED WHETHER RHEUMATOID FACTOR PRESENT (HCC): ICD-10-CM

## 2022-03-12 DIAGNOSIS — Z79.899 LONG-TERM USE OF PLAQUENIL: ICD-10-CM

## 2022-03-12 LAB
ALBUMIN SERPL BCP-MCNC: 4 G/DL (ref 3.2–4.9)
ALBUMIN/GLOB SERPL: 1.7 G/DL
ALP SERPL-CCNC: 104 U/L (ref 30–99)
ALT SERPL-CCNC: 12 U/L (ref 2–50)
ANION GAP SERPL CALC-SCNC: 13 MMOL/L (ref 7–16)
AST SERPL-CCNC: 17 U/L (ref 12–45)
BASOPHILS # BLD AUTO: 0.5 % (ref 0–1.8)
BASOPHILS # BLD: 0.04 K/UL (ref 0–0.12)
BILIRUB SERPL-MCNC: 0.3 MG/DL (ref 0.1–1.5)
BUN SERPL-MCNC: 13 MG/DL (ref 8–22)
CALCIUM SERPL-MCNC: 9.4 MG/DL (ref 8.5–10.5)
CHLORIDE SERPL-SCNC: 102 MMOL/L (ref 96–112)
CO2 SERPL-SCNC: 23 MMOL/L (ref 20–33)
CREAT SERPL-MCNC: 0.55 MG/DL (ref 0.5–1.4)
EOSINOPHIL # BLD AUTO: 0.12 K/UL (ref 0–0.51)
EOSINOPHIL NFR BLD: 1.5 % (ref 0–6.9)
ERYTHROCYTE [DISTWIDTH] IN BLOOD BY AUTOMATED COUNT: 45.7 FL (ref 35.9–50)
ERYTHROCYTE [SEDIMENTATION RATE] IN BLOOD BY WESTERGREN METHOD: 40 MM/HOUR (ref 0–25)
GLOBULIN SER CALC-MCNC: 2.3 G/DL (ref 1.9–3.5)
GLUCOSE SERPL-MCNC: 138 MG/DL (ref 65–99)
HCT VFR BLD AUTO: 39.7 % (ref 37–47)
HGB BLD-MCNC: 12.7 G/DL (ref 12–16)
IMM GRANULOCYTES # BLD AUTO: 0.06 K/UL (ref 0–0.11)
IMM GRANULOCYTES NFR BLD AUTO: 0.8 % (ref 0–0.9)
LYMPHOCYTES # BLD AUTO: 0.9 K/UL (ref 1–4.8)
LYMPHOCYTES NFR BLD: 11.5 % (ref 22–41)
MCH RBC QN AUTO: 29.5 PG (ref 27–33)
MCHC RBC AUTO-ENTMCNC: 32 G/DL (ref 33.6–35)
MCV RBC AUTO: 92.3 FL (ref 81.4–97.8)
MONOCYTES # BLD AUTO: 0.57 K/UL (ref 0–0.85)
MONOCYTES NFR BLD AUTO: 7.3 % (ref 0–13.4)
NEUTROPHILS # BLD AUTO: 6.15 K/UL (ref 2–7.15)
NEUTROPHILS NFR BLD: 78.4 % (ref 44–72)
NRBC # BLD AUTO: 0 K/UL
NRBC BLD-RTO: 0 /100 WBC
PLATELET # BLD AUTO: 342 K/UL (ref 164–446)
PMV BLD AUTO: 10.1 FL (ref 9–12.9)
POTASSIUM SERPL-SCNC: 4.2 MMOL/L (ref 3.6–5.5)
PROT SERPL-MCNC: 6.3 G/DL (ref 6–8.2)
RBC # BLD AUTO: 4.3 M/UL (ref 4.2–5.4)
SODIUM SERPL-SCNC: 138 MMOL/L (ref 135–145)
WBC # BLD AUTO: 7.8 K/UL (ref 4.8–10.8)

## 2022-03-12 PROCEDURE — 85025 COMPLETE CBC W/AUTO DIFF WBC: CPT

## 2022-03-12 PROCEDURE — 82955 ASSAY OF G6PD ENZYME: CPT

## 2022-03-12 PROCEDURE — 80053 COMPREHEN METABOLIC PANEL: CPT

## 2022-03-12 PROCEDURE — 36415 COLL VENOUS BLD VENIPUNCTURE: CPT

## 2022-03-12 PROCEDURE — 85652 RBC SED RATE AUTOMATED: CPT

## 2022-03-19 LAB — G6PD RBC-CCNC: 13.9 U/G HB (ref 9.9–16.6)

## 2022-05-10 ENCOUNTER — APPOINTMENT (OUTPATIENT)
Dept: RADIOLOGY | Facility: MEDICAL CENTER | Age: 57
DRG: 854 | End: 2022-05-10
Attending: STUDENT IN AN ORGANIZED HEALTH CARE EDUCATION/TRAINING PROGRAM
Payer: COMMERCIAL

## 2022-05-10 ENCOUNTER — APPOINTMENT (OUTPATIENT)
Dept: RADIOLOGY | Facility: MEDICAL CENTER | Age: 57
DRG: 854 | End: 2022-05-10
Attending: INTERNAL MEDICINE
Payer: COMMERCIAL

## 2022-05-10 ENCOUNTER — HOSPITAL ENCOUNTER (INPATIENT)
Facility: MEDICAL CENTER | Age: 57
LOS: 22 days | DRG: 854 | End: 2022-06-01
Attending: STUDENT IN AN ORGANIZED HEALTH CARE EDUCATION/TRAINING PROGRAM | Admitting: INTERNAL MEDICINE
Payer: COMMERCIAL

## 2022-05-10 DIAGNOSIS — M17.11 ARTHRITIS OF RIGHT KNEE: ICD-10-CM

## 2022-05-10 DIAGNOSIS — M19.041 PRIMARY OSTEOARTHRITIS OF BOTH HANDS: ICD-10-CM

## 2022-05-10 DIAGNOSIS — M05.9 SEROPOSITIVE RHEUMATOID ARTHRITIS (HCC): ICD-10-CM

## 2022-05-10 DIAGNOSIS — S42.254A CLOSED NONDISPLACED FRACTURE OF GREATER TUBEROSITY OF RIGHT HUMERUS, INITIAL ENCOUNTER: Primary | ICD-10-CM

## 2022-05-10 DIAGNOSIS — M19.042 PRIMARY OSTEOARTHRITIS OF BOTH HANDS: ICD-10-CM

## 2022-05-10 PROBLEM — A41.9 SEPSIS (HCC): Status: ACTIVE | Noted: 2022-05-10

## 2022-05-10 LAB
ALBUMIN SERPL BCP-MCNC: 3.8 G/DL (ref 3.2–4.9)
ALBUMIN/GLOB SERPL: 1.1 G/DL
ALP SERPL-CCNC: 136 U/L (ref 30–99)
ALT SERPL-CCNC: 7 U/L (ref 2–50)
ANION GAP SERPL CALC-SCNC: 17 MMOL/L (ref 7–16)
AST SERPL-CCNC: 9 U/L (ref 12–45)
BASOPHILS # BLD AUTO: 0.3 % (ref 0–1.8)
BASOPHILS # BLD: 0.05 K/UL (ref 0–0.12)
BILIRUB SERPL-MCNC: 0.8 MG/DL (ref 0.1–1.5)
BUN SERPL-MCNC: 17 MG/DL (ref 8–22)
CALCIUM SERPL-MCNC: 9.6 MG/DL (ref 8.5–10.5)
CHLORIDE SERPL-SCNC: 98 MMOL/L (ref 96–112)
CO2 SERPL-SCNC: 17 MMOL/L (ref 20–33)
CREAT SERPL-MCNC: 0.61 MG/DL (ref 0.5–1.4)
CRP SERPL HS-MCNC: 5.03 MG/DL (ref 0–0.75)
EKG IMPRESSION: NORMAL
EOSINOPHIL # BLD AUTO: 0.13 K/UL (ref 0–0.51)
EOSINOPHIL NFR BLD: 0.8 % (ref 0–6.9)
ERYTHROCYTE [DISTWIDTH] IN BLOOD BY AUTOMATED COUNT: 47.1 FL (ref 35.9–50)
GFR SERPLBLD CREATININE-BSD FMLA CKD-EPI: 104 ML/MIN/1.73 M 2
GLOBULIN SER CALC-MCNC: 3.4 G/DL (ref 1.9–3.5)
GLUCOSE SERPL-MCNC: 219 MG/DL (ref 65–99)
HCT VFR BLD AUTO: 38.2 % (ref 37–47)
HGB BLD-MCNC: 13.1 G/DL (ref 12–16)
IMM GRANULOCYTES # BLD AUTO: 0.09 K/UL (ref 0–0.11)
IMM GRANULOCYTES NFR BLD AUTO: 0.5 % (ref 0–0.9)
INR PPP: 1.25 (ref 0.87–1.13)
LACTATE BLD-SCNC: 2.4 MMOL/L (ref 0.5–2)
LACTATE BLD-SCNC: 3 MMOL/L (ref 0.5–2)
LACTATE BLD-SCNC: 3.1 MMOL/L (ref 0.5–2)
LYMPHOCYTES # BLD AUTO: 0.21 K/UL (ref 1–4.8)
LYMPHOCYTES NFR BLD: 1.3 % (ref 22–41)
MCH RBC QN AUTO: 30.1 PG (ref 27–33)
MCHC RBC AUTO-ENTMCNC: 34.3 G/DL (ref 33.6–35)
MCV RBC AUTO: 87.8 FL (ref 81.4–97.8)
MONOCYTES # BLD AUTO: 0.22 K/UL (ref 0–0.85)
MONOCYTES NFR BLD AUTO: 1.3 % (ref 0–13.4)
NEUTROPHILS # BLD AUTO: 15.67 K/UL (ref 2–7.15)
NEUTROPHILS NFR BLD: 95.8 % (ref 44–72)
NRBC # BLD AUTO: 0 K/UL
NRBC BLD-RTO: 0 /100 WBC
PLATELET # BLD AUTO: 346 K/UL (ref 164–446)
PMV BLD AUTO: 9.5 FL (ref 9–12.9)
POTASSIUM SERPL-SCNC: 3.8 MMOL/L (ref 3.6–5.5)
PROT SERPL-MCNC: 7.2 G/DL (ref 6–8.2)
PROTHROMBIN TIME: 15.3 SEC (ref 12–14.6)
RBC # BLD AUTO: 4.35 M/UL (ref 4.2–5.4)
SODIUM SERPL-SCNC: 132 MMOL/L (ref 135–145)
WBC # BLD AUTO: 16.4 K/UL (ref 4.8–10.8)

## 2022-05-10 PROCEDURE — 700105 HCHG RX REV CODE 258: Performed by: STUDENT IN AN ORGANIZED HEALTH CARE EDUCATION/TRAINING PROGRAM

## 2022-05-10 PROCEDURE — 87641 MR-STAPH DNA AMP PROBE: CPT

## 2022-05-10 PROCEDURE — 700117 HCHG RX CONTRAST REV CODE 255: Performed by: INTERNAL MEDICINE

## 2022-05-10 PROCEDURE — 0SJC3ZZ INSPECTION OF RIGHT KNEE JOINT, PERCUTANEOUS APPROACH: ICD-10-PCS | Performed by: STUDENT IN AN ORGANIZED HEALTH CARE EDUCATION/TRAINING PROGRAM

## 2022-05-10 PROCEDURE — 700102 HCHG RX REV CODE 250 W/ 637 OVERRIDE(OP): Performed by: STUDENT IN AN ORGANIZED HEALTH CARE EDUCATION/TRAINING PROGRAM

## 2022-05-10 PROCEDURE — 93005 ELECTROCARDIOGRAM TRACING: CPT

## 2022-05-10 PROCEDURE — 36415 COLL VENOUS BLD VENIPUNCTURE: CPT

## 2022-05-10 PROCEDURE — 87040 BLOOD CULTURE FOR BACTERIA: CPT

## 2022-05-10 PROCEDURE — 73701 CT LOWER EXTREMITY W/DYE: CPT | Mod: RT

## 2022-05-10 PROCEDURE — G0378 HOSPITAL OBSERVATION PER HR: HCPCS

## 2022-05-10 PROCEDURE — 700111 HCHG RX REV CODE 636 W/ 250 OVERRIDE (IP): Performed by: STUDENT IN AN ORGANIZED HEALTH CARE EDUCATION/TRAINING PROGRAM

## 2022-05-10 PROCEDURE — 0240U HCHG SARS-COV-2 COVID-19 NFCT DS RESP RNA 3 TRGT MIC: CPT

## 2022-05-10 PROCEDURE — 83605 ASSAY OF LACTIC ACID: CPT

## 2022-05-10 PROCEDURE — 93005 ELECTROCARDIOGRAM TRACING: CPT | Performed by: STUDENT IN AN ORGANIZED HEALTH CARE EDUCATION/TRAINING PROGRAM

## 2022-05-10 PROCEDURE — 770004 HCHG ROOM/CARE - ONCOLOGY PRIVATE *

## 2022-05-10 PROCEDURE — C9803 HOPD COVID-19 SPEC COLLECT: HCPCS | Performed by: STUDENT IN AN ORGANIZED HEALTH CARE EDUCATION/TRAINING PROGRAM

## 2022-05-10 PROCEDURE — 700101 HCHG RX REV CODE 250: Performed by: STUDENT IN AN ORGANIZED HEALTH CARE EDUCATION/TRAINING PROGRAM

## 2022-05-10 PROCEDURE — 96375 TX/PRO/DX INJ NEW DRUG ADDON: CPT

## 2022-05-10 PROCEDURE — 80053 COMPREHEN METABOLIC PANEL: CPT

## 2022-05-10 PROCEDURE — A9270 NON-COVERED ITEM OR SERVICE: HCPCS | Performed by: STUDENT IN AN ORGANIZED HEALTH CARE EDUCATION/TRAINING PROGRAM

## 2022-05-10 PROCEDURE — 71045 X-RAY EXAM CHEST 1 VIEW: CPT

## 2022-05-10 PROCEDURE — 85610 PROTHROMBIN TIME: CPT

## 2022-05-10 PROCEDURE — 73560 X-RAY EXAM OF KNEE 1 OR 2: CPT | Mod: RT

## 2022-05-10 PROCEDURE — 99220 PR INITIAL OBSERVATION CARE,LEVL III: CPT | Performed by: INTERNAL MEDICINE

## 2022-05-10 PROCEDURE — 99285 EMERGENCY DEPT VISIT HI MDM: CPT

## 2022-05-10 PROCEDURE — 86140 C-REACTIVE PROTEIN: CPT

## 2022-05-10 PROCEDURE — 85025 COMPLETE CBC W/AUTO DIFF WBC: CPT

## 2022-05-10 RX ORDER — HYDRALAZINE HYDROCHLORIDE 20 MG/ML
10 INJECTION INTRAMUSCULAR; INTRAVENOUS EVERY 4 HOURS PRN
Status: DISCONTINUED | OUTPATIENT
Start: 2022-05-10 | End: 2022-06-01 | Stop reason: HOSPADM

## 2022-05-10 RX ORDER — CLINDAMYCIN PHOSPHATE 900 MG/50ML
900 INJECTION, SOLUTION INTRAVENOUS EVERY 8 HOURS
Status: DISCONTINUED | OUTPATIENT
Start: 2022-05-10 | End: 2022-05-12

## 2022-05-10 RX ORDER — PROMETHAZINE HYDROCHLORIDE 25 MG/1
12.5-25 SUPPOSITORY RECTAL EVERY 4 HOURS PRN
Status: DISCONTINUED | OUTPATIENT
Start: 2022-05-10 | End: 2022-06-01 | Stop reason: HOSPADM

## 2022-05-10 RX ORDER — DEXTROSE MONOHYDRATE 25 G/50ML
25 INJECTION, SOLUTION INTRAVENOUS
Status: DISCONTINUED | OUTPATIENT
Start: 2022-05-10 | End: 2022-06-01 | Stop reason: HOSPADM

## 2022-05-10 RX ORDER — FOLIC ACID 1 MG/1
1 TABLET ORAL DAILY
Status: DISCONTINUED | OUTPATIENT
Start: 2022-05-11 | End: 2022-05-27

## 2022-05-10 RX ORDER — ACETAMINOPHEN 325 MG/1
650 TABLET ORAL ONCE
Status: COMPLETED | OUTPATIENT
Start: 2022-05-10 | End: 2022-05-10

## 2022-05-10 RX ORDER — MORPHINE SULFATE 4 MG/ML
4 INJECTION INTRAVENOUS ONCE
Status: COMPLETED | OUTPATIENT
Start: 2022-05-10 | End: 2022-05-10

## 2022-05-10 RX ORDER — ONDANSETRON 4 MG/1
4 TABLET, ORALLY DISINTEGRATING ORAL EVERY 4 HOURS PRN
Status: DISCONTINUED | OUTPATIENT
Start: 2022-05-10 | End: 2022-06-01 | Stop reason: HOSPADM

## 2022-05-10 RX ORDER — SODIUM CHLORIDE 9 MG/ML
2000 INJECTION, SOLUTION INTRAVENOUS ONCE
Status: COMPLETED | OUTPATIENT
Start: 2022-05-10 | End: 2022-05-10

## 2022-05-10 RX ORDER — POLYETHYLENE GLYCOL 3350 17 G/17G
1 POWDER, FOR SOLUTION ORAL
Status: DISCONTINUED | OUTPATIENT
Start: 2022-05-10 | End: 2022-06-01 | Stop reason: HOSPADM

## 2022-05-10 RX ORDER — ONDANSETRON 2 MG/ML
4 INJECTION INTRAMUSCULAR; INTRAVENOUS EVERY 4 HOURS PRN
Status: DISCONTINUED | OUTPATIENT
Start: 2022-05-10 | End: 2022-06-01 | Stop reason: HOSPADM

## 2022-05-10 RX ORDER — HYDROMORPHONE HYDROCHLORIDE 1 MG/ML
1 INJECTION, SOLUTION INTRAMUSCULAR; INTRAVENOUS; SUBCUTANEOUS ONCE
Status: COMPLETED | OUTPATIENT
Start: 2022-05-10 | End: 2022-05-10

## 2022-05-10 RX ORDER — PROMETHAZINE HYDROCHLORIDE 25 MG/1
12.5-25 TABLET ORAL EVERY 4 HOURS PRN
Status: DISCONTINUED | OUTPATIENT
Start: 2022-05-10 | End: 2022-06-01 | Stop reason: HOSPADM

## 2022-05-10 RX ORDER — ENOXAPARIN SODIUM 100 MG/ML
40 INJECTION SUBCUTANEOUS DAILY
Status: DISCONTINUED | OUTPATIENT
Start: 2022-05-11 | End: 2022-05-18

## 2022-05-10 RX ORDER — BISACODYL 10 MG
10 SUPPOSITORY, RECTAL RECTAL
Status: DISCONTINUED | OUTPATIENT
Start: 2022-05-10 | End: 2022-06-01 | Stop reason: HOSPADM

## 2022-05-10 RX ORDER — LISINOPRIL 10 MG/1
10 TABLET ORAL DAILY
Status: DISCONTINUED | OUTPATIENT
Start: 2022-05-11 | End: 2022-05-20

## 2022-05-10 RX ORDER — CEFTRIAXONE 1 G/1
1 INJECTION, POWDER, FOR SOLUTION INTRAMUSCULAR; INTRAVENOUS ONCE
Status: COMPLETED | OUTPATIENT
Start: 2022-05-10 | End: 2022-05-10

## 2022-05-10 RX ORDER — ATORVASTATIN CALCIUM 20 MG/1
20 TABLET, FILM COATED ORAL EVERY EVENING
Status: DISCONTINUED | OUTPATIENT
Start: 2022-05-10 | End: 2022-06-01 | Stop reason: HOSPADM

## 2022-05-10 RX ORDER — KETOROLAC TROMETHAMINE 30 MG/ML
30 INJECTION, SOLUTION INTRAMUSCULAR; INTRAVENOUS EVERY 6 HOURS PRN
Status: DISPENSED | OUTPATIENT
Start: 2022-05-10 | End: 2022-05-15

## 2022-05-10 RX ORDER — PROCHLORPERAZINE EDISYLATE 5 MG/ML
5-10 INJECTION INTRAMUSCULAR; INTRAVENOUS EVERY 4 HOURS PRN
Status: DISCONTINUED | OUTPATIENT
Start: 2022-05-10 | End: 2022-06-01 | Stop reason: HOSPADM

## 2022-05-10 RX ORDER — ACETAMINOPHEN 325 MG/1
650 TABLET ORAL EVERY 6 HOURS PRN
Status: DISCONTINUED | OUTPATIENT
Start: 2022-05-10 | End: 2022-06-01 | Stop reason: HOSPADM

## 2022-05-10 RX ORDER — LIDOCAINE HCL/EPINEPHRINE/PF 2%-1:200K
20 VIAL (ML) INJECTION ONCE
Status: COMPLETED | OUTPATIENT
Start: 2022-05-10 | End: 2022-05-10

## 2022-05-10 RX ORDER — SODIUM CHLORIDE 9 MG/ML
INJECTION, SOLUTION INTRAVENOUS CONTINUOUS
Status: DISCONTINUED | OUTPATIENT
Start: 2022-05-10 | End: 2022-05-14

## 2022-05-10 RX ORDER — AMOXICILLIN 250 MG
2 CAPSULE ORAL 2 TIMES DAILY
Status: DISCONTINUED | OUTPATIENT
Start: 2022-05-11 | End: 2022-06-01 | Stop reason: HOSPADM

## 2022-05-10 RX ADMIN — HYDROMORPHONE HYDROCHLORIDE 1 MG: 1 INJECTION, SOLUTION INTRAMUSCULAR; INTRAVENOUS; SUBCUTANEOUS at 21:28

## 2022-05-10 RX ADMIN — MORPHINE SULFATE 4 MG: 4 INJECTION INTRAVENOUS at 20:30

## 2022-05-10 RX ADMIN — SODIUM CHLORIDE 2000 ML: 9 INJECTION, SOLUTION INTRAVENOUS at 20:45

## 2022-05-10 RX ADMIN — IOHEXOL 80 ML: 350 INJECTION, SOLUTION INTRAVENOUS at 23:38

## 2022-05-10 RX ADMIN — LIDOCAINE HYDROCHLORIDE AND EPINEPHRINE 20 ML: 20; 5 INJECTION, SOLUTION EPIDURAL; INFILTRATION; INTRACAUDAL; PERINEURAL at 20:20

## 2022-05-10 RX ADMIN — ACETAMINOPHEN 650 MG: 325 TABLET, FILM COATED ORAL at 21:43

## 2022-05-10 RX ADMIN — CEFTRIAXONE SODIUM 1 G: 1 INJECTION, POWDER, FOR SOLUTION INTRAMUSCULAR; INTRAVENOUS at 20:15

## 2022-05-10 ASSESSMENT — ENCOUNTER SYMPTOMS
HEARTBURN: 0
ORTHOPNEA: 0
FEVER: 1
FLANK PAIN: 0
NAUSEA: 0
HALLUCINATIONS: 0
HEMOPTYSIS: 0
NECK PAIN: 0
VOMITING: 0
DOUBLE VISION: 0
WEIGHT LOSS: 0
HEADACHES: 0
BACK PAIN: 0
PALPITATIONS: 0
PHOTOPHOBIA: 0
FOCAL WEAKNESS: 0
POLYDIPSIA: 0
TREMORS: 0
NERVOUS/ANXIOUS: 0
SPEECH CHANGE: 0
CHILLS: 1
BRUISES/BLEEDS EASILY: 0
BLURRED VISION: 0
COUGH: 0
SPUTUM PRODUCTION: 0

## 2022-05-10 ASSESSMENT — FIBROSIS 4 INDEX: FIB4 SCORE: 0.82

## 2022-05-10 ASSESSMENT — LIFESTYLE VARIABLES: SUBSTANCE_ABUSE: 0

## 2022-05-11 ENCOUNTER — ANESTHESIA (OUTPATIENT)
Dept: SURGERY | Facility: MEDICAL CENTER | Age: 57
DRG: 854 | End: 2022-05-11
Payer: COMMERCIAL

## 2022-05-11 ENCOUNTER — ANESTHESIA EVENT (OUTPATIENT)
Dept: SURGERY | Facility: MEDICAL CENTER | Age: 57
DRG: 854 | End: 2022-05-11
Payer: COMMERCIAL

## 2022-05-11 LAB
ALBUMIN SERPL BCP-MCNC: 2.9 G/DL (ref 3.2–4.9)
ALBUMIN/GLOB SERPL: 1.1 G/DL
ALP SERPL-CCNC: 84 U/L (ref 30–99)
ALT SERPL-CCNC: 7 U/L (ref 2–50)
ANION GAP SERPL CALC-SCNC: 14 MMOL/L (ref 7–16)
APPEARANCE UR: CLEAR
AST SERPL-CCNC: 13 U/L (ref 12–45)
BASOPHILS # BLD AUTO: 0.2 % (ref 0–1.8)
BASOPHILS # BLD: 0.04 K/UL (ref 0–0.12)
BILIRUB SERPL-MCNC: 0.6 MG/DL (ref 0.1–1.5)
BILIRUB UR QL STRIP.AUTO: NEGATIVE
BUN SERPL-MCNC: 11 MG/DL (ref 8–22)
CALCIUM SERPL-MCNC: 8.5 MG/DL (ref 8.5–10.5)
CHLORIDE SERPL-SCNC: 103 MMOL/L (ref 96–112)
CO2 SERPL-SCNC: 17 MMOL/L (ref 20–33)
COLOR UR: YELLOW
CREAT SERPL-MCNC: 0.46 MG/DL (ref 0.5–1.4)
EOSINOPHIL # BLD AUTO: 0 K/UL (ref 0–0.51)
EOSINOPHIL NFR BLD: 0 % (ref 0–6.9)
ERYTHROCYTE [DISTWIDTH] IN BLOOD BY AUTOMATED COUNT: 50.6 FL (ref 35.9–50)
FLUAV RNA SPEC QL NAA+PROBE: NEGATIVE
FLUBV RNA SPEC QL NAA+PROBE: NEGATIVE
GFR SERPLBLD CREATININE-BSD FMLA CKD-EPI: 111 ML/MIN/1.73 M 2
GLOBULIN SER CALC-MCNC: 2.6 G/DL (ref 1.9–3.5)
GLUCOSE BLD STRIP.AUTO-MCNC: 110 MG/DL (ref 65–99)
GLUCOSE BLD STRIP.AUTO-MCNC: 114 MG/DL (ref 65–99)
GLUCOSE BLD STRIP.AUTO-MCNC: 151 MG/DL (ref 65–99)
GLUCOSE BLD STRIP.AUTO-MCNC: 224 MG/DL (ref 65–99)
GLUCOSE BLD STRIP.AUTO-MCNC: 267 MG/DL (ref 65–99)
GLUCOSE SERPL-MCNC: 240 MG/DL (ref 65–99)
GLUCOSE UR STRIP.AUTO-MCNC: >=1000 MG/DL
HCT VFR BLD AUTO: 30 % (ref 37–47)
HGB BLD-MCNC: 9.9 G/DL (ref 12–16)
IMM GRANULOCYTES # BLD AUTO: 0.35 K/UL (ref 0–0.11)
IMM GRANULOCYTES NFR BLD AUTO: 1.4 % (ref 0–0.9)
KETONES UR STRIP.AUTO-MCNC: 15 MG/DL
LACTATE BLD-SCNC: 1.1 MMOL/L (ref 0.5–2)
LACTATE BLD-SCNC: 1.5 MMOL/L (ref 0.5–2)
LACTATE BLD-SCNC: 2.6 MMOL/L (ref 0.5–2)
LEUKOCYTE ESTERASE UR QL STRIP.AUTO: NEGATIVE
LYMPHOCYTES # BLD AUTO: 0.7 K/UL (ref 1–4.8)
LYMPHOCYTES NFR BLD: 2.8 % (ref 22–41)
MCH RBC QN AUTO: 30.7 PG (ref 27–33)
MCHC RBC AUTO-ENTMCNC: 33 G/DL (ref 33.6–35)
MCV RBC AUTO: 93.2 FL (ref 81.4–97.8)
MICRO URNS: ABNORMAL
MONOCYTES # BLD AUTO: 1.15 K/UL (ref 0–0.85)
MONOCYTES NFR BLD AUTO: 4.6 % (ref 0–13.4)
NEUTROPHILS # BLD AUTO: 22.57 K/UL (ref 2–7.15)
NEUTROPHILS NFR BLD: 91 % (ref 44–72)
NITRITE UR QL STRIP.AUTO: NEGATIVE
NRBC # BLD AUTO: 0 K/UL
NRBC BLD-RTO: 0 /100 WBC
PH UR STRIP.AUTO: 5 [PH] (ref 5–8)
PLATELET # BLD AUTO: 263 K/UL (ref 164–446)
PMV BLD AUTO: 9.6 FL (ref 9–12.9)
POTASSIUM SERPL-SCNC: 3.9 MMOL/L (ref 3.6–5.5)
PROT SERPL-MCNC: 5.5 G/DL (ref 6–8.2)
PROT UR QL STRIP: NEGATIVE MG/DL
RBC # BLD AUTO: 3.22 M/UL (ref 4.2–5.4)
RBC UR QL AUTO: NEGATIVE
SARS-COV-2 RNA RESP QL NAA+PROBE: NOTDETECTED
SCCMEC + MECA PNL NOSE NAA+PROBE: NEGATIVE
SODIUM SERPL-SCNC: 134 MMOL/L (ref 135–145)
SP GR UR STRIP.AUTO: 1.04
SPECIMEN SOURCE: NORMAL
UROBILINOGEN UR STRIP.AUTO-MCNC: 0.2 MG/DL
WBC # BLD AUTO: 24.8 K/UL (ref 4.8–10.8)

## 2022-05-11 PROCEDURE — 80053 COMPREHEN METABOLIC PANEL: CPT

## 2022-05-11 PROCEDURE — 00400 ANES INTEGUMENTARY SYS NOS: CPT | Performed by: ANESTHESIOLOGY

## 2022-05-11 PROCEDURE — 87086 URINE CULTURE/COLONY COUNT: CPT

## 2022-05-11 PROCEDURE — 87205 SMEAR GRAM STAIN: CPT

## 2022-05-11 PROCEDURE — 700102 HCHG RX REV CODE 250 W/ 637 OVERRIDE(OP): Performed by: INTERNAL MEDICINE

## 2022-05-11 PROCEDURE — 87075 CULTR BACTERIA EXCEPT BLOOD: CPT

## 2022-05-11 PROCEDURE — 96367 TX/PROPH/DG ADDL SEQ IV INF: CPT

## 2022-05-11 PROCEDURE — 81003 URINALYSIS AUTO W/O SCOPE: CPT

## 2022-05-11 PROCEDURE — 700111 HCHG RX REV CODE 636 W/ 250 OVERRIDE (IP): Performed by: ANESTHESIOLOGY

## 2022-05-11 PROCEDURE — 96365 THER/PROPH/DIAG IV INF INIT: CPT

## 2022-05-11 PROCEDURE — 99233 SBSQ HOSP IP/OBS HIGH 50: CPT | Performed by: INTERNAL MEDICINE

## 2022-05-11 PROCEDURE — 160009 HCHG ANES TIME/MIN: Performed by: ORTHOPAEDIC SURGERY

## 2022-05-11 PROCEDURE — 96376 TX/PRO/DX INJ SAME DRUG ADON: CPT

## 2022-05-11 PROCEDURE — 96366 THER/PROPH/DIAG IV INF ADDON: CPT

## 2022-05-11 PROCEDURE — 99223 1ST HOSP IP/OBS HIGH 75: CPT | Mod: 57 | Performed by: ORTHOPAEDIC SURGERY

## 2022-05-11 PROCEDURE — 500376 HCHG DRAIN, J-P ROUND 15FR: Performed by: ORTHOPAEDIC SURGERY

## 2022-05-11 PROCEDURE — 700101 HCHG RX REV CODE 250: Performed by: INTERNAL MEDICINE

## 2022-05-11 PROCEDURE — 0J9N00Z DRAINAGE OF RIGHT LOWER LEG SUBCUTANEOUS TISSUE AND FASCIA WITH DRAINAGE DEVICE, OPEN APPROACH: ICD-10-PCS | Performed by: ORTHOPAEDIC SURGERY

## 2022-05-11 PROCEDURE — 700102 HCHG RX REV CODE 250 W/ 637 OVERRIDE(OP): Performed by: ANESTHESIOLOGY

## 2022-05-11 PROCEDURE — 87070 CULTURE OTHR SPECIMN AEROBIC: CPT

## 2022-05-11 PROCEDURE — 501330 HCHG SET, CYSTO IRRIG TUBING: Performed by: ORTHOPAEDIC SURGERY

## 2022-05-11 PROCEDURE — 700105 HCHG RX REV CODE 258: Performed by: INTERNAL MEDICINE

## 2022-05-11 PROCEDURE — 160038 HCHG SURGERY MINUTES - EA ADDL 1 MIN LEVEL 2: Performed by: ORTHOPAEDIC SURGERY

## 2022-05-11 PROCEDURE — 700111 HCHG RX REV CODE 636 W/ 250 OVERRIDE (IP): Performed by: INTERNAL MEDICINE

## 2022-05-11 PROCEDURE — 99140 ANES COMP EMERGENCY COND: CPT | Performed by: ANESTHESIOLOGY

## 2022-05-11 PROCEDURE — 160048 HCHG OR STATISTICAL LEVEL 1-5: Performed by: ORTHOPAEDIC SURGERY

## 2022-05-11 PROCEDURE — 501838 HCHG SUTURE GENERAL: Performed by: ORTHOPAEDIC SURGERY

## 2022-05-11 PROCEDURE — 160027 HCHG SURGERY MINUTES - 1ST 30 MINS LEVEL 2: Performed by: ORTHOPAEDIC SURGERY

## 2022-05-11 PROCEDURE — 160035 HCHG PACU - 1ST 60 MINS PHASE I: Performed by: ORTHOPAEDIC SURGERY

## 2022-05-11 PROCEDURE — 83605 ASSAY OF LACTIC ACID: CPT

## 2022-05-11 PROCEDURE — 27301 DRAIN THIGH/KNEE LESION: CPT | Performed by: ORTHOPAEDIC SURGERY

## 2022-05-11 PROCEDURE — A9270 NON-COVERED ITEM OR SERVICE: HCPCS | Performed by: ANESTHESIOLOGY

## 2022-05-11 PROCEDURE — 770004 HCHG ROOM/CARE - ONCOLOGY PRIVATE *

## 2022-05-11 PROCEDURE — 160002 HCHG RECOVERY MINUTES (STAT): Performed by: ORTHOPAEDIC SURGERY

## 2022-05-11 PROCEDURE — 27310 EXPLORATION OF KNEE JOINT: CPT | Mod: RT | Performed by: ORTHOPAEDIC SURGERY

## 2022-05-11 PROCEDURE — 36415 COLL VENOUS BLD VENIPUNCTURE: CPT

## 2022-05-11 PROCEDURE — 82962 GLUCOSE BLOOD TEST: CPT | Mod: 91

## 2022-05-11 PROCEDURE — 160036 HCHG PACU - EA ADDL 30 MINS PHASE I: Performed by: ORTHOPAEDIC SURGERY

## 2022-05-11 PROCEDURE — 87015 SPECIMEN INFECT AGNT CONCNTJ: CPT

## 2022-05-11 PROCEDURE — 85025 COMPLETE CBC W/AUTO DIFF WBC: CPT

## 2022-05-11 PROCEDURE — 96372 THER/PROPH/DIAG INJ SC/IM: CPT

## 2022-05-11 PROCEDURE — A9270 NON-COVERED ITEM OR SERVICE: HCPCS | Performed by: INTERNAL MEDICINE

## 2022-05-11 PROCEDURE — 0S9C0ZZ DRAINAGE OF RIGHT KNEE JOINT, OPEN APPROACH: ICD-10-PCS | Performed by: ORTHOPAEDIC SURGERY

## 2022-05-11 RX ORDER — PHENYLEPHRINE HCL IN 0.9% NACL 0.5 MG/5ML
SYRINGE (ML) INTRAVENOUS PRN
Status: DISCONTINUED | OUTPATIENT
Start: 2022-05-11 | End: 2022-05-11 | Stop reason: SURG

## 2022-05-11 RX ORDER — ONDANSETRON 2 MG/ML
4 INJECTION INTRAMUSCULAR; INTRAVENOUS
Status: DISCONTINUED | OUTPATIENT
Start: 2022-05-11 | End: 2022-05-11 | Stop reason: HOSPADM

## 2022-05-11 RX ORDER — HALOPERIDOL 5 MG/ML
1 INJECTION INTRAMUSCULAR
Status: DISCONTINUED | OUTPATIENT
Start: 2022-05-11 | End: 2022-05-11 | Stop reason: HOSPADM

## 2022-05-11 RX ORDER — CEFAZOLIN SODIUM 1 G/3ML
INJECTION, POWDER, FOR SOLUTION INTRAMUSCULAR; INTRAVENOUS PRN
Status: DISCONTINUED | OUTPATIENT
Start: 2022-05-11 | End: 2022-05-11 | Stop reason: SURG

## 2022-05-11 RX ORDER — DIPHENHYDRAMINE HYDROCHLORIDE 50 MG/ML
12.5 INJECTION INTRAMUSCULAR; INTRAVENOUS
Status: DISCONTINUED | OUTPATIENT
Start: 2022-05-11 | End: 2022-05-11 | Stop reason: HOSPADM

## 2022-05-11 RX ORDER — ONDANSETRON 2 MG/ML
INJECTION INTRAMUSCULAR; INTRAVENOUS PRN
Status: DISCONTINUED | OUTPATIENT
Start: 2022-05-11 | End: 2022-05-11 | Stop reason: SURG

## 2022-05-11 RX ORDER — SODIUM CHLORIDE, SODIUM GLUCONATE, SODIUM ACETATE, POTASSIUM CHLORIDE AND MAGNESIUM CHLORIDE 526; 502; 368; 37; 30 MG/100ML; MG/100ML; MG/100ML; MG/100ML; MG/100ML
500 INJECTION, SOLUTION INTRAVENOUS CONTINUOUS
Status: DISCONTINUED | OUTPATIENT
Start: 2022-05-11 | End: 2022-05-11 | Stop reason: HOSPADM

## 2022-05-11 RX ORDER — HYDROMORPHONE HYDROCHLORIDE 1 MG/ML
0.6 INJECTION, SOLUTION INTRAMUSCULAR; INTRAVENOUS; SUBCUTANEOUS
Status: DISCONTINUED | OUTPATIENT
Start: 2022-05-11 | End: 2022-05-11 | Stop reason: HOSPADM

## 2022-05-11 RX ORDER — HYDROMORPHONE HYDROCHLORIDE 1 MG/ML
0.2 INJECTION, SOLUTION INTRAMUSCULAR; INTRAVENOUS; SUBCUTANEOUS
Status: DISCONTINUED | OUTPATIENT
Start: 2022-05-11 | End: 2022-05-11 | Stop reason: HOSPADM

## 2022-05-11 RX ORDER — MIDAZOLAM HYDROCHLORIDE 1 MG/ML
INJECTION INTRAMUSCULAR; INTRAVENOUS PRN
Status: DISCONTINUED | OUTPATIENT
Start: 2022-05-11 | End: 2022-05-11 | Stop reason: SURG

## 2022-05-11 RX ORDER — OXYCODONE HCL 5 MG/5 ML
10 SOLUTION, ORAL ORAL
Status: COMPLETED | OUTPATIENT
Start: 2022-05-11 | End: 2022-05-11

## 2022-05-11 RX ORDER — MEPERIDINE HYDROCHLORIDE 25 MG/ML
12.5 INJECTION INTRAMUSCULAR; INTRAVENOUS; SUBCUTANEOUS
Status: DISCONTINUED | OUTPATIENT
Start: 2022-05-11 | End: 2022-05-11 | Stop reason: HOSPADM

## 2022-05-11 RX ORDER — HYDROMORPHONE HYDROCHLORIDE 1 MG/ML
0.4 INJECTION, SOLUTION INTRAMUSCULAR; INTRAVENOUS; SUBCUTANEOUS
Status: DISCONTINUED | OUTPATIENT
Start: 2022-05-11 | End: 2022-05-11 | Stop reason: HOSPADM

## 2022-05-11 RX ORDER — HYDRALAZINE HYDROCHLORIDE 20 MG/ML
5 INJECTION INTRAMUSCULAR; INTRAVENOUS
Status: DISCONTINUED | OUTPATIENT
Start: 2022-05-11 | End: 2022-05-11 | Stop reason: HOSPADM

## 2022-05-11 RX ORDER — ALBUTEROL SULFATE 2.5 MG/3ML
2.5 SOLUTION RESPIRATORY (INHALATION)
Status: DISCONTINUED | OUTPATIENT
Start: 2022-05-11 | End: 2022-05-11 | Stop reason: HOSPADM

## 2022-05-11 RX ORDER — OXYCODONE HCL 5 MG/5 ML
5 SOLUTION, ORAL ORAL
Status: COMPLETED | OUTPATIENT
Start: 2022-05-11 | End: 2022-05-11

## 2022-05-11 RX ORDER — PROPOFOL 10 MG/ML
INJECTION, EMULSION INTRAVENOUS PRN
Status: DISCONTINUED | OUTPATIENT
Start: 2022-05-11 | End: 2022-05-11 | Stop reason: SURG

## 2022-05-11 RX ADMIN — VANCOMYCIN HYDROCHLORIDE 750 MG: 500 INJECTION, POWDER, LYOPHILIZED, FOR SOLUTION INTRAVENOUS at 14:45

## 2022-05-11 RX ADMIN — LISINOPRIL 10 MG: 10 TABLET ORAL at 06:18

## 2022-05-11 RX ADMIN — FENTANYL CITRATE 50 MCG: 50 INJECTION, SOLUTION INTRAMUSCULAR; INTRAVENOUS at 18:39

## 2022-05-11 RX ADMIN — PROPOFOL 200 MG: 10 INJECTION, EMULSION INTRAVENOUS at 17:36

## 2022-05-11 RX ADMIN — Medication 100 MCG: at 17:50

## 2022-05-11 RX ADMIN — HYDROMORPHONE HYDROCHLORIDE 0.6 MG: 1 INJECTION, SOLUTION INTRAMUSCULAR; INTRAVENOUS; SUBCUTANEOUS at 19:15

## 2022-05-11 RX ADMIN — CLINDAMYCIN IN 5 PERCENT DEXTROSE 900 MG: 18 INJECTION, SOLUTION INTRAVENOUS at 08:52

## 2022-05-11 RX ADMIN — PIPERACILLIN AND TAZOBACTAM 4.5 G: 4; .5 INJECTION, POWDER, LYOPHILIZED, FOR SOLUTION INTRAVENOUS; PARENTERAL at 00:40

## 2022-05-11 RX ADMIN — KETOROLAC TROMETHAMINE 30 MG: 30 INJECTION, SOLUTION INTRAMUSCULAR at 00:40

## 2022-05-11 RX ADMIN — FENTANYL CITRATE 50 MCG: 50 INJECTION, SOLUTION INTRAMUSCULAR; INTRAVENOUS at 18:33

## 2022-05-11 RX ADMIN — HYDROMORPHONE HYDROCHLORIDE 0.4 MG: 1 INJECTION, SOLUTION INTRAMUSCULAR; INTRAVENOUS; SUBCUTANEOUS at 19:35

## 2022-05-11 RX ADMIN — PIPERACILLIN AND TAZOBACTAM 4.5 G: 4; .5 INJECTION, POWDER, FOR SOLUTION INTRAVENOUS at 10:25

## 2022-05-11 RX ADMIN — CEFAZOLIN 2 G: 330 INJECTION, POWDER, FOR SOLUTION INTRAMUSCULAR; INTRAVENOUS at 17:38

## 2022-05-11 RX ADMIN — ONDANSETRON 4 MG: 2 INJECTION INTRAMUSCULAR; INTRAVENOUS at 17:57

## 2022-05-11 RX ADMIN — ATORVASTATIN CALCIUM 20 MG: 20 TABLET, FILM COATED ORAL at 21:12

## 2022-05-11 RX ADMIN — PIPERACILLIN AND TAZOBACTAM 4.5 G: 4; .5 INJECTION, POWDER, FOR SOLUTION INTRAVENOUS at 05:14

## 2022-05-11 RX ADMIN — ENOXAPARIN SODIUM 40 MG: 40 INJECTION SUBCUTANEOUS at 06:18

## 2022-05-11 RX ADMIN — KETOROLAC TROMETHAMINE 30 MG: 30 INJECTION, SOLUTION INTRAMUSCULAR at 21:11

## 2022-05-11 RX ADMIN — CLINDAMYCIN IN 5 PERCENT DEXTROSE 900 MG: 18 INJECTION, SOLUTION INTRAVENOUS at 00:01

## 2022-05-11 RX ADMIN — MIDAZOLAM HYDROCHLORIDE 2 MG: 1 INJECTION, SOLUTION INTRAMUSCULAR; INTRAVENOUS at 17:38

## 2022-05-11 RX ADMIN — OXYCODONE HYDROCHLORIDE 10 MG: 5 SOLUTION ORAL at 18:33

## 2022-05-11 RX ADMIN — FENTANYL CITRATE 50 MCG: 50 INJECTION, SOLUTION INTRAMUSCULAR; INTRAVENOUS at 17:44

## 2022-05-11 RX ADMIN — PIPERACILLIN AND TAZOBACTAM 4.5 G: 4; .5 INJECTION, POWDER, FOR SOLUTION INTRAVENOUS at 21:44

## 2022-05-11 RX ADMIN — Medication 100 MCG: at 17:57

## 2022-05-11 RX ADMIN — CLINDAMYCIN IN 5 PERCENT DEXTROSE 900 MG: 18 INJECTION, SOLUTION INTRAVENOUS at 23:02

## 2022-05-11 RX ADMIN — SODIUM CHLORIDE: 9 INJECTION, SOLUTION INTRAVENOUS at 00:40

## 2022-05-11 RX ADMIN — INSULIN HUMAN 2 UNITS: 100 INJECTION, SOLUTION PARENTERAL at 21:23

## 2022-05-11 RX ADMIN — INSULIN HUMAN 5 UNITS: 100 INJECTION, SOLUTION PARENTERAL at 06:20

## 2022-05-11 RX ADMIN — HYDROMORPHONE HYDROCHLORIDE 0.6 MG: 1 INJECTION, SOLUTION INTRAMUSCULAR; INTRAVENOUS; SUBCUTANEOUS at 18:46

## 2022-05-11 RX ADMIN — KETOROLAC TROMETHAMINE 30 MG: 30 INJECTION, SOLUTION INTRAMUSCULAR at 06:27

## 2022-05-11 RX ADMIN — FOLIC ACID 1 MG: 1 TABLET ORAL at 06:18

## 2022-05-11 RX ADMIN — VANCOMYCIN HYDROCHLORIDE 1500 MG: 500 INJECTION, POWDER, LYOPHILIZED, FOR SOLUTION INTRAVENOUS at 01:25

## 2022-05-11 RX ADMIN — INSULIN HUMAN 3 UNITS: 100 INJECTION, SOLUTION PARENTERAL at 11:40

## 2022-05-11 RX ADMIN — FENTANYL CITRATE 50 MCG: 50 INJECTION, SOLUTION INTRAMUSCULAR; INTRAVENOUS at 17:38

## 2022-05-11 ASSESSMENT — LIFESTYLE VARIABLES
HAVE YOU EVER FELT YOU SHOULD CUT DOWN ON YOUR DRINKING: NO
EVER FELT BAD OR GUILTY ABOUT YOUR DRINKING: NO
ALCOHOL_USE: NO
CONSUMPTION TOTAL: NEGATIVE
TOTAL SCORE: 0
ON A TYPICAL DAY WHEN YOU DRINK ALCOHOL HOW MANY DRINKS DO YOU HAVE: 0
AVERAGE NUMBER OF DAYS PER WEEK YOU HAVE A DRINK CONTAINING ALCOHOL: 0
TOTAL SCORE: 0
TOTAL SCORE: 0
EVER HAD A DRINK FIRST THING IN THE MORNING TO STEADY YOUR NERVES TO GET RID OF A HANGOVER: NO
HOW MANY TIMES IN THE PAST YEAR HAVE YOU HAD 5 OR MORE DRINKS IN A DAY: 0
HAVE PEOPLE ANNOYED YOU BY CRITICIZING YOUR DRINKING: NO

## 2022-05-11 ASSESSMENT — COGNITIVE AND FUNCTIONAL STATUS - GENERAL
SUGGESTED CMS G CODE MODIFIER DAILY ACTIVITY: CH
MOBILITY SCORE: 24
DAILY ACTIVITIY SCORE: 24
SUGGESTED CMS G CODE MODIFIER MOBILITY: CH

## 2022-05-11 ASSESSMENT — PAIN DESCRIPTION - PAIN TYPE
TYPE: ACUTE PAIN

## 2022-05-11 ASSESSMENT — ENCOUNTER SYMPTOMS
GASTROINTESTINAL NEGATIVE: 1
NEUROLOGICAL NEGATIVE: 1
PSYCHIATRIC NEGATIVE: 1
RESPIRATORY NEGATIVE: 1
EYES NEGATIVE: 1
CARDIOVASCULAR NEGATIVE: 1

## 2022-05-11 ASSESSMENT — FIBROSIS 4 INDEX
FIB4 SCORE: 0.56
FIB4 SCORE: 0.56

## 2022-05-11 ASSESSMENT — PATIENT HEALTH QUESTIONNAIRE - PHQ9
1. LITTLE INTEREST OR PLEASURE IN DOING THINGS: NOT AT ALL
SUM OF ALL RESPONSES TO PHQ9 QUESTIONS 1 AND 2: 0
2. FEELING DOWN, DEPRESSED, IRRITABLE, OR HOPELESS: NOT AT ALL

## 2022-05-11 ASSESSMENT — PAIN SCALES - GENERAL: PAIN_LEVEL: 1

## 2022-05-11 NOTE — PROGRESS NOTES
Hospital Medicine Daily Progress Note    Date of Service  5/11/2022    Chief Complaint  Kary Shah is a 56 y.o. female admitted 5/10/2022 with 1 week history of fevers and chills, with worsening chronic right knee pain and swelling. She has seropositive rheumatoid arthritis (on methotrexate and Plaquenil), type 2 diabetes, osteoarthritis, essential hypertension, and GERD.    Hospital Course  On admission,labs showed leukocytosis (16.4 K), sodium was 132, anion gap was 17.0, lactic acid was 3.1.     Right knee x-ray showed moderate right knee joint effusion with questionable soft tissue gas seen posterior to the distal femur.    CT right knee showed joint effusion with thick synovial enhancement, concerning for infection. There is gas within the multilocular fluid collection in the popliteal fossa, concerning for infected Baker's cyst/abscess.     ER physician attempted arthrocentesis but was unsuccessful. Orthopedic surgery on-call recommended starting IV antibiotics with formal consult in the morning. Vancomycin, Clindamycin and Zosyn were empirically started.    Interval Problem Update  Lactic acid improved to 1.5.  Blood cultures are pending. Discussed with and consulted Orthopedic surgery. NPO for possible I&D today.     I have personally seen and examined the patient at bedside. I discussed the plan of care with patient and bedside RN.    Consultants/Specialty  orthopedics    Code Status  Full Code    Disposition  Patient is not medically cleared for discharge.   Anticipate discharge to to home with close outpatient follow-up.  I have placed the appropriate orders for post-discharge needs.    Review of Systems  Review of Systems   Constitutional: Positive for malaise/fatigue.   HENT: Negative.    Eyes: Negative.    Respiratory: Negative.    Cardiovascular: Negative.    Gastrointestinal: Negative.    Genitourinary: Negative.    Musculoskeletal:        Right knee pain and swelling, left knee with pain     Skin: Negative.    Neurological: Negative.    Endo/Heme/Allergies: Negative.    Psychiatric/Behavioral: Negative.         Physical Exam  Temp:  [36.8 °C (98.3 °F)-38.9 °C (102 °F)] 36.8 °C (98.3 °F)  Pulse:  [] 92  Resp:  [18-26] 18  BP: (118-150)/(59-85) 118/59  SpO2:  [93 %-99 %] 97 %    Physical Exam  Constitutional:       General: She is in acute distress.      Appearance: She is obese. She is ill-appearing.   HENT:      Head: Normocephalic.      Nose: Nose normal.      Mouth/Throat:      Mouth: Mucous membranes are moist.   Eyes:      Pupils: Pupils are equal, round, and reactive to light.   Cardiovascular:      Rate and Rhythm: Normal rate.   Pulmonary:      Effort: Pulmonary effort is normal.   Abdominal:      Palpations: Abdomen is soft.   Musculoskeletal:         General: Swelling and tenderness present.      Cervical back: Normal range of motion.      Right lower leg: No edema.      Left lower leg: No edema.      Comments: Right knee very painful, and swollen. Left knee with areas of pain   Skin:     General: Skin is warm and dry.   Neurological:      General: No focal deficit present.      Mental Status: She is alert.   Psychiatric:         Mood and Affect: Mood normal.         Fluids    Intake/Output Summary (Last 24 hours) at 5/11/2022 1047  Last data filed at 5/11/2022 0627  Gross per 24 hour   Intake --   Output 1500 ml   Net -1500 ml       Laboratory  Recent Labs     05/10/22  1841 05/11/22  0937   WBC 16.4* 24.8*   RBC 4.35 3.22*   HEMOGLOBIN 13.1 9.9*   HEMATOCRIT 38.2 30.0*   MCV 87.8 93.2   MCH 30.1 30.7   MCHC 34.3 33.0*   RDW 47.1 50.6*   PLATELETCT 346 263   MPV 9.5 9.6     Recent Labs     05/10/22  1841   SODIUM 132*   POTASSIUM 3.8   CHLORIDE 98   CO2 17*   GLUCOSE 219*   BUN 17   CREATININE 0.61   CALCIUM 9.6     Recent Labs     05/10/22  2255   INR 1.25*               Imaging  CT-KNEE WITH RIGHT   Final Result         1. Moderate knee joint effusion with thick synovial enhancement,  concerning for infection      2. There is gas within the multilocular fluid collection in the popliteal fossa, concerning for infected Baker's cyst/abscess.      3. Soft tissue gas in the lateral leg as well.      DX-KNEE 2- RIGHT   Final Result         Moderate right knee joint effusion.      Questionable soft tissue gas seen posterior to the distal femur on lateral view.      DX-CHEST-PORTABLE (1 VIEW)   Final Result         1.  There are mild perihilar opacifications which could be due to edema or bronchitis.      2.  No consolidations identified.           Assessment/Plan  * Sepsis (HCC)- (present on admission)  Assessment & Plan  This is Sepsis Present on admission. SIRS criteria identified on my evaluation include: Fever, with temperature greater than 101 deg F, Tachypnea, with respirations greater than 20 per minute and Leukocytosis, with WBC greater than 12,000.Source is septic arthritis.Sepsis protocol initiated. Fluid resuscitation ordered per protocol. Crystalloid Fluid Administration: Fluid resuscitation ordered per standard protocol - 30 mL/kg per current or ideal body weight.IV antibiotics as appropriate for source of sepsis. Reassessment: I have reassessed the patient's hemodynamic status.    Arthritis of right knee  Assessment & Plan  Right knee x-ray showed moderate right knee joint effusion with questionable soft tissue gas seen posterior to the distal femur. CT right knee showed joint effusion with thick synovial enhancement, concerning for infection. There is gas within the multilocular fluid collection in the popliteal fossa, concerning for infected Baker's cyst/abscess. Started on Vancomycin, Clindamycin and Zosyn were empirically started on admission.  Unsuccessful arthrocentesis at bedside by ER physician. Orthopedic surgery consulted. Planned for I&D today.     Type 2 diabetes mellitus with hyperglycemia, without long-term current use of insulin (HCC)- (present on admission)  Assessment &  Plan  Home metformin on hold.  Hemoglobin A1c was 7.7 on 10/22/2021.  Repeat ordered.  Continue SSI with Accu-Cheks and hypoglycemia protocol.    Seropositive rheumatoid arthritis (HCC)  Assessment & Plan  On methotrexate and Plaquenil for seropositive rheumatoid arthritis.  We will place on hold due to acute infection.    Essential hypertension- (present on admission)  Assessment & Plan  Stable. Continue lisinopril       VTE prophylaxis: SCDs/TEDs    I have performed a physical exam and reviewed and updated ROS and Plan today (5/11/2022). In review of yesterday's note (5/10/2022), there are no changes except as documented above.

## 2022-05-11 NOTE — H&P
Hospital Medicine History & Physical Note    Date of Service  5/10/2022    Primary Care Physician  SURYA Dooley.    Consultants  orthopedics      Code Status  Full Code    Chief Complaint  Chief Complaint   Patient presents with   • Weakness     Patient reports weakness worsening today at 2pm   • Leg Pain     Patient reports severe bilateral leg pain that makes it difficult to walk   • Shortness of Breath     Patient reports slight intermittent SOB       History of Presenting Illness  Kary Shah is a 56 y.o. female with past medical history GERD, hypertension, osteoarthritis of the hands, type 2 diabetes, seropositive rheumatoid arthritis (on Plaquenil, methotrexate) who presented 5/10/2022 with complaints of chills, fever in the last week, worsening of chronic right knee pain and swelling, exacerbated by movements to the point that patient could not walk today.  Patient reports that bilateral knee pain started 1 year ago after fall and she was evaluated with arthrocentesis at that time, result is unknown.  Presented with fever 102, heart rate 155, respiratory rate 26, WBC 16.4.  Lactic acid initially 3.1, went down to 2.4 after sepsis bolus.  Denies any other symptoms, including denies cough, congestion, diarrhea, nausea, vomiting, abdominal pain, dysuria, headache.  She had COVID booster recently.  COVID-19 screen is pending.  X-ray of right knee showed moderate right knee joint effusion, questionable soft tissue gas seen posterior to the distal femur on the lateral view.  ERP attempted arthrocentesis at bedside, unsuccessful.  Case discussed with orthopedic surgery on-call, recommended IV antibiotics and observation, formal consult to follow tomorrow.    I discussed the plan of care with patient.    Review of Systems  Review of Systems   Constitutional: Positive for chills, fever and malaise/fatigue. Negative for weight loss.   HENT: Negative for ear pain, hearing loss and tinnitus.     Eyes: Negative for blurred vision, double vision and photophobia.   Respiratory: Negative for cough, hemoptysis and sputum production.    Cardiovascular: Negative for chest pain, palpitations and orthopnea.   Gastrointestinal: Negative for heartburn, nausea and vomiting.   Genitourinary: Negative for dysuria, flank pain, frequency and hematuria.   Musculoskeletal: Positive for joint pain. Negative for back pain and neck pain.   Skin: Negative for itching and rash.   Neurological: Negative for tremors, speech change, focal weakness and headaches.   Endo/Heme/Allergies: Negative for environmental allergies and polydipsia. Does not bruise/bleed easily.   Psychiatric/Behavioral: Negative for hallucinations and substance abuse. The patient is not nervous/anxious.        Past Medical History   has a past medical history of Diabetes (HCC), GERD (gastroesophageal reflux disease), Hypertension, and Osteoarthritis of both hands (2015).    Surgical History   has a past surgical history that includes primary c section.     Family History  family history includes Arthritis in her mother; Heart Disease in her brother and brother; Heart Disease (age of onset: 64) in her mother; Heart Disease (age of onset: 65) in her father; Other in her sister.   Family history reviewed with patient. There is no family history that is pertinent to the chief complaint.     Social History   reports that she has never smoked. She has never used smokeless tobacco. She reports that she does not drink alcohol and does not use drugs.    Allergies  No Known Allergies    Medications  Prior to Admission Medications   Prescriptions Last Dose Informant Patient Reported? Taking?   atorvastatin (LIPITOR) 20 MG Tab   No No   Sig: Take 1 Tablet by mouth every evening.   folic acid (FOLVITE) 1 MG Tab   No No   Si tab po qday   hydroxychloroquine (PLAQUENIL) 200 MG Tab   No No   Si tab po bid   lisinopril (PRINIVIL) 10 MG Tab   No No   Sig: Take 1 tablet  by mouth once daily   metFORMIN ER (GLUCOPHAGE XR) 750 MG TABLET SR 24 HR   No No   Sig: Take 1 Tablet by mouth 2 times a day.   methotrexate 2.5 MG tablet   No No   Si tabs po qweek (every )   sulindac (CLINORIL) 200 MG Tab   No No   Si tab po bid prn with food joint pain      Facility-Administered Medications: None       Physical Exam  Temp:  [38.9 °C (102 °F)] 38.9 °C (102 °F)  Pulse:  [125-155] 127  Resp:  [22-26] 24  BP: (132-150)/(65-85) 137/69  SpO2:  [93 %-97 %] 97 %  Blood Pressure: 137/69   Temperature: (!) 38.9 °C (102 °F)   Pulse: (!) 127   Respiration: (!) 24   Pulse Oximetry: 97 %       Physical Exam  Vitals and nursing note reviewed.   Constitutional:       General: She is not in acute distress.     Appearance: Normal appearance.   HENT:      Head: Normocephalic and atraumatic.      Nose: Nose normal.      Mouth/Throat:      Mouth: Mucous membranes are moist.   Eyes:      Extraocular Movements: Extraocular movements intact.      Pupils: Pupils are equal, round, and reactive to light.   Cardiovascular:      Rate and Rhythm: Regular rhythm. Tachycardia present.   Pulmonary:      Effort: Pulmonary effort is normal.      Breath sounds: Normal breath sounds.   Abdominal:      General: Abdomen is flat. There is no distension.      Tenderness: There is no abdominal tenderness.   Musculoskeletal:         General: No swelling or deformity.      Cervical back: Normal range of motion and neck supple.      Right knee: Swelling present. Decreased range of motion. Tenderness present.   Skin:     General: Skin is warm and dry.   Neurological:      General: No focal deficit present.      Mental Status: She is alert and oriented to person, place, and time.   Psychiatric:         Mood and Affect: Mood normal.         Behavior: Behavior normal.         Laboratory:  Recent Labs     05/10/22  1841   WBC 16.4*   RBC 4.35   HEMOGLOBIN 13.1   HEMATOCRIT 38.2   MCV 87.8   MCH 30.1   MCHC 34.3   RDW 47.1    PLATELETCT 346   MPV 9.5     Recent Labs     05/10/22  1841   SODIUM 132*   POTASSIUM 3.8   CHLORIDE 98   CO2 17*   GLUCOSE 219*   BUN 17   CREATININE 0.61   CALCIUM 9.6     Recent Labs     05/10/22  1841   ALTSGPT 7   ASTSGOT 9*   ALKPHOSPHAT 136*   TBILIRUBIN 0.8   GLUCOSE 219*         No results for input(s): NTPROBNP in the last 72 hours.      No results for input(s): TROPONINT in the last 72 hours.    Imaging:  DX-KNEE 2- RIGHT   Final Result         Moderate right knee joint effusion.      Questionable soft tissue gas seen posterior to the distal femur on lateral view.      DX-CHEST-PORTABLE (1 VIEW)   Final Result         1.  There are mild perihilar opacifications which could be due to edema or bronchitis.      2.  No consolidations identified.              Assessment/Plan:  Justification for Admission Status  I anticipate this patient is appropriate for observation status at this time because Plan of care to be determined    * Sepsis (HCC)- (present on admission)  Assessment & Plan  This is Sepsis Present on admission  SIRS criteria identified on my evaluation include: Fever, with temperature greater than 101 deg F, Tachypnea, with respirations greater than 20 per minute and Leukocytosis, with WBC greater than 12,000  Source is septic arthritis  Sepsis protocol initiated  Fluid resuscitation ordered per protocol  Crystalloid Fluid Administration: Fluid resuscitation ordered per standard protocol - 30 mL/kg per current or ideal body weight  IV antibiotics as appropriate for source of sepsis  Reassessment: I have reassessed the patient's hemodynamic status          Arthritis of right knee  Assessment & Plan  X-ray of the right knee: Moderate right knee effusion, possible gas in the soft tissue posterior to distal femur on the lateral view  Concern for septic arthritis  Will start vancomycin and cefepime considering immunocompromise state secondary to methotrexate  Arthrocentesis attempted at bedside in ER,  unsuccessful  Follow-up with orthopedic surgery recommendations tomorrow  Pain control  Will pursue CT with contrast for further evaluation    Type 2 diabetes mellitus with hyperglycemia, without long-term current use of insulin (HCC)- (present on admission)  Assessment & Plan  Will hold metformin  Insulin sliding scale started    Seropositive rheumatoid arthritis (HCC)  Assessment & Plan  We will hold methotrexate and Plaquenil due to acute infection  Tylenol, Toradol, oxycodone as needed    Essential hypertension- (present on admission)  Assessment & Plan  Continue lisinopril  Hydralazine IV as needed      VTE prophylaxis: enoxaparin ppx

## 2022-05-11 NOTE — DISCHARGE PLANNING
[facility] TCN chart review completed. Collaborated with RANGEL Castillo  prior to meeting with the pt. The most current review of medical record, knowledge of pt's PLOF and social support, LACE+ score of 73, 6 clicks scores of 24 mobility were considered.      Pt seen at bedside, AXOX4, O2 via NC in place, SOn Shawn present in the room. Introduced TCN program. Provided education regarding differences in post acute resources including IRF, SNF and HH . Discussed [facility] plan benefits including Meds to Beds and [facility] transitional care services. Pt verbalizes understanding. Mrb states she is independent with ADL s and mobility, well supported by family at home and denies additional assistance with food/housing/transportation, [facility].Choice forms proactively obtained for HH , Infusion and given to CHANO Castillo. TCN will continue to follow and collaborate with discharge planning team as additional post acute needs arise. Thank you.       Choice forms obtained: HH, Infusion  [facility] introduced ( Y) Referral ( sent)

## 2022-05-11 NOTE — ASSESSMENT & PLAN NOTE
Home metformin on hold.  Hemoglobin A1c was 7.7 on 10/22/2021.  Repeat ordered.  Continue SSI with Accu-Cheks and hypoglycemia protocol.

## 2022-05-11 NOTE — PROGRESS NOTES
Patient CT scan was reviewed.  The right knee shows thickening of the synovium consistent with a subacute or chronic infection to the right knee.  There is also a Baker's cyst that has gas present also consistent with infection.  This was discussed with Dr. Gasca.  I recommended debridement of the right knee.  She did have breakfast this morning and we will plan for debridement later this afternoon after appropriate n.p.o. status.

## 2022-05-11 NOTE — PROGRESS NOTES
"Pharmacy Vancomycin Kinetics Note for 5/11/2022     56 y.o. female on Vancomycin Day # 1     Vancomycin Indication (AUC Dosing): Skin/skin structure infection    Provider specified end date:  (TBD)    Active Antibiotics (From admission, onward)    Ordered     Ordering Provider       Wed May 11, 2022  2:59 AM    05/11/22 0259  vancomycin (VANCOCIN) 750 mg in  mL IVPB  (vancomycin (VANCOCIN) IV (LD + Maintenance))  EVERY 12 HOURS         Redd Wade M.D.       Tue May 10, 2022 11:02 PM    05/10/22 2302  vancomycin (VANCOCIN) 1,500 mg in  mL IVPB  (vancomycin (VANCOCIN) IV (LD + Maintenance))  ONCE         Redd Wade M.D.       Tue May 10, 2022 10:57 PM    05/10/22 2257  clindamycin (CLEOCIN) IVPB premix 900 mg  EVERY 8 HOURS         Redd Wade M.D.       Tue May 10, 2022 10:55 PM    05/10/22 2255  piperacillin-tazobactam (ZOSYN) 4.5 g in  mL IVPB  (piperacillin-tazobactam (ZOSYN) IV (Extended-infusion) PANEL )  EVERY 8 HOURS        \"And\" Linked Group Details    Redd Wade M.D.       Tue May 10, 2022 10:20 PM    05/10/22 2220  MD Alert...Vancomycin per Pharmacy  PHARMACY TO DOSE        Question:  Indication(s) for vancomycin?  Answer:  Skin and soft tissue infection    Redd Wade M.D.          Dosing Weight: 61.2 kg (134 lb 14.7 oz)      Admission History: Admitted on 5/10/2022 for Sepsis (HCC) [A41.9]  Pertinent history: 57 yo female admitted for septic arthritis with soft tissue gas and fluid collection identified on CT. Empiric antibiotics initiated.    Allergies:     Patient has no known allergies.     Pertinent cultures to date:     Results     Procedure Component Value Units Date/Time    MRSA By PCR (Amp) [[id number]]     Order Status: Sent Specimen: Respirate from Nares     CoV-2 and Flu A/B by PCR (24 hour In-House): Collect NP swab in [facility] [[id number]] Collected: 05/10/22 2000    Order Status: Completed Specimen: Respirate from Nasopharyngeal Updated: 05/10/22 " "     SARS-CoV-2 Source NP Swab    BLOOD CULTURE [[id number]] Collected: 05/10/22 1924    Order Status: Sent Specimen: Blood from Peripheral Updated: 05/10/22 2018    Narrative:      Per Hospital Policy: Only change Specimen Src: to \"Line\" if  specified by physician order.    BLOOD CULTURE [[id number]] Collected: 05/10/22 1841    Order Status: Sent Specimen: Blood from Peripheral Updated: 05/10/22 1914    Narrative:      Per Hospital Policy: Only change Specimen Src: to \"Line\" if  specified by physician order.    URINALYSIS [[id number]]     Order Status: Sent Specimen: Urine     URINE CULTURE(NEW) [[id number]]     Order Status: Sent Specimen: Urine           Labs:     CrCl cannot be calculated (Unknown ideal weight.).  Recent Labs     05/10/22  1841   WBC 16.4*   NEUTSPOLYS 95.80*     Recent Labs     05/10/22  1841   BUN 17   CREATININE 0.61   ALBUMIN 3.8     No intake or output data in the 24 hours ending 22 0300   /62   Pulse (!) 121   Temp 37.2 °C (98.9 °F) (Temporal)   Resp 19   Ht 1.448 m (4' 9\")   Wt 61.2 kg (134 lb 14.7 oz)   SpO2 99%  Temp (24hrs), Av.8 °C (100.1 °F), Min:37.2 °C (98.9 °F), Max:38.9 °C (102 °F)      List concerns for Vancomycin clearance:     BUN/Scr ratio greater than 20:1;Obesity    Pharmacokinetics:     AUC kinetics:   Ke (hr ^-1): 0.073 hr^-1  Half life: 9.5 hr  Clearance: 2.904  Estimated TDD: 1452  Estimated Dose: 696  Estimated interval: 11.5    A/P:     -  Vancomycin dose: Loading dose followed by Vancomycin 750 mg (11 mg/kg) IV q12h     -  Next vancomycin level(s): TBD continuation     -  Predicted vancomycin AUC from initial AUC test calculator: 517 mg·hr/L    Renetta Wan, PharmD, BCPS    "

## 2022-05-11 NOTE — ED PROVIDER NOTES
CHIEF COMPLAINT  Chief Complaint   Patient presents with   • Weakness     Patient reports weakness worsening today at 2pm   • Leg Pain     Patient reports severe bilateral leg pain that makes it difficult to walk   • Shortness of Breath     Patient reports slight intermittent SOB       HPI  Kary Shah is a 56 y.o. female who presents for evaluation of generalized weakness bilateral leg pain for the past several days.  Patient states that she has not been feeling well has had subjective fevers and chills for the past few days at a progressively gotten worse.  She has a chronic history of bilateral osteoarthritis, and knee pain, which has required arthrocentesis's in the past.  States that she has had worsening swelling and pain of her knees so presented today for evaluation.  Denies any chest pain shortness of breath runny nose chest pain abdominal pain nausea vomiting diarrhea dysuria hematuria or back pain.  No trauma or falls.  Pain is a throbbing sensation rated a 10 in bilateral knees worse with ambulation better with rest.  REVIEW OF SYSTEMS  See HPI for further details. All other systems are negative.     PAST MEDICAL HISTORY   has a past medical history of Diabetes (HCC), GERD (gastroesophageal reflux disease), Hypertension, and Osteoarthritis of both hands (2015).    SOCIAL HISTORY  Social History     Tobacco Use   • Smoking status: Never Smoker   • Smokeless tobacco: Never Used   Vaping Use   • Vaping Use: Never used   Substance and Sexual Activity   • Alcohol use: No   • Drug use: No   • Sexual activity: Not Currently       SURGICAL HISTORY   has a past surgical history that includes primary c section.    CURRENT MEDICATIONS  Home Medications       Reviewed by Claude Rock (Pharmacy Tech) on 05/11/22 at 0006  Med List Status: Complete     Medication Last Dose Status   atorvastatin (LIPITOR) 20 MG Tab 5/9/2022 Active   folic acid (FOLVITE) 1 MG Tab 5/10/2022 Active   hydroxychloroquine  "(PLAQUENIL) 200 MG Tab 5/10/2022 Active   lisinopril (PRINIVIL) 10 MG Tab 5/10/2022 Active   metFORMIN ER (GLUCOPHAGE XR) 750 MG TABLET SR 24 HR 5/10/2022 Active   methotrexate 2.5 MG tablet 5/8/2022 Active   sulindac (CLINORIL) 200 MG Tab PRN Active                    ALLERGIES  No Known Allergies    FAMILY HISTORY  No pertinent family history    PHYSICAL EXAM   /66   Pulse (!) 128   Temp 37.5 °C (99.5 °F) (Temporal)   Resp (!) 21   Ht 1.448 m (4' 9\")   Wt 61.2 kg (135 lb)   SpO2 96%   BMI 29.21 kg/m²  @TATIANNA[[id number]::@   Pulse ox interpretation: I interpret this pulse ox as normal.  VITALS - vital signs documented prior to this note have been reviewed and noted,  GENERAL - awake, alert, oriented, GCS 15, no apparent distress, non-toxic  appearing  HEENT - normocephalic, atraumatic, pupils equal, sclera anicteric, mucus  membranes moist  NECK - supple, no meningismus, full active range of motion, trachea midline  CARDIOVASCULAR - tachycardic regular rate/rhythm, no murmurs/gallops/rubs  PULMONARY - no respiratory distress, speaking in full sentences, clear to  auscultation bilaterally, no wheezing/ronchi/rales, no accessory muscle use  GASTROINTESTINAL - soft, non-tender, non-distended, no rebound, guarding,  or peritonitis  GENITOURINARY - Deferred  NEUROLOGIC - Awake alert, normal mental status, speech fluid, cognition  normal, moves all extremities  MUSCULOSKELETAL - no obvious asymmetry or deformities bilateral knees have effusions, right knee has pain with passive range of motion though no overlying erythema, or tenderness    EXTREMITIES - warm, well-perfused, no cyanosis or significant edema knee joint has a  DERMATOLOGIC - warm, dry, no rashes, no jaundice  PSYCHIATRIC - normal affect, normal insight, normal concentration            EKG  EKG shows a sinus tachycardia at a rate of 146 with an otherwise normal WA QRS QTc interval no ST elevation depression T wave inversion to suggest anemia normal " "axis interpretation is sinus tachycardia as interpreted by myself.  LABS  Labs Reviewed   LACTIC ACID - Abnormal; Notable for the following components:       Result Value    Lactic Acid 3.1 (*)     All other components within normal limits   LACTIC ACID - Abnormal; Notable for the following components:    Lactic Acid 3.0 (*)     All other components within normal limits    Narrative:     Repeat if initial lactic acid result is greater than 2   LACTIC ACID - Abnormal; Notable for the following components:    Lactic Acid 2.4 (*)     All other components within normal limits    Narrative:     Repeat if initial lactic acid result is greater than 2   CBC WITH DIFFERENTIAL - Abnormal; Notable for the following components:    WBC 16.4 (*)     Neutrophils-Polys 95.80 (*)     Lymphocytes 1.30 (*)     Neutrophils (Absolute) 15.67 (*)     Lymphs (Absolute) 0.21 (*)     All other components within normal limits   COMP METABOLIC PANEL - Abnormal; Notable for the following components:    Sodium 132 (*)     Co2 17 (*)     Anion Gap 17.0 (*)     Glucose 219 (*)     AST(SGOT) 9 (*)     Alkaline Phosphatase 136 (*)     All other components within normal limits   PROTHROMBIN TIME - Abnormal; Notable for the following components:    PT 15.3 (*)     INR 1.25 (*)     All other components within normal limits    Narrative:     If not done within the last 4 hours  Indicate which anticoagulants the patient is on:->UNKNOWN   CRP QUANTITIVE (NON-CARDIAC) - Abnormal; Notable for the following components:    Stat C-Reactive Protein 5.03 (*)     All other components within normal limits    Narrative:     If not done within the last 4 hours  Indicate which anticoagulants the patient is on:->UNKNOWN   ESTIMATED GFR   COV-2 AND FLU A/B BY PCR ([facility])   URINALYSIS   URINE CULTURE(NEW)   BLOOD CULTURE    Narrative:     Per Hospital Policy: Only change Specimen Src: to \"Line\" if  specified by physician order.   BLOOD CULTURE    Narrative:     Per " "Hospital Policy: Only change Specimen Src: to \"Line\" if  specified by physician order.   LACTIC ACID   CBC WITH DIFFERENTIAL   COMP METABOLIC PANEL   MRSA BY PCR (AMP)       Results for orders placed or performed during the hospital encounter of 05/10/22   Lactic acid (lactate)   Result Value Ref Range    Lactic Acid 3.1 (H) 0.5 - 2.0 mmol/L   Lactic acid (lactate): Repeat if initial lactic acid result is greater than 2   Result Value Ref Range    Lactic Acid 3.0 (H) 0.5 - 2.0 mmol/L   Lactic acid (lactate): Repeat if initial lactic acid result is greater than 2   Result Value Ref Range    Lactic Acid 2.4 (H) 0.5 - 2.0 mmol/L   CBC WITH DIFFERENTIAL   Result Value Ref Range    WBC 16.4 (H) 4.8 - 10.8 K/uL    RBC 4.35 4.20 - 5.40 M/uL    Hemoglobin 13.1 12.0 - 16.0 g/dL    Hematocrit 38.2 37.0 - 47.0 %    MCV 87.8 81.4 - 97.8 fL    MCH 30.1 27.0 - 33.0 pg    MCHC 34.3 33.6 - 35.0 g/dL    RDW 47.1 35.9 - 50.0 fL    Platelet Count 346 164 - 446 K/uL    MPV 9.5 9.0 - 12.9 fL    Neutrophils-Polys 95.80 (H) 44.00 - 72.00 %    Lymphocytes 1.30 (L) 22.00 - 41.00 %    Monocytes 1.30 0.00 - 13.40 %    Eosinophils 0.80 0.00 - 6.90 %    Basophils 0.30 0.00 - 1.80 %    Immature Granulocytes 0.50 0.00 - 0.90 %    Nucleated RBC 0.00 /100 WBC    Neutrophils (Absolute) 15.67 (H) 2.00 - 7.15 K/uL    Lymphs (Absolute) 0.21 (L) 1.00 - 4.80 K/uL    Monos (Absolute) 0.22 0.00 - 0.85 K/uL    Eos (Absolute) 0.13 0.00 - 0.51 K/uL    Baso (Absolute) 0.05 0.00 - 0.12 K/uL    Immature Granulocytes (abs) 0.09 0.00 - 0.11 K/uL    NRBC (Absolute) 0.00 K/uL   COMP METABOLIC PANEL   Result Value Ref Range    Sodium 132 (L) 135 - 145 mmol/L    Potassium 3.8 3.6 - 5.5 mmol/L    Chloride 98 96 - 112 mmol/L    Co2 17 (L) 20 - 33 mmol/L    Anion Gap 17.0 (H) 7.0 - 16.0    Glucose 219 (H) 65 - 99 mg/dL    Bun 17 8 - 22 mg/dL    Creatinine 0.61 0.50 - 1.40 mg/dL    Calcium 9.6 8.5 - 10.5 mg/dL    AST(SGOT) 9 (L) 12 - 45 U/L    ALT(SGPT) 7 2 - 50 U/L    " Alkaline Phosphatase 136 (H) 30 - 99 U/L    Total Bilirubin 0.8 0.1 - 1.5 mg/dL    Albumin 3.8 3.2 - 4.9 g/dL    Total Protein 7.2 6.0 - 8.2 g/dL    Globulin 3.4 1.9 - 3.5 g/dL    A-G Ratio 1.1 g/dL   ESTIMATED GFR   Result Value Ref Range    GFR (CKD-EPI) 104 >60 mL/min/1.73 m 2   CoV-2 and Flu A/B by PCR (24 hour In-House): Collect NP swab in VTM    Specimen: Nasopharyngeal; Respirate   Result Value Ref Range    SARS-CoV-2 Source NP Swab    Prothrombin time (INR)   Result Value Ref Range    PT 15.3 (H) 12.0 - 14.6 sec    INR 1.25 (H) 0.87 - 1.13   CRP QUANTITIVE (NON-CARDIAC)   Result Value Ref Range    Stat C-Reactive Protein 5.03 (H) 0.00 - 0.75 mg/dL   EKG   Result Value Ref Range    Report       [facility] Emergency Dept.    Test Date:  2022-05-10  Pt Name:    MIRTA FORD        Department: ER  MRN:        [id number]                      Room:  Gender:     Female                       Technician: [id number]  :        1965                   Requested By:ER TRIAGE PROTOCOL  Order #:    [id number]                    Reading MD: Francisco Campoverde    Measurements  Intervals                                Axis  Rate:       146                          P:          11  WA:         97                           QRS:        195  QRSD:       106                          T:          13  QT:         311  QTc:        485    Interpretive Statements  Sinus tachycardia  Probable right ventricular hypertrophy  Inferior infarct, old  No previous ECG available for comparison  Electronically Signed On 5- 20:44:38 PDT by Francisco Campoverde           Pertinent Labs & Imaging studies reviewed. (See chart for details)    RADIOLOGY  DX-KNEE 2- RIGHT   Final Result         Moderate right knee joint effusion.      Questionable soft tissue gas seen posterior to the distal femur on lateral view.      DX-CHEST-PORTABLE (1 VIEW)   Final Result         1.  There are mild perihilar opacifications which could  be due to edema or bronchitis.      2.  No consolidations identified.      CT-KNEE WITH RIGHT    (Results Pending)             ED COURSE             Medications   senna-docusate (PERICOLACE or SENOKOT S) 8.6-50 MG per tablet 2 Tablet (has no administration in time range)     And   polyethylene glycol/lytes (MIRALAX) PACKET 1 Packet (has no administration in time range)     And   magnesium hydroxide (MILK OF MAGNESIA) suspension 30 mL (has no administration in time range)     And   bisacodyl (DULCOLAX) suppository 10 mg (has no administration in time range)   enoxaparin (Lovenox) inj 40 mg (has no administration in time range)   acetaminophen (Tylenol) tablet 650 mg (has no administration in time range)   hydrALAZINE (APRESOLINE) injection 10 mg (has no administration in time range)   ondansetron (ZOFRAN) syringe/vial injection 4 mg (has no administration in time range)   ondansetron (ZOFRAN ODT) dispertab 4 mg (has no administration in time range)   promethazine (PHENERGAN) tablet 12.5-25 mg (has no administration in time range)   promethazine (PHENERGAN) suppository 12.5-25 mg (has no administration in time range)   prochlorperazine (COMPAZINE) injection 5-10 mg (has no administration in time range)   NS infusion (has no administration in time range)   MD Alert...Vancomycin per Pharmacy (has no administration in time range)   atorvastatin (LIPITOR) tablet 20 mg (has no administration in time range)   folic acid (FOLVITE) tablet 1 mg (has no administration in time range)   lisinopril (PRINIVIL) tablet 10 mg (has no administration in time range)   ketorolac (TORADOL) injection 30 mg (has no administration in time range)   insulin regular (HumuLIN R,NovoLIN R) injection (has no administration in time range)     And   dextrose 50% (D50W) injection 25 g (has no administration in time range)   piperacillin-tazobactam (ZOSYN) 4.5 g in  mL IVPB (has no administration in time range)     And   piperacillin-tazobactam  (ZOSYN) 4.5 g in  mL IVPB (has no administration in time range)   clindamycin (CLEOCIN) IVPB premix 900 mg (900 mg Intravenous New Bag 5/11/22 0001)   vancomycin (VANCOCIN) 1,500 mg in  mL IVPB (has no administration in time range)   cefTRIAXone (Rocephin) injection 1 g (1 g Intravenous Given 5/10/22 2015)   lidocaine-EPINEPHrine 2%-1:[id number] injection 20 mL (20 mL Injection Given 5/10/22 2020)   morphine 4 MG/ML injection 4 mg (4 mg Intravenous Given 5/10/22 2030)   NS (BOLUS) infusion 2,000 mL (2,000 mL Intravenous New Bag 5/10/22 2045)   HYDROmorphone (Dilaudid) injection 1 mg (1 mg Intravenous Given 5/10/22 2128)   acetaminophen (Tylenol) tablet 650 mg (650 mg Oral Given 5/10/22 2143)   iohexol (OMNIPAQUE) 350 mg/mL (IV) (80 mL Intravenous Given 5/10/22 2338)           Procedure arthrocentesis  After obtaining verbal consent, the knee was prepped and draped with chlorhexidine, and after anesthetizing the area with 1% lidocaine, I did attempt to aspirate the knee, there was a very small amount of sero sanguinous aspirate though the patient moved her knee during the attempt, and I was unable to aspirate enough fluid to send to the lab.  Patient was medicated with morphine and additional lidocaine but was unable to tolerate the procedure secondary to pain.  After 2 unsuccessful attempts the procedure was aborted.    MEDICAL DECISION MAKING  She presented for evaluation of fevers and her right knee pain.  Differential initially included was not limited to    Septic arthritis, pneumonia urinary tract infection viral syndrome cellulitis abscess among other considerations.  On examination she has no erythema of her right knee though she does have pain with passive range of motion and a moderate effusion.  After verbal consent was obtained I did attempt to perform an arthrocentesis to exclude a septic arthritis unfortunately the patient was not tolerating the procedure, she was given morphine and anesthetized  with lidocaine and still was unable to tolerate the procedure, fidgeting and moving and screaming in pain as such the procedure was aborted.  Labs were concerning for an underlying sepsis syndrome with elevated. She was given broad-spectrum antibiotics, and a fluid bolus empirically though there is no obvious source.  I did call and speak with orthopedist over my concern of a possible septic arthritis recommended admission for IV antibiotics observation, and possible reattempt arthrocentesis if The knee swelling and fevers persist.        FINAL IMPRESSION  1.  Sepsis  2.  Dehydration  3.  Knee pain  4.  Dehydration       Critical care    Critical Care Procedure Note    Total critical care time: Approximately 36 minutes    Upon my assess due to a high probability of clinically significant, life threatening deterioration, secondary to ****** the patient required my direct attention and intervention. This critical care time included obtaining a history; examining the patient; pulse oximetry; ordering and review of studies; arranging urgent treatment with development of a management plan; evaluation of patient's response to treatment; frequent reassessment; and, discussions with other providers.    was exclusive of separately billable procedures and treating other patients and teaching time.    Electronically signed by: Francisco Rivera D.O., 5/10/2022 9:02 PM      Dictation Disclaimer  Please note this report has been produced using speech recognition software and  may contain errors related to that system, including errors seen in grammar,  punctuation and spelling, as well as words and phrases that may be inappropriate.  If there are any questions or concerns, please feel free to contact the dictating  physician for clarification.

## 2022-05-11 NOTE — PROGRESS NOTES
4 Eyes Skin Assessment Completed by Kimberly Soto RN, RN and Yamilet Roberson, RN.    Head WDL  Ears WDL  Nose WDL  Mouth WDL  Neck WDL  Breast/Chest WDL  Shoulder Blades WDL  Spine WDL  (R) Arm/Elbow/Hand WDL  (L) Arm/Elbow/Hand WDL  Abdomen WDL  Groin WDL  Scrotum/Coccyx/Buttocks WDL  (R) Leg Swelling to Right Knee  (L) Leg WDL  (R) Heel/Foot/Toe WDL  (L) Heel/Foot/Toe WDL          Devices In Places Oxygen nasal cannula      Interventions In Place Pillows and Pressure Redistribution Mattress    Possible Skin Injury No    Pictures Uploaded Into Epic N/A  Wound Consult Placed N/A  RN Wound Prevention Protocol Ordered No

## 2022-05-11 NOTE — ASSESSMENT & PLAN NOTE
Patient underwent right knee arthrotomy and drainage of deep infection, incision and drainage of right thigh abscess on 5/11/2022. 2 drains were palced. Vancomycin and Zosyn switched to Ancef on 5/13/2022.  No positive cultures to date. Ancef switched to doxycycline Levaquin on 5/16/2022, with end date 5/30/2022.  Per orthopedic surgery, continue DVT prophylaxis with SCDs and Lovenox until mobilizing then switch to aspirin for 4 weeks. Right thigh drain was removed on 5/16/2022. Right knee drain was removed on 5/18/2022. She is to follow-up with orthopedic surgery outpatient clinic 2 weeks for wound check and suture/staple removal.

## 2022-05-11 NOTE — CARE PLAN
Problem: Knowledge Deficit - Standard  Goal: Patient and family/care givers will demonstrate understanding of plan of care, disease process/condition, diagnostic tests and medications  Outcome: Progressing     Problem: Physical Regulation  Goal: Signs and symptoms of infection will decrease  Outcome: Progressing     Problem: Pain - Standard  Goal: Alleviation of pain or a reduction in pain to the patient’s comfort goal  Outcome: Progressing   The patient is Watcher - Medium risk of patient condition declining or worsening    Shift Goals  Clinical Goals: i&d today, iv abx  Patient Goals: feel better    Progress made toward(s) clinical / shift goals:  I&d today, iv abx in place    Patient is not progressing towards the following goals:

## 2022-05-11 NOTE — ED PROVIDER NOTES
CHIEF COMPLAINT  Chief Complaint   Patient presents with   • Weakness     Patient reports weakness worsening today at 2pm   • Leg Pain     Patient reports severe bilateral leg pain that makes it difficult to walk   • Shortness of Breath     Patient reports slight intermittent SOB       HPI  Kary Shah is a 56 y.o. female who presents for evaluation of generalized weakness bilateral leg pain for the past several days.  Patient states that she has not been feeling well has had subjective fevers and chills for the past few days at a progressively gotten worse.  She has a chronic history of bilateral osteoarthritis, and knee pain, which has required arthrocentesis's in the past.  States that she has had worsening swelling and pain of her knees so presented today for evaluation.  Denies any chest pain shortness of breath runny nose chest pain abdominal pain nausea vomiting diarrhea dysuria hematuria or back pain.  No trauma or falls.  Pain is a throbbing sensation rated a 10 in bilateral knees worse with ambulation better with rest.    REVIEW OF SYSTEMS  See HPI for further details. All other systems are negative.     PAST MEDICAL HISTORY   has a past medical history of Diabetes (HCC), GERD (gastroesophageal reflux disease), Hypertension, and Osteoarthritis of both hands (2015).    SOCIAL HISTORY  Social History     Tobacco Use   • Smoking status: Never Smoker   • Smokeless tobacco: Never Used   Vaping Use   • Vaping Use: Never used   Substance and Sexual Activity   • Alcohol use: No   • Drug use: No   • Sexual activity: Not Currently       SURGICAL HISTORY   has a past surgical history that includes primary c section and irrigation & debridement general (Right, 5/11/2022).    CURRENT MEDICATIONS  Home Medications     Reviewed by Radha Elmore R.N. (Registered Nurse) on 05/11/22 at 1714  Med List Status: Complete   Medication Last Dose Status   acetaminophen (Tylenol) tablet 650 mg  Active   atorvastatin  "(LIPITOR) 20 MG Tab 5/9/2022 Active   atorvastatin (LIPITOR) tablet 20 mg  Active   bisacodyl (DULCOLAX) suppository 10 mg  Active   clindamycin (CLEOCIN) IVPB premix 900 mg  Active   dextrose 50% (D50W) injection 25 g  Active   enoxaparin (Lovenox) inj 40 mg  Active   folic acid (FOLVITE) 1 MG Tab 5/10/2022 Active   folic acid (FOLVITE) tablet 1 mg  Active   hydrALAZINE (APRESOLINE) injection 10 mg  Active   hydroxychloroquine (PLAQUENIL) 200 MG Tab 5/10/2022 Active   insulin regular (HumuLIN R,NovoLIN R) injection  Active   ketorolac (TORADOL) injection 30 mg  Active   lisinopril (PRINIVIL) 10 MG Tab 5/10/2022 Active   lisinopril (PRINIVIL) tablet 10 mg  Active   magnesium hydroxide (MILK OF MAGNESIA) suspension 30 mL  Active   MD Alert...Vancomycin per Pharmacy  Active   metFORMIN ER (GLUCOPHAGE XR) 750 MG TABLET SR 24 HR 5/10/2022 Active   methotrexate 2.5 MG tablet 5/8/2022 Active   NS infusion  Active   ondansetron (ZOFRAN ODT) dispertab 4 mg  Active   ondansetron (ZOFRAN) syringe/vial injection 4 mg  Active   piperacillin-tazobactam (ZOSYN) 4.5 g in  mL IVPB  Active   polyethylene glycol/lytes (MIRALAX) PACKET 1 Packet  Active   prochlorperazine (COMPAZINE) injection 5-10 mg  Active   promethazine (PHENERGAN) suppository 12.5-25 mg  Active   promethazine (PHENERGAN) tablet 12.5-25 mg  Active   senna-docusate (PERICOLACE or SENOKOT S) 8.6-50 MG per tablet 2 Tablet  Active   sulindac (CLINORIL) 200 MG Tab PRN Active   vancomycin (VANCOCIN) 750 mg in  mL IVPB  Active                ALLERGIES  No Known Allergies    FAMILY HISTORY  No pertinent family history    PHYSICAL EXAM   /58   Pulse 99   Temp 37.2 °C (99 °F) (Temporal)   Resp 18   Ht 1.448 m (4' 9\")   Wt 65.5 kg (144 lb 6.4 oz)   SpO2 98%   BMI 31.25 kg/m²  @TATIANNA[[id number]::@   Pulse ox interpretation: I interpret this pulse ox as normal.  VITALS - vital signs documented prior to this note have been reviewed and noted,  GENERAL - awake, " alert, oriented, GCS 15, no apparent distress, non-toxic  appearing  HEENT - normocephalic, atraumatic, pupils equal, sclera anicteric, mucus  membranes moist  NECK - supple, no meningismus, full active range of motion, trachea midline  CARDIOVASCULAR - tachycardic regular rate/rhythm, no murmurs/gallops/rubs  PULMONARY - no respiratory distress, speaking in full sentences, clear to  auscultation bilaterally, no wheezing/ronchi/rales, no accessory muscle use  GASTROINTESTINAL - soft, non-tender, non-distended, no rebound, guarding,  or peritonitis  GENITOURINARY - Deferred  NEUROLOGIC - Awake alert, normal mental status, speech fluid, cognition  normal, moves all extremities  MUSCULOSKELETAL - no obvious asymmetry or deformities bilateral knees have effusions, right knee has pain with passive range of motion though no overlying erythema, or tenderness  EXTREMITIES - warm, well-perfused, no cyanosis or significant edema knee joint has a palpable effusion as well as an area of tenderness and fluctuance in the popliteal fossa pain with passive range of motion though no overlying erythema of the knee joint.  DERMATOLOGIC - warm, dry, no rashes, no jaundice  PSYCHIATRIC - normal affect, normal insight, normal concentration            EKG  EKG shows a sinus tachycardia at a rate of 146 with an otherwise normal VT QRS QTc interval no ST elevation depression T wave inversion to suggest anemia normal axis interpretation is sinus tachycardia as interpreted by myself.  LABS  Labs Reviewed   LACTIC ACID - Abnormal; Notable for the following components:       Result Value    Lactic Acid 3.1 (*)     All other components within normal limits   LACTIC ACID - Abnormal; Notable for the following components:    Lactic Acid 3.0 (*)     All other components within normal limits    Narrative:     Repeat if initial lactic acid result is greater than 2   LACTIC ACID - Abnormal; Notable for the following components:    Lactic Acid 2.4 (*)      All other components within normal limits    Narrative:     Repeat if initial lactic acid result is greater than 2   CBC WITH DIFFERENTIAL - Abnormal; Notable for the following components:    WBC 16.4 (*)     Neutrophils-Polys 95.80 (*)     Lymphocytes 1.30 (*)     Neutrophils (Absolute) 15.67 (*)     Lymphs (Absolute) 0.21 (*)     All other components within normal limits   COMP METABOLIC PANEL - Abnormal; Notable for the following components:    Sodium 132 (*)     Co2 17 (*)     Anion Gap 17.0 (*)     Glucose 219 (*)     AST(SGOT) 9 (*)     Alkaline Phosphatase 136 (*)     All other components within normal limits   URINALYSIS - Abnormal; Notable for the following components:    Glucose >=1000 (*)     Ketones 15 (*)     All other components within normal limits    Narrative:     Indication for culture:->Evaluation for sepsis without a  clear source of infection   PROTHROMBIN TIME - Abnormal; Notable for the following components:    PT 15.3 (*)     INR 1.25 (*)     All other components within normal limits    Narrative:     If not done within the last 4 hours  Indicate which anticoagulants the patient is on:->UNKNOWN   CRP QUANTITIVE (NON-CARDIAC) - Abnormal; Notable for the following components:    Stat C-Reactive Protein 5.03 (*)     All other components within normal limits    Narrative:     If not done within the last 4 hours  Indicate which anticoagulants the patient is on:->UNKNOWN   COMP METABOLIC PANEL - Abnormal; Notable for the following components:    Sodium 134 (*)     Co2 17 (*)     Glucose 240 (*)     Creatinine 0.46 (*)     Albumin 2.9 (*)     Total Protein 5.5 (*)     All other components within normal limits   LACTIC ACID - Abnormal; Notable for the following components:    Lactic Acid 2.6 (*)     All other components within normal limits   CBC WITH DIFFERENTIAL - Abnormal; Notable for the following components:    WBC 24.8 (*)     RBC 3.22 (*)     Hemoglobin 9.9 (*)     Hematocrit 30.0 (*)      "MCHC 33.0 (*)     RDW 50.6 (*)     Neutrophils-Polys 91.00 (*)     Lymphocytes 2.80 (*)     Immature Granulocytes 1.40 (*)     Neutrophils (Absolute) 22.57 (*)     Lymphs (Absolute) 0.70 (*)     Monos (Absolute) 1.15 (*)     Immature Granulocytes (abs) 0.35 (*)     All other components within normal limits    Narrative:     SPECIMEN IS A RECOLLECT   POCT GLUCOSE DEVICE RESULTS - Abnormal; Notable for the following components:    POC Glucose, Blood 267 (*)     All other components within normal limits   POCT GLUCOSE DEVICE RESULTS - Abnormal; Notable for the following components:    POC Glucose, Blood 224 (*)     All other components within normal limits   POCT GLUCOSE DEVICE RESULTS - Abnormal; Notable for the following components:    POC Glucose, Blood 110 (*)     All other components within normal limits   POCT GLUCOSE DEVICE RESULTS - Abnormal; Notable for the following components:    POC Glucose, Blood 114 (*)     All other components within normal limits   POCT GLUCOSE DEVICE RESULTS - Abnormal; Notable for the following components:    POC Glucose, Blood 151 (*)     All other components within normal limits   BLOOD CULTURE    Narrative:     Per Hospital Policy: Only change Specimen Src: to \"Line\" if  specified by physician order.  Left AC   BLOOD CULTURE    Narrative:     Per Hospital Policy: Only change Specimen Src: to \"Line\" if  specified by physician order.  Left AC   ESTIMATED GFR   COV-2 AND FLU A/B BY PCR (ROCHE/TF)   LACTIC ACID   MRSA BY PCR (AMP)   LACTIC ACID   ESTIMATED GFR   URINE CULTURE(NEW)    Narrative:     Indication for culture:->Evaluation for sepsis without a  clear source of infection   CULTURE TISSUE W/ GRM STAIN    Narrative:     Surgery Specimen   ANAEROBIC CULTURE    Narrative:     Surgery Specimen   CBC WITH DIFFERENTIAL   BASIC METABOLIC PANEL       Results for orders placed or performed during the hospital encounter of 05/10/22   Lactic acid (lactate)   Result Value Ref Range    " Lactic Acid 3.1 (H) 0.5 - 2.0 mmol/L   Lactic acid (lactate): Repeat if initial lactic acid result is greater than 2   Result Value Ref Range    Lactic Acid 3.0 (H) 0.5 - 2.0 mmol/L   Lactic acid (lactate): Repeat if initial lactic acid result is greater than 2   Result Value Ref Range    Lactic Acid 2.4 (H) 0.5 - 2.0 mmol/L   CBC WITH DIFFERENTIAL   Result Value Ref Range    WBC 16.4 (H) 4.8 - 10.8 K/uL    RBC 4.35 4.20 - 5.40 M/uL    Hemoglobin 13.1 12.0 - 16.0 g/dL    Hematocrit 38.2 37.0 - 47.0 %    MCV 87.8 81.4 - 97.8 fL    MCH 30.1 27.0 - 33.0 pg    MCHC 34.3 33.6 - 35.0 g/dL    RDW 47.1 35.9 - 50.0 fL    Platelet Count 346 164 - 446 K/uL    MPV 9.5 9.0 - 12.9 fL    Neutrophils-Polys 95.80 (H) 44.00 - 72.00 %    Lymphocytes 1.30 (L) 22.00 - 41.00 %    Monocytes 1.30 0.00 - 13.40 %    Eosinophils 0.80 0.00 - 6.90 %    Basophils 0.30 0.00 - 1.80 %    Immature Granulocytes 0.50 0.00 - 0.90 %    Nucleated RBC 0.00 /100 WBC    Neutrophils (Absolute) 15.67 (H) 2.00 - 7.15 K/uL    Lymphs (Absolute) 0.21 (L) 1.00 - 4.80 K/uL    Monos (Absolute) 0.22 0.00 - 0.85 K/uL    Eos (Absolute) 0.13 0.00 - 0.51 K/uL    Baso (Absolute) 0.05 0.00 - 0.12 K/uL    Immature Granulocytes (abs) 0.09 0.00 - 0.11 K/uL    NRBC (Absolute) 0.00 K/uL   COMP METABOLIC PANEL   Result Value Ref Range    Sodium 132 (L) 135 - 145 mmol/L    Potassium 3.8 3.6 - 5.5 mmol/L    Chloride 98 96 - 112 mmol/L    Co2 17 (L) 20 - 33 mmol/L    Anion Gap 17.0 (H) 7.0 - 16.0    Glucose 219 (H) 65 - 99 mg/dL    Bun 17 8 - 22 mg/dL    Creatinine 0.61 0.50 - 1.40 mg/dL    Calcium 9.6 8.5 - 10.5 mg/dL    AST(SGOT) 9 (L) 12 - 45 U/L    ALT(SGPT) 7 2 - 50 U/L    Alkaline Phosphatase 136 (H) 30 - 99 U/L    Total Bilirubin 0.8 0.1 - 1.5 mg/dL    Albumin 3.8 3.2 - 4.9 g/dL    Total Protein 7.2 6.0 - 8.2 g/dL    Globulin 3.4 1.9 - 3.5 g/dL    A-G Ratio 1.1 g/dL   URINALYSIS    Specimen: Urine   Result Value Ref Range    Color Yellow     Character Clear     Specific  Gravity 1.043 <1.035    Ph 5.0 5.0 - 8.0    Glucose >=1000 (A) Negative mg/dL    Ketones 15 (A) Negative mg/dL    Protein Negative Negative mg/dL    Bilirubin Negative Negative    Urobilinogen, Urine 0.2 Negative    Nitrite Negative Negative    Leukocyte Esterase Negative Negative    Occult Blood Negative Negative    Micro Urine Req see below    BLOOD CULTURE    Specimen: Peripheral; Blood   Result Value Ref Range    Significant Indicator NEG     Source BLD     Site PERIPHERAL     Culture Result       No Growth  Note: Blood cultures are incubated for 5 days and  are monitored continuously.Positive blood cultures  are called to the RN and reported as soon as  they are identified.     BLOOD CULTURE    Specimen: Peripheral; Blood   Result Value Ref Range    Significant Indicator NEG     Source BLD     Site PERIPHERAL     Culture Result       No Growth  Note: Blood cultures are incubated for 5 days and  are monitored continuously.Positive blood cultures  are called to the RN and reported as soon as  they are identified.     ESTIMATED GFR   Result Value Ref Range    GFR (CKD-EPI) 104 >60 mL/min/1.73 m 2   CoV-2 and Flu A/B by PCR (24 hour In-House): Collect NP swab in [facility]    Specimen: Nasopharyngeal; Respirate   Result Value Ref Range    Influenza virus A RNA Negative Negative    Influenza virus B, PCR Negative Negative    SARS-CoV-2 by PCR NotDetected     SARS-CoV-2 Source NP Swab    Lactic Acid Every four hours after STAT order   Result Value Ref Range    Lactic Acid 1.1 0.5 - 2.0 mmol/L   Prothrombin time (INR)   Result Value Ref Range    PT 15.3 (H) 12.0 - 14.6 sec    INR 1.25 (H) 0.87 - 1.13   CRP QUANTITIVE (NON-CARDIAC)   Result Value Ref Range    Stat C-Reactive Protein 5.03 (H) 0.00 - 0.75 mg/dL   Comp Metabolic Panel (CMP)   Result Value Ref Range    Sodium 134 (L) 135 - 145 mmol/L    Potassium 3.9 3.6 - 5.5 mmol/L    Chloride 103 96 - 112 mmol/L    Co2 17 (L) 20 - 33 mmol/L    Anion Gap 14.0 7.0 - 16.0     Glucose 240 (H) 65 - 99 mg/dL    Bun 11 8 - 22 mg/dL    Creatinine 0.46 (L) 0.50 - 1.40 mg/dL    Calcium 8.5 8.5 - 10.5 mg/dL    AST(SGOT) 13 12 - 45 U/L    ALT(SGPT) 7 2 - 50 U/L    Alkaline Phosphatase 84 30 - 99 U/L    Total Bilirubin 0.6 0.1 - 1.5 mg/dL    Albumin 2.9 (L) 3.2 - 4.9 g/dL    Total Protein 5.5 (L) 6.0 - 8.2 g/dL    Globulin 2.6 1.9 - 3.5 g/dL    A-G Ratio 1.1 g/dL   MRSA By PCR (Amp)    Specimen: Nares; Respirate   Result Value Ref Range    MRSA by PCR Negative Negative   Lactic Acid Every four hours after STAT order   Result Value Ref Range    Lactic Acid 1.5 0.5 - 2.0 mmol/L   Lactic Acid Every four hours after STAT order   Result Value Ref Range    Lactic Acid 2.6 (H) 0.5 - 2.0 mmol/L   CBC WITH DIFFERENTIAL   Result Value Ref Range    WBC 24.8 (H) 4.8 - 10.8 K/uL    RBC 3.22 (L) 4.20 - 5.40 M/uL    Hemoglobin 9.9 (L) 12.0 - 16.0 g/dL    Hematocrit 30.0 (L) 37.0 - 47.0 %    MCV 93.2 81.4 - 97.8 fL    MCH 30.7 27.0 - 33.0 pg    MCHC 33.0 (L) 33.6 - 35.0 g/dL    RDW 50.6 (H) 35.9 - 50.0 fL    Platelet Count 263 164 - 446 K/uL    MPV 9.6 9.0 - 12.9 fL    Neutrophils-Polys 91.00 (H) 44.00 - 72.00 %    Lymphocytes 2.80 (L) 22.00 - 41.00 %    Monocytes 4.60 0.00 - 13.40 %    Eosinophils 0.00 0.00 - 6.90 %    Basophils 0.20 0.00 - 1.80 %    Immature Granulocytes 1.40 (H) 0.00 - 0.90 %    Nucleated RBC 0.00 /100 WBC    Neutrophils (Absolute) 22.57 (H) 2.00 - 7.15 K/uL    Lymphs (Absolute) 0.70 (L) 1.00 - 4.80 K/uL    Monos (Absolute) 1.15 (H) 0.00 - 0.85 K/uL    Eos (Absolute) 0.00 0.00 - 0.51 K/uL    Baso (Absolute) 0.04 0.00 - 0.12 K/uL    Immature Granulocytes (abs) 0.35 (H) 0.00 - 0.11 K/uL    NRBC (Absolute) 0.00 K/uL   ESTIMATED GFR   Result Value Ref Range    GFR (CKD-EPI) 111 >60 mL/min/1.73 m 2   CULTURE TISSUE W/ GRM STAIN    Specimen: Tissue   Result Value Ref Range    Significant Indicator NEG     Source TISS     Site right knee     Culture Result -     Gram Stain Result -    Anaerobic  Culture    Specimen: Tissue   Result Value Ref Range    Significant Indicator NEG     Source TISS     Site right knee     Culture Result -    EKG   Result Value Ref Range    Report       [facility]    Test Date:  2022-05-10  Pt Name:    MIRTA FORD        Department: ER  MRN:        [id number]                      Room:  Gender:     Female                       Technician: [id number]  :        [id number]                   Requested By:ER TRIAGE PROTOCOL  Order #:    [id number]                    Reading MD: Francisco Campoverde    Measurements  Intervals                                Axis  Rate:       146                          P:          11  FL:         97                           QRS:        195  QRSD:       106                          T:          13  QT:         311  QTc:        485    Interpretive Statements  Sinus tachycardia  Probable right ventricular hypertrophy  Inferior infarct, old  No previous ECG available for comparison  Electronically Signed On 5- 20:44:38 PDT by Francisco Campoverde     POCT glucose device results   Result Value Ref Range    POC Glucose, Blood 267 (H) 65 - 99 mg/dL   POCT glucose device results   Result Value Ref Range    POC Glucose, Blood 224 (H) 65 - 99 mg/dL   POCT glucose device results   Result Value Ref Range    POC Glucose, Blood 110 (H) 65 - 99 mg/dL   POCT glucose device results   Result Value Ref Range    POC Glucose, Blood 114 (H) 65 - 99 mg/dL   POCT glucose device results   Result Value Ref Range    POC Glucose, Blood 151 (H) 65 - 99 mg/dL         Pertinent Labs & Imaging studies reviewed. (See chart for details)    RADIOLOGY  CT-KNEE WITH RIGHT   Final Result         1. Moderate knee joint effusion with thick synovial enhancement, concerning for infection      2. There is gas within the multilocular fluid collection in the popliteal fossa, concerning for infected Baker's cyst/abscess.      3. Soft tissue gas in the lateral  leg as well.      DX-KNEE 2- RIGHT   Final Result         Moderate right knee joint effusion.      Questionable soft tissue gas seen posterior to the distal femur on lateral view.      DX-CHEST-PORTABLE (1 VIEW)   Final Result         1.  There are mild perihilar opacifications which could be due to edema or bronchitis.      2.  No consolidations identified.                ED COURSE       Informed consent in regards to a bedside arthrocentesis was obtained risks discussed included worsening pain worsening infection, injury to nerves arteries vesicles and any other unforeseen complication.  Patient did feel comfortable proceeding with the procedure.  As such the patient was prepped in the normal sterile fashion and prepped with Betadine copiously.  She is anesthetized with 5 cc of 1% lidocaine, initially inject attempted to aspirate the effusion though the patient began to move her knee and was unable to tolerate the knee aspiration, she was given morphine and Dilaudid additional local anesthesia, and still was unable to tolerate the aspiration secondary to pain as such the attempts were aborted.      Medications   senna-docusate (PERICOLACE or SENOKOT S) 8.6-50 MG per tablet 2 Tablet ( Oral MAR Unhold 5/11/22 2018)     And   polyethylene glycol/lytes (MIRALAX) PACKET 1 Packet ( Oral MAR Unhold 5/11/22 2018)     And   magnesium hydroxide (MILK OF MAGNESIA) suspension 30 mL ( Oral MAR Unhold 5/11/22 2018)     And   bisacodyl (DULCOLAX) suppository 10 mg ( Rectal MAR Unhold 5/11/22 2018)   enoxaparin (Lovenox) inj 40 mg ( Subcutaneous MAR Unhold 5/11/22 2018)   acetaminophen (Tylenol) tablet 650 mg ( Oral MAR Unhold 5/11/22 2018)   hydrALAZINE (APRESOLINE) injection 10 mg ( Intravenous MAR Unhold 5/11/22 2018)   ondansetron (ZOFRAN) syringe/vial injection 4 mg ( Intravenous MAR Unhold 5/11/22 2018)   ondansetron (ZOFRAN ODT) dispertab 4 mg ( Oral MAR Unhold 5/11/22 2018)   promethazine (PHENERGAN) tablet 12.5-25 mg (  Oral MAR Unhold 5/11/22 2018)   promethazine (PHENERGAN) suppository 12.5-25 mg ( Rectal MAR Unhold 5/11/22 2018)   prochlorperazine (COMPAZINE) injection 5-10 mg ( Intravenous MAR Unhold 5/11/22 2018)   NS infusion ( Intravenous Restarted 5/11/22 2119)   MD Alert...Vancomycin per Pharmacy ( Other MAR Unhold 5/11/22 2018)   atorvastatin (LIPITOR) tablet 20 mg (20 mg Oral Given 5/11/22 2112)   folic acid (FOLVITE) tablet 1 mg ( Oral MAR Unhold 5/11/22 2018)   lisinopril (PRINIVIL) tablet 10 mg ( Oral MAR Unhold 5/11/22 2018)   ketorolac (TORADOL) injection 30 mg (30 mg Intravenous Given 5/11/22 2111)   insulin regular (HumuLIN R,NovoLIN R) injection (2 Units Subcutaneous Given 5/11/22 2123)     And   dextrose 50% (D50W) injection 25 g ( Intravenous MAR Unhold 5/11/22 2018)   clindamycin (CLEOCIN) IVPB premix 900 mg (900 mg Intravenous New Bag 5/11/22 2302)   vancomycin (VANCOCIN) 750 mg in  mL IVPB ( Intravenous MAR Unhold 5/11/22 2018)   piperacillin-tazobactam (ZOSYN) 4.5 g in  mL IVPB (0 g Intravenous Stopped 5/11/22 0110)     And   piperacillin-tazobactam (ZOSYN) 4.5 g in  mL IVPB (4.5 g Intravenous New Bag 5/11/22 2144)   cefTRIAXone (Rocephin) injection 1 g (1 g Intravenous Given 5/10/22 2015)   lidocaine-EPINEPHrine 2%-1:726014 injection 20 mL (20 mL Injection Given 5/10/22 2020)   morphine 4 MG/ML injection 4 mg (4 mg Intravenous Given 5/10/22 2030)   NS (BOLUS) infusion 2,000 mL (2,000 mL Intravenous New Bag 5/10/22 2045)   HYDROmorphone (Dilaudid) injection 1 mg (1 mg Intravenous Given 5/10/22 2128)   acetaminophen (Tylenol) tablet 650 mg (650 mg Oral Given 5/10/22 2143)   vancomycin (VANCOCIN) 1,500 mg in  mL IVPB (0 mg Intravenous Stopped 5/11/22 5805)   iohexol (OMNIPAQUE) 350 mg/mL (IV) (80 mL Intravenous Given 5/10/22 5440)   oxyCODONE (ROXICODONE) oral solution 5 mg ( Oral See Alternative 5/11/22 5374)     Or   oxyCODONE (ROXICODONE) oral solution 10 mg (10 mg Oral Given  5/11/22 1833)               MEDICAL DECISION MAKING    Presented for evaluation of knee pain.  On examination she does have a joint effusion of her right knee, after obtaining informed consent, attempted to aspirate the knee though the patient was unable to tolerate the procedure secondary to pain despite multiple rounds of pain medications, as well as local infiltration.  The knee does have pain with passive range of motion, this is concerning for a septic arthritis, she did receive a sepsis work-up, and was given broad-spectrum antibiotics empirically and IV fluids.  After fluid resuscitation, cap refill is less than 2 seconds, her blood pressure was stable though she did remain tachycardic, there is no hypotension no indication for vasopressors..  Orthopedics was consulted for concerns of a septic arthritis, graciously agreed to evaluate the patient. patient was admitted to medicine for further care and evaluation of a suspected septic arthritis possible infectious bakers cyst patient is admitted for further care and evaluation    Critical care    Critical Care Procedure Note    Total critical care time: Approximately 36 minutes    Upon my assess due to a high probability of clinically significant, life threatening deterioration, secondary to sepsis syndrome the patient required my direct attention and intervention. This critical care time included obtaining a history; examining the patient; pulse oximetry; ordering and review of studies; arranging urgent treatment with development of a management plan; evaluation of patient's response to treatment; frequent reassessment; and, discussions with other providers.    was exclusive of separately billable procedures and treating other patients and teaching time.      FINAL IMPRESSION  1.  Septic arthritis  2.  Sepsis  3.  Dehydration         Electronically signed by: Francisco Rivera D.O., 5/10/2022 9:02 PM      Dictation Disclaimer  Please note this report has  been produced using speech recognition software and  may contain errors related to that system, including errors seen in grammar,  punctuation and spelling, as well as words and phrases that may be inappropriate.  If there are any questions or concerns, please feel free to contact the dictating  physician for clarification.

## 2022-05-11 NOTE — ASSESSMENT & PLAN NOTE
On methotrexate and Plaquenil for seropositive rheumatoid arthritis.  We will place on hold due to acute infection.

## 2022-05-11 NOTE — ED TRIAGE NOTES
"Chief Complaint   Patient presents with   • Weakness     Patient reports weakness worsening today at 2pm   • Leg Pain     Patient reports severe bilateral leg pain that makes it difficult to walk   • Shortness of Breath     Patient reports slight intermittent SOB       55 yo female to triage for above complaint. Patient reports today she has been unable to walk due to pain and weakness.   Sepsis score 3. Protocol ordered.    Pt is alert and oriented, speaking in full sentences, follows commands and responds appropriately to questions.     Patient placed back in lobby and educated on triage process. Asked to inform RN of any changes.    /66   Pulse (!) 155   Temp (!) 38.9 °C (102 °F) (Temporal)   Resp (!) 24   Ht 1.448 m (4' 9\")   Wt 61.2 kg (135 lb)   SpO2 93%   BMI 29.21 kg/m²     "

## 2022-05-11 NOTE — ED NOTES
Pt awake and alert. Respirations unlabored and even. No acute distress noted. Pt feels warm to touch. Pt reports pain to right knee area.

## 2022-05-11 NOTE — ASSESSMENT & PLAN NOTE
This is Sepsis Present on admission. SIRS criteria identified on my evaluation include: Fever, with temperature greater than 101 deg F, Tachypnea, with respirations greater than 20 per minute and Leukocytosis, with WBC greater than 12,000.Source is septic arthritis.Sepsis protocol initiated. Fluid resuscitation ordered per protocol. Crystalloid Fluid Administration: Fluid resuscitation ordered per standard protocol - 30 mL/kg per current or ideal body weight.IV antibiotics as appropriate for source of sepsis. Reassessment: I have reassessed the patient's hemodynamic status.

## 2022-05-12 PROBLEM — M79.89 LEFT ARM SWELLING: Status: ACTIVE | Noted: 2022-05-12

## 2022-05-12 LAB
ANION GAP SERPL CALC-SCNC: 8 MMOL/L (ref 7–16)
BASOPHILS # BLD AUTO: 0.4 % (ref 0–1.8)
BASOPHILS # BLD: 0.08 K/UL (ref 0–0.12)
BUN SERPL-MCNC: 12 MG/DL (ref 8–22)
CALCIUM SERPL-MCNC: 8.2 MG/DL (ref 8.5–10.5)
CHLORIDE SERPL-SCNC: 102 MMOL/L (ref 96–112)
CO2 SERPL-SCNC: 24 MMOL/L (ref 20–33)
CREAT SERPL-MCNC: 0.51 MG/DL (ref 0.5–1.4)
EOSINOPHIL # BLD AUTO: 0.06 K/UL (ref 0–0.51)
EOSINOPHIL NFR BLD: 0.3 % (ref 0–6.9)
ERYTHROCYTE [DISTWIDTH] IN BLOOD BY AUTOMATED COUNT: 53.2 FL (ref 35.9–50)
GFR SERPLBLD CREATININE-BSD FMLA CKD-EPI: 108 ML/MIN/1.73 M 2
GLUCOSE BLD STRIP.AUTO-MCNC: 126 MG/DL (ref 65–99)
GLUCOSE BLD STRIP.AUTO-MCNC: 150 MG/DL (ref 65–99)
GLUCOSE BLD STRIP.AUTO-MCNC: 152 MG/DL (ref 65–99)
GLUCOSE BLD STRIP.AUTO-MCNC: 186 MG/DL (ref 65–99)
GLUCOSE SERPL-MCNC: 135 MG/DL (ref 65–99)
GRAM STN SPEC: NORMAL
HCT VFR BLD AUTO: 29.6 % (ref 37–47)
HGB BLD-MCNC: 9.4 G/DL (ref 12–16)
IMM GRANULOCYTES # BLD AUTO: 0.29 K/UL (ref 0–0.11)
IMM GRANULOCYTES NFR BLD AUTO: 1.5 % (ref 0–0.9)
LYMPHOCYTES # BLD AUTO: 1.26 K/UL (ref 1–4.8)
LYMPHOCYTES NFR BLD: 6.6 % (ref 22–41)
MCH RBC QN AUTO: 30 PG (ref 27–33)
MCHC RBC AUTO-ENTMCNC: 31.8 G/DL (ref 33.6–35)
MCV RBC AUTO: 94.6 FL (ref 81.4–97.8)
MONOCYTES # BLD AUTO: 0.97 K/UL (ref 0–0.85)
MONOCYTES NFR BLD AUTO: 5.1 % (ref 0–13.4)
NEUTROPHILS # BLD AUTO: 16.52 K/UL (ref 2–7.15)
NEUTROPHILS NFR BLD: 86.1 % (ref 44–72)
NRBC # BLD AUTO: 0 K/UL
NRBC BLD-RTO: 0 /100 WBC
PLATELET # BLD AUTO: 245 K/UL (ref 164–446)
PMV BLD AUTO: 9.7 FL (ref 9–12.9)
POTASSIUM SERPL-SCNC: 3.9 MMOL/L (ref 3.6–5.5)
RBC # BLD AUTO: 3.13 M/UL (ref 4.2–5.4)
SIGNIFICANT IND 70042: NORMAL
SITE SITE: NORMAL
SODIUM SERPL-SCNC: 134 MMOL/L (ref 135–145)
SOURCE SOURCE: NORMAL
WBC # BLD AUTO: 19.2 K/UL (ref 4.8–10.8)

## 2022-05-12 PROCEDURE — 82962 GLUCOSE BLOOD TEST: CPT

## 2022-05-12 PROCEDURE — 80048 BASIC METABOLIC PNL TOTAL CA: CPT

## 2022-05-12 PROCEDURE — 85025 COMPLETE CBC W/AUTO DIFF WBC: CPT

## 2022-05-12 PROCEDURE — A9270 NON-COVERED ITEM OR SERVICE: HCPCS | Performed by: HOSPITALIST

## 2022-05-12 PROCEDURE — 700111 HCHG RX REV CODE 636 W/ 250 OVERRIDE (IP): Performed by: INTERNAL MEDICINE

## 2022-05-12 PROCEDURE — 770004 HCHG ROOM/CARE - ONCOLOGY PRIVATE *

## 2022-05-12 PROCEDURE — 700102 HCHG RX REV CODE 250 W/ 637 OVERRIDE(OP): Performed by: HOSPITALIST

## 2022-05-12 PROCEDURE — 700105 HCHG RX REV CODE 258: Performed by: INTERNAL MEDICINE

## 2022-05-12 PROCEDURE — 700102 HCHG RX REV CODE 250 W/ 637 OVERRIDE(OP): Performed by: INTERNAL MEDICINE

## 2022-05-12 PROCEDURE — A9270 NON-COVERED ITEM OR SERVICE: HCPCS | Performed by: INTERNAL MEDICINE

## 2022-05-12 PROCEDURE — 36415 COLL VENOUS BLD VENIPUNCTURE: CPT

## 2022-05-12 PROCEDURE — 99233 SBSQ HOSP IP/OBS HIGH 50: CPT | Performed by: INTERNAL MEDICINE

## 2022-05-12 RX ORDER — OXYCODONE HYDROCHLORIDE 5 MG/1
5 TABLET ORAL
Status: DISCONTINUED | OUTPATIENT
Start: 2022-05-12 | End: 2022-06-01 | Stop reason: HOSPADM

## 2022-05-12 RX ORDER — OXYCODONE HYDROCHLORIDE 10 MG/1
10 TABLET ORAL
Status: DISCONTINUED | OUTPATIENT
Start: 2022-05-12 | End: 2022-06-01 | Stop reason: HOSPADM

## 2022-05-12 RX ORDER — MORPHINE SULFATE 4 MG/ML
4 INJECTION INTRAVENOUS
Status: DISCONTINUED | OUTPATIENT
Start: 2022-05-12 | End: 2022-06-01 | Stop reason: HOSPADM

## 2022-05-12 RX ADMIN — KETOROLAC TROMETHAMINE 30 MG: 30 INJECTION, SOLUTION INTRAMUSCULAR at 15:43

## 2022-05-12 RX ADMIN — KETOROLAC TROMETHAMINE 30 MG: 30 INJECTION, SOLUTION INTRAMUSCULAR at 23:34

## 2022-05-12 RX ADMIN — INSULIN HUMAN 2 UNITS: 100 INJECTION, SOLUTION PARENTERAL at 16:02

## 2022-05-12 RX ADMIN — INSULIN HUMAN 2 UNITS: 100 INJECTION, SOLUTION PARENTERAL at 21:08

## 2022-05-12 RX ADMIN — KETOROLAC TROMETHAMINE 30 MG: 30 INJECTION, SOLUTION INTRAMUSCULAR at 03:39

## 2022-05-12 RX ADMIN — SODIUM CHLORIDE: 9 INJECTION, SOLUTION INTRAVENOUS at 23:08

## 2022-05-12 RX ADMIN — SENNOSIDES AND DOCUSATE SODIUM 2 TABLET: 50; 8.6 TABLET ORAL at 05:02

## 2022-05-12 RX ADMIN — LISINOPRIL 10 MG: 10 TABLET ORAL at 05:02

## 2022-05-12 RX ADMIN — ENOXAPARIN SODIUM 40 MG: 40 INJECTION SUBCUTANEOUS at 05:03

## 2022-05-12 RX ADMIN — VANCOMYCIN HYDROCHLORIDE 750 MG: 500 INJECTION, POWDER, LYOPHILIZED, FOR SOLUTION INTRAVENOUS at 15:59

## 2022-05-12 RX ADMIN — KETOROLAC TROMETHAMINE 30 MG: 30 INJECTION, SOLUTION INTRAMUSCULAR at 09:32

## 2022-05-12 RX ADMIN — ATORVASTATIN CALCIUM 20 MG: 20 TABLET, FILM COATED ORAL at 17:35

## 2022-05-12 RX ADMIN — VANCOMYCIN HYDROCHLORIDE 750 MG: 500 INJECTION, POWDER, LYOPHILIZED, FOR SOLUTION INTRAVENOUS at 02:12

## 2022-05-12 RX ADMIN — PIPERACILLIN AND TAZOBACTAM 4.5 G: 4; .5 INJECTION, POWDER, FOR SOLUTION INTRAVENOUS at 05:00

## 2022-05-12 RX ADMIN — SENNOSIDES AND DOCUSATE SODIUM 2 TABLET: 50; 8.6 TABLET ORAL at 17:35

## 2022-05-12 RX ADMIN — PIPERACILLIN AND TAZOBACTAM 4.5 G: 4; .5 INJECTION, POWDER, FOR SOLUTION INTRAVENOUS at 11:51

## 2022-05-12 RX ADMIN — OXYCODONE 5 MG: 5 TABLET ORAL at 20:15

## 2022-05-12 RX ADMIN — PIPERACILLIN AND TAZOBACTAM 4.5 G: 4; .5 INJECTION, POWDER, FOR SOLUTION INTRAVENOUS at 18:11

## 2022-05-12 RX ADMIN — FOLIC ACID 1 MG: 1 TABLET ORAL at 05:03

## 2022-05-12 ASSESSMENT — COGNITIVE AND FUNCTIONAL STATUS - GENERAL
MOBILITY SCORE: 12
HELP NEEDED FOR BATHING: A LOT
DAILY ACTIVITIY SCORE: 13
TOILETING: A LOT
TURNING FROM BACK TO SIDE WHILE IN FLAT BAD: A LOT
DRESSING REGULAR UPPER BODY CLOTHING: A LOT
SUGGESTED CMS G CODE MODIFIER MOBILITY: CL
WALKING IN HOSPITAL ROOM: A LOT
SUGGESTED CMS G CODE MODIFIER DAILY ACTIVITY: CL
PERSONAL GROOMING: A LOT
DRESSING REGULAR LOWER BODY CLOTHING: A LOT
EATING MEALS: A LITTLE
MOVING FROM LYING ON BACK TO SITTING ON SIDE OF FLAT BED: A LOT
STANDING UP FROM CHAIR USING ARMS: A LOT
MOVING TO AND FROM BED TO CHAIR: A LOT
CLIMB 3 TO 5 STEPS WITH RAILING: A LOT

## 2022-05-12 ASSESSMENT — PAIN DESCRIPTION - PAIN TYPE
TYPE: ACUTE PAIN

## 2022-05-12 NOTE — CARE PLAN
The patient is Watcher - Medium risk of patient condition declining or worsening    Shift Goals  Clinical Goals: IV Abx; pain control; monitor surgical site  Patient Goals: pain control; rest  Family Goals: N/A    Progress made toward(s) clinical / shift goals: IV Abx have been administered; pain has been controlled; surgical site has been monitored    Patient is not progressing towards the following goals: N/A      Problem: Knowledge Deficit - Standard  Goal: Patient and family/care givers will demonstrate understanding of plan of care, disease process/condition, diagnostic tests and medications  Outcome: Progressing     Problem: Respiratory  Goal: Patient will achieve/maintain optimum respiratory ventilation and gas exchange  Outcome: Progressing     Problem: Pain - Standard  Goal: Alleviation of pain or a reduction in pain to the patient’s comfort goal  Outcome: Progressing     Problem: Fall Risk  Goal: Patient will remain free from falls  Outcome: Progressing       Pt is alert and oriented x 4; is aware and understands plan of care; all questions have been answered at this time. Pain has been frequently assessed; pt is able to verbalize needs and makes them known. Pain has been controlled with PRN medications. Pt's bed is locked in th lowest position and refuses bed alarm at this time. Pt educated on the need to call fr assistance; pt calls appropriately.

## 2022-05-12 NOTE — ANESTHESIA TIME REPORT
Anesthesia Start and Stop Event Times     Date Time Event    5/11/2022 1717 Ready for Procedure     1733 Anesthesia Start     1825 Anesthesia Stop        Responsible Staff  05/11/22    Name Role Begin End    Skip Estes M.D. Anesth 1733 1825        Overtime Reason:  no overtime (within assigned shift)    Comments: ZITA FIRST CALL

## 2022-05-12 NOTE — DISCHARGE PLANNING
TCN following. HTH/SCP chart review completed. Mbr is S/P Right knee arthrotomy and drainage of deep infection, incision and drainage of right thigh abscess 5/11/2022.  Collaborated with RANGEL mckeon, possible Home w/ HH.     Previously completed:  Choice forms obtained: HH, Infusion  [facility] introduced ( Y) Referral ( sent)

## 2022-05-12 NOTE — PROGRESS NOTES
Hospital Medicine Daily Progress Note    Date of Service  5/12/2022    Chief Complaint  Kary Shah is a 56 y.o. female admitted 5/10/2022 with 1 week history of fevers and chills, with worsening chronic right knee pain and swelling. She has seropositive rheumatoid arthritis (on methotrexate and Plaquenil), type 2 diabetes, osteoarthritis, essential hypertension, and GERD.     Hospital Course  On admission,labs showed leukocytosis (16.4 K), sodium was 132, anion gap was 17.0, lactic acid was 3.1.      Right knee x-ray showed moderate right knee joint effusion with questionable soft tissue gas seen posterior to the distal femur.     CT right knee showed joint effusion with thick synovial enhancement, concerning for infection. There is gas within the multilocular fluid collection in the popliteal fossa, concerning for infected Baker's cyst/abscess.     ER physician attempted arthrocentesis but was unsuccessful. Orthopedic surgery on-call recommended starting IV antibiotics with formal consult in the morning. Vancomycin, Clindamycin and Zosyn were empirically started.  Lactic acid improved to 1.5.    Discussed with and consulted orthopedic surgery.     Interval Problem Update  Patient underwent right knee arthrotomy and drainage of deep infection, incision and drainage of right thigh abscess on 5/11/2022.  She has 2 drains in place.  Blood cultures x2 and I&D cultures are pending. LUE US ordered due to swelling.       I have personally seen and examined the patient at bedside. I discussed the plan of care with patient and bedside RN.     Consultants/Specialty  orthopedics     Code Status  Full Code     Disposition  Patient is not medically cleared for discharge.   Anticipate discharge to to home with close outpatient follow-up.  I have placed the appropriate orders for post-discharge needs.     Review of Systems  Review of Systems   Constitutional: Positive for malaise/fatigue.   HENT: Negative.    Eyes:  Negative.    Respiratory: Negative.    Cardiovascular: Negative.    Gastrointestinal: Negative.    Genitourinary: Negative.    Musculoskeletal:        Right knee pain and swelling, left knee with pain    Skin: Negative.    Neurological: Negative.    Endo/Heme/Allergies: Negative.    Psychiatric/Behavioral: Negative.         Physical Exam  Temp:  [36.6 °C (97.8 °F)-37.2 °C (99 °F)] 36.6 °C (97.8 °F)  Pulse:  [] 97  Resp:  [7-29] 16  BP: ()/(44-64) 132/64  SpO2:  [91 %-100 %] 91 %    Physical Exam  Constitutional:       General: She is in NAD.      Appearance: She is obese.   HENT:      Head: Normocephalic.      Nose: Nose normal.      Mouth/Throat:      Mouth: Mucous membranes are moist.   Eyes:      Pupils: Pupils are equal, round, and reactive to light.   Cardiovascular:      Rate and Rhythm: Normal rate.   Pulmonary:      Effort: Pulmonary effort is normal.   Abdominal:      Palpations: Abdomen is soft.   Musculoskeletal:         General: Right knee with       Cervical back: Normal range of motion.      Right lower leg: No edema.      Left lower leg: No edema.   Skin:     General: Skin is warm and dry.   Neurological:      General: No focal deficit present.      Mental Status: She is alert.   Psychiatric:         Mood and Affect: Mood normal.       Fluids    Intake/Output Summary (Last 24 hours) at 5/12/2022 1440  Last data filed at 5/12/2022 0003  Gross per 24 hour   Intake --   Output 845 ml   Net -845 ml       Laboratory  Recent Labs     05/10/22  1841 05/11/22  0937 05/12/22  0345   WBC 16.4* 24.8* 19.2*   RBC 4.35 3.22* 3.13*   HEMOGLOBIN 13.1 9.9* 9.4*   HEMATOCRIT 38.2 30.0* 29.6*   MCV 87.8 93.2 94.6   MCH 30.1 30.7 30.0   MCHC 34.3 33.0* 31.8*   RDW 47.1 50.6* 53.2*   PLATELETCT 346 263 245   MPV 9.5 9.6 9.7     Recent Labs     05/10/22  1841 05/11/22  0755 05/12/22  0345   SODIUM 132* 134* 134*   POTASSIUM 3.8 3.9 3.9   CHLORIDE 98 103 102   CO2 17* 17* 24   GLUCOSE 219* 240* 135*   BUN 17 11  12   CREATININE 0.61 0.46* 0.51   CALCIUM 9.6 8.5 8.2*     Recent Labs     05/10/22  2255   INR 1.25*               Imaging  CT-KNEE WITH RIGHT   Final Result         1. Moderate knee joint effusion with thick synovial enhancement, concerning for infection      2. There is gas within the multilocular fluid collection in the popliteal fossa, concerning for infected Baker's cyst/abscess.      3. Soft tissue gas in the lateral leg as well.      DX-KNEE 2- RIGHT   Final Result         Moderate right knee joint effusion.      Questionable soft tissue gas seen posterior to the distal femur on lateral view.      DX-CHEST-PORTABLE (1 VIEW)   Final Result         1.  There are mild perihilar opacifications which could be due to edema or bronchitis.      2.  No consolidations identified.      US-EXTREMITY VENOUS UPPER UNILAT LEFT    (Results Pending)        Assessment/Plan  * Sepsis (HCC)- (present on admission)  Assessment & Plan  This is Sepsis Present on admission. SIRS criteria identified on my evaluation include: Fever, with temperature greater than 101 deg F, Tachypnea, with respirations greater than 20 per minute and Leukocytosis, with WBC greater than 12,000.Source is septic arthritis.Sepsis protocol initiated. Fluid resuscitation ordered per protocol. Crystalloid Fluid Administration: Fluid resuscitation ordered per standard protocol - 30 mL/kg per current or ideal body weight.IV antibiotics as appropriate for source of sepsis. Reassessment: I have reassessed the patient's hemodynamic status.    Arthritis of right knee  Assessment & Plan  Patient underwent right knee arthrotomy and drainage of deep infection, incision and drainage of right thigh abscess on 5/11/2022.  She has 2 drains in place.  Currently on Zosyn and vancomycin.  Per orthopedic surgery, continue DVT prophylaxis with SCDs and Lovenox until mobilizing then switch to aspirin for 4 weeks.  Maintain drain for 2 days until cultures have resulted and  patient has clinically improved.  She is to follow-up with orthopedic surgery outpatient clinic in 2 weeks for wound check and suture/staple removal.    Type 2 diabetes mellitus with hyperglycemia, without long-term current use of insulin (HCC)- (present on admission)  Assessment & Plan  Home metformin on hold.  Hemoglobin A1c was 7.7 on 10/22/2021.  Repeat ordered.  Continue SSI with Accu-Cheks and hypoglycemia protocol.    Seropositive rheumatoid arthritis (HCC)  Assessment & Plan  On methotrexate and Plaquenil for seropositive rheumatoid arthritis.  We will place on hold due to acute infection.    Essential hypertension- (present on admission)  Assessment & Plan  Stable. Continue lisinopril    Left arm swelling  Assessment & Plan  Patient noted to have swelling in left hand and arm. US ordered.        VTE prophylaxis: SCDs/TEDs    I have performed a physical exam and reviewed and updated ROS and Plan today (5/12/2022). In review of yesterday's note (5/11/2022), there are no changes except as documented above.

## 2022-05-12 NOTE — ANESTHESIA PREPROCEDURE EVALUATION
Case: [id number] Date/Time: 05/11/22 1621    Procedure: IRRIGATION AND DEBRIDEMENT, WOUND (Right Knee)    Location: [facility] OR 16 / SURGERY [facility]    Surgeons: Martin Montesinos M.D.          Relevant Problems   CARDIAC   (positive) Essential hypertension      GI   (positive) Gastroesophageal reflux disease without esophagitis      ENDO   (positive) Type 2 diabetes mellitus with hyperglycemia, without long-term current use of insulin (HCC)      Other   (positive) Arthritis of right knee   (positive) Seropositive rheumatoid arthritis (HCC)       Physical Exam    Airway   Mallampati: II  TM distance: >3 FB  Neck ROM: full       Cardiovascular - normal exam  Rhythm: regular  Rate: normal  (-) murmur     Dental - normal exam           Pulmonary - normal exam  Breath sounds clear to auscultation     Abdominal    Neurological - normal exam                 Anesthesia Plan    ASA 3- EMERGENT   ASA physical status emergent criteria: acutely contaminated wound or identified infection source    Plan - general       Airway plan will be LMA          Induction: intravenous    Postoperative Plan: Postoperative administration of opioids is intended.    Pertinent diagnostic labs and testing reviewed    Informed Consent:    Anesthetic plan and risks discussed with patient.    Use of blood products discussed with: patient whom consented to blood products.

## 2022-05-12 NOTE — CARE PLAN
Problem: Knowledge Deficit - Standard  Goal: Patient and family/care givers will demonstrate understanding of plan of care, disease process/condition, diagnostic tests and medications  Outcome: Progressing     Problem: Hemodynamics  Goal: Patient's hemodynamics, fluid balance and neurologic status will be stable or improve  Outcome: Progressing     Problem: Fluid Volume  Goal: Fluid volume balance will be maintained  Outcome: Progressing     Problem: Urinary - Renal Perfusion  Goal: Ability to achieve and maintain adequate renal perfusion and functioning will improve  Outcome: Progressing     Problem: Respiratory  Goal: Patient will achieve/maintain optimum respiratory ventilation and gas exchange  Outcome: Progressing     Problem: Physical Regulation  Goal: Diagnostic test results will improve  Outcome: Progressing  Goal: Signs and symptoms of infection will decrease  Outcome: Progressing     Problem: Fall Risk  Goal: Patient will remain free from falls  Outcome: Progressing   The patient is Stable - Low risk of patient condition declining or worsening    Shift Goals  Clinical Goals:  (IV abx, pain control)  Patient Goals: pain control; rest  Family Goals: N/A    Progress made toward(s) clinical / shift goals:      Patient is not progressing towards the following goals:    Pt AOx4. Toradol, ice pack given for pain. Pt was able to sit by the edge of the bed. Edema to R hand, MD aware. Dressing and  2JP's to R leg. 1 p assist.

## 2022-05-12 NOTE — ANESTHESIA PROCEDURE NOTES
Airway  Performed by: Skip Estes M.D.  Authorized by: Skip Estes M.D.     Location:  OR  Urgency:  Elective  Indications for Airway Management:  Anesthesia      Spontaneous Ventilation: absent    Sedation Level:  Deep  Preoxygenated: Yes    Patient Position:  Sniffing  Final Airway Type:  Endotracheal airway  Final Endotracheal Airway:  ETT  Cuffed: Yes    Technique Used for Successful ETT Placement:  Direct laryngoscopy    Insertion Site:  Oral  Blade Type:  Sarah  Laryngoscope Blade/Videolaryngoscope Blade Size:  3  ETT Size (mm):  7.0  Measured from:  Teeth  ETT to Teeth (cm):  22  Placement Verified by: auscultation and capnometry    Cormack-Lehane Classification:  Grade I - full view of glottis  Number of Attempts at Approach:  1

## 2022-05-12 NOTE — DISCHARGE PLANNING
Case Management Discharge Planning    Admission Date: 5/10/2022  GMLOS: 5.3  ALOS: 2    6-Clicks ADL Score: 13  6-Clicks Mobility Score: 12  PT and/or OT Eval ordered: No  Post-acute Referrals Ordered: No  Post-acute Choice Obtained: NA  Has referral(s) been sent to post-acute provider:  SAURABH      Anticipated Discharge Dispo: Discharge Disposition: Discharged to home/self care (01)    DME Needed: No    Action(s) Taken: Updated Provider/Nurse on Discharge Plan    Escalations Completed: None    Medically Clear: No    Next Steps: Pending cultures.     Barriers to Discharge: Medical clearance    Is the patient up for discharge tomorrow: No

## 2022-05-12 NOTE — OR NURSING
1814: Pt arrived from OR post R knee debridement under anesthesia. Pt is asleep. Sight dressingis CDI with 2 ALEJANDRO drains to bulb suction. Cardiac rhythm appears to be ST.     1825: Pt awakens. Pt denies nausea. Pt reports severe pain; medicated per MAR.     1935: Updated pt's son, Shawn.    1945: Family to bedside. Report to ANTONIO Beebe.     2008: Pt back to room via bed with transport. Pt on 2L; tank is half full.

## 2022-05-12 NOTE — OP REPORT
DATE OF OPERATION: 5/11/2022     PREOPERATIVE DIAGNOSIS: Right septic knee arthritis, right thigh abscess    POSTOPERATIVE DIAGNOSIS: Same    PROCEDURE PERFORMED: Right knee arthrotomy and drainage of deep infection, incision and drainage of right thigh abscess    SURGEON: Martin Montesinos M.D.     ASSISTANT: None    ANESTHESIA: General    SPECIMEN: None    ESTIMATED BLOOD LOSS: Less than 5 mL    IMPLANTS: None      INDICATIONS: The patient is a 56 y.o. female who presented with right knee pain is been present for multiple months.  This is been worsening over recently.  She is also had recent fevers and chills.  On exam and imaging she has a large fluid collection consistent with an abscess and septic knee arthritis.  On CT scan there is also a posterior thigh abscess that is likely from a chronically infected Baker's cyst.  I recommended incision and drainage of both these fluid infection collections.  I discussed the risks and benefits of the procedure which include but are not limited to risks of infection, wound healing complication, neurovascular injury, pain, possible need for additional procedures such as repeat debridement, and the medical risks of anesthesia including MI, stroke, and death.  Alternatives to surgery were also discussed, including non-operative management, which I did not recommend.  The patient was in agreement with the plan to proceed, and the informed consent was signed and documented.  I met with the patient pre-operatively and marked the operative extremity with their agreement.  We proceeded to the operating room.     DESCRIPTION OF PROCEDURE:  Patient was seen in the preoperative holding area on the day of surgery. The operative site was marked with my initials.  she was taken to the operating room and placed supine on the operative table.  Anesthesia was induced.  The operative extremity was prepped and draped in the normal sterile fashion.  Operative pause was conducted and the  correct patient, site, side, procedure, and surgeon's initials on extremity were identified.  The knee was first addressed.  A medial parapatellar arthrotomy was made.  This was carried down as a full-thickness flap to the level of the joint capsule.  This then sharply incised with a knife.  There was immediate expression's of purulent appearing fluid.  A sample of this was sent for culture.  Knee was thoroughly decompressed at this point.  Attention was then turned to the posterior thigh abscess.  Based off the imaging, an incision was made 5 to 10 cm above the knee joint.  This was then dissected underneath the vastus medialis.  I then further bluntly dissected posteriorly into the large abscess cavity.  The fluid in this cavity was significantly different than the knee.  This was thick and congealed abscess.  This appeared to have much more chronicity to it.  This cavity was scooped out and a sample of this was again sent for culture.  At this point both areas were thoroughly irrigated normal saline.  This was done until no further purulent material could be expressed.  The deep thigh abscess had adhered purulent material at the walls that was loosened and debrided.  Once all the fluid remained clear the wounds were then closed in layered fashion.  Prior to closure deep drains were placed into both cavities.  These were tunneled with a trocar.  The wounds were closed with 0 and 2-0 PDS as well as staples.  Sterile dressings were applied and the patient was allowed to awaken in the operating room taken to PACU in stable condition.    POSTOPERATIVE PLAN: As tolerated weight bearing.  Mobilize with physical and occupational therapies.  DVT prophylaxis with SCDs and Lovenox until mobilizing independently and then can be switched to aspirin for 4 weeks.  Maintain drains for 2 days until cultures have resulted and patient showed clinical improvement.  The patient will follow up in clinic in 2 weeks to check wounds and  remove sutures/staples.      ____________________________________   Martin Montesinos M.D.   DD: 5/11/2022  6:12 PM

## 2022-05-12 NOTE — CONSULTS
Date of Service: 05/11/22    Kary Shah was seen today in consultation for right septic knee arthritis at the request of Dr. Gasca    CC: Right knee pain    HPI: Kary Shah is a 56 y.o. female who presents with complaints of pain to right knee.  This started over the last year after no specific injury.  She does have a history of rheumatoid arthritis though.  This knee has been previously aspirated.  She has also been treated with steroids for the pain in the knee.  Over the last couple days though she started developing fevers chills and presented to the hospital with tachycardia and leukocytosis.  CT scan showed likely subacute to chronic abscess within the knee as well as infected Baker's cyst with gas present.  The pain is 7/10 and is described as sharp.  The pain is made worse by palpation of the area and made better by rest and immobilization.    PMH:   Past Medical History:   Diagnosis Date   • Diabetes (HCC)    • GERD (gastroesophageal reflux disease)    • Hypertension    • Osteoarthritis of both hands 2015       PSH:   Past Surgical History:   Procedure Laterality Date   • PRIMARY C SECTION      x2       FH:   Family History   Problem Relation Age of Onset   • Heart Disease Mother 64        heart attack   • Arthritis Mother    • Heart Disease Father 65        heart attack   • Other Sister         blood clot   • Heart Disease Brother         heart attack   • Heart Disease Brother         heart attack       SH:   Social History     Socioeconomic History   • Marital status: Single     Spouse name: Not on file   • Number of children: Not on file   • Years of education: Not on file   • Highest education level: Not on file   Occupational History   • Not on file   Tobacco Use   • Smoking status: Never Smoker   • Smokeless tobacco: Never Used   Vaping Use   • Vaping Use: Never used   Substance and Sexual Activity   • Alcohol use: No   • Drug use: No   • Sexual activity: Not Currently   Other  "Topics Concern   • Not on file   Social History Narrative   • Not on file     Social Determinants of Health     Financial Resource Strain: Not on file   Food Insecurity: Not on file   Transportation Needs: Not on file   Physical Activity: Not on file   Stress: Not on file   Social Connections: Not on file   Intimate Partner Violence: Not on file   Housing Stability: Not on file       ROS: In review of the following systems: Constitutional, Eyes, ENT, Cardiovascular,Respiratory, GI, , Musculoskeletal, Skin, Neuro, Psych, Hematologic, Endocrine, Allergic; no pertinent findings were found related to the chief complaint and orthopedic injury     /58   Pulse (!) 101   Temp 37.1 °C (98.8 °F) (Temporal)   Resp 18   Ht 1.448 m (4' 9\")   Wt 65.5 kg (144 lb 6.4 oz)   SpO2 99%     Physical Exam:  General: Well nourished, well developed, age appropriate appearance   HEENT: Normocephalic, atraumatic  Psych: Normal mood and affect  Neck: Supple, no pain to motion  Chest/Pulmonary: breathing unlabored, no audible wheezing  Cardio: Regular heart rate and rhythm  Neuro: Sensation grossly intact to BUE and BLE, moving all four extremities  Skin: Intact with no full thickness abrasions or lacerations  MSK: Right knee shows a large effusion.  There is tenderness to palpation in the same area.  Range of motion is also limited by pain.  No tenderness distally at the ankle or foot.  Left knee shows no effusion present.  SCFE taken through gentle range of motion without pain.  Remainder of major joints are nontender to palpation or motion.    Imaging and labs: CT scan of the right knee showed thickened synovium as well as a large fluid collection consistent with septic knee.  There is also an infected Baker's cyst with gas present.    Recent Labs     05/10/22  1841 05/11/22  0937   WBC 16.4* 24.8*   RBC 4.35 3.22*   HEMOGLOBIN 13.1 9.9*   HEMATOCRIT 38.2 30.0*   MCV 87.8 93.2   MCH 30.1 30.7   RDW 47.1 50.6*   PLATELETCT 346 " 263   MPV 9.5 9.6   NEUTSPOLYS 95.80* 91.00*   LYMPHOCYTES 1.30* 2.80*   MONOCYTES 1.30 4.60   EOSINOPHILS 0.80 0.00   BASOPHILS 0.30 0.20       Assessment: Subacute to chronic infection of the right knee as well as an infected Baker's cyst    We discussed the diagnosis and findings with the patient at length.  We reviewed possible non operative and operative interventions and the risks and benefits of each of these.  Recommended debridement in the operating room.  She had a chance to ask questions and all of these were answered to her satisfaction.        Plan:  N.p.o.  I&D right knee, I&D right thigh  Cultures  Antibiotics  Weightbearing as tolerated  Drains until infection has improved

## 2022-05-12 NOTE — ANESTHESIA POSTPROCEDURE EVALUATION
Patient: Kary Shah    Procedure Summary     Date: 05/11/22 Room / Location: [place] [id number] / SURGERY [facility]    Anesthesia Start: 1733 Anesthesia Stop: 1825    Procedure: IRRIGATION AND DEBRIDEMENT, WOUND (Right Knee) Diagnosis: (right septic knee and right thigh abscess)    Surgeons: Martin Montesinos M.D. Responsible Provider: Skip Estes M.D.    Anesthesia Type: general ASA Status: 3 - Emergent          Final Anesthesia Type: general  Last vitals  BP   Blood Pressure: 126/59    Temp   36.9 °C (98.4 °F)    Pulse   (!) 117   Resp   (!) 26    SpO2   94 %      Anesthesia Post Evaluation    Patient location during evaluation: PACU  Patient participation: complete - patient participated  Level of consciousness: awake and alert  Pain score: 1    Airway patency: patent  Anesthetic complications: no  Cardiovascular status: hemodynamically stable  Respiratory status: acceptable  Hydration status: euvolemic    PONV: none          No complications documented.     Nurse Pain Score: 6 (NPRS)

## 2022-05-13 ENCOUNTER — APPOINTMENT (OUTPATIENT)
Dept: RADIOLOGY | Facility: MEDICAL CENTER | Age: 57
DRG: 854 | End: 2022-05-13
Attending: INTERNAL MEDICINE
Payer: COMMERCIAL

## 2022-05-13 LAB
ANION GAP SERPL CALC-SCNC: 7 MMOL/L (ref 7–16)
BACTERIA UR CULT: NORMAL
BASOPHILS # BLD AUTO: 0.3 % (ref 0–1.8)
BASOPHILS # BLD: 0.03 K/UL (ref 0–0.12)
BUN SERPL-MCNC: 11 MG/DL (ref 8–22)
CALCIUM SERPL-MCNC: 8 MG/DL (ref 8.5–10.5)
CHLORIDE SERPL-SCNC: 107 MMOL/L (ref 96–112)
CO2 SERPL-SCNC: 25 MMOL/L (ref 20–33)
CREAT SERPL-MCNC: 0.56 MG/DL (ref 0.5–1.4)
EOSINOPHIL # BLD AUTO: 0.17 K/UL (ref 0–0.51)
EOSINOPHIL NFR BLD: 1.5 % (ref 0–6.9)
ERYTHROCYTE [DISTWIDTH] IN BLOOD BY AUTOMATED COUNT: 50.4 FL (ref 35.9–50)
GFR SERPLBLD CREATININE-BSD FMLA CKD-EPI: 106 ML/MIN/1.73 M 2
GLUCOSE BLD STRIP.AUTO-MCNC: 125 MG/DL (ref 65–99)
GLUCOSE BLD STRIP.AUTO-MCNC: 132 MG/DL (ref 65–99)
GLUCOSE BLD STRIP.AUTO-MCNC: 137 MG/DL (ref 65–99)
GLUCOSE SERPL-MCNC: 143 MG/DL (ref 65–99)
HCT VFR BLD AUTO: 27.9 % (ref 37–47)
HGB BLD-MCNC: 9 G/DL (ref 12–16)
IMM GRANULOCYTES # BLD AUTO: 0.1 K/UL (ref 0–0.11)
IMM GRANULOCYTES NFR BLD AUTO: 0.9 % (ref 0–0.9)
LYMPHOCYTES # BLD AUTO: 1.03 K/UL (ref 1–4.8)
LYMPHOCYTES NFR BLD: 8.8 % (ref 22–41)
MCH RBC QN AUTO: 29.7 PG (ref 27–33)
MCHC RBC AUTO-ENTMCNC: 32.3 G/DL (ref 33.6–35)
MCV RBC AUTO: 92.1 FL (ref 81.4–97.8)
MONOCYTES # BLD AUTO: 0.64 K/UL (ref 0–0.85)
MONOCYTES NFR BLD AUTO: 5.5 % (ref 0–13.4)
NEUTROPHILS # BLD AUTO: 9.75 K/UL (ref 2–7.15)
NEUTROPHILS NFR BLD: 83 % (ref 44–72)
NRBC # BLD AUTO: 0 K/UL
NRBC BLD-RTO: 0 /100 WBC
PLATELET # BLD AUTO: 250 K/UL (ref 164–446)
PMV BLD AUTO: 9.6 FL (ref 9–12.9)
POTASSIUM SERPL-SCNC: 3.8 MMOL/L (ref 3.6–5.5)
RBC # BLD AUTO: 3.03 M/UL (ref 4.2–5.4)
SIGNIFICANT IND 70042: NORMAL
SITE SITE: NORMAL
SODIUM SERPL-SCNC: 139 MMOL/L (ref 135–145)
SOURCE SOURCE: NORMAL
VANCOMYCIN PEAK SERPL-MCNC: 20.5 UG/ML (ref 20–40)
VANCOMYCIN TROUGH SERPL-MCNC: 11.2 UG/ML (ref 10–20)
WBC # BLD AUTO: 11.7 K/UL (ref 4.8–10.8)

## 2022-05-13 PROCEDURE — 700111 HCHG RX REV CODE 636 W/ 250 OVERRIDE (IP): Performed by: INTERNAL MEDICINE

## 2022-05-13 PROCEDURE — 700102 HCHG RX REV CODE 250 W/ 637 OVERRIDE(OP): Performed by: HOSPITALIST

## 2022-05-13 PROCEDURE — 700105 HCHG RX REV CODE 258: Performed by: INTERNAL MEDICINE

## 2022-05-13 PROCEDURE — A9270 NON-COVERED ITEM OR SERVICE: HCPCS | Performed by: HOSPITALIST

## 2022-05-13 PROCEDURE — 80048 BASIC METABOLIC PNL TOTAL CA: CPT

## 2022-05-13 PROCEDURE — 36415 COLL VENOUS BLD VENIPUNCTURE: CPT

## 2022-05-13 PROCEDURE — 700102 HCHG RX REV CODE 250 W/ 637 OVERRIDE(OP): Performed by: INTERNAL MEDICINE

## 2022-05-13 PROCEDURE — 93971 EXTREMITY STUDY: CPT | Mod: LT

## 2022-05-13 PROCEDURE — 82962 GLUCOSE BLOOD TEST: CPT

## 2022-05-13 PROCEDURE — 85025 COMPLETE CBC W/AUTO DIFF WBC: CPT

## 2022-05-13 PROCEDURE — 80202 ASSAY OF VANCOMYCIN: CPT | Mod: 91

## 2022-05-13 PROCEDURE — 770004 HCHG ROOM/CARE - ONCOLOGY PRIVATE *

## 2022-05-13 PROCEDURE — 99233 SBSQ HOSP IP/OBS HIGH 50: CPT | Performed by: INTERNAL MEDICINE

## 2022-05-13 PROCEDURE — A9270 NON-COVERED ITEM OR SERVICE: HCPCS | Performed by: INTERNAL MEDICINE

## 2022-05-13 PROCEDURE — 93971 EXTREMITY STUDY: CPT | Mod: 26,LT | Performed by: INTERNAL MEDICINE

## 2022-05-13 RX ORDER — CEFAZOLIN SODIUM 2 G/100ML
2 INJECTION, SOLUTION INTRAVENOUS EVERY 8 HOURS
Status: DISCONTINUED | OUTPATIENT
Start: 2022-05-13 | End: 2022-05-16

## 2022-05-13 RX ADMIN — LISINOPRIL 10 MG: 10 TABLET ORAL at 05:40

## 2022-05-13 RX ADMIN — PIPERACILLIN AND TAZOBACTAM 4.5 G: 4; .5 INJECTION, POWDER, FOR SOLUTION INTRAVENOUS at 05:36

## 2022-05-13 RX ADMIN — OXYCODONE 5 MG: 5 TABLET ORAL at 00:05

## 2022-05-13 RX ADMIN — ATORVASTATIN CALCIUM 20 MG: 20 TABLET, FILM COATED ORAL at 17:26

## 2022-05-13 RX ADMIN — OXYCODONE 5 MG: 5 TABLET ORAL at 17:29

## 2022-05-13 RX ADMIN — OXYCODONE 5 MG: 5 TABLET ORAL at 08:16

## 2022-05-13 RX ADMIN — OXYCODONE 5 MG: 5 TABLET ORAL at 21:47

## 2022-05-13 RX ADMIN — CEFAZOLIN SODIUM 2 G: 2 INJECTION, SOLUTION INTRAVENOUS at 17:25

## 2022-05-13 RX ADMIN — SENNOSIDES AND DOCUSATE SODIUM 2 TABLET: 50; 8.6 TABLET ORAL at 05:40

## 2022-05-13 RX ADMIN — PIPERACILLIN AND TAZOBACTAM 4.5 G: 4; .5 INJECTION, POWDER, FOR SOLUTION INTRAVENOUS at 13:01

## 2022-05-13 RX ADMIN — VANCOMYCIN HYDROCHLORIDE 750 MG: 500 INJECTION, POWDER, LYOPHILIZED, FOR SOLUTION INTRAVENOUS at 02:59

## 2022-05-13 RX ADMIN — SODIUM CHLORIDE: 9 INJECTION, SOLUTION INTRAVENOUS at 21:47

## 2022-05-13 RX ADMIN — FOLIC ACID 1 MG: 1 TABLET ORAL at 05:40

## 2022-05-13 RX ADMIN — ENOXAPARIN SODIUM 40 MG: 40 INJECTION SUBCUTANEOUS at 05:41

## 2022-05-13 ASSESSMENT — PAIN DESCRIPTION - PAIN TYPE
TYPE: ACUTE PAIN
TYPE: ACUTE PAIN

## 2022-05-13 NOTE — PROGRESS NOTES
Hospital Medicine Daily Progress Note    Date of Service  5/13/2022    Chief Complaint  Kary Shah is a 56 y.o. female admitted 5/10/2022 with 1 week history of fevers and chills, with worsening chronic right knee pain and swelling. She has seropositive rheumatoid arthritis (on methotrexate and Plaquenil), type 2 diabetes, osteoarthritis, essential hypertension, and GERD.     Hospital Course  On admission,labs showed leukocytosis (16.4 K), sodium was 132, anion gap was 17.0, lactic acid was 3.1.      Right knee x-ray showed moderate right knee joint effusion with questionable soft tissue gas seen posterior to the distal femur.     CT right knee showed joint effusion with thick synovial enhancement, concerning for infection. There is gas within the multilocular fluid collection in the popliteal fossa, concerning for infected Baker's cyst/abscess.     ER physician attempted arthrocentesis but was unsuccessful. Orthopedic surgery on-call recommended starting IV antibiotics with formal consult in the morning. Vancomycin, Clindamycin and Zosyn were empirically started.  Lactic acid improved to 1.5.     Discussed with and consulted orthopedic surgery. Patient underwent right knee arthrotomy and drainage of deep infection, incision and drainage of right thigh abscess on 5/11/2022.  She has 2 drains in place. LUE US ordered due to swelling.     Interval Problem Update  Blood cultures x2 and I&D cultures have been negative. Afebrile and hemodynamically stable. Vancomycin and Zosyn switched to Ancef. LUE US ordered due to swelling.      I have personally seen and examined the patient at bedside. I discussed the plan of care with patient and bedside RN.     Consultants/Specialty  orthopedics     Code Status  Full Code     Disposition  Patient is not medically cleared for discharge.   Anticipate discharge to to home with close outpatient follow-up.  I have placed the appropriate orders for post-discharge  needs.     Review of Systems  Review of Systems   Constitutional: Positive for malaise/fatigue.   HENT: Negative.    Eyes: Negative.    Respiratory: Negative.    Cardiovascular: Negative.    Gastrointestinal: Negative.    Genitourinary: Negative.    Musculoskeletal:        Right knee pain and swelling, left knee with pain    Skin: Negative.    Neurological: Negative.    Endo/Heme/Allergies: Negative.    Psychiatric/Behavioral: Negative.      Physical Exam  Temp:  [36.8 °C (98.3 °F)-37.7 °C (99.9 °F)] 36.8 °C (98.3 °F)  Pulse:  [83-96] 84  Resp:  [16-19] 19  BP: (132-155)/(56-70) 141/57  SpO2:  [93 %-97 %] 97 %    Physical Exam  Constitutional:       General: She is in NAD.      Appearance: She is obese.   HENT:      Head: Normocephalic.      Nose: Nose normal.      Mouth/Throat:      Mouth: Mucous membranes are moist.   Eyes:      Pupils: Pupils are equal, round, and reactive to light.   Cardiovascular:      Rate and Rhythm: Normal rate.   Pulmonary:      Effort: Pulmonary effort is normal.   Abdominal:      Palpations: Abdomen is soft.   Musculoskeletal:         General: Right knee with dressing     Cervical back: Normal range of motion.      Right lower leg: No edema.      Left lower leg: No edema.   Skin:     General: Skin is warm and dry.   Neurological:      General: No focal deficit present.      Mental Status: She is alert.   Psychiatric:         Mood and Affect: Mood normal.        Fluids    Intake/Output Summary (Last 24 hours) at 5/13/2022 1552  Last data filed at 5/13/2022 1220  Gross per 24 hour   Intake --   Output 1110 ml   Net -1110 ml       Laboratory  Recent Labs     05/11/22  0937 05/12/22  0345 05/13/22  0651   WBC 24.8* 19.2* 11.7*   RBC 3.22* 3.13* 3.03*   HEMOGLOBIN 9.9* 9.4* 9.0*   HEMATOCRIT 30.0* 29.6* 27.9*   MCV 93.2 94.6 92.1   MCH 30.7 30.0 29.7   MCHC 33.0* 31.8* 32.3*   RDW 50.6* 53.2* 50.4*   PLATELETCT 263 245 250   MPV 9.6 9.7 9.6     Recent Labs     05/11/22  0755 05/12/22  0345  05/13/22  0651   SODIUM 134* 134* 139   POTASSIUM 3.9 3.9 3.8   CHLORIDE 103 102 107   CO2 17* 24 25   GLUCOSE 240* 135* 143*   BUN 11 12 11   CREATININE 0.46* 0.51 0.56   CALCIUM 8.5 8.2* 8.0*     Recent Labs     05/10/22  2255   INR 1.25*               Imaging  CT-KNEE WITH RIGHT   Final Result         1. Moderate knee joint effusion with thick synovial enhancement, concerning for infection      2. There is gas within the multilocular fluid collection in the popliteal fossa, concerning for infected Baker's cyst/abscess.      3. Soft tissue gas in the lateral leg as well.      DX-KNEE 2- RIGHT   Final Result         Moderate right knee joint effusion.      Questionable soft tissue gas seen posterior to the distal femur on lateral view.      DX-CHEST-PORTABLE (1 VIEW)   Final Result         1.  There are mild perihilar opacifications which could be due to edema or bronchitis.      2.  No consolidations identified.      US-EXTREMITY VENOUS UPPER UNILAT LEFT    (Results Pending)        Assessment/Plan  * Sepsis (HCC)- (present on admission)  Assessment & Plan  This is Sepsis Present on admission. SIRS criteria identified on my evaluation include: Fever, with temperature greater than 101 deg F, Tachypnea, with respirations greater than 20 per minute and Leukocytosis, with WBC greater than 12,000.Source is septic arthritis.Sepsis protocol initiated. Fluid resuscitation ordered per protocol. Crystalloid Fluid Administration: Fluid resuscitation ordered per standard protocol - 30 mL/kg per current or ideal body weight.IV antibiotics as appropriate for source of sepsis. Reassessment: I have reassessed the patient's hemodynamic status.    Arthritis of right knee  Assessment & Plan  Patient underwent right knee arthrotomy and drainage of deep infection, incision and drainage of right thigh abscess on 5/11/2022.  She has 2 drains in place.  Per orthopedic surgery, continue DVT prophylaxis with SCDs and Lovenox until  mobilizing then switch to aspirin for 4 weeks.  Maintain drain for 2 days until cultures have resulted and patient has clinically improved.  She is to follow-up with orthopedic surgery outpatient clinic in 2 weeks for wound check and suture/staple removal.  Blood and fluid cultures have been negative.  Vancomycin and Zosyn switched to Ancef    Type 2 diabetes mellitus with hyperglycemia, without long-term current use of insulin (HCC)- (present on admission)  Assessment & Plan  Home metformin on hold.  Hemoglobin A1c was 7.7 on 10/22/2021.  Repeat ordered.  Continue SSI with Accu-Cheks and hypoglycemia protocol.    Seropositive rheumatoid arthritis (HCC)  Assessment & Plan  On methotrexate and Plaquenil for seropositive rheumatoid arthritis.  We will place on hold due to acute infection.    Essential hypertension- (present on admission)  Assessment & Plan  Stable. Continue lisinopril    Left arm swelling  Assessment & Plan  Patient noted to have swelling in left hand and arm. US ordered.        VTE prophylaxis: enoxaparin ppx    I have performed a physical exam and reviewed and updated ROS and Plan today (5/13/2022). In review of yesterday's note (5/12/2022), there are no changes except as documented above.

## 2022-05-13 NOTE — CARE PLAN
The patient is Watcher - Medium risk of patient condition declining or worsening    Shift Goals  Clinical Goals: IV Abx; pain control  Patient Goals: pain control; rest  Family Goals: N/A    Progress made toward(s) clinical / shift goals:    Problem: Knowledge Deficit - Standard  Goal: Patient and family/care givers will demonstrate understanding of plan of care, disease process/condition, diagnostic tests and medications  Outcome: Progressing     Problem: Respiratory  Goal: Patient will achieve/maintain optimum respiratory ventilation and gas exchange  Outcome: Progressing       Patient is not progressing towards the following goals:

## 2022-05-13 NOTE — CARE PLAN
The patient is Watcher - Medium risk of patient condition declining or worsening    Shift Goals  Clinical Goals: IV Abx; pain control  Patient Goals: pain control; rest  Family Goals: N/A    Progress made toward(s) clinical / shift goals: IV Abx; pain control; rest    Patient is not progressing towards the following goals: N/A      Problem: Knowledge Deficit - Standard  Goal: Patient and family/care givers will demonstrate understanding of plan of care, disease process/condition, diagnostic tests and medications  Outcome: Progressing  Note: Pt is alert and oriented x 4; is aware and understands plan of care; all questions have been answered at this time.     Problem: Pain - Standard  Goal: Alleviation of pain or a reduction in pain to the patient’s comfort goal  Outcome: Progressing  Note: Pain has been frequently assessed; pt is able to verbalize needs and makes them known. On-call hospitalist paged for PRN pain medications to help control pain. Pain has now been controlled.      Problem: Respiratory  Goal: Patient will achieve/maintain optimum respiratory ventilation and gas exchange  Outcome: Progressing     Problem: Fall Risk  Goal: Patient will remain free from falls  Outcome: Progressing

## 2022-05-13 NOTE — DISCHARGE PLANNING
"[facility] TCN chart review completed. Collaborated with RANGEL Quintero. Mbr is S/P \" Right knee arthrotomy and drainage of deep infection, incision and drainage of right thigh abscess\"  On 5/11/2022. Per 5/12/2022 Hospitalist notes \" Patient is not medically cleared for discharge.   Anticipate discharge to to home with close outpatient follow-up.\"    Patient seen at bedside. Mbr appears fatigued compared to yesterday. Mbr has no additional healthplan related questions at this time. Supportive Son Shawn stays with mbr in the room.    TCN will continue to follow and collaborate with discharge planning team as additional post acute needs arise. Thank you.    Previously completed:  Choice forms obtained: [facility], Infusion  [facility] introduced ( Y) Referral ( sent)              "

## 2022-05-13 NOTE — PROGRESS NOTES
Patient does have an I&D of the knee today and continues to have pain despite ketorolac injection  Added IV and oral opioids PRN for now

## 2022-05-13 NOTE — DIETARY
Nutrition Services: Brief Update    Received alert from Nutrition Rep that the pt is requesting Boost Glucose Control. Will place order for BGC BID.     RD will monitor per dept policy

## 2022-05-14 PROBLEM — M79.89 LEFT ARM SWELLING: Status: RESOLVED | Noted: 2022-05-12 | Resolved: 2022-05-14

## 2022-05-14 PROBLEM — I82.612 SUPERFICIAL VENOUS THROMBOSIS OF ARM, LEFT: Status: ACTIVE | Noted: 2022-05-14

## 2022-05-14 LAB
ANION GAP SERPL CALC-SCNC: 12 MMOL/L (ref 7–16)
BACTERIA TISS AEROBE CULT: NORMAL
BASOPHILS # BLD AUTO: 0.3 % (ref 0–1.8)
BASOPHILS # BLD: 0.03 K/UL (ref 0–0.12)
BUN SERPL-MCNC: 13 MG/DL (ref 8–22)
CALCIUM SERPL-MCNC: 8.5 MG/DL (ref 8.5–10.5)
CHLORIDE SERPL-SCNC: 105 MMOL/L (ref 96–112)
CO2 SERPL-SCNC: 21 MMOL/L (ref 20–33)
CREAT SERPL-MCNC: 1.37 MG/DL (ref 0.5–1.4)
EOSINOPHIL # BLD AUTO: 0.12 K/UL (ref 0–0.51)
EOSINOPHIL NFR BLD: 1.1 % (ref 0–6.9)
ERYTHROCYTE [DISTWIDTH] IN BLOOD BY AUTOMATED COUNT: 47.9 FL (ref 35.9–50)
GFR SERPLBLD CREATININE-BSD FMLA CKD-EPI: 45 ML/MIN/1.73 M 2
GLUCOSE BLD STRIP.AUTO-MCNC: 125 MG/DL (ref 65–99)
GLUCOSE BLD STRIP.AUTO-MCNC: 160 MG/DL (ref 65–99)
GLUCOSE BLD STRIP.AUTO-MCNC: 168 MG/DL (ref 65–99)
GLUCOSE SERPL-MCNC: 130 MG/DL (ref 65–99)
GRAM STN SPEC: NORMAL
HCT VFR BLD AUTO: 28.8 % (ref 37–47)
HGB BLD-MCNC: 9.6 G/DL (ref 12–16)
IMM GRANULOCYTES # BLD AUTO: 0.12 K/UL (ref 0–0.11)
IMM GRANULOCYTES NFR BLD AUTO: 1.1 % (ref 0–0.9)
LYMPHOCYTES # BLD AUTO: 0.77 K/UL (ref 1–4.8)
LYMPHOCYTES NFR BLD: 7 % (ref 22–41)
MCH RBC QN AUTO: 30.2 PG (ref 27–33)
MCHC RBC AUTO-ENTMCNC: 33.3 G/DL (ref 33.6–35)
MCV RBC AUTO: 90.6 FL (ref 81.4–97.8)
MONOCYTES # BLD AUTO: 0.88 K/UL (ref 0–0.85)
MONOCYTES NFR BLD AUTO: 8 % (ref 0–13.4)
NEUTROPHILS # BLD AUTO: 9.09 K/UL (ref 2–7.15)
NEUTROPHILS NFR BLD: 82.5 % (ref 44–72)
NRBC # BLD AUTO: 0 K/UL
NRBC BLD-RTO: 0 /100 WBC
PLATELET # BLD AUTO: 245 K/UL (ref 164–446)
PMV BLD AUTO: 9.9 FL (ref 9–12.9)
POTASSIUM SERPL-SCNC: 3.8 MMOL/L (ref 3.6–5.5)
RBC # BLD AUTO: 3.18 M/UL (ref 4.2–5.4)
SIGNIFICANT IND 70042: NORMAL
SITE SITE: NORMAL
SODIUM SERPL-SCNC: 138 MMOL/L (ref 135–145)
SOURCE SOURCE: NORMAL
WBC # BLD AUTO: 11 K/UL (ref 4.8–10.8)

## 2022-05-14 PROCEDURE — 770004 HCHG ROOM/CARE - ONCOLOGY PRIVATE *

## 2022-05-14 PROCEDURE — A9270 NON-COVERED ITEM OR SERVICE: HCPCS | Performed by: INTERNAL MEDICINE

## 2022-05-14 PROCEDURE — 36415 COLL VENOUS BLD VENIPUNCTURE: CPT

## 2022-05-14 PROCEDURE — 97535 SELF CARE MNGMENT TRAINING: CPT | Performed by: PHYSICAL THERAPIST

## 2022-05-14 PROCEDURE — 82962 GLUCOSE BLOOD TEST: CPT | Mod: 91

## 2022-05-14 PROCEDURE — 700111 HCHG RX REV CODE 636 W/ 250 OVERRIDE (IP): Performed by: INTERNAL MEDICINE

## 2022-05-14 PROCEDURE — 700102 HCHG RX REV CODE 250 W/ 637 OVERRIDE(OP): Performed by: HOSPITALIST

## 2022-05-14 PROCEDURE — 700102 HCHG RX REV CODE 250 W/ 637 OVERRIDE(OP): Performed by: INTERNAL MEDICINE

## 2022-05-14 PROCEDURE — A9270 NON-COVERED ITEM OR SERVICE: HCPCS | Performed by: HOSPITALIST

## 2022-05-14 PROCEDURE — 80048 BASIC METABOLIC PNL TOTAL CA: CPT

## 2022-05-14 PROCEDURE — 99233 SBSQ HOSP IP/OBS HIGH 50: CPT | Performed by: INTERNAL MEDICINE

## 2022-05-14 PROCEDURE — 85025 COMPLETE CBC W/AUTO DIFF WBC: CPT

## 2022-05-14 PROCEDURE — 97161 PT EVAL LOW COMPLEX 20 MIN: CPT | Performed by: PHYSICAL THERAPIST

## 2022-05-14 RX ORDER — KETOROLAC TROMETHAMINE 30 MG/ML
30 INJECTION, SOLUTION INTRAMUSCULAR; INTRAVENOUS ONCE
Status: COMPLETED | OUTPATIENT
Start: 2022-05-14 | End: 2022-05-14

## 2022-05-14 RX ADMIN — OXYCODONE 5 MG: 5 TABLET ORAL at 12:07

## 2022-05-14 RX ADMIN — KETOROLAC TROMETHAMINE 30 MG: 30 INJECTION, SOLUTION INTRAMUSCULAR at 13:18

## 2022-05-14 RX ADMIN — INSULIN HUMAN 2 UNITS: 100 INJECTION, SOLUTION PARENTERAL at 11:44

## 2022-05-14 RX ADMIN — SENNOSIDES AND DOCUSATE SODIUM 2 TABLET: 50; 8.6 TABLET ORAL at 10:08

## 2022-05-14 RX ADMIN — FOLIC ACID 1 MG: 1 TABLET ORAL at 06:27

## 2022-05-14 RX ADMIN — ATORVASTATIN CALCIUM 20 MG: 20 TABLET, FILM COATED ORAL at 17:44

## 2022-05-14 RX ADMIN — OXYCODONE 5 MG: 5 TABLET ORAL at 16:06

## 2022-05-14 RX ADMIN — CEFAZOLIN SODIUM 2 G: 2 INJECTION, SOLUTION INTRAVENOUS at 10:07

## 2022-05-14 RX ADMIN — INSULIN HUMAN 2 UNITS: 100 INJECTION, SOLUTION PARENTERAL at 16:18

## 2022-05-14 RX ADMIN — ENOXAPARIN SODIUM 40 MG: 40 INJECTION SUBCUTANEOUS at 06:26

## 2022-05-14 RX ADMIN — CEFAZOLIN SODIUM 2 G: 2 INJECTION, SOLUTION INTRAVENOUS at 01:10

## 2022-05-14 RX ADMIN — INSULIN HUMAN 3 UNITS: 100 INJECTION, SOLUTION PARENTERAL at 20:46

## 2022-05-14 RX ADMIN — LISINOPRIL 10 MG: 10 TABLET ORAL at 06:27

## 2022-05-14 RX ADMIN — CEFAZOLIN SODIUM 2 G: 2 INJECTION, SOLUTION INTRAVENOUS at 16:09

## 2022-05-14 ASSESSMENT — GAIT ASSESSMENTS
DISTANCE (FEET): 10
GAIT LEVEL OF ASSIST: CONTACT GUARD ASSIST
ASSISTIVE DEVICE: PLATFORM WALKER

## 2022-05-14 ASSESSMENT — COGNITIVE AND FUNCTIONAL STATUS - GENERAL
CLIMB 3 TO 5 STEPS WITH RAILING: A LOT
MOBILITY SCORE: 16
MOVING FROM LYING ON BACK TO SITTING ON SIDE OF FLAT BED: A LITTLE
WALKING IN HOSPITAL ROOM: A LITTLE
TURNING FROM BACK TO SIDE WHILE IN FLAT BAD: A LOT
SUGGESTED CMS G CODE MODIFIER MOBILITY: CK
MOVING TO AND FROM BED TO CHAIR: A LITTLE
STANDING UP FROM CHAIR USING ARMS: A LITTLE

## 2022-05-14 ASSESSMENT — PAIN DESCRIPTION - PAIN TYPE
TYPE: ACUTE PAIN
TYPE: ACUTE PAIN

## 2022-05-14 NOTE — CARE PLAN
The patient is Watcher - Medium risk of patient condition declining or worsening    Shift Goals  Clinical Goals: IV Abx; pain control  Patient Goals: pain control; rest  Family Goals: n/a    Progress made toward(s) clinical / shift goals: IV Abx; pain control; rest    Patient is not progressing towards the following goals: N/A      Problem: Knowledge Deficit - Standard  Goal: Patient and family/care givers will demonstrate understanding of plan of care, disease process/condition, diagnostic tests and medications  Outcome: Progressing  Note: Pt is alert and oriented x 4; is aware and understands plan of care; all questions have been answered at this time.      Problem: Respiratory  Goal: Patient will achieve/maintain optimum respiratory ventilation and gas exchange  Outcome: Progressing     Problem: Pain - Standard  Goal: Alleviation of pain or a reduction in pain to the patient’s comfort goal  Outcome: Progressing  Note: Pain has been frequently assessed; pt is able to verbalize needs and makes them known. Pain has been controlled with PRN medications.     Problem: Fall Risk  Goal: Patient will remain free from falls  Outcome: Progressing

## 2022-05-14 NOTE — ASSESSMENT & PLAN NOTE
LUE US from 5/13/2022 shows acute, non-occlusive superficial venous thrombosis in the cephalic vein of the distal bicep attached to the IV.  IV removed.  Pain and swelling resolved

## 2022-05-14 NOTE — CARE PLAN
Problem: Pain - Standard  Goal: Alleviation of pain or a reduction in pain to the patient’s comfort goal  5/14/2022 1520 by Lindsay Lane, R.N.  Outcome: Progressing  Note: IV abx as ordered. Good hand hygiene before and after patient contact. Pt denies fever, chills   5/14/2022 1518 by Lindsay Lane R.N.  Outcome: Progressing  Note: Pt states pain improved with oral oxy and toradol. Pt c/o of pain in BUE d/t swelling and inflammation.      Problem: Mobility  Goal: Patient's capacity to carry out activities will improve  Outcome: Progressing  Note: Pt ambulated about 10 feet to [facility] with 2 person assist and platform walker. Gait slightly unsteady.    The patient is Stable - Low risk of patient condition declining or worsening    Shift Goals  Clinical Goals: IV Abx; pain control  Patient Goals: pain control; rest  Family Goals: n/a    Progress made toward(s) clinical / shift goals:       Patient is not progressing towards the following goals:

## 2022-05-14 NOTE — THERAPY
Physical Therapy   Initial Evaluation     Patient Name: Kary Shah  Age:  56 y.o., Sex:  female  Medical Record #: [id number]  Today's Date: 5/14/2022     Precautions  Precautions: Fall Risk    Assessment  Patient is 56 y.o. female with a diagnosis of Sepsis and septic arthritis pt is now s/p R knee arthroscopy, drainage of a deep infection of a thigh abscess.  Additional factors influencing patient status / progress include a PMH of HPTN, type 2 diabetes, OA, GERD, seropositive RA . Pt is hoping to get back to work but she has strength deficits in the R LE, and R UE from a previous work injury. Pt has decreased AROM, gait dysfunction, balance deficits, and weakness. Pt will benefit from PT 4 x week to improve these and to help pt progress to a higher LOF.     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 She has a 16 y/o son and they live with her sister.     Objective       05/14/22 1450   Prior Living Situation   Prior Services None   Housing / Facility 1 Story House   Steps Into Home 2   Steps In Home 0   Bathroom Set up Walk In Shower;Bathtub / Shower Combination   Equipment Owned None   Lives with - Patient's Self Care Capacity Child Less than 18 Years of Age;Other (Comments)  (sister)   Prior Level of Functional Mobility   Bed Mobility Independent   Transfer Status Independent   Ambulation Independent   Distance Ambulation  (Feet)   (community distances)   Assistive Devices Used None   Stairs Independent   Comments   (pt states she injured her R shoulder at work in January and she continues to have pain and limited mobility  with use of the R shoulder.)   Gait Analysis   Gait Level Of Assist Contact Guard Assist   Assistive Device Platform Walker   Distance (Feet) 10   # of Times Distance was Traveled 1   Deviation Antalgic;Step To;Decreased Base Of Support;Decreased Heel Strike;Decreased Toe Off;Shuffled Gait   # of Stairs Climbed 0   Weight Bearing Status WBAT R LE   Comments pt able to offset pain into the L wrist by using a platform FWW for gait. pt should be able to use a FWW once the L wrist and hand swelling decreases.   Bed Mobility    Supine to Sit Minimal Assist   Sit to Supine   (pt in chair at end of TX)   Scooting Supervised   Rolling Unable to Participate   Comments pt requires min a with R LE to get to EOB   Functional Mobility   Sit to Stand Minimal Assist   Bed, Chair, Wheelchair Transfer Minimal Assist   Toilet Transfers Minimal Assist   Transfer Method Squat Pivot   Mobility pt talya to ambulate to bedside chair with CGA   Comments pt has a weak R shoulder from a prior injury as well as swelling in the L hand . pt best able to ambulate with  less pain using a Platform walker on the L 2/2 hand and wrist swelling.   Patient / Family Goals    Patient / Family Goal #1 return home   Short Term Goals    Short Term Goal # 1 pt will get OOB and return BTB with HOB flat and rails down and SPV for safety with bed mobility at home. ( 6 tx's)   Short Term Goal # 2 pt will transfer with the LRAD and SPV in 6 Tx's to safely transfer at home.   Short Term Goal # 3 pt will ambulate ' with the LRAD and SPV in 6 Tx's   Short Term Goal # 4 pt will se her instructions and continue to work on her exercises at home.   Anticipated Discharge Equipment and Recommendations   DC Equipment Recommendations Unable to determine at this  time  (pt may need a FWW but uncertain if she will need a platform depending on her L hand swelling.)   Discharge Recommendations Recommend home health for continued physical therapy services   Session Information   Date / Session Number  5/14-1( 1/4, 5/20)

## 2022-05-14 NOTE — PROGRESS NOTES
Hospital Medicine Daily Progress Note    Date of Service  5/14/2022    Chief Complaint  Kary Shah is a 56 y.o. female admitted 5/10/2022 with 1 week history of fevers and chills, with worsening chronic right knee pain and swelling. She has seropositive rheumatoid arthritis (on methotrexate and Plaquenil), type 2 diabetes, osteoarthritis, essential hypertension, and GERD.     Hospital Course  On admission,labs showed leukocytosis (16.4 K), sodium was 132, anion gap was 17.0, lactic acid was 3.1.      Right knee x-ray showed moderate right knee joint effusion with questionable soft tissue gas seen posterior to the distal femur.     CT right knee showed joint effusion with thick synovial enhancement, concerning for infection. There is gas within the multilocular fluid collection in the popliteal fossa, concerning for infected Baker's cyst/abscess.     ER physician attempted arthrocentesis but was unsuccessful. Orthopedic surgery on-call recommended starting IV antibiotics with formal consult in the morning. Vancomycin, Clindamycin and Zosyn were empirically started.  Lactic acid improved to 1.5.     Discussed with and consulted orthopedic surgery. Patient underwent right knee arthrotomy and drainage of deep infection, incision and drainage of right thigh abscess on 5/11/2022.  She has 2 drains in place. LUE US ordered due to swelling.      Interval Problem Update  Blood cultures x2 and I&D cultures have been negative. Afebrile and hemodynamically stable. Vancomycin and Zosyn switched to Ancef on 5/13/2022. LUE US from 5/13/2022 shows acute, non-occlusive superficial venous thrombosis in the cephalic vein of the distal bicep attached to the IV.    I have personally seen and examined the patient at bedside. I discussed the plan of care with patient and bedside RN.     Consultants/Specialty  orthopedics     Code Status  Full Code     Disposition  Patient is not medically cleared for discharge.   Anticipate  discharge to to home with close outpatient follow-up.  I have placed the appropriate orders for post-discharge needs.     Review of Systems  Review of Systems   Constitutional: Positive for malaise/fatigue.   HENT: Negative.    Eyes: Negative.    Respiratory: Negative.    Cardiovascular: Negative.    Gastrointestinal: Negative.    Genitourinary: Negative.    Musculoskeletal:        Right knee pain and swelling, left knee with pain    Skin: Negative.    Neurological: Negative.    Endo/Heme/Allergies: Negative.    Psychiatric/Behavioral: Negative.         Physical Exam  Temp:  [36.8 °C (98.2 °F)-37.7 °C (99.8 °F)] 36.8 °C (98.2 °F)  Pulse:  [68-89] 68  Resp:  [15-18] 15  BP: (133-154)/(55-72) 154/72  SpO2:  [95 %-99 %] 99 %      Physical Exam  Constitutional:       General: She is in NAD.      Appearance: She is obese.   HENT:      Head: Normocephalic.      Nose: Nose normal.      Mouth/Throat:      Mouth: Mucous membranes are moist.   Eyes:      Pupils: Pupils are equal, round, and reactive to light.   Cardiovascular:      Rate and Rhythm: Normal rate.   Pulmonary:      Effort: Pulmonary effort is normal.   Abdominal:      Palpations: Abdomen is soft.   Musculoskeletal:         General: Right knee with dressing     Cervical back: Normal range of motion.      Right lower leg: No edema.      Left lower leg: No edema.   Skin:     General: Skin is warm and dry.   Neurological:      General: No focal deficit present.      Mental Status: She is alert.   Psychiatric:         Mood and Affect: Mood normal.       Fluids    Intake/Output Summary (Last 24 hours) at 5/14/2022 1241  Last data filed at 5/14/2022 0900  Gross per 24 hour   Intake 240 ml   Output 0 ml   Net 240 ml       Laboratory  Recent Labs     05/12/22  0345 05/13/22  0651 05/14/22  0651   WBC 19.2* 11.7* 11.0*   RBC 3.13* 3.03* 3.18*   HEMOGLOBIN 9.4* 9.0* 9.6*   HEMATOCRIT 29.6* 27.9* 28.8*   MCV 94.6 92.1 90.6   MCH 30.0 29.7 30.2   MCHC 31.8* 32.3* 33.3*   RDW  53.2* 50.4* 47.9   PLATELETCT 245 250 245   MPV 9.7 9.6 9.9     Recent Labs     05/12/22  0345 05/13/22  0651 05/14/22  0651   SODIUM 134* 139 138   POTASSIUM 3.9 3.8 3.8   CHLORIDE 102 107 105   CO2 24 25 21   GLUCOSE 135* 143* 130*   BUN 12 11 13   CREATININE 0.51 0.56 1.37   CALCIUM 8.2* 8.0* 8.5                   Imaging  US-EXTREMITY VENOUS UPPER UNILAT LEFT   Final Result      CT-KNEE WITH RIGHT   Final Result         1. Moderate knee joint effusion with thick synovial enhancement, concerning for infection      2. There is gas within the multilocular fluid collection in the popliteal fossa, concerning for infected Baker's cyst/abscess.      3. Soft tissue gas in the lateral leg as well.      DX-KNEE 2- RIGHT   Final Result         Moderate right knee joint effusion.      Questionable soft tissue gas seen posterior to the distal femur on lateral view.      DX-CHEST-PORTABLE (1 VIEW)   Final Result         1.  There are mild perihilar opacifications which could be due to edema or bronchitis.      2.  No consolidations identified.           Assessment/Plan  * Sepsis (HCC)- (present on admission)  Assessment & Plan  This is Sepsis Present on admission. SIRS criteria identified on my evaluation include: Fever, with temperature greater than 101 deg F, Tachypnea, with respirations greater than 20 per minute and Leukocytosis, with WBC greater than 12,000.Source is septic arthritis.Sepsis protocol initiated. Fluid resuscitation ordered per protocol. Crystalloid Fluid Administration: Fluid resuscitation ordered per standard protocol - 30 mL/kg per current or ideal body weight.IV antibiotics as appropriate for source of sepsis. Reassessment: I have reassessed the patient's hemodynamic status.    Arthritis of right knee  Assessment & Plan  Patient underwent right knee arthrotomy and drainage of deep infection, incision and drainage of right thigh abscess on 5/11/2022.  She has 2 drains in place.  Per orthopedic surgery,  continue DVT prophylaxis with SCDs and Lovenox until mobilizing then switch to aspirin for 4 weeks.  Maintain drain for 2 days until cultures have resulted and patient has clinically improved.  She is to follow-up with orthopedic surgery outpatient clinic in 2 weeks for wound check and suture/staple removal.  Blood and fluid cultures have been negative.  Vancomycin and Zosyn switched to Ancef on 5/13/2022    Type 2 diabetes mellitus with hyperglycemia, without long-term current use of insulin (HCC)- (present on admission)  Assessment & Plan  Home metformin on hold.  Hemoglobin A1c was 7.7 on 10/22/2021.  Repeat ordered.  Continue SSI with Accu-Cheks and hypoglycemia protocol.    Seropositive rheumatoid arthritis (HCC)  Assessment & Plan  On methotrexate and Plaquenil for seropositive rheumatoid arthritis.  We will place on hold due to acute infection.    Essential hypertension- (present on admission)  Assessment & Plan  Stable. Continue lisinopril    Superficial venous thrombosis of arm, left  Assessment & Plan  LUE US from 5/13/2022 shows acute, non-occlusive superficial venous thrombosis in the cephalic vein of the distal bicep attached to the IV.  IV removed.  Apply warm compresses.       VTE prophylaxis: enoxaparin ppx    I have performed a physical exam and reviewed and updated ROS and Plan today (5/14/2022). In review of yesterday's note (5/13/2022), there are no changes except as documented above.

## 2022-05-14 NOTE — PROGRESS NOTES
• Received report from NOC RN. Assumed care at 0700   • Patient a & o x 4.   • Pt denies numbness/tingling  • Strength RUE 5/5; LUE 5/5; RLE 3/5, LLE 4/5  • Brace none  • SpO2 99% on 1.5NC   • Voiding via bedpan or bsc pending pain  • BM on 5/12. Abd soft, Bowel sounds active, denies nausea  • Diet diabetic  • Pt states pain 4/10 to RLE  • ALEJANDRO drainx2 with minimal serosang output  • Surgical dressing to right knee c/d/i  • No skin breakdown  • SCDs refused.  • \Bed in lowest position, locked and upper side rails up. Patient demonstrated correct use of call bell. Call bell/belongings within reach. Patient educated on hourly rounding.   • Plan   o PT to evaluate at 1300.   o Pain management  o Bowel protocol.  o Wean O2.  o Pt BUE swelling; pt complaining about arthritis.

## 2022-05-15 LAB
ANION GAP SERPL CALC-SCNC: 12 MMOL/L (ref 7–16)
BACTERIA BLD CULT: NORMAL
BACTERIA BLD CULT: NORMAL
BASOPHILS # BLD AUTO: 0.3 % (ref 0–1.8)
BASOPHILS # BLD: 0.02 K/UL (ref 0–0.12)
BUN SERPL-MCNC: 18 MG/DL (ref 8–22)
CALCIUM SERPL-MCNC: 8.1 MG/DL (ref 8.5–10.5)
CHLORIDE SERPL-SCNC: 101 MMOL/L (ref 96–112)
CO2 SERPL-SCNC: 21 MMOL/L (ref 20–33)
CREAT SERPL-MCNC: 1.52 MG/DL (ref 0.5–1.4)
EOSINOPHIL # BLD AUTO: 0.2 K/UL (ref 0–0.51)
EOSINOPHIL NFR BLD: 2.6 % (ref 0–6.9)
ERYTHROCYTE [DISTWIDTH] IN BLOOD BY AUTOMATED COUNT: 46.6 FL (ref 35.9–50)
GFR SERPLBLD CREATININE-BSD FMLA CKD-EPI: 40 ML/MIN/1.73 M 2
GLUCOSE BLD STRIP.AUTO-MCNC: 132 MG/DL (ref 65–99)
GLUCOSE BLD STRIP.AUTO-MCNC: 142 MG/DL (ref 65–99)
GLUCOSE BLD STRIP.AUTO-MCNC: 145 MG/DL (ref 65–99)
GLUCOSE BLD STRIP.AUTO-MCNC: 148 MG/DL (ref 65–99)
GLUCOSE BLD STRIP.AUTO-MCNC: 229 MG/DL (ref 65–99)
GLUCOSE SERPL-MCNC: 159 MG/DL (ref 65–99)
HCT VFR BLD AUTO: 27.4 % (ref 37–47)
HGB BLD-MCNC: 9.1 G/DL (ref 12–16)
IMM GRANULOCYTES # BLD AUTO: 0.12 K/UL (ref 0–0.11)
IMM GRANULOCYTES NFR BLD AUTO: 1.6 % (ref 0–0.9)
LYMPHOCYTES # BLD AUTO: 0.6 K/UL (ref 1–4.8)
LYMPHOCYTES NFR BLD: 7.8 % (ref 22–41)
MCH RBC QN AUTO: 29.9 PG (ref 27–33)
MCHC RBC AUTO-ENTMCNC: 33.2 G/DL (ref 33.6–35)
MCV RBC AUTO: 90.1 FL (ref 81.4–97.8)
MONOCYTES # BLD AUTO: 0.76 K/UL (ref 0–0.85)
MONOCYTES NFR BLD AUTO: 9.9 % (ref 0–13.4)
NEUTROPHILS # BLD AUTO: 5.96 K/UL (ref 2–7.15)
NEUTROPHILS NFR BLD: 77.8 % (ref 44–72)
NRBC # BLD AUTO: 0 K/UL
NRBC BLD-RTO: 0 /100 WBC
PLATELET # BLD AUTO: 255 K/UL (ref 164–446)
PMV BLD AUTO: 9.4 FL (ref 9–12.9)
POTASSIUM SERPL-SCNC: 3.5 MMOL/L (ref 3.6–5.5)
RBC # BLD AUTO: 3.04 M/UL (ref 4.2–5.4)
SIGNIFICANT IND 70042: NORMAL
SIGNIFICANT IND 70042: NORMAL
SITE SITE: NORMAL
SITE SITE: NORMAL
SODIUM SERPL-SCNC: 134 MMOL/L (ref 135–145)
SOURCE SOURCE: NORMAL
SOURCE SOURCE: NORMAL
WBC # BLD AUTO: 7.7 K/UL (ref 4.8–10.8)

## 2022-05-15 PROCEDURE — 700102 HCHG RX REV CODE 250 W/ 637 OVERRIDE(OP): Performed by: HOSPITALIST

## 2022-05-15 PROCEDURE — 99232 SBSQ HOSP IP/OBS MODERATE 35: CPT | Performed by: INTERNAL MEDICINE

## 2022-05-15 PROCEDURE — 36415 COLL VENOUS BLD VENIPUNCTURE: CPT

## 2022-05-15 PROCEDURE — 770004 HCHG ROOM/CARE - ONCOLOGY PRIVATE *

## 2022-05-15 PROCEDURE — A9270 NON-COVERED ITEM OR SERVICE: HCPCS | Performed by: INTERNAL MEDICINE

## 2022-05-15 PROCEDURE — 700111 HCHG RX REV CODE 636 W/ 250 OVERRIDE (IP): Performed by: INTERNAL MEDICINE

## 2022-05-15 PROCEDURE — A9270 NON-COVERED ITEM OR SERVICE: HCPCS | Performed by: HOSPITALIST

## 2022-05-15 PROCEDURE — 700102 HCHG RX REV CODE 250 W/ 637 OVERRIDE(OP): Performed by: INTERNAL MEDICINE

## 2022-05-15 PROCEDURE — 99231 SBSQ HOSP IP/OBS SF/LOW 25: CPT | Performed by: SURGERY

## 2022-05-15 PROCEDURE — 82962 GLUCOSE BLOOD TEST: CPT | Mod: 91

## 2022-05-15 PROCEDURE — 80048 BASIC METABOLIC PNL TOTAL CA: CPT

## 2022-05-15 PROCEDURE — 85025 COMPLETE CBC W/AUTO DIFF WBC: CPT

## 2022-05-15 RX ORDER — OXYCODONE HYDROCHLORIDE AND ACETAMINOPHEN 5; 325 MG/1; MG/1
1 TABLET ORAL ONCE
Status: COMPLETED | OUTPATIENT
Start: 2022-05-15 | End: 2022-05-15

## 2022-05-15 RX ADMIN — ENOXAPARIN SODIUM 40 MG: 40 INJECTION SUBCUTANEOUS at 06:29

## 2022-05-15 RX ADMIN — CEFAZOLIN SODIUM 2 G: 2 INJECTION, SOLUTION INTRAVENOUS at 08:50

## 2022-05-15 RX ADMIN — OXYCODONE HYDROCHLORIDE AND ACETAMINOPHEN 1 TABLET: 5; 325 TABLET ORAL at 13:44

## 2022-05-15 RX ADMIN — SENNOSIDES AND DOCUSATE SODIUM 2 TABLET: 50; 8.6 TABLET ORAL at 06:29

## 2022-05-15 RX ADMIN — CEFAZOLIN SODIUM 2 G: 2 INJECTION, SOLUTION INTRAVENOUS at 00:52

## 2022-05-15 RX ADMIN — ATORVASTATIN CALCIUM 20 MG: 20 TABLET, FILM COATED ORAL at 16:31

## 2022-05-15 RX ADMIN — FOLIC ACID 1 MG: 1 TABLET ORAL at 06:29

## 2022-05-15 RX ADMIN — OXYCODONE 5 MG: 5 TABLET ORAL at 11:42

## 2022-05-15 RX ADMIN — SENNOSIDES AND DOCUSATE SODIUM 2 TABLET: 50; 8.6 TABLET ORAL at 16:31

## 2022-05-15 RX ADMIN — CEFAZOLIN SODIUM 2 G: 2 INJECTION, SOLUTION INTRAVENOUS at 16:30

## 2022-05-15 RX ADMIN — LISINOPRIL 10 MG: 10 TABLET ORAL at 06:29

## 2022-05-15 RX ADMIN — OXYCODONE HYDROCHLORIDE 10 MG: 10 TABLET ORAL at 20:45

## 2022-05-15 RX ADMIN — OXYCODONE HYDROCHLORIDE 10 MG: 10 TABLET ORAL at 01:28

## 2022-05-15 ASSESSMENT — PAIN DESCRIPTION - PAIN TYPE
TYPE: ACUTE PAIN

## 2022-05-15 NOTE — PROGRESS NOTES
"   Orthopaedic PA Progress Note    Interval changes:Doing well, called by RN as drain # 2 low output.    ROS - Patient denies any new issues. No chest pain, dyspnea, or fever.  Pain well controlled.    BP (!) 157/67   Pulse 85   Temp 37.4 °C (99.4 °F) (Temporal)   Resp 18   Ht 1.448 m (4' 9\")   Wt 65.5 kg (144 lb 6.4 oz)   SpO2 94%     Patient seen and examined  No acute distress  Breathing non labored  RRR  Surgical dressing is clean, dry, and intact. Removed. Incisions healthy-appearing and well-approximated. Serous blood tinged drainage Bilat HVs, Patient clearly fires tibialis anterior, EHL, and gastrocnemius/soleus. Sensation is intact to light touch throughout superficial peroneal, deep peroneal, tibial, saphenous, and sural nerve distributions. Strong and palpable 2+ dorsalis pedis and posterior tibial pulses with capillary refill less than 2 seconds. No lower leg tenderness or discomfort.    Recent Labs     05/13/22  0651 05/14/22  0651 05/15/22  0243   WBC 11.7* 11.0* 7.7   RBC 3.03* 3.18* 3.04*   HEMOGLOBIN 9.0* 9.6* 9.1*   HEMATOCRIT 27.9* 28.8* 27.4*   MCV 92.1 90.6 90.1   MCH 29.7 30.2 29.9   MCHC 32.3* 33.3* 33.2*   RDW 50.4* 47.9 46.6   PLATELETCT 250 245 255   MPV 9.6 9.9 9.4       Active Hospital Problems    Diagnosis    • Superficial venous thrombosis of arm, left [I82.612]    • Sepsis ([facility]) [A41.9]    • Arthritis of right knee [M17.11]    • Type 2 diabetes mellitus with hyperglycemia, without long-term current use of insulin ([facility]) [E11.65]    • Essential hypertension [I10]      Assessment/Plan:  POD#4 S/P I+D R knee and thigh  Wt bearing status - as tolerated  PT/OT-initiated  Wound care:dressing changed with nursing today  Drains - 25 mL / 10ml /24hr  Attempted to remove #2, too much resistance, left in, d/w Dr. Jaguar Coy-b/a  Sutures/Staples out- 10-14 days post operatively  Antibiotics: per ID  DVT Prophylaxis- TEDS/SCDs/Foot pumps.   Lovenox: only if nonambulatory, Duration-until " ambulatory > 150'  Future Procedures - none planned  Case Coordination for Discharge Planning - Disposition Clear for disposition (home/SNF/IRF) from Orthopaedic team standpoint.  HVs may be removed in office, will return to bedside tomorrow if still in house. .

## 2022-05-15 NOTE — CARE PLAN
Problem: Knowledge Deficit - Standard  Goal: Patient and family/care givers will demonstrate understanding of plan of care, disease process/condition, diagnostic tests and medications  Outcome: Progressing     Problem: Hemodynamics  Goal: Patient's hemodynamics, fluid balance and neurologic status will be stable or improve  Outcome: Progressing     Problem: Fluid Volume  Goal: Fluid volume balance will be maintained  Outcome: Progressing     Problem: Urinary - Renal Perfusion  Goal: Ability to achieve and maintain adequate renal perfusion and functioning will improve  Outcome: Progressing     Problem: Respiratory  Goal: Patient will achieve/maintain optimum respiratory ventilation and gas exchange  Outcome: Progressing     Problem: Physical Regulation  Goal: Diagnostic test results will improve  Outcome: Progressing  Goal: Signs and symptoms of infection will decrease  Outcome: Progressing     Problem: Pain - Standard  Goal: Alleviation of pain or a reduction in pain to the patient’s comfort goal  Outcome: Progressing     Problem: Fall Risk  Goal: Patient will remain free from falls  Outcome: Progressing     Problem: Mobility  Goal: Patient's capacity to carry out activities will improve  Outcome: Progressing     Problem: Infection - Standard  Goal: Patient will remain free from infection  Outcome: Progressing   The patient is Stable - Low risk of patient condition declining or worsening    Shift Goals  Clinical Goals: pain control, IV abx  Patient Goals: Rest, ABX  Family Goals: n/a    Progress made toward(s) clinical / shift goals:      Patient is not progressing towards the following goals:    Pt AOx4. Pt declines pain med. Dressing to R leg changed with ortho PA Luca. Edema to upper extremities. Last BM 05/14. Pt up with 1-2 p assist.

## 2022-05-15 NOTE — PROGRESS NOTES
Hospital Medicine Daily Progress Note    Date of Service  5/15/2022    Chief Complaint  Kary Shah is a 56 y.o. female admitted 5/10/2022 with 1 week history of fevers and chills, with worsening chronic right knee pain and swelling. She has seropositive rheumatoid arthritis (on methotrexate and Plaquenil), type 2 diabetes, osteoarthritis, essential hypertension, and GERD.     Hospital Course  On admission,labs showed leukocytosis (16.4 K), sodium was 132, anion gap was 17.0, lactic acid was 3.1.      Right knee x-ray showed moderate right knee joint effusion with questionable soft tissue gas seen posterior to the distal femur.     CT right knee showed joint effusion with thick synovial enhancement, concerning for infection. There is gas within the multilocular fluid collection in the popliteal fossa, concerning for infected Baker's cyst/abscess.     ER physician attempted arthrocentesis but was unsuccessful. Orthopedic surgery on-call recommended starting IV antibiotics with formal consult in the morning. Vancomycin, Clindamycin and Zosyn were empirically started.  Lactic acid improved to 1.5.     Discussed with and consulted orthopedic surgery. Patient underwent right knee arthrotomy and drainage of deep infection, incision and drainage of right thigh abscess on 5/11/2022.  She has 2 drains in place. LUE US ordered due to swelling.      Interval Problem Update  Blood cultures x2 and I&D cultures have been negative. Afebrile and hemodynamically stable. Vancomycin and Zosyn switched to Ancef on 5/13/2022. LUE US from 5/13/2022 shows acute, non-occlusive superficial venous thrombosis in the cephalic vein of the distal bicep attached to the IV.    5/15/2022: Afebrile and hemodynamically stable. No positive cultures to date. Left arm swelling and pain have improved.      I have personally seen and examined the patient at bedside. I discussed the plan of care with patient and bedside  RN.     Consultants/Specialty  orthopedics     Code Status  Full Code     Disposition  Patient is not medically cleared for discharge.   Anticipate discharge to to home with close outpatient follow-up.  I have placed the appropriate orders for post-discharge needs.     Review of Systems  Review of Systems   Constitutional: Positive for malaise/fatigue.   HENT: Negative.    Eyes: Negative.    Respiratory: Negative.    Cardiovascular: Negative.    Gastrointestinal: Negative.    Genitourinary: Negative.    Musculoskeletal:        Right knee pain and swelling, left knee with pain    Skin: Negative.    Neurological: Negative.    Endo/Heme/Allergies: Negative.    Psychiatric/Behavioral: Negative.        Physical Exam  Temp:  [36.8 °C (98.2 °F)-37.4 °C (99.4 °F)] 37.4 °C (99.4 °F)  Pulse:  [74-87] 85  Resp:  [16-18] 18  BP: (130-157)/(47-82) 135/63  SpO2:  [90 %-94 %] 94 %    Physical Exam  Constitutional:       General: She is in NAD.      Appearance: She is obese.   HENT:      Head: Normocephalic.      Nose: Nose normal.      Mouth/Throat:      Mouth: Mucous membranes are moist.   Eyes:      Pupils: Pupils are equal, round, and reactive to light.   Cardiovascular:      Rate and Rhythm: Normal rate.   Pulmonary:      Effort: Pulmonary effort is normal.   Abdominal:      Palpations: Abdomen is soft.   Musculoskeletal:         General: Right knee with dressing     Cervical back: Normal range of motion.      Right lower leg: No edema.      Left lower leg: No edema.   Skin:     General: Skin is warm and dry.   Neurological:      General: No focal deficit present.      Mental Status: She is alert.   Psychiatric:         Mood and Affect: Mood normal.        Fluids    Intake/Output Summary (Last 24 hours) at 5/15/2022 1220  Last data filed at 5/15/2022 0400  Gross per 24 hour   Intake 480 ml   Output 235 ml   Net 245 ml       Laboratory  Recent Labs     05/13/22  0651 05/14/22  0651 05/15/22  0243   WBC 11.7* 11.0* 7.7   RBC  3.03* 3.18* 3.04*   HEMOGLOBIN 9.0* 9.6* 9.1*   HEMATOCRIT 27.9* 28.8* 27.4*   MCV 92.1 90.6 90.1   MCH 29.7 30.2 29.9   MCHC 32.3* 33.3* 33.2*   RDW 50.4* 47.9 46.6   PLATELETCT 250 245 255   MPV 9.6 9.9 9.4     Recent Labs     05/13/22  0651 05/14/22  0651 05/15/22  0243   SODIUM 139 138 134*   POTASSIUM 3.8 3.8 3.5*   CHLORIDE 107 105 101   CO2 25 21 21   GLUCOSE 143* 130* 159*   BUN 11 13 18   CREATININE 0.56 1.37 1.52*   CALCIUM 8.0* 8.5 8.1*                   Imaging  US-EXTREMITY VENOUS UPPER UNILAT LEFT   Final Result      CT-KNEE WITH RIGHT   Final Result         1. Moderate knee joint effusion with thick synovial enhancement, concerning for infection      2. There is gas within the multilocular fluid collection in the popliteal fossa, concerning for infected Baker's cyst/abscess.      3. Soft tissue gas in the lateral leg as well.      DX-KNEE 2- RIGHT   Final Result         Moderate right knee joint effusion.      Questionable soft tissue gas seen posterior to the distal femur on lateral view.      DX-CHEST-PORTABLE (1 VIEW)   Final Result         1.  There are mild perihilar opacifications which could be due to edema or bronchitis.      2.  No consolidations identified.           Assessment/Plan  * Sepsis (HCC)- (present on admission)  Assessment & Plan  This is Sepsis Present on admission. SIRS criteria identified on my evaluation include: Fever, with temperature greater than 101 deg F, Tachypnea, with respirations greater than 20 per minute and Leukocytosis, with WBC greater than 12,000.Source is septic arthritis.Sepsis protocol initiated. Fluid resuscitation ordered per protocol. Crystalloid Fluid Administration: Fluid resuscitation ordered per standard protocol - 30 mL/kg per current or ideal body weight.IV antibiotics as appropriate for source of sepsis. Reassessment: I have reassessed the patient's hemodynamic status.    Arthritis of right knee  Assessment & Plan  Patient underwent right knee  arthrotomy and drainage of deep infection, incision and drainage of right thigh abscess on 5/11/2022.  She has 2 drains in place.  Per orthopedic surgery, continue DVT prophylaxis with SCDs and Lovenox until mobilizing then switch to aspirin for 4 weeks.  Maintain drain for 2 days until cultures have resulted and patient has clinically improved.  She is to follow-up with orthopedic surgery outpatient clinic in 2 weeks for wound check and suture/staple removal.  Blood and fluid cultures have been negative.  Vancomycin and Zosyn switched to Ancef on 5/13/2022    Type 2 diabetes mellitus with hyperglycemia, without long-term current use of insulin (HCC)- (present on admission)  Assessment & Plan  Home metformin on hold.  Hemoglobin A1c was 7.7 on 10/22/2021.  Repeat ordered.  Continue SSI with Accu-Cheks and hypoglycemia protocol.    Seropositive rheumatoid arthritis (HCC)  Assessment & Plan  On methotrexate and Plaquenil for seropositive rheumatoid arthritis.  We will place on hold due to acute infection.    Essential hypertension- (present on admission)  Assessment & Plan  Stable. Continue lisinopril    Superficial venous thrombosis of arm, left  Assessment & Plan  LUE US from 5/13/2022 shows acute, non-occlusive superficial venous thrombosis in the cephalic vein of the distal bicep attached to the IV.  IV removed.  Pain and swelling have improved.        VTE prophylaxis: enoxaparin ppx    I have performed a physical exam and reviewed and updated ROS and Plan today (5/15/2022). In review of yesterday's note (5/14/2022), there are no changes except as documented above.

## 2022-05-15 NOTE — CARE PLAN
The patient is Watcher - Medium risk of patient condition declining or worsening    Shift Goals  Clinical Goals: IV abx, pain control, rest  Patient Goals: Rest, ABX  Family Goals: n/a    Progress made toward(s) clinical / shift goals:    Problem: Knowledge Deficit - Standard  Goal: Patient and family/care givers will demonstrate understanding of plan of care, disease process/condition, diagnostic tests and medications  Outcome: Progressing     Problem: Pain - Standard  Goal: Alleviation of pain or a reduction in pain to the patient’s comfort goal  Outcome: Progressing  Note: Pt states pain 1/10- swelling in the right arm is making the tape tighten over the IV site which is causing pain- heat packs applied to reduce swelling. No signs of inflammation or phlebitis noticed.      Problem: Infection - Standard  Goal: Patient will remain free from infection  Outcome: Progressing       Patient is not progressing towards the following goals:

## 2022-05-16 LAB
ANION GAP SERPL CALC-SCNC: 10 MMOL/L (ref 7–16)
BACTERIA SPEC ANAEROBE CULT: NORMAL
BASOPHILS # BLD AUTO: 0.6 % (ref 0–1.8)
BASOPHILS # BLD: 0.05 K/UL (ref 0–0.12)
BUN SERPL-MCNC: 17 MG/DL (ref 8–22)
CALCIUM SERPL-MCNC: 8.7 MG/DL (ref 8.5–10.5)
CHLORIDE SERPL-SCNC: 101 MMOL/L (ref 96–112)
CO2 SERPL-SCNC: 24 MMOL/L (ref 20–33)
CREAT SERPL-MCNC: 1.56 MG/DL (ref 0.5–1.4)
EOSINOPHIL # BLD AUTO: 0.19 K/UL (ref 0–0.51)
EOSINOPHIL NFR BLD: 2.4 % (ref 0–6.9)
ERYTHROCYTE [DISTWIDTH] IN BLOOD BY AUTOMATED COUNT: 46.9 FL (ref 35.9–50)
GFR SERPLBLD CREATININE-BSD FMLA CKD-EPI: 38 ML/MIN/1.73 M 2
GLUCOSE BLD STRIP.AUTO-MCNC: 114 MG/DL (ref 65–99)
GLUCOSE BLD STRIP.AUTO-MCNC: 136 MG/DL (ref 65–99)
GLUCOSE BLD STRIP.AUTO-MCNC: 173 MG/DL (ref 65–99)
GLUCOSE BLD STRIP.AUTO-MCNC: 184 MG/DL (ref 65–99)
GLUCOSE SERPL-MCNC: 223 MG/DL (ref 65–99)
HCT VFR BLD AUTO: 28 % (ref 37–47)
HGB BLD-MCNC: 9.4 G/DL (ref 12–16)
IMM GRANULOCYTES # BLD AUTO: 0.17 K/UL (ref 0–0.11)
IMM GRANULOCYTES NFR BLD AUTO: 2.2 % (ref 0–0.9)
LYMPHOCYTES # BLD AUTO: 0.7 K/UL (ref 1–4.8)
LYMPHOCYTES NFR BLD: 8.9 % (ref 22–41)
MCH RBC QN AUTO: 29.8 PG (ref 27–33)
MCHC RBC AUTO-ENTMCNC: 33.6 G/DL (ref 33.6–35)
MCV RBC AUTO: 88.9 FL (ref 81.4–97.8)
MONOCYTES # BLD AUTO: 0.69 K/UL (ref 0–0.85)
MONOCYTES NFR BLD AUTO: 8.7 % (ref 0–13.4)
NEUTROPHILS # BLD AUTO: 6.09 K/UL (ref 2–7.15)
NEUTROPHILS NFR BLD: 77.2 % (ref 44–72)
NRBC # BLD AUTO: 0 K/UL
NRBC BLD-RTO: 0 /100 WBC
PLATELET # BLD AUTO: 255 K/UL (ref 164–446)
PMV BLD AUTO: 9.9 FL (ref 9–12.9)
POTASSIUM SERPL-SCNC: 3.8 MMOL/L (ref 3.6–5.5)
RBC # BLD AUTO: 3.15 M/UL (ref 4.2–5.4)
SIGNIFICANT IND 70042: NORMAL
SITE SITE: NORMAL
SODIUM SERPL-SCNC: 135 MMOL/L (ref 135–145)
SOURCE SOURCE: NORMAL
WBC # BLD AUTO: 7.9 K/UL (ref 4.8–10.8)

## 2022-05-16 PROCEDURE — A9270 NON-COVERED ITEM OR SERVICE: HCPCS | Performed by: HOSPITALIST

## 2022-05-16 PROCEDURE — 700102 HCHG RX REV CODE 250 W/ 637 OVERRIDE(OP): Performed by: INTERNAL MEDICINE

## 2022-05-16 PROCEDURE — A9270 NON-COVERED ITEM OR SERVICE: HCPCS | Performed by: INTERNAL MEDICINE

## 2022-05-16 PROCEDURE — 85025 COMPLETE CBC W/AUTO DIFF WBC: CPT

## 2022-05-16 PROCEDURE — 700111 HCHG RX REV CODE 636 W/ 250 OVERRIDE (IP): Performed by: INTERNAL MEDICINE

## 2022-05-16 PROCEDURE — 82962 GLUCOSE BLOOD TEST: CPT

## 2022-05-16 PROCEDURE — 770004 HCHG ROOM/CARE - ONCOLOGY PRIVATE *

## 2022-05-16 PROCEDURE — 99255 IP/OBS CONSLTJ NEW/EST HI 80: CPT | Performed by: INTERNAL MEDICINE

## 2022-05-16 PROCEDURE — 36415 COLL VENOUS BLD VENIPUNCTURE: CPT

## 2022-05-16 PROCEDURE — 80048 BASIC METABOLIC PNL TOTAL CA: CPT

## 2022-05-16 PROCEDURE — 99232 SBSQ HOSP IP/OBS MODERATE 35: CPT | Performed by: INTERNAL MEDICINE

## 2022-05-16 PROCEDURE — 700102 HCHG RX REV CODE 250 W/ 637 OVERRIDE(OP): Performed by: HOSPITALIST

## 2022-05-16 PROCEDURE — 97166 OT EVAL MOD COMPLEX 45 MIN: CPT

## 2022-05-16 RX ORDER — DOXYCYCLINE 100 MG/1
100 TABLET ORAL EVERY 12 HOURS
Status: DISCONTINUED | OUTPATIENT
Start: 2022-05-16 | End: 2022-05-27

## 2022-05-16 RX ORDER — LEVOFLOXACIN 500 MG/1
500 TABLET, FILM COATED ORAL ONCE
Status: COMPLETED | OUTPATIENT
Start: 2022-05-16 | End: 2022-05-16

## 2022-05-16 RX ORDER — LEVOFLOXACIN 250 MG/1
250 TABLET, FILM COATED ORAL EVERY 24 HOURS
Status: DISCONTINUED | OUTPATIENT
Start: 2022-05-17 | End: 2022-05-27

## 2022-05-16 RX ORDER — SODIUM CHLORIDE, SODIUM LACTATE, POTASSIUM CHLORIDE, CALCIUM CHLORIDE 600; 310; 30; 20 MG/100ML; MG/100ML; MG/100ML; MG/100ML
INJECTION, SOLUTION INTRAVENOUS CONTINUOUS
Status: DISCONTINUED | OUTPATIENT
Start: 2022-05-16 | End: 2022-05-23

## 2022-05-16 RX ADMIN — INSULIN HUMAN 2 UNITS: 100 INJECTION, SOLUTION PARENTERAL at 11:08

## 2022-05-16 RX ADMIN — ATORVASTATIN CALCIUM 20 MG: 20 TABLET, FILM COATED ORAL at 17:14

## 2022-05-16 RX ADMIN — ENOXAPARIN SODIUM 40 MG: 40 INJECTION SUBCUTANEOUS at 06:10

## 2022-05-16 RX ADMIN — OXYCODONE HYDROCHLORIDE 10 MG: 10 TABLET ORAL at 10:28

## 2022-05-16 RX ADMIN — CEFAZOLIN SODIUM 2 G: 2 INJECTION, SOLUTION INTRAVENOUS at 01:04

## 2022-05-16 RX ADMIN — DOXYCYCLINE 100 MG: 100 TABLET, FILM COATED ORAL at 17:15

## 2022-05-16 RX ADMIN — FOLIC ACID 1 MG: 1 TABLET ORAL at 06:12

## 2022-05-16 RX ADMIN — SENNOSIDES AND DOCUSATE SODIUM 2 TABLET: 50; 8.6 TABLET ORAL at 17:15

## 2022-05-16 RX ADMIN — OXYCODONE 5 MG: 5 TABLET ORAL at 06:12

## 2022-05-16 RX ADMIN — SENNOSIDES AND DOCUSATE SODIUM 2 TABLET: 50; 8.6 TABLET ORAL at 06:11

## 2022-05-16 RX ADMIN — LISINOPRIL 10 MG: 10 TABLET ORAL at 06:12

## 2022-05-16 RX ADMIN — OXYCODONE 5 MG: 5 TABLET ORAL at 17:14

## 2022-05-16 RX ADMIN — INSULIN HUMAN 2 UNITS: 100 INJECTION, SOLUTION PARENTERAL at 20:22

## 2022-05-16 RX ADMIN — CEFAZOLIN SODIUM 2 G: 2 INJECTION, SOLUTION INTRAVENOUS at 08:04

## 2022-05-16 RX ADMIN — LEVOFLOXACIN 500 MG: 500 TABLET, FILM COATED ORAL at 13:08

## 2022-05-16 RX ADMIN — OXYCODONE HYDROCHLORIDE 10 MG: 10 TABLET ORAL at 20:22

## 2022-05-16 ASSESSMENT — PAIN DESCRIPTION - PAIN TYPE
TYPE: ACUTE PAIN

## 2022-05-16 ASSESSMENT — COGNITIVE AND FUNCTIONAL STATUS - GENERAL
SUGGESTED CMS G CODE MODIFIER DAILY ACTIVITY: CL
HELP NEEDED FOR BATHING: A LOT
PERSONAL GROOMING: A LOT
DRESSING REGULAR UPPER BODY CLOTHING: A LOT
DAILY ACTIVITIY SCORE: 12
DRESSING REGULAR LOWER BODY CLOTHING: A LOT
TOILETING: A LOT
EATING MEALS: A LOT

## 2022-05-16 ASSESSMENT — ACTIVITIES OF DAILY LIVING (ADL): TOILETING: INDEPENDENT

## 2022-05-16 NOTE — CARE PLAN
Problem: Knowledge Deficit - Standard  Goal: Patient and family/care givers will demonstrate understanding of plan of care, disease process/condition, diagnostic tests and medications  Outcome: Progressing     Problem: Hemodynamics  Goal: Patient's hemodynamics, fluid balance and neurologic status will be stable or improve  Outcome: Progressing     Problem: Fluid Volume  Goal: Fluid volume balance will be maintained  Outcome: Progressing     Problem: Urinary - Renal Perfusion  Goal: Ability to achieve and maintain adequate renal perfusion and functioning will improve  Outcome: Progressing     Problem: Respiratory  Goal: Patient will achieve/maintain optimum respiratory ventilation and gas exchange  Outcome: Progressing     Problem: Mechanical Ventilation  Goal: Safe management of artificial airway and ventilation  Outcome: Progressing  Goal: Successful weaning off mechanical ventilator, spontaneously maintains adequate gas exchange  Outcome: Progressing  Goal: Patient will be able to express needs and understand communication  Outcome: Progressing     Problem: Physical Regulation  Goal: Diagnostic test results will improve  Outcome: Progressing  Goal: Signs and symptoms of infection will decrease  Outcome: Progressing     Problem: Pain - Standard  Goal: Alleviation of pain or a reduction in pain to the patient’s comfort goal  Outcome: Progressing     Problem: Fall Risk  Goal: Patient will remain free from falls  Outcome: Progressing     Problem: Mobility  Goal: Patient's capacity to carry out activities will improve  Outcome: Progressing     Problem: Infection - Standard  Goal: Patient will remain free from infection  Outcome: Progressing   The patient is Stable - Low risk of patient condition declining or worsening    Shift Goals  Clinical Goals: pain control, ambulation  Patient Goals: Rest  Family Goals: n/a    Progress made toward(s) clinical / shift goals:      Patient is not progressing towards the following  goals:    Pt AOX4. Roxicodone 10 mg given for pain. Pt on RA. One of the drains removed by surgeon. Edema to lower and upper extremities. Last BM 2 days ago. 2 p assist.

## 2022-05-16 NOTE — PROGRESS NOTES
"BP (!) 146/78   Pulse 74   Temp 36.7 °C (98.1 °F) (Temporal)   Resp 18   Ht 1.448 m (4' 9\")   Wt 65.5 kg (144 lb 6.4 oz)   SpO2 96%   BMI 31.25 kg/m² '  Recent Labs     05/14/22  0651 05/15/22  0243   WBC 11.0* 7.7   RBC 3.18* 3.04*   HEMOGLOBIN 9.6* 9.1*   HEMATOCRIT 28.8* 27.4*   MCV 90.6 90.1   MCH 30.2 29.9   RDW 47.9 46.6   PLATELETCT 245 255   MPV 9.9 9.4   NEUTSPOLYS 82.50* 77.80*   LYMPHOCYTES 7.00* 7.80*   MONOCYTES 8.00 9.90   EOSINOPHILS 1.10 2.60   BASOPHILS 0.30 0.30       24h output:  HV 10 mL  HV 6 mL  Will remove with Dr. Montesinos within next 24  "

## 2022-05-16 NOTE — CARE PLAN
The patient is Watcher - Medium risk of patient condition declining or worsening    Shift Goals  Clinical Goals: Pain control, IV abx  Patient Goals: Rest  Family Goals: n/a    Progress made toward(s) clinical / shift goals:    Problem: Hemodynamics  Goal: Patient's hemodynamics, fluid balance and neurologic status will be stable or improve  Outcome: Progressing     Problem: Pain - Standard  Goal: Alleviation of pain or a reduction in pain to the patient’s comfort goal  Outcome: Progressing  Note: Pt states pain 8/10 in her right knee/ foot- oxy given     Problem: Fall Risk  Goal: Patient will remain free from falls  Outcome: Progressing     Problem: Infection - Standard  Goal: Patient will remain free from infection  Outcome: Progressing       Patient is not progressing towards the following goals:

## 2022-05-16 NOTE — DISCHARGE PLANNING
Case Management Discharge Planning     Admission Date: 5/10/2022  GMLOS: 5.3  ALOS: 2     6-Clicks ADL Score: 13  6-Clicks Mobility Score: 12  PT and/or OT Eval ordered: No  Post-acute Referrals Ordered: No  Post-acute Choice Obtained: NA  Has referral(s) been sent to post-acute provider:  [facility]        Anticipated Discharge Dispo: Discharge Disposition: Discharged to home with HHC vs SNF     DME Needed: No     Action(s) Taken: Updated Provider/Nurse on Discharge Plan     Escalations Completed: None     Medically Clear: No     Next Steps: Plan for OR on 5/17/2022 for drain removal     Barriers to Discharge: Medical clearance, SNF choice and acceptance     Is the patient up for discharge tomorrow: No

## 2022-05-16 NOTE — CONSULTS
INFECTIOUS DISEASES INPATIENT CONSULT NOTE     Date of Service: 5/16/2022    Consult Requested By: Lindy Gasca M.D.    Reason for Consultation: septic arthritis    History of Present Illness:   Kary Shah is a 56 y.o. female with RA admitted 5/10/2022 for bilateral knee pain wityh fever and chills.+diabetes, on Plaquenil, methotrexate for RA who Symptoms for one week PTA and worsening especially of chronic right knee pain and swelling.  Worse with movement and weight bearing to the point she was unable to walk on the day of admission.  Chronic bilateral knee pain for 1 year after a fall.  In the ED + fever 102, tachycardia 155, tachypneia 26, leukocytosis of 16.4.    X-ray of right knee showed moderate right knee joint effusion, questionable soft tissue gas seen posterior to the distal femur on the lateral view.  ERP attempted arthrocentesis at bedside, unsuccessful. Ortho consulted  Underwent right knee arthrotomy and drainage of deep infection, incision and drainage of right thigh abscess on 5/11/2022. On Ancef. Cultures negative  Infectious Diseases consulted for antibiotic recommendation and management      Review Of Systems:  Joint pain  No fever, N/V/D  All other systems are reviewed and are negative     PMH:   Past Medical History:   Diagnosis Date   • Diabetes (HCC)    • GERD (gastroesophageal reflux disease)    • Hypertension    • Osteoarthritis of both hands 2015         PSH:  Past Surgical History:   Procedure Laterality Date   • IRRIGATION & DEBRIDEMENT GENERAL Right 5/11/2022    Procedure: IRRIGATION AND DEBRIDEMENT, WOUND;  Surgeon: Martin Montesinos M.D.;  Location: SURGERY [facility];  Service: Orthopedics   • PRIMARY C SECTION      x2       FAMILY HX:  Family History   Problem Relation Age of Onset   • Heart Disease Mother 64        heart attack   • Arthritis Mother    • Heart Disease Father 65        heart attack   • Other Sister         blood clot   • Heart Disease Brother          heart attack   • Heart Disease Brother         heart attack       SOCIAL HX:  Social History     Socioeconomic History   • Marital status: Single     Spouse name: Not on file   • Number of children: Not on file   • Years of education: Not on file   • Highest education level: Not on file   Occupational History   • Not on file   Tobacco Use   • Smoking status: Never Smoker   • Smokeless tobacco: Never Used   Vaping Use   • Vaping Use: Never used   Substance and Sexual Activity   • Alcohol use: No   • Drug use: No   • Sexual activity: Not Currently   Other Topics Concern   • Not on file   Social History Narrative   • Not on file     Social Determinants of Health     Financial Resource Strain: Not on file   Food Insecurity: Not on file   Transportation Needs: Not on file   Physical Activity: Not on file   Stress: Not on file   Social Connections: Not on file   Intimate Partner Violence: Not on file   Housing Stability: Not on file     Social History     Tobacco Use   Smoking Status Never Smoker   Smokeless Tobacco Never Used     Social History     Substance and Sexual Activity   Alcohol Use No       Allergies/Intolerances:  No Known Allergies      Other Current Medications:    Current Facility-Administered Medications:   •  lactated ringers infusion, , Intravenous, Continuous, Lindy Gasca M.D.  •  doxycycline monohydrate (ADOXA) tablet 100 mg, 100 mg, Oral, Q12HRS, Jade Alberto M.D.  •  levoFLOXacin (LEVAQUIN) tablet 500 mg, 500 mg, Oral, Q24HRS, Jade Alberto M.D.  •  Pharmacy Consult Request ...Pain Management Review 1 Each, 1 Each, Other, PHARMACY TO DOSE, Ck Goldsmith M.D.  •  oxyCODONE immediate-release (ROXICODONE) tablet 5 mg, 5 mg, Oral, Q3HRS PRN, 5 mg at 05/16/22 0612 **OR** oxyCODONE immediate release (ROXICODONE) tablet 10 mg, 10 mg, Oral, Q3HRS PRN, 10 mg at 05/16/22 1028 **OR** morphine 4 MG/ML injection 4 mg, 4 mg, Intravenous, Q3HRS PRN, Ck Goldsmith M.D.  •  senna-docusate (PERICOLACE or  SENOKOT S) 8.6-50 MG per tablet 2 Tablet, 2 Tablet, Oral, BID, 2 Tablet at 05/16/22 0611 **AND** polyethylene glycol/lytes (MIRALAX) PACKET 1 Packet, 1 Packet, Oral, QDAY PRN **AND** magnesium hydroxide (MILK OF MAGNESIA) suspension 30 mL, 30 mL, Oral, QDAY PRN **AND** bisacodyl (DULCOLAX) suppository 10 mg, 10 mg, Rectal, QDAY PRN, Redd Wade M.D.  •  enoxaparin (Lovenox) inj 40 mg, 40 mg, Subcutaneous, DAILY, Redd Wade M.D., 40 mg at 05/16/22 0610  •  acetaminophen (Tylenol) tablet 650 mg, 650 mg, Oral, Q6HRS PRN, Redd Wade M.D.  •  hydrALAZINE (APRESOLINE) injection 10 mg, 10 mg, Intravenous, Q4HRS PRN, Redd Wade M.D.  •  ondansetron (ZOFRAN) syringe/vial injection 4 mg, 4 mg, Intravenous, Q4HRS PRN, Redd Wade M.D.  •  ondansetron (ZOFRAN ODT) dispertab 4 mg, 4 mg, Oral, Q4HRS PRN, Redd Wade M.D.  •  promethazine (PHENERGAN) tablet 12.5-25 mg, 12.5-25 mg, Oral, Q4HRS PRN, Redd Wade M.D.  •  promethazine (PHENERGAN) suppository 12.5-25 mg, 12.5-25 mg, Rectal, Q4HRS PRN, Redd Wade M.D.  •  prochlorperazine (COMPAZINE) injection 5-10 mg, 5-10 mg, Intravenous, Q4HRS PRN, Redd Wade M.D.  •  atorvastatin (LIPITOR) tablet 20 mg, 20 mg, Oral, Q EVENING, Redd Wade M.D., 20 mg at 05/15/22 1631  •  folic acid (FOLVITE) tablet 1 mg, 1 mg, Oral, DAILY, Redd Wade M.D., 1 mg at 05/16/22 0612  •  lisinopril (PRINIVIL) tablet 10 mg, 10 mg, Oral, DAILY, Redd Wade M.D., 10 mg at 05/16/22 0612  •  insulin regular (HumuLIN R,NovoLIN R) injection, 2-9 Units, Subcutaneous, 4X/DAY ACHS, 2 Units at 05/16/22 1108 **AND** POC blood glucose manual result, , , Q AC AND BEDTIME(S) **AND** NOTIFY MD and PharmD, , , Once **AND** Administer 20 grams of glucose (approximately 8 ounces of fruit juice) every 15 minutes PRN FSBG less than 70 mg/dL, , , PRN **AND** dextrose 50% (D50W) injection 25 g, 25 g, Intravenous, Q15 MIN PRN, Redd Wade,  "M.D.      Most Recent Vital Signs:  BP (!) 146/78   Pulse 74   Temp 36.7 °C (98.1 °F) (Temporal)   Resp 18   Ht 1.448 m (4' 9\")   Wt 65.5 kg (144 lb 6.4 oz)   SpO2 96%   BMI 31.25 kg/m²   Temp  Av.1 °C (98.8 °F)  Min: 36.6 °C (97.8 °F)  Max: 38.9 °C (102 °F)    Physical Exam:  General: well-appearing, well nourished no acute distress  HEENT: NCAT, PERRLA, sclera anicteric, PERRL, EOMI,  no oral lesions Dentition good  Neck: supple, no lymphadenopathy  Chest: CTAB, unlabored.No wheeze or rhonchi  Cardiac: RRR,  no m/r/g   Abdomen: + bowel sounds, soft, non-tender, non-distended  Extremities: No cyanosis, clubbing. Right knee dressed drain serosanguinous fluid  Skin: no rashes   Neuro: Alert and oriented times 3, Speech fluent CN intact SALTER  Psych: Mood normal Affect normal    Pertinent Lab Results:  Recent Labs     22  0651 05/15/22  0243 22  0913   WBC 11.0* 7.7 7.9      Recent Labs     22  0651 05/15/22  0243 22  0913   HEMOGLOBIN 9.6* 9.1* 9.4*   HEMATOCRIT 28.8* 27.4* 28.0*   MCV 90.6 90.1 88.9   MCH 30.2 29.9 29.8   PLATELETCT 245 255 255         Recent Labs     22  0651 05/15/22  0243 22  0913   SODIUM 138 134* 135   POTASSIUM 3.8 3.5* 3.8   CHLORIDE 105 101 101   CO2 21 21 24   CREATININE 1.37 1.52* 1.56*        No results for input(s): ALBUMIN in the last 72 hours.    Invalid input(s): AST, ALT, ALKPHOS, BILITOT, TOTALBILIRUB, BILIRUBINTOT, BILIRUBINDIR, BILIRUBININD, ALKALINEPHOS     Pertinent Micro:  Results     Procedure Component Value Units Date/Time    CULTURE TISSUE W/ GRM STAIN [[id number]] Collected: [id number]    Order Status: Completed Specimen: Tissue Updated: 22 1154     Significant Indicator NEG     Source TISS     Site right knee     Culture Result No growth at 72 hours.     Gram Stain Result Many WBCs.  No organisms seen.      Narrative:      Surgery Specimen    Anaerobic Culture [[id number]] Collected: 22    Order Status: " "Completed Specimen: Tissue Updated: 05/16/22 1154     Significant Indicator NEG     Source TISS     Site right knee     Culture Result No Anaerobes isolated.    Narrative:      Surgery Specimen    BLOOD CULTURE [[id number]] Collected: 05/10/22 1924    Order Status: Completed Specimen: Blood from Peripheral Updated: 05/15/22 2100     Significant Indicator NEG     Source BLD     Site PERIPHERAL     Culture Result No growth after 5 days of incubation.    Narrative:      Per Hospital Policy: Only change Specimen Src: to \"Line\" if  specified by physician order.  Left AC    BLOOD CULTURE [[id number]] Collected: 05/10/22 1841    Order Status: Completed Specimen: Blood from Peripheral Updated: 05/15/22 2100     Significant Indicator NEG     Source BLD     Site PERIPHERAL     Culture Result No growth after 5 days of incubation.    Narrative:      Per Hospital Policy: Only change Specimen Src: to \"Line\" if  specified by physician order.  Left AC    URINE CULTURE(NEW) [[id number]] Collected: 05/11/22 0628    Order Status: Completed Specimen: Urine Updated: 05/13/22 0711     Significant Indicator NEG     Source UR     Site -     Culture Result No growth at 48 hours.    Narrative:      Indication for culture:->Evaluation for sepsis without a  clear source of infection  Indication for culture:->Evaluation for sepsis without a    GRAM STAIN [[id number]] Collected: 05/11/22 1755    Order Status: Completed Specimen: Tissue Updated: 05/12/22 1338     Significant Indicator .     Source TISS     Site right knee     Gram Stain Result Many WBCs.  No organisms seen.      Narrative:      Surgery Specimen    CoV-2 and Flu A/B by PCR (24 hour In-House): Collect NP swab in [facility] [[id number]] Collected: 05/10/22 2000    Order Status: Completed Specimen: Respirate from Nasopharyngeal Updated: 05/11/22 1752     Influenza virus A RNA Negative     Influenza virus B, PCR Negative     SARS-CoV-2 by PCR NotDetected     Comment: PATIENTS: Important " "information regarding your results and instructions can  be found at [email or web address]   \"After your  Covid-19 Test\"    [facility] providers: PLEASE REFER TO DE-ESCALATION AND RETESTING PROTOCOL  on [email or web address]    **The Roche SARS-CoV-2 RT-PCR test has been made available for use under the  Emergency Use Authorization (EUA) only.          SARS-CoV-2 Source NP Swab    URINALYSIS [[id number]]  (Abnormal) Collected: 05/11/22 0628    Order Status: Completed Specimen: Urine Updated: 05/11/22 0718     Color Yellow     Character Clear     Specific Gravity 1.043     Ph 5.0     Glucose >=1000 mg/dL      Ketones 15 mg/dL      Protein Negative mg/dL      Bilirubin Negative     Urobilinogen, Urine 0.2     Nitrite Negative     Leukocyte Esterase Negative     Occult Blood Negative     Micro Urine Req see below     Comment: Microscopic examination not performed when specimen is clear  and chemically negative for protein, blood, leukocyte esterase  and nitrite.         Narrative:      Indication for culture:->Evaluation for sepsis without a  clear source of infection    MRSA By PCR (Amp) [[id number]] Collected: 05/10/22 5825    Order Status: Completed Specimen: Respirate from Nares Updated: 05/11/22 0610     MRSA by PCR Negative        No results found for: BLOODCULTU, BLDCULT, BCHOLD     Studies:  CT knee 5/10/2022  IMPRESSION:   1. Moderate knee joint effusion with thick synovial enhancement, concerning for infection     2. There is gas within the multilocular fluid collection in the popliteal fossa, concerning for infected Baker's cyst/abscess.     3. Soft tissue gas in the lateral leg as well.  IMPRESSION:   Presumed right knee septic arthritis  Immunosuppressed  Rheumatoid arthritis  Leukocytosis resolved  Posterior thigh abscess  S/p Right knee arthrotomy and drainage of deep infection, incision and drainage of right thigh abscess 5/11/2022  DM    PLAN:   No fluid sent  Cultures neg  Per op " "notes, fluid appeared purulent. Thigh abscess \"congealed\"  No SA or PSAR isolated, so will treat for more fastidious pathogens  DC Ancef  Start doxy 100 mg PO BID with levofloxacin 50 mg PO daily for 14 days  Hold cultures for 14 days if possible  Keep BS under 150 to help control current infection      Plan of care discussed with KRISTIN Gasca M.D.. Will continue to follow    Jade Alberto M.D.    "

## 2022-05-16 NOTE — PROGRESS NOTES
Hospital Medicine Daily Progress Note    Date of Service  5/16/2022    Chief Complaint  Kary Shah is a 56 y.o. female admitted 5/10/2022 with 1 week history of fevers and chills, with worsening chronic right knee pain and swelling. She has seropositive rheumatoid arthritis (on methotrexate and Plaquenil), type 2 diabetes, osteoarthritis, essential hypertension, and GERD.     Hospital Course  On admission,labs showed leukocytosis (16.4 K), sodium was 132, anion gap was 17.0, lactic acid was 3.1.      Right knee x-ray showed moderate right knee joint effusion with questionable soft tissue gas seen posterior to the distal femur.     CT right knee showed joint effusion with thick synovial enhancement, concerning for infection. There is gas within the multilocular fluid collection in the popliteal fossa, concerning for infected Baker's cyst/abscess.     ER physician attempted arthrocentesis but was unsuccessful. Orthopedic surgery on-call recommended starting IV antibiotics with formal consult in the morning. Vancomycin, Clindamycin and Zosyn were empirically started.  Lactic acid improved to 1.5.     Orthopedic surgery following. Patient underwent right knee arthrotomy and drainage of deep infection, incision and drainage of right thigh abscess on 5/11/2022.  She has 2 drains in place.     Vancomycin and Zosyn switched to Ancef on 5/13/2022. LUE US from 5/13/2022 shows acute, non-occlusive superficial venous thrombosis in the cephalic vein of the distal bicep attached to the IV. Left arm swelling and pain have improved.      Interval Problem Update  5/15/2022: Afebrile and hemodynamically stable. No positive cultures to date. Surgical drains were not able to be removed by ortho PA. Plan for OR on 5/17/2022 for drain removal. No positive cultures to date. ID consulted for antibiotic recommendations.     I have personally seen and examined the patient at bedside. I discussed the plan of care with patient and  bedside RN.     Consultants/Specialty  orthopedics     Code Status  Full Code     Disposition  Patient is not medically cleared for discharge.   Anticipate discharge to to home with close outpatient follow-up.  I have placed the appropriate orders for post-discharge needs.     Review of Systems  Review of Systems   Constitutional: Positive for malaise/fatigue.   HENT: Negative.    Eyes: Negative.    Respiratory: Negative.    Cardiovascular: Negative.    Gastrointestinal: Negative.    Genitourinary: Negative.    Musculoskeletal:        Right knee pain and swelling, left knee with pain    Skin: Negative.    Neurological: Negative.    Endo/Heme/Allergies: Negative.    Psychiatric/Behavioral: Negative.         Physical Exam  Temp:  [36.7 °C (98.1 °F)-37.1 °C (98.8 °F)] 36.7 °C (98.1 °F)  Pulse:  [74-77] 74  Resp:  [18] 18  BP: (144-158)/(64-78) 146/78  SpO2:  [91 %-96 %] 96 %    Physical Exam  Constitutional:       General: She is in NAD.      Appearance: She is obese.   HENT:      Head: Normocephalic.      Nose: Nose normal.      Mouth/Throat:      Mouth: Mucous membranes are moist.   Eyes:      Pupils: Pupils are equal, round, and reactive to light.   Cardiovascular:      Rate and Rhythm: Normal rate.   Pulmonary:      Effort: Pulmonary effort is normal.   Abdominal:      Palpations: Abdomen is soft.   Musculoskeletal:         General: Right knee with dressing     Cervical back: Normal range of motion.      Right lower leg: No edema.      Left lower leg: No edema.   Skin:     General: Skin is warm and dry.   Neurological:      General: No focal deficit present.      Mental Status: She is alert.   Psychiatric:         Mood and Affect: Mood normal.     Fluids    Intake/Output Summary (Last 24 hours) at 5/16/2022 1057  Last data filed at 5/16/2022 0804  Gross per 24 hour   Intake 240 ml   Output 916 ml   Net -676 ml       Laboratory  Recent Labs     05/14/22  0651 05/15/22  0243 05/16/22  0913   WBC 11.0* 7.7 7.9   RBC  3.18* 3.04* 3.15*   HEMOGLOBIN 9.6* 9.1* 9.4*   HEMATOCRIT 28.8* 27.4* 28.0*   MCV 90.6 90.1 88.9   MCH 30.2 29.9 29.8   MCHC 33.3* 33.2* 33.6   RDW 47.9 46.6 46.9   PLATELETCT 245 255 255   MPV 9.9 9.4 9.9     Recent Labs     05/14/22  0651 05/15/22  0243 05/16/22  0913   SODIUM 138 134* 135   POTASSIUM 3.8 3.5* 3.8   CHLORIDE 105 101 101   CO2 21 21 24   GLUCOSE 130* 159* 223*   BUN 13 18 17   CREATININE 1.37 1.52* 1.56*   CALCIUM 8.5 8.1* 8.7                   Imaging  US-EXTREMITY VENOUS UPPER UNILAT LEFT   Final Result      CT-KNEE WITH RIGHT   Final Result         1. Moderate knee joint effusion with thick synovial enhancement, concerning for infection      2. There is gas within the multilocular fluid collection in the popliteal fossa, concerning for infected Baker's cyst/abscess.      3. Soft tissue gas in the lateral leg as well.      DX-KNEE 2- RIGHT   Final Result         Moderate right knee joint effusion.      Questionable soft tissue gas seen posterior to the distal femur on lateral view.      DX-CHEST-PORTABLE (1 VIEW)   Final Result         1.  There are mild perihilar opacifications which could be due to edema or bronchitis.      2.  No consolidations identified.           Assessment/Plan  * Sepsis (HCC)- (present on admission)  Assessment & Plan  This is Sepsis Present on admission. SIRS criteria identified on my evaluation include: Fever, with temperature greater than 101 deg F, Tachypnea, with respirations greater than 20 per minute and Leukocytosis, with WBC greater than 12,000.Source is septic arthritis.Sepsis protocol initiated. Fluid resuscitation ordered per protocol. Crystalloid Fluid Administration: Fluid resuscitation ordered per standard protocol - 30 mL/kg per current or ideal body weight.IV antibiotics as appropriate for source of sepsis. Reassessment: I have reassessed the patient's hemodynamic status.    Arthritis of right knee  Assessment & Plan  Patient underwent right knee  arthrotomy and drainage of deep infection, incision and drainage of right thigh abscess on 5/11/2022.  She has 2 drains in place.  Per orthopedic surgery, continue DVT prophylaxis with SCDs and Lovenox until mobilizing then switch to aspirin for 4 weeks.  Maintain drain for 2 days until cultures have resulted and patient has clinically improved.  She is to follow-up with orthopedic surgery outpatient clinic in 2 weeks for wound check and suture/staple removal.  Blood and fluid cultures have been negative.  Vancomycin and Zosyn switched to Ancef on 5/13/2022    Type 2 diabetes mellitus with hyperglycemia, without long-term current use of insulin (HCC)- (present on admission)  Assessment & Plan  Home metformin on hold.  Hemoglobin A1c was 7.7 on 10/22/2021.  Repeat ordered.  Continue SSI with Accu-Cheks and hypoglycemia protocol.    Seropositive rheumatoid arthritis (HCC)  Assessment & Plan  On methotrexate and Plaquenil for seropositive rheumatoid arthritis.  We will place on hold due to acute infection.    Essential hypertension- (present on admission)  Assessment & Plan  Stable. Continue lisinopril    Superficial venous thrombosis of arm, left  Assessment & Plan  LUE US from 5/13/2022 shows acute, non-occlusive superficial venous thrombosis in the cephalic vein of the distal bicep attached to the IV.  IV removed.  Pain and swelling have improved.          VTE prophylaxis: enoxaparin ppx    I have performed a physical exam and reviewed and updated ROS and Plan today (5/16/2022). In review of yesterday's note (5/15/2022), there are no changes except as documented above.

## 2022-05-16 NOTE — THERAPY
Occupational Therapy   Initial Evaluation     Patient Name: Kary Shah  Age:  56 y.o., Sex:  female  Medical Record #: [id number]  Today's Date: 5/16/2022     Precautions: (P) Fall Risk, Weight Bearing As Tolerated Right Lower Extremity  Comments: R knee I&D, R/L shoulder pain    Assessment  Patient is 56 y.o. female admitted with sepsis and knee infection now s/p R knee I&D seen for OT evaluation. Pt presented with poor pain control, impaired balance, BUE weakness, limited UE ROM, and decreased endurance impacting ADLs and mobility. Pt required max A for LB dressing, max A for UB dressing, mod A for functional txf. Highly recommend placement at this time 2/2 said deficits. Will follow for skilled OT services.    Plan    Recommend Occupational Therapy 3 times per week until therapy goals are met for the following treatments:  Adaptive Equipment, Neuro Re-Education / Balance, Self Care/Activities of Daily Living, Therapeutic Activities and Therapeutic Exercises.    DC Equipment Recommendations: (P) Unable to determine at this time  Discharge Recommendations: (P) Recommend post-acute placement for additional occupational therapy services prior to discharge home        05/16/22 1335   Prior Living Situation   Prior Services None   Housing / Facility 1 Story House   Bathroom Set up Walk In Shower;Bathtub / Shower Combination   Equipment Owned None   Lives with - Patient's Self Care Capacity Child Less than 18 Years of Age;Other (Comments)  (sister)   Comments Pt reports her sister works during the day and is unable to provide heavy assist. Pt works full time at baseline   Prior Level of ADL Function   Self Feeding Independent   Grooming / Hygiene Independent   Bathing Independent   Dressing Independent   Toileting Independent   Prior Level of IADL Function   Medication Management Independent   Laundry Independent   Kitchen Mobility Independent   Finances Independent   Home Management Independent   Shopping  Independent   Prior Level Of Mobility Independent Without Device in Community   History of Falls   History of Falls Yes   Precautions   Precautions Fall Risk;Weight Bearing As Tolerated Right Lower Extremity   Comments R knee I&D, R/L shoulder pain   Cognition    Cognition / Consciousness WDL   Comments pleasant and agreeable, limited by pain   Active ROM Upper Body   Active ROM Upper Body  X   Comments plans for RUE shoulder sx, pt reports pain in L shoulder as well 2/2 compensation   Strength Upper Body   Upper Body Strength  X   Comments limited by fx   Coordination Upper Body   Coordination X   Comments grossly decreased coordination   Balance Assessment   Sitting Balance (Static) Fair   Sitting Balance (Dynamic) Fair -   Standing Balance (Static) Poor +   Standing Balance (Dynamic) Poor -   Weight Shift Sitting Fair   Weight Shift Standing Poor   Comments with platform walker   Bed Mobility    Supine to Sit Minimal Assist   Scooting Moderate Assist   ADL Assessment   Grooming Moderate Assist;Seated   Lower Body Dressing Maximal Assist   How much help from another person does the patient currently need...   6 Clicks Daily Activity Score 12   Functional Mobility   Sit to Stand Minimal Assist   Bed, Chair, Wheelchair Transfer Moderate Assist   Transfer Method Stand Step   Mobility txf to recliner only, limited by pain   Comments using platform walker   Patient / Family Goals   Patient / Family Goal #1 to go to rehab   Short Term Goals   Short Term Goal # 1 Pt will demo LB dressing using LRAD SPV   Short Term Goal # 2 Pt will demo UB dressing SPV   Short Term Goal # 3 Pt will complete functional ADL txfs SPV   Education Group   Role of Occupational Therapist Patient Response Patient;Acceptance;Explanation   Problem List   Problem List Decreased Active Daily Living Skills;Decreased Homemaking Skills;Decreased Upper Extremity Strength Right;Decreased Upper Extremity Strength Left;Decreased Upper Extremity AROM  Right;Decreased Upper Extremity AROM Left;Decreased Functional Mobility;Decreased Activity Tolerance;Impaired Postural Control / Balance;Impaired Coordination Right Upper Extremity;Impaired Coordination Left Upper Extremity   Anticipated Discharge Equipment and Recommendations   DC Equipment Recommendations Unable to determine at this time   Discharge Recommendations Recommend post-acute placement for additional occupational therapy services prior to discharge home

## 2022-05-16 NOTE — DISCHARGE PLANNING
"[facility] TCN chart review completed.S/P Right knee arthrotomy and drainage of deep infection, incision and drainage of right thigh abscess on 5/11/2022.   Collaborated with RANGEL Quintero. PT recommended HH. Per 5/16/2022 Hospitalist notes \" Patient is not medically cleared for discharge. Anticipate discharge to to home with close outpatient follow-up.\"     Patient seen at bedside. TCN will continue to follow and collaborate with discharge planning team as additional post acute needs arise. Thank you.    Previously completed:  Choice forms obtained: [facility]  [facility] introduced ( Y) Referral ( sent)            "

## 2022-05-17 ENCOUNTER — APPOINTMENT (OUTPATIENT)
Dept: RADIOLOGY | Facility: MEDICAL CENTER | Age: 57
DRG: 854 | End: 2022-05-17
Attending: PHYSICIAN ASSISTANT
Payer: COMMERCIAL

## 2022-05-17 LAB
ANION GAP SERPL CALC-SCNC: 10 MMOL/L (ref 7–16)
BASOPHILS # BLD AUTO: 0.4 % (ref 0–1.8)
BASOPHILS # BLD: 0.03 K/UL (ref 0–0.12)
BUN SERPL-MCNC: 22 MG/DL (ref 8–22)
CALCIUM SERPL-MCNC: 8.9 MG/DL (ref 8.5–10.5)
CHLORIDE SERPL-SCNC: 102 MMOL/L (ref 96–112)
CO2 SERPL-SCNC: 24 MMOL/L (ref 20–33)
CREAT SERPL-MCNC: 1.5 MG/DL (ref 0.5–1.4)
EOSINOPHIL # BLD AUTO: 0.19 K/UL (ref 0–0.51)
EOSINOPHIL NFR BLD: 2.5 % (ref 0–6.9)
ERYTHROCYTE [DISTWIDTH] IN BLOOD BY AUTOMATED COUNT: 46.5 FL (ref 35.9–50)
GFR SERPLBLD CREATININE-BSD FMLA CKD-EPI: 40 ML/MIN/1.73 M 2
GLUCOSE BLD STRIP.AUTO-MCNC: 114 MG/DL (ref 65–99)
GLUCOSE BLD STRIP.AUTO-MCNC: 128 MG/DL (ref 65–99)
GLUCOSE BLD STRIP.AUTO-MCNC: 150 MG/DL (ref 65–99)
GLUCOSE BLD STRIP.AUTO-MCNC: 161 MG/DL (ref 65–99)
GLUCOSE SERPL-MCNC: 139 MG/DL (ref 65–99)
HCT VFR BLD AUTO: 27.2 % (ref 37–47)
HGB BLD-MCNC: 9 G/DL (ref 12–16)
IMM GRANULOCYTES # BLD AUTO: 0.31 K/UL (ref 0–0.11)
IMM GRANULOCYTES NFR BLD AUTO: 4.1 % (ref 0–0.9)
LYMPHOCYTES # BLD AUTO: 0.67 K/UL (ref 1–4.8)
LYMPHOCYTES NFR BLD: 8.9 % (ref 22–41)
MCH RBC QN AUTO: 29.9 PG (ref 27–33)
MCHC RBC AUTO-ENTMCNC: 33.1 G/DL (ref 33.6–35)
MCV RBC AUTO: 90.4 FL (ref 81.4–97.8)
MONOCYTES # BLD AUTO: 0.89 K/UL (ref 0–0.85)
MONOCYTES NFR BLD AUTO: 11.8 % (ref 0–13.4)
NEUTROPHILS # BLD AUTO: 5.43 K/UL (ref 2–7.15)
NEUTROPHILS NFR BLD: 72.3 % (ref 44–72)
NRBC # BLD AUTO: 0 K/UL
NRBC BLD-RTO: 0 /100 WBC
PLATELET # BLD AUTO: 274 K/UL (ref 164–446)
PMV BLD AUTO: 9.6 FL (ref 9–12.9)
POTASSIUM SERPL-SCNC: 3.6 MMOL/L (ref 3.6–5.5)
RBC # BLD AUTO: 3.01 M/UL (ref 4.2–5.4)
SODIUM SERPL-SCNC: 136 MMOL/L (ref 135–145)
WBC # BLD AUTO: 7.5 K/UL (ref 4.8–10.8)

## 2022-05-17 PROCEDURE — 99233 SBSQ HOSP IP/OBS HIGH 50: CPT | Performed by: INTERNAL MEDICINE

## 2022-05-17 PROCEDURE — 700102 HCHG RX REV CODE 250 W/ 637 OVERRIDE(OP): Performed by: INTERNAL MEDICINE

## 2022-05-17 PROCEDURE — A9270 NON-COVERED ITEM OR SERVICE: HCPCS | Performed by: HOSPITALIST

## 2022-05-17 PROCEDURE — 700105 HCHG RX REV CODE 258: Performed by: INTERNAL MEDICINE

## 2022-05-17 PROCEDURE — 97535 SELF CARE MNGMENT TRAINING: CPT

## 2022-05-17 PROCEDURE — 73560 X-RAY EXAM OF KNEE 1 OR 2: CPT | Mod: RT

## 2022-05-17 PROCEDURE — 700102 HCHG RX REV CODE 250 W/ 637 OVERRIDE(OP): Performed by: HOSPITALIST

## 2022-05-17 PROCEDURE — 82962 GLUCOSE BLOOD TEST: CPT | Mod: 91

## 2022-05-17 PROCEDURE — 36415 COLL VENOUS BLD VENIPUNCTURE: CPT

## 2022-05-17 PROCEDURE — 85025 COMPLETE CBC W/AUTO DIFF WBC: CPT

## 2022-05-17 PROCEDURE — 770004 HCHG ROOM/CARE - ONCOLOGY PRIVATE *

## 2022-05-17 PROCEDURE — A9270 NON-COVERED ITEM OR SERVICE: HCPCS | Performed by: INTERNAL MEDICINE

## 2022-05-17 PROCEDURE — 80048 BASIC METABOLIC PNL TOTAL CA: CPT

## 2022-05-17 RX ORDER — AMLODIPINE BESYLATE 5 MG/1
5 TABLET ORAL
Status: DISCONTINUED | OUTPATIENT
Start: 2022-05-17 | End: 2022-05-18

## 2022-05-17 RX ADMIN — SODIUM CHLORIDE, POTASSIUM CHLORIDE, SODIUM LACTATE AND CALCIUM CHLORIDE: 600; 310; 30; 20 INJECTION, SOLUTION INTRAVENOUS at 13:35

## 2022-05-17 RX ADMIN — LEVOFLOXACIN 250 MG: 250 TABLET, FILM COATED ORAL at 06:04

## 2022-05-17 RX ADMIN — OXYCODONE HYDROCHLORIDE 10 MG: 10 TABLET ORAL at 19:44

## 2022-05-17 RX ADMIN — DOXYCYCLINE 100 MG: 100 TABLET, FILM COATED ORAL at 06:04

## 2022-05-17 RX ADMIN — INSULIN HUMAN 2 UNITS: 100 INJECTION, SOLUTION PARENTERAL at 21:18

## 2022-05-17 RX ADMIN — LISINOPRIL 10 MG: 10 TABLET ORAL at 06:04

## 2022-05-17 RX ADMIN — SODIUM CHLORIDE, POTASSIUM CHLORIDE, SODIUM LACTATE AND CALCIUM CHLORIDE: 600; 310; 30; 20 INJECTION, SOLUTION INTRAVENOUS at 00:06

## 2022-05-17 RX ADMIN — OXYCODONE HYDROCHLORIDE 10 MG: 10 TABLET ORAL at 06:08

## 2022-05-17 RX ADMIN — ATORVASTATIN CALCIUM 20 MG: 20 TABLET, FILM COATED ORAL at 17:33

## 2022-05-17 RX ADMIN — DOXYCYCLINE 100 MG: 100 TABLET, FILM COATED ORAL at 17:33

## 2022-05-17 RX ADMIN — SENNOSIDES AND DOCUSATE SODIUM 2 TABLET: 50; 8.6 TABLET ORAL at 17:36

## 2022-05-17 RX ADMIN — AMLODIPINE BESYLATE 5 MG: 5 TABLET ORAL at 16:21

## 2022-05-17 RX ADMIN — OXYCODONE HYDROCHLORIDE 10 MG: 10 TABLET ORAL at 16:22

## 2022-05-17 ASSESSMENT — ENCOUNTER SYMPTOMS
PALPITATIONS: 0
COUGH: 0
VOMITING: 0
BLURRED VISION: 0
DIZZINESS: 0
HEADACHES: 0
SHORTNESS OF BREATH: 0
CONSTIPATION: 0
DEPRESSION: 0
NAUSEA: 0
SORE THROAT: 0
CHILLS: 0
NERVOUS/ANXIOUS: 0
DIARRHEA: 0
WEAKNESS: 0
FEVER: 0
ABDOMINAL PAIN: 0

## 2022-05-17 NOTE — DISCHARGE PLANNING
Received Choice form at [id number]  Agency/Facility Name: [facility]  Referral sent per Choice form at [phone]

## 2022-05-17 NOTE — DISCHARGE PLANNING
[facility] TCN chart review completed. Noting OT recommendations highly recommending placement. Spoke with PT Loy, who plans to reassess this afternoon. Collaborated with CM Mag. Patient seen at bedside using  Ipad #[id number]. Pt is agreeable to post acute placement. Completes choice for IRF/SNF, faxed to [facility] and given to CM. Pt voices concerns regarding cost of care. Would like to know what her HT coverage is- inquiry placed.  Also requests to be screened for Medicaid- CM will email PFAs. TCN will continue to follow and collaborate with discharge planning team as additional post acute needs arise. Thank you.    Previously completed:  - PT HH (5/14), OT highly recommends placement (5/16). Pending PT reassessment  - Choice forms: HH, Infusion, IRF, SNF (1. [facility], [facility], [facility])  - [facility] introduced (Y), referral (sent).

## 2022-05-17 NOTE — PROGRESS NOTES
Infectious Disease Progress Note    Author: Jade Alberto M.D. Date & Time of service: 2022  12:08 PM    Chief Complaint:  septic arthritis   Thigh abscess    Interval History:  56 y.o. female with RA admitted 5/10/2022 for bilateral knee pain    AF tolerating abx NPO for procedure to remove drain    Labs Reviewed, Medications Reviewed and Wound Reviewed.    Review of Systems:  Review of Systems   Constitutional: Negative for fever.   Respiratory: Negative for cough and shortness of breath.    Gastrointestinal: Negative for abdominal pain, diarrhea, nausea and vomiting.   Musculoskeletal: Positive for joint pain.   Skin: Negative for rash.   All other systems reviewed and are negative.      Hemodynamics:  Temp (24hrs), Av.8 °C (98.3 °F), Min:36.7 °C (98 °F), Max:37 °C (98.6 °F)  Temperature: 36.8 °C (98.2 °F)  Pulse  Av.9  Min: 64  Max: 155   Blood Pressure: (!) 161/69       Physical Exam:  Physical Exam  Vitals and nursing note reviewed.   Constitutional:       General: She is not in acute distress.     Appearance: She is not ill-appearing, toxic-appearing or diaphoretic.   HENT:      Nose: No rhinorrhea.   Eyes:      General: No scleral icterus.     Extraocular Movements: Extraocular movements intact.      Pupils: Pupils are equal, round, and reactive to light.   Cardiovascular:      Rate and Rhythm: Tachycardia present.   Pulmonary:      Effort: Pulmonary effort is normal. No respiratory distress.      Breath sounds: No stridor.   Abdominal:      General: There is no distension.      Palpations: Abdomen is soft.   Musculoskeletal:         General: Swelling present.      Cervical back: Neck supple. No tenderness.      Comments: Right knee  Drain minimal fluid   Skin:     Coloration: Skin is not jaundiced.   Neurological:      General: No focal deficit present.      Mental Status: She is alert and oriented to person, place, and time.   Psychiatric:         Mood and Affect: Mood normal.          Behavior: Behavior normal.         Meds:    Current Facility-Administered Medications:   •  amLODIPine  •  LR  •  doxycycline monohydrate  •  levoFLOXacin  •  Pharmacy Consult Request  •  oxyCODONE immediate-release **OR** oxyCODONE immediate-release **OR** morphine injection  •  senna-docusate **AND** polyethylene glycol/lytes **AND** magnesium hydroxide **AND** bisacodyl  •  enoxaparin (LOVENOX) injection  •  acetaminophen  •  hydrALAZINE  •  ondansetron  •  ondansetron  •  promethazine  •  promethazine  •  prochlorperazine  •  atorvastatin  •  folic acid  •  lisinopril  •  insulin regular **AND** POC blood glucose manual result **AND** NOTIFY MD and PharmD **AND** Administer 20 grams of glucose (approximately 8 ounces of fruit juice) every 15 minutes PRN FSBG less than 70 mg/dL **AND** dextrose bolus    Labs:  Recent Labs     05/15/22  0243 05/16/22  0913 05/17/22  0150   WBC 7.7 7.9 7.5   RBC 3.04* 3.15* 3.01*   HEMOGLOBIN 9.1* 9.4* 9.0*   HEMATOCRIT 27.4* 28.0* 27.2*   MCV 90.1 88.9 90.4   MCH 29.9 29.8 29.9   RDW 46.6 46.9 46.5   PLATELETCT 255 255 274   MPV 9.4 9.9 9.6   NEUTSPOLYS 77.80* 77.20* 72.30*   LYMPHOCYTES 7.80* 8.90* 8.90*   MONOCYTES 9.90 8.70 11.80   EOSINOPHILS 2.60 2.40 2.50   BASOPHILS 0.30 0.60 0.40     Recent Labs     05/15/22  0243 05/16/22  0913 05/17/22  0150   SODIUM 134* 135 136   POTASSIUM 3.5* 3.8 3.6   CHLORIDE 101 101 102   CO2 21 24 24   GLUCOSE 159* 223* 139*   BUN 18 17 22     Recent Labs     05/15/22  0243 05/16/22  0913 05/17/22  0150   CREATININE 1.52* 1.56* 1.50*       Imaging:  CT-KNEE WITH RIGHT    Result Date: 5/11/2022  5/10/2022 11:08 PM HISTORY/REASON FOR EXAM:  Soft tissue infection suspected, knee, xray done; ?gas in soft tissue of distal femur. TECHNIQUE/ EXAM DESCRIPTION AND NUMBER OF VIEWS:  CT scan of the RIGHT knee with contrast, with reconstructions. Thin helical sections were obtained from the distal femur through the proximal tibia/fibula. Sagittal and  coronal reconstructions were generated from the axial images. A total of 80 mL of Omnipaque 350 nonionic contrast was administered IV without complication. Up to date radiation dose reduction adjustments have been utilized to meet ALARA standards for radiation dose reduction. COMPARISON:  Radiograph 5/11/2022. FINDINGS: Moderate knee joint with synovial enhancement. There is gas in the soft tissue lateral leg as well (image 94 series 2) There is gas within the multilocular fluid collection in the popliteal fossa, measuring 3.0 x 4.7 cm There is gas in the soft tissue lateral leg as well (image 94 series 2) Mild osteoarthritis.     1. Moderate knee joint effusion with thick synovial enhancement, concerning for infection 2. There is gas within the multilocular fluid collection in the popliteal fossa, concerning for infected Baker's cyst/abscess. 3. Soft tissue gas in the lateral leg as well.    DX-CHEST-PORTABLE (1 VIEW)    Result Date: 5/10/2022  5/10/2022 8:19 PM HISTORY/REASON FOR EXAM:  possible sepsis. TECHNIQUE/EXAM DESCRIPTION AND NUMBER OF VIEWS: Single portable view of the chest. COMPARISON: None FINDINGS: Heart size is within normal limits. Mild perihilar opacifications are noted. No focal infiltrates or consolidations are identified in the lungs. No pleural fluid collections are identified. No pneumothorax is appreciated.     1.  There are mild perihilar opacifications which could be due to edema or bronchitis. 2.  No consolidations identified.    DX-KNEE 2- RIGHT    Result Date: 5/10/2022  5/10/2022 9:20 PM HISTORY/REASON FOR EXAM:  Pain/Deformity Following Trauma Right leg pain TECHNIQUE/EXAM DESCRIPTION AND NUMBER OF VIEWS:  2 views of the RIGHT knee. COMPARISON: None FINDINGS: No acute fracture or dislocation. Mild osteoarthritis Moderate knee joint effusion. Questionable soft tissue gas seen posterior to the distal femur on lateral view.     Moderate right knee joint effusion. Questionable soft tissue  gas seen posterior to the distal femur on lateral view.    US-EXTREMITY VENOUS UPPER UNILAT LEFT    Result Date: 2022   Upper Extremity  Venous Duplex Report  Vascular Laboratory  CONCLUSIONS  Left upper extremity.  Acute, non-occlusive superficial venous thrombosis is seen in the cephalic  vein of the distal bicep attached to the IV.  PARTHA FORD MIRTA  Exam Date:     2022 16:31  Room #:     Inpatient  Priority:     Routine  Ht (in):             Wt (lb):  Ordering Physician:        ROBERT REYES  Referring Physician:       ERIC BELTRAN  Sonographer:               Cecy Anna RVT  Study Type:                Complete Unilateral  Technical Quality:         Adequate  Age:    56    Gender:     F  MRN:    [id number]  :    1965      BSA:  Indications:     Localized swelling, mass and lump, left upper limb  CPT Codes:       10980  ICD Codes:       R22.32  History:         Swelling of left hand and forearm, pain in left shoulder. No                    prior duplex.  Limitations:  PROCEDURES:  Left upper extremity venous duplex imaging.  The following venous structures were evaluated: internal jugular,  subclavian, axillary, brachial, cephalic, basilic, radial, and ulnar veins.  Serial compression and spectral Doppler flow evaluations were performed.  FINDINGS:  Left upper extremity.  Acute, non-occlusive superficial venous thrombosis is seen in the cephalic  vein of the distal bicep attached to the IV.  All other veins demonstrate complete color filling and compressibility with  normal venous flow dynamics including spontaneous flow and respiratory  phasicity.  No deep venous thrombosis.  Flow was evaluated in the contralateral subclavian vein and normal venous  flow dynamics including spontaneous flow and respiratory phasic variation  were demonstrated.  Kendall Burton MD  (Electronically Signed)  Final Date:      13 May 2022 17:32      Micro:  Results     Procedure Component Value Units  "Date/Time    CULTURE TISSUE W/ GRM STAIN [[id number]] Collected: 05/11/22 1755    Order Status: Completed Specimen: Tissue Updated: 05/16/22 1154     Significant Indicator NEG     Source TISS     Site right knee     Culture Result No growth at 72 hours.     Gram Stain Result Many WBCs.  No organisms seen.      Narrative:      Surgery Specimen    Anaerobic Culture [[id number]] Collected: 05/11/22 1755    Order Status: Completed Specimen: Tissue Updated: 05/16/22 1154     Significant Indicator NEG     Source TISS     Site right knee     Culture Result No Anaerobes isolated.    Narrative:      Surgery Specimen    BLOOD CULTURE [[id number]] Collected: 05/10/22 1924    Order Status: Completed Specimen: Blood from Peripheral Updated: 05/15/22 2100     Significant Indicator NEG     Source BLD     Site PERIPHERAL     Culture Result No growth after 5 days of incubation.    Narrative:      Per Hospital Policy: Only change Specimen Src: to \"Line\" if  specified by physician order.  Left AC    BLOOD CULTURE [[id number]] Collected: 05/10/22 1841    Order Status: Completed Specimen: Blood from Peripheral Updated: 05/15/22 2100     Significant Indicator NEG     Source BLD     Site PERIPHERAL     Culture Result No growth after 5 days of incubation.    Narrative:      Per Hospital Policy: Only change Specimen Src: to \"Line\" if  specified by physician order.  Left AC    URINE CULTURE(NEW) [[id number]] Collected: 05/11/22 0628    Order Status: Completed Specimen: Urine Updated: 05/13/22 0711     Significant Indicator NEG     Source UR     Site -     Culture Result No growth at 48 hours.    Narrative:      Indication for culture:->Evaluation for sepsis without a  clear source of infection  Indication for culture:->Evaluation for sepsis without a    GRAM STAIN [[id number]] Collected: 05/11/22 1755    Order Status: Completed Specimen: Tissue Updated: 05/12/22 1338     Significant Indicator .     Source TISS     Site right knee     Gram Stain " "Result Many WBCs.  No organisms seen.      Narrative:      Surgery Specimen    CoV-2 and Flu A/B by PCR (24 hour In-House): Collect NP swab in [facility] [[id number]] Collected: 05/10/22 2000    Order Status: Completed Specimen: Respirate from Nasopharyngeal Updated: 05/11/22 1752     Influenza virus A RNA Negative     Influenza virus B, PCR Negative     SARS-CoV-2 by PCR NotDetected     Comment: PATIENTS: Important information regarding your results and instructions can  be found at [email or web address]   \"After your  Covid-19 Test\"    [facility] providers: PLEASE REFER TO DE-ESCALATION AND RETESTING PROTOCOL  on [email or web address]    **The Roche SARS-CoV-2 RT-PCR test has been made available for use under the  Emergency Use Authorization (EUA) only.          SARS-CoV-2 Source NP Swab    URINALYSIS [[id number]]  (Abnormal) Collected: 05/11/22 0628    Order Status: Completed Specimen: Urine Updated: 05/11/22 0718     Color Yellow     Character Clear     Specific Gravity 1.043     Ph 5.0     Glucose >=1000 mg/dL      Ketones 15 mg/dL      Protein Negative mg/dL      Bilirubin Negative     Urobilinogen, Urine 0.2     Nitrite Negative     Leukocyte Esterase Negative     Occult Blood Negative     Micro Urine Req see below     Comment: Microscopic examination not performed when specimen is clear  and chemically negative for protein, blood, leukocyte esterase  and nitrite.         Narrative:      Indication for culture:->Evaluation for sepsis without a  clear source of infection    MRSA By PCR (Amp) [[id number]] Collected: 05/10/22 2945    Order Status: Completed Specimen: Respirate from Nares Updated: 05/11/22 0610     MRSA by PCR Negative          Assessment:  Active Hospital Problems    Diagnosis    • *Sepsis (HCC) [A41.9]    • Superficial venous thrombosis of arm, left [I82.612]    • Arthritis of right knee [M17.11]    • Type 2 diabetes mellitus with hyperglycemia, without long-term current use of insulin " "(HCC) [E11.65]    • Essential hypertension [I10]      Presumed right knee septic arthritis  Immunosuppressed  Rheumatoid arthritis  Leukocytosis resolved  Posterior thigh abscess, s/p drainage  S/p Right knee arthrotomy and drainage of deep infection, incision and drainage of right thigh abscess 5/11/2022  DM  RAISA     PLAN:   No fluid analysis sent  Cultures neg  Per op notes, fluid appeared purulent. Thigh abscess \"congealed\"  No SA or PSAR isolated, so will treat for more fastidious pathogens  Continue doxy 100 mg PO BID with levofloxacin 500 mg PO daily for 14 days  Hold cultures for 14 days if possible  Keep BS under 150 to help control current infection    "

## 2022-05-17 NOTE — CARE PLAN
Problem: Knowledge Deficit - Standard  Goal: Patient and family/care givers will demonstrate understanding of plan of care, disease process/condition, diagnostic tests and medications  Outcome: Progressing     Problem: Fluid Volume  Goal: Fluid volume balance will be maintained  Outcome: Progressing     Problem: Respiratory  Goal: Patient will achieve/maintain optimum respiratory ventilation and gas exchange  Outcome: Progressing     Problem: Pain - Standard  Goal: Alleviation of pain or a reduction in pain to the patient’s comfort goal  Outcome: Progressing     Problem: Fall Risk  Goal: Patient will remain free from falls  Outcome: Progressing     Problem: Infection - Standard  Goal: Patient will remain free from infection  Outcome: Progressing   The patient is stable    Shift Goals  Clinical Goals: pain control NPO  Patient Goals: rest  Family Goals: n/a    Progress made toward(s) clinical / shift goals: pain control    Patient is not progressing towards the following goals:none

## 2022-05-17 NOTE — PROGRESS NOTES
[facility] Medicine Daily Progress Note    Date of Service  5/17/2022    Chief Complaint  Kary Shah is a 56 y.o. female admitted 5/10/2022 with fever, chills, right knee pain.    Hospital Course  No notes on file    Interval Problem Update  Patient was seen and examined at bedside.  I have personally reviewed and interpreted vitals, labs, and imaging.    5/17.  Afebrile.  Has been hypertensive.  On 0-1 L nasal cannula.  Continue outpatient lisinopril.  Monitor kidney function.  Started on amlodipine.  Denies fevers, chills, chest pains, shortness of breath.  Complains of right knee pain.  Reviewed knee x-ray.  Discussed with orthopedic surgery.  Plan to discontinue drain tomorrow in OR.  N.p.o. after midnight.    I have personally seen and examined the patient at bedside. I discussed the plan of care with patient, bedside RN and .    Consultants/Specialty  infectious disease and orthopedics    Code Status  Full Code    Disposition  Patient is not medically cleared for discharge.   Anticipate discharge to to skilled nursing facility.  I have placed the appropriate orders for post-discharge needs.    Review of Systems  Review of Systems   Constitutional: Negative for chills and fever.   HENT: Negative for congestion and sore throat.    Eyes: Negative for blurred vision.   Respiratory: Negative for cough and shortness of breath.    Cardiovascular: Negative for chest pain, palpitations and leg swelling.   Gastrointestinal: Negative for abdominal pain, constipation, diarrhea, nausea and vomiting.   Genitourinary: Negative for dysuria, frequency and urgency.   Musculoskeletal: Positive for joint pain.   Skin: Negative for rash.   Neurological: Negative for dizziness, weakness and headaches.   Psychiatric/Behavioral: Negative for depression. The patient is not nervous/anxious.    All other systems reviewed and are negative.       Physical Exam  Temp:  [36.7 °C (98 °F)-37 °C (98.6 °F)] 36.8 °C (98.2  °F)  Pulse:  [64-94] 85  Resp:  [15-18] 15  BP: (128-162)/(64-73) 161/69  SpO2:  [94 %-96 %] 95 %    Physical Exam  Vitals and nursing note reviewed.   Constitutional:       Appearance: Normal appearance. She is obese. She is ill-appearing.   HENT:      Head: Normocephalic and atraumatic.      Right Ear: External ear normal.      Left Ear: External ear normal.      Nose: Nose normal.      Mouth/Throat:      Mouth: Mucous membranes are moist.      Pharynx: Oropharynx is clear.   Eyes:      Extraocular Movements: Extraocular movements intact.      Conjunctiva/sclera: Conjunctivae normal.   Cardiovascular:      Rate and Rhythm: Normal rate and regular rhythm.      Pulses: Normal pulses.      Heart sounds: Normal heart sounds. No murmur heard.  Pulmonary:      Effort: Pulmonary effort is normal. No respiratory distress.      Breath sounds: Normal breath sounds. No stridor. No wheezing or rales.   Abdominal:      General: Abdomen is flat. Bowel sounds are normal. There is no distension.      Palpations: Abdomen is soft. There is no mass.      Tenderness: There is no abdominal tenderness.   Musculoskeletal:      Cervical back: Normal range of motion.      Comments: Right knee wrapped, drain in place.   Skin:     General: Skin is warm.      Capillary Refill: Capillary refill takes less than 2 seconds.   Neurological:      General: No focal deficit present.      Mental Status: She is alert and oriented to person, place, and time. Mental status is at baseline.      Cranial Nerves: No cranial nerve deficit.   Psychiatric:         Mood and Affect: Mood normal.         Behavior: Behavior normal.         Fluids    Intake/Output Summary (Last 24 hours) at 5/17/2022 1142  Last data filed at 5/17/2022 0600  Gross per 24 hour   Intake --   Output 1354 ml   Net -1354 ml       Laboratory  Recent Labs     05/15/22  0243 05/16/22  0913 05/17/22  0150   WBC 7.7 7.9 7.5   RBC 3.04* 3.15* 3.01*   HEMOGLOBIN 9.1* 9.4* 9.0*   HEMATOCRIT  27.4* 28.0* 27.2*   MCV 90.1 88.9 90.4   MCH 29.9 29.8 29.9   MCHC 33.2* 33.6 33.1*   RDW 46.6 46.9 46.5   PLATELETCT 255 255 274   MPV 9.4 9.9 9.6     Recent Labs     05/15/22  0243 05/16/22  0913 05/17/22  0150   SODIUM 134* 135 136   POTASSIUM 3.5* 3.8 3.6   CHLORIDE 101 101 102   CO2 21 24 24   GLUCOSE 159* 223* 139*   BUN 18 17 22   CREATININE 1.52* 1.56* 1.50*   CALCIUM 8.1* 8.7 8.9                   Imaging  DX-KNEE 2- RIGHT   Final Result      1. Surgical drain terminating within the patellofemoral compartment of the right knee joint. No other radiopaque foreign object.   2. Postsurgical changes in the anterior knee soft tissues, surgical skin staples.      US-EXTREMITY VENOUS UPPER UNILAT LEFT   Final Result      CT-KNEE WITH RIGHT   Final Result         1. Moderate knee joint effusion with thick synovial enhancement, concerning for infection      2. There is gas within the multilocular fluid collection in the popliteal fossa, concerning for infected Baker's cyst/abscess.      3. Soft tissue gas in the lateral leg as well.      DX-KNEE 2- RIGHT   Final Result         Moderate right knee joint effusion.      Questionable soft tissue gas seen posterior to the distal femur on lateral view.      DX-CHEST-PORTABLE (1 VIEW)   Final Result         1.  There are mild perihilar opacifications which could be due to edema or bronchitis.      2.  No consolidations identified.           Assessment/Plan  * Sepsis (HCC)- (present on admission)  Assessment & Plan  This is Sepsis Present on admission. SIRS criteria identified on my evaluation include: Fever, with temperature greater than 101 deg F, Tachypnea, with respirations greater than 20 per minute and Leukocytosis, with WBC greater than 12,000.Source is septic arthritis.Sepsis protocol initiated. Fluid resuscitation ordered per protocol. Crystalloid Fluid Administration: Fluid resuscitation ordered per standard protocol - 30 mL/kg per current or ideal body weight.IV  antibiotics as appropriate for source of sepsis. Reassessment: I have reassessed the patient's hemodynamic status.    Superficial venous thrombosis of arm, left  Assessment & Plan  LUE US from 5/13/2022 shows acute, non-occlusive superficial venous thrombosis in the cephalic vein of the distal bicep attached to the IV.  IV removed.  Pain and swelling have improved.     Arthritis of right knee  Assessment & Plan  Patient underwent right knee arthrotomy and drainage of deep infection, incision and drainage of right thigh abscess on 5/11/2022.  She has 2 drains in place. Vancomycin and Zosyn switched to Ancef on 5/13/2022. Surgical drains were not able to be removed by ortho PA. Plan for OR on 5/17/2022 for drain removal. No positive cultures to date. ID consulted for antibiotic recommendations. Per orthopedic surgery, continue DVT prophylaxis with SCDs and Lovenox until mobilizing then switch to aspirin for 4 weeks. She is to follow-up with orthopedic surgery outpatient clinic in 2 weeks for wound check and suture/staple removal.  Blood and fluid cultures have been negative.      Type 2 diabetes mellitus with hyperglycemia, without long-term current use of insulin (HCC)- (present on admission)  Assessment & Plan  Home metformin on hold.  Hemoglobin A1c was 7.7 on 10/22/2021.  Repeat ordered.  Continue SSI with Accu-Cheks and hypoglycemia protocol.    Seropositive rheumatoid arthritis (HCC)  Assessment & Plan  On methotrexate and Plaquenil for seropositive rheumatoid arthritis.  We will place on hold due to acute infection.    Essential hypertension- (present on admission)  Assessment & Plan  Stable. Continue lisinopril       VTE prophylaxis: enoxaparin ppx    I have performed a physical exam and reviewed and updated ROS and Plan today (5/17/2022). In review of yesterday's note (5/16/2022), there are no changes except as documented above.

## 2022-05-17 NOTE — THERAPY
Missed Therapy     Patient Name: Kary Shah  Age:  56 y.o., Sex:  female  Medical Record #: [id number]  Today's Date: 5/17/2022 05/17/22 1402   Interdisciplinary Plan of Care Collaboration   Collaboration Comments Attempted PT tx session, pt politely declined stating she was in too much pain from Xray today and is NPO for a procedure.  Pt agreeable to try again tomorrow.  Will follow up as able/appropriate.     Lor Mckenna, PT, DPT

## 2022-05-17 NOTE — CARE PLAN
The patient is Watcher - Medium risk of patient condition declining or worsening    Shift Goals  Clinical Goals: Pain control, NPO @ midnight, procedure in the morning  Patient Goals: Rest, pain control  Family Goals: n/a    Progress made toward(s) clinical / shift goals:    Problem: Hemodynamics  Goal: Patient's hemodynamics, fluid balance and neurologic status will be stable or improve  Outcome: Progressing     Problem: Pain - Standard  Goal: Alleviation of pain or a reduction in pain to the patient’s comfort goal  Outcome: Progressing  Note: Pt complains of pain 8/10- oxy 10 mg given. After administration pt complains of pain 0/10.     Problem: Infection - Standard  Goal: Patient will remain free from infection  Outcome: Progressing       Patient is not progressing towards the following goals:

## 2022-05-18 ENCOUNTER — APPOINTMENT (OUTPATIENT)
Dept: RADIOLOGY | Facility: MEDICAL CENTER | Age: 57
DRG: 854 | End: 2022-05-18
Attending: INTERNAL MEDICINE
Payer: COMMERCIAL

## 2022-05-18 ENCOUNTER — ANESTHESIA (OUTPATIENT)
Dept: SURGERY | Facility: MEDICAL CENTER | Age: 57
DRG: 854 | End: 2022-05-18
Payer: COMMERCIAL

## 2022-05-18 ENCOUNTER — ANESTHESIA EVENT (OUTPATIENT)
Dept: SURGERY | Facility: MEDICAL CENTER | Age: 57
DRG: 854 | End: 2022-05-18
Payer: COMMERCIAL

## 2022-05-18 PROBLEM — A41.9 SEPSIS (HCC): Status: RESOLVED | Noted: 2022-05-10 | Resolved: 2022-05-18

## 2022-05-18 LAB
ANION GAP SERPL CALC-SCNC: 9 MMOL/L (ref 7–16)
BASOPHILS # BLD AUTO: 0.4 % (ref 0–1.8)
BASOPHILS # BLD: 0.03 K/UL (ref 0–0.12)
BUN SERPL-MCNC: 20 MG/DL (ref 8–22)
CALCIUM SERPL-MCNC: 9.1 MG/DL (ref 8.5–10.5)
CHLORIDE SERPL-SCNC: 101 MMOL/L (ref 96–112)
CO2 SERPL-SCNC: 26 MMOL/L (ref 20–33)
CREAT SERPL-MCNC: 1.27 MG/DL (ref 0.5–1.4)
EOSINOPHIL # BLD AUTO: 0.22 K/UL (ref 0–0.51)
EOSINOPHIL NFR BLD: 2.9 % (ref 0–6.9)
ERYTHROCYTE [DISTWIDTH] IN BLOOD BY AUTOMATED COUNT: 49.2 FL (ref 35.9–50)
GFR SERPLBLD CREATININE-BSD FMLA CKD-EPI: 49 ML/MIN/1.73 M 2
GLUCOSE BLD STRIP.AUTO-MCNC: 134 MG/DL (ref 65–99)
GLUCOSE BLD STRIP.AUTO-MCNC: 165 MG/DL (ref 65–99)
GLUCOSE BLD STRIP.AUTO-MCNC: 187 MG/DL (ref 65–99)
GLUCOSE BLD STRIP.AUTO-MCNC: 210 MG/DL (ref 65–99)
GLUCOSE SERPL-MCNC: 129 MG/DL (ref 65–99)
HCT VFR BLD AUTO: 29.1 % (ref 37–47)
HGB BLD-MCNC: 9.4 G/DL (ref 12–16)
IMM GRANULOCYTES # BLD AUTO: 0.36 K/UL (ref 0–0.11)
IMM GRANULOCYTES NFR BLD AUTO: 4.7 % (ref 0–0.9)
LYMPHOCYTES # BLD AUTO: 0.68 K/UL (ref 1–4.8)
LYMPHOCYTES NFR BLD: 8.9 % (ref 22–41)
MAGNESIUM SERPL-MCNC: 1.6 MG/DL (ref 1.5–2.5)
MCH RBC QN AUTO: 29.9 PG (ref 27–33)
MCHC RBC AUTO-ENTMCNC: 32.3 G/DL (ref 33.6–35)
MCV RBC AUTO: 92.7 FL (ref 81.4–97.8)
MONOCYTES # BLD AUTO: 0.91 K/UL (ref 0–0.85)
MONOCYTES NFR BLD AUTO: 11.9 % (ref 0–13.4)
NEUTROPHILS # BLD AUTO: 5.44 K/UL (ref 2–7.15)
NEUTROPHILS NFR BLD: 71.2 % (ref 44–72)
NRBC # BLD AUTO: 0 K/UL
NRBC BLD-RTO: 0 /100 WBC
PHOSPHATE SERPL-MCNC: 4.9 MG/DL (ref 2.5–4.5)
PLATELET # BLD AUTO: 274 K/UL (ref 164–446)
PMV BLD AUTO: 9.7 FL (ref 9–12.9)
POTASSIUM SERPL-SCNC: 3.8 MMOL/L (ref 3.6–5.5)
RBC # BLD AUTO: 3.14 M/UL (ref 4.2–5.4)
SODIUM SERPL-SCNC: 136 MMOL/L (ref 135–145)
WBC # BLD AUTO: 7.6 K/UL (ref 4.8–10.8)

## 2022-05-18 PROCEDURE — 770004 HCHG ROOM/CARE - ONCOLOGY PRIVATE *

## 2022-05-18 PROCEDURE — 0SPC00Z REMOVAL OF DRAINAGE DEVICE FROM RIGHT KNEE JOINT, OPEN APPROACH: ICD-10-PCS | Performed by: ORTHOPAEDIC SURGERY

## 2022-05-18 PROCEDURE — 160002 HCHG RECOVERY MINUTES (STAT): Performed by: ORTHOPAEDIC SURGERY

## 2022-05-18 PROCEDURE — 36415 COLL VENOUS BLD VENIPUNCTURE: CPT

## 2022-05-18 PROCEDURE — 01400 ANES OPN/ARTHRS KNEE JT NOS: CPT | Performed by: ANESTHESIOLOGY

## 2022-05-18 PROCEDURE — 82962 GLUCOSE BLOOD TEST: CPT

## 2022-05-18 PROCEDURE — A9270 NON-COVERED ITEM OR SERVICE: HCPCS | Performed by: HOSPITALIST

## 2022-05-18 PROCEDURE — 700111 HCHG RX REV CODE 636 W/ 250 OVERRIDE (IP): Performed by: ANESTHESIOLOGY

## 2022-05-18 PROCEDURE — 83735 ASSAY OF MAGNESIUM: CPT

## 2022-05-18 PROCEDURE — 160035 HCHG PACU - 1ST 60 MINS PHASE I: Performed by: ORTHOPAEDIC SURGERY

## 2022-05-18 PROCEDURE — 700105 HCHG RX REV CODE 258: Performed by: INTERNAL MEDICINE

## 2022-05-18 PROCEDURE — 20670 REMOVAL IMPLANT SUPERFICIAL: CPT | Mod: 58,RT | Performed by: ORTHOPAEDIC SURGERY

## 2022-05-18 PROCEDURE — 700102 HCHG RX REV CODE 250 W/ 637 OVERRIDE(OP): Performed by: HOSPITALIST

## 2022-05-18 PROCEDURE — A9270 NON-COVERED ITEM OR SERVICE: HCPCS | Performed by: ANESTHESIOLOGY

## 2022-05-18 PROCEDURE — 85025 COMPLETE CBC W/AUTO DIFF WBC: CPT

## 2022-05-18 PROCEDURE — 20670 REMOVAL IMPLANT SUPERFICIAL: CPT | Mod: 80ROC,58,RT | Performed by: STUDENT IN AN ORGANIZED HEALTH CARE EDUCATION/TRAINING PROGRAM

## 2022-05-18 PROCEDURE — 700102 HCHG RX REV CODE 250 W/ 637 OVERRIDE(OP): Performed by: INTERNAL MEDICINE

## 2022-05-18 PROCEDURE — 76775 US EXAM ABDO BACK WALL LIM: CPT

## 2022-05-18 PROCEDURE — 700105 HCHG RX REV CODE 258: Performed by: ANESTHESIOLOGY

## 2022-05-18 PROCEDURE — 99232 SBSQ HOSP IP/OBS MODERATE 35: CPT | Performed by: INTERNAL MEDICINE

## 2022-05-18 PROCEDURE — A9270 NON-COVERED ITEM OR SERVICE: HCPCS | Performed by: INTERNAL MEDICINE

## 2022-05-18 PROCEDURE — 700111 HCHG RX REV CODE 636 W/ 250 OVERRIDE (IP)

## 2022-05-18 PROCEDURE — 160009 HCHG ANES TIME/MIN: Performed by: ORTHOPAEDIC SURGERY

## 2022-05-18 PROCEDURE — 160048 HCHG OR STATISTICAL LEVEL 1-5: Performed by: ORTHOPAEDIC SURGERY

## 2022-05-18 PROCEDURE — 80048 BASIC METABOLIC PNL TOTAL CA: CPT

## 2022-05-18 PROCEDURE — 700102 HCHG RX REV CODE 250 W/ 637 OVERRIDE(OP): Performed by: ANESTHESIOLOGY

## 2022-05-18 PROCEDURE — 84100 ASSAY OF PHOSPHORUS: CPT

## 2022-05-18 PROCEDURE — 501838 HCHG SUTURE GENERAL: Performed by: ORTHOPAEDIC SURGERY

## 2022-05-18 PROCEDURE — 700101 HCHG RX REV CODE 250: Performed by: ANESTHESIOLOGY

## 2022-05-18 PROCEDURE — 160027 HCHG SURGERY MINUTES - 1ST 30 MINS LEVEL 2: Performed by: ORTHOPAEDIC SURGERY

## 2022-05-18 PROCEDURE — 97535 SELF CARE MNGMENT TRAINING: CPT

## 2022-05-18 RX ORDER — HYDROMORPHONE HYDROCHLORIDE 1 MG/ML
0.5 INJECTION, SOLUTION INTRAMUSCULAR; INTRAVENOUS; SUBCUTANEOUS
Status: DISCONTINUED | OUTPATIENT
Start: 2022-05-18 | End: 2022-05-18 | Stop reason: HOSPADM

## 2022-05-18 RX ORDER — ONDANSETRON 2 MG/ML
4 INJECTION INTRAMUSCULAR; INTRAVENOUS
Status: DISCONTINUED | OUTPATIENT
Start: 2022-05-18 | End: 2022-05-18 | Stop reason: HOSPADM

## 2022-05-18 RX ORDER — SODIUM CHLORIDE, SODIUM LACTATE, POTASSIUM CHLORIDE, CALCIUM CHLORIDE 600; 310; 30; 20 MG/100ML; MG/100ML; MG/100ML; MG/100ML
INJECTION, SOLUTION INTRAVENOUS CONTINUOUS
Status: DISCONTINUED | OUTPATIENT
Start: 2022-05-18 | End: 2022-05-18 | Stop reason: HOSPADM

## 2022-05-18 RX ORDER — AMLODIPINE BESYLATE 10 MG/1
10 TABLET ORAL
Status: DISCONTINUED | OUTPATIENT
Start: 2022-05-19 | End: 2022-05-27

## 2022-05-18 RX ORDER — HYDROMORPHONE HYDROCHLORIDE 1 MG/ML
0.4 INJECTION, SOLUTION INTRAMUSCULAR; INTRAVENOUS; SUBCUTANEOUS
Status: DISCONTINUED | OUTPATIENT
Start: 2022-05-18 | End: 2022-05-18 | Stop reason: HOSPADM

## 2022-05-18 RX ORDER — DIPHENHYDRAMINE HYDROCHLORIDE 50 MG/ML
12.5 INJECTION INTRAMUSCULAR; INTRAVENOUS
Status: DISCONTINUED | OUTPATIENT
Start: 2022-05-18 | End: 2022-05-18 | Stop reason: HOSPADM

## 2022-05-18 RX ORDER — SODIUM CHLORIDE, SODIUM LACTATE, POTASSIUM CHLORIDE, CALCIUM CHLORIDE 600; 310; 30; 20 MG/100ML; MG/100ML; MG/100ML; MG/100ML
INJECTION, SOLUTION INTRAVENOUS
Status: DISCONTINUED | OUTPATIENT
Start: 2022-05-18 | End: 2022-05-18 | Stop reason: SURG

## 2022-05-18 RX ORDER — DEXAMETHASONE SODIUM PHOSPHATE 4 MG/ML
INJECTION, SOLUTION INTRA-ARTICULAR; INTRALESIONAL; INTRAMUSCULAR; INTRAVENOUS; SOFT TISSUE PRN
Status: DISCONTINUED | OUTPATIENT
Start: 2022-05-18 | End: 2022-05-18 | Stop reason: SURG

## 2022-05-18 RX ORDER — HYDROMORPHONE HYDROCHLORIDE 1 MG/ML
0.2 INJECTION, SOLUTION INTRAMUSCULAR; INTRAVENOUS; SUBCUTANEOUS
Status: DISCONTINUED | OUTPATIENT
Start: 2022-05-18 | End: 2022-05-18 | Stop reason: HOSPADM

## 2022-05-18 RX ORDER — ENOXAPARIN SODIUM 100 MG/ML
40 INJECTION SUBCUTANEOUS DAILY
Status: DISCONTINUED | OUTPATIENT
Start: 2022-05-19 | End: 2022-06-01 | Stop reason: HOSPADM

## 2022-05-18 RX ORDER — ONDANSETRON 2 MG/ML
INJECTION INTRAMUSCULAR; INTRAVENOUS PRN
Status: DISCONTINUED | OUTPATIENT
Start: 2022-05-18 | End: 2022-05-18 | Stop reason: SURG

## 2022-05-18 RX ORDER — HYDRALAZINE HYDROCHLORIDE 20 MG/ML
5 INJECTION INTRAMUSCULAR; INTRAVENOUS
Status: DISCONTINUED | OUTPATIENT
Start: 2022-05-18 | End: 2022-05-18 | Stop reason: HOSPADM

## 2022-05-18 RX ORDER — OXYCODONE HCL 5 MG/5 ML
10 SOLUTION, ORAL ORAL
Status: COMPLETED | OUTPATIENT
Start: 2022-05-18 | End: 2022-05-18

## 2022-05-18 RX ORDER — MEPERIDINE HYDROCHLORIDE 25 MG/ML
12.5 INJECTION INTRAMUSCULAR; INTRAVENOUS; SUBCUTANEOUS
Status: DISCONTINUED | OUTPATIENT
Start: 2022-05-18 | End: 2022-05-18 | Stop reason: HOSPADM

## 2022-05-18 RX ORDER — ALBUTEROL SULFATE 2.5 MG/3ML
2.5 SOLUTION RESPIRATORY (INHALATION)
Status: DISCONTINUED | OUTPATIENT
Start: 2022-05-18 | End: 2022-05-18 | Stop reason: HOSPADM

## 2022-05-18 RX ORDER — OXYCODONE HCL 5 MG/5 ML
5 SOLUTION, ORAL ORAL
Status: COMPLETED | OUTPATIENT
Start: 2022-05-18 | End: 2022-05-18

## 2022-05-18 RX ORDER — LIDOCAINE HYDROCHLORIDE 20 MG/ML
INJECTION, SOLUTION EPIDURAL; INFILTRATION; INTRACAUDAL; PERINEURAL PRN
Status: DISCONTINUED | OUTPATIENT
Start: 2022-05-18 | End: 2022-05-18 | Stop reason: SURG

## 2022-05-18 RX ADMIN — ONDANSETRON 4 MG: 2 INJECTION INTRAMUSCULAR; INTRAVENOUS at 08:40

## 2022-05-18 RX ADMIN — FENTANYL CITRATE 50 MCG: 50 INJECTION, SOLUTION INTRAMUSCULAR; INTRAVENOUS at 09:01

## 2022-05-18 RX ADMIN — FENTANYL CITRATE 50 MCG: 50 INJECTION, SOLUTION INTRAMUSCULAR; INTRAVENOUS at 08:43

## 2022-05-18 RX ADMIN — FENTANYL CITRATE 50 MCG: 50 INJECTION, SOLUTION INTRAMUSCULAR; INTRAVENOUS at 09:11

## 2022-05-18 RX ADMIN — FENTANYL CITRATE 50 MCG: 50 INJECTION, SOLUTION INTRAMUSCULAR; INTRAVENOUS at 08:33

## 2022-05-18 RX ADMIN — LISINOPRIL 10 MG: 10 TABLET ORAL at 04:01

## 2022-05-18 RX ADMIN — SODIUM CHLORIDE, POTASSIUM CHLORIDE, SODIUM LACTATE AND CALCIUM CHLORIDE: 600; 310; 30; 20 INJECTION, SOLUTION INTRAVENOUS at 09:15

## 2022-05-18 RX ADMIN — DOXYCYCLINE 100 MG: 100 TABLET, FILM COATED ORAL at 04:01

## 2022-05-18 RX ADMIN — PROPOFOL 160 MG: 10 INJECTION, EMULSION INTRAVENOUS at 08:28

## 2022-05-18 RX ADMIN — LEVOFLOXACIN 250 MG: 250 TABLET, FILM COATED ORAL at 04:01

## 2022-05-18 RX ADMIN — INSULIN HUMAN 2 UNITS: 100 INJECTION, SOLUTION PARENTERAL at 20:52

## 2022-05-18 RX ADMIN — DEXAMETHASONE SODIUM PHOSPHATE 4 MG: 4 INJECTION, SOLUTION INTRA-ARTICULAR; INTRALESIONAL; INTRAMUSCULAR; INTRAVENOUS; SOFT TISSUE at 08:28

## 2022-05-18 RX ADMIN — OXYCODONE HYDROCHLORIDE 10 MG: 10 TABLET ORAL at 17:08

## 2022-05-18 RX ADMIN — SODIUM CHLORIDE, POTASSIUM CHLORIDE, SODIUM LACTATE AND CALCIUM CHLORIDE: 600; 310; 30; 20 INJECTION, SOLUTION INTRAVENOUS at 18:26

## 2022-05-18 RX ADMIN — FOLIC ACID 1 MG: 1 TABLET ORAL at 04:01

## 2022-05-18 RX ADMIN — LIDOCAINE HYDROCHLORIDE 50 MG: 20 INJECTION, SOLUTION EPIDURAL; INFILTRATION; INTRACAUDAL at 08:28

## 2022-05-18 RX ADMIN — INSULIN HUMAN 3 UNITS: 100 INJECTION, SOLUTION PARENTERAL at 17:21

## 2022-05-18 RX ADMIN — AMLODIPINE BESYLATE 5 MG: 5 TABLET ORAL at 04:01

## 2022-05-18 RX ADMIN — SODIUM CHLORIDE, POTASSIUM CHLORIDE, SODIUM LACTATE AND CALCIUM CHLORIDE: 600; 310; 30; 20 INJECTION, SOLUTION INTRAVENOUS at 02:17

## 2022-05-18 RX ADMIN — INSULIN HUMAN 2 UNITS: 100 INJECTION, SOLUTION PARENTERAL at 11:16

## 2022-05-18 RX ADMIN — DOXYCYCLINE 100 MG: 100 TABLET, FILM COATED ORAL at 17:12

## 2022-05-18 RX ADMIN — SODIUM CHLORIDE, POTASSIUM CHLORIDE, SODIUM LACTATE AND CALCIUM CHLORIDE: 600; 310; 30; 20 INJECTION, SOLUTION INTRAVENOUS at 08:20

## 2022-05-18 RX ADMIN — ATORVASTATIN CALCIUM 20 MG: 20 TABLET, FILM COATED ORAL at 17:12

## 2022-05-18 RX ADMIN — PROPOFOL 50 MG: 10 INJECTION, EMULSION INTRAVENOUS at 08:29

## 2022-05-18 RX ADMIN — FENTANYL CITRATE 50 MCG: 50 INJECTION, SOLUTION INTRAMUSCULAR; INTRAVENOUS at 09:05

## 2022-05-18 RX ADMIN — OXYCODONE HYDROCHLORIDE 10 MG: 5 SOLUTION ORAL at 09:11

## 2022-05-18 ASSESSMENT — PAIN DESCRIPTION - PAIN TYPE
TYPE: SURGICAL PAIN

## 2022-05-18 ASSESSMENT — ENCOUNTER SYMPTOMS
SORE THROAT: 0
NAUSEA: 0
DIZZINESS: 0
NERVOUS/ANXIOUS: 0
PALPITATIONS: 0
VOMITING: 0
ABDOMINAL PAIN: 0
HEADACHES: 0
BLURRED VISION: 0
WEAKNESS: 0
DIARRHEA: 0
CHILLS: 0
DEPRESSION: 0
FEVER: 0
SHORTNESS OF BREATH: 0
COUGH: 0
CONSTIPATION: 1

## 2022-05-18 NOTE — ANESTHESIA POSTPROCEDURE EVALUATION
Patient: Kary Shah    Procedure Summary     Date: 05/18/22 Room / Location: [place] [address] / SURGERY [facility]    Anesthesia Start: 0820 Anesthesia Stop: 0858    Procedure: RIGHT KNEE DRAIN REMOVAL (Right Knee) Diagnosis: (Retained drain Right Knee)    Surgeons: Martin Montesinos M.D. Responsible Provider: Lazaro Morgan M.D.    Anesthesia Type: general, MAC ASA Status: 2          Final Anesthesia Type: general, MAC  Last vitals  BP   Blood Pressure: (!) 148/70    Temp   36.9 °C (98.4 °F)    Pulse   95   Resp   16    SpO2   97 %      Anesthesia Post Evaluation    Patient location during evaluation: PACU  Patient participation: complete - patient participated  Level of consciousness: awake and alert    Airway patency: patent  Anesthetic complications: no  Cardiovascular status: hemodynamically stable  Respiratory status: acceptable  Hydration status: euvolemic    PONV: none          No complications documented.     Nurse Pain Score: 5 (NPRS)

## 2022-05-18 NOTE — ANESTHESIA TIME REPORT
Anesthesia Start and Stop Event Times     Date Time Event    5/18/2022 0812 Ready for Procedure     0820 Anesthesia Start     0858 Anesthesia Stop        Responsible Staff  05/18/22    Name Role Begin End    Lazaro Morgan M.D. Anesth 0820 0858        Overtime Reason:  no overtime (within assigned shift)    Comments:

## 2022-05-18 NOTE — CARE PLAN
Problem: Knowledge Deficit - Standard  Goal: Patient and family/care givers will demonstrate understanding of plan of care, disease process/condition, diagnostic tests and medications  Outcome: Progressing     Problem: Hemodynamics  Goal: Patient's hemodynamics, fluid balance and neurologic status will be stable or improve  Outcome: Progressing     Problem: Fluid Volume  Goal: Fluid volume balance will be maintained  Outcome: Progressing     Problem: Respiratory  Goal: Patient will achieve/maintain optimum respiratory ventilation and gas exchange  Outcome: Progressing     Problem: Physical Regulation  Goal: Diagnostic test results will improve  Outcome: Progressing     Problem: Pain - Standard  Goal: Alleviation of pain or a reduction in pain to the patient’s comfort goal  Outcome: Progressing   The patient is stable    Shift Goals  Clinical Goals: pain control  Patient Goals: pain control  Family Goals: N/A    Progress made toward(s) clinical / shift goals:  pain control    Patient is not progressing towards the following goals:none

## 2022-05-18 NOTE — OP REPORT
DATE OF OPERATION: 5/18/2022     PREOPERATIVE DIAGNOSIS: Right septic knee arthritis, retained ALEJANDRO drain right knee    POSTOPERATIVE DIAGNOSIS: Same    PROCEDURE PERFORMED: Removal of retained drain right knee    SURGEON: Martin Montesinos M.D.     ASSISTANT: Remigio Parkinson MD    ANESTHESIA: General    SPECIMEN: None    ESTIMATED BLOOD LOSS: Less than 5 mL    IMPLANTS: None      INDICATIONS: The patient is a 56 y.o. female who presented with a previous right septic knee arthritis and infected Baker's cyst.  This underwent subsequent debridement and drainage.  Deep drains were placed.  One of the drains was unable to be fully removed at bedside.  Recommend removal in the operating room.  I discussed the risks and benefits of the procedure which include but are not limited to risks of infection, wound healing complication, neurovascular injury, pain,  and the medical risks of anesthesia including MI, stroke, and death.  Alternatives to surgery were also discussed, including non-operative management, which I did not recommend.  The patient was in agreement with the plan to proceed, and the informed consent was signed and documented.  I met with the patient pre-operatively and marked the operative extremity with their agreement.  We proceeded to the operating room.     DESCRIPTION OF PROCEDURE:  Patient was seen in the preoperative holding area on the day of surgery. The operative site was marked with my initials.  she was taken to the operating room and placed supine on the operative table.  Anesthesia was induced.  The operative extremity was prepped and draped in the normal sterile fashion.  Operative pause was conducted and the correct patient, site, side, procedure, and surgeon's initials on extremity were identified.  The drain was initially attempted to be removed again but this again showed that it was likely tethered deep within one of the stitches.  The staples were removed.  The subcutaneous stitches were  removed with a knife and pickups.  After the fascial stitch was released with a knife the drain was able to be pulled without difficulty.  No signs of any missing portions of the drain.  This point the knee was thoroughly irrigated normal saline.  The wounds then reclosed with 2-0 Vicryl and 3-0 nylon.  Sterile dressings were applied the patient was allowed to wake in the operating room taken PACU in stable condition    POSTOPERATIVE PLAN: As tolerated weight bearing.  Mobilize with physical and occupational therapies.  DVT prophylaxis with SCDs and Lovenox until mobilizing independently and then can be switched to aspirin for 4 weeks.  The patient will follow up in clinic in 2 weeks to check wounds and remove sutures/staples.      ____________________________________   Martin Montesinos M.D.   DD: 5/18/2022  8:50 AM

## 2022-05-18 NOTE — ANESTHESIA PROCEDURE NOTES
Airway    Date/Time: 5/18/2022 8:30 AM  Performed by: Lazaro Morgan M.D.  Authorized by: Lazaro Morgan M.D.     Location:  OR  Urgency:  Elective  Indications for Airway Management:  Anesthesia      Spontaneous Ventilation: absent    Sedation Level:  Deep  Preoxygenated: Yes    Patient Position:  Sniffing  Final Airway Type:  Supraglottic airway  Final Supraglottic Airway:  Standard LMA    SGA Size:  3  Number of Attempts at Approach:  1

## 2022-05-18 NOTE — THERAPY
Physical Therapy Contact Note    Patient Name: Kary Shah  Age:  56 y.o., Sex:  female  Medical Record #: [id number]  Today's Date: 5/18/2022 05/18/22 1150   Interdisciplinary Plan of Care Collaboration   Collaboration Comments Attempted PT tx session, pt politely declined stating she was in too much pain & fearful of moving leg after drain removal.  Pt stated she would like to try again tomorrow.  Educated pt on sitting up in chair for meals and moving LEs in bed to increase ROM.     Lor Mckenna, PT, DPT

## 2022-05-18 NOTE — DISCHARGE PLANNING
HTH/SCP TCN chart review completed. Collaborated with CM Mag. Patient seen at bedside. Noted pt had I&D this AM and reports to this TCN her pain is improving and swelling appears to be improving per pt as well.  Reached out to PT who is aware of need for reassessment post procedure and will continue to monitor for PT re-assessment. Note that pt reports that her impression of the current plan was to dc to SNF/rehab once medically cleared. TCN will continue to follow and collaborate with discharge planning team as additional post acute needs arise. Thank you.    Previously completed:  - PT HH (5/14), OT highly recommends placement (5/16). Pending PT reassessment  - Choice forms: HH, Infusion, IRF, SNF (1. [facility], 2. [facility], 3. [facility])  - [facility] introduced (Y), referral (sent).

## 2022-05-18 NOTE — ANESTHESIA PREPROCEDURE EVALUATION
Case: [id number] Date/Time: 05/18/22 0815    Procedure: IRRIGATION AND DEBRIDEMENT, WOUND (Right Knee)    Location: [facility] OR 16 / SURGERY [facility]    Surgeons: Martin Montesinos M.D.          Relevant Problems   CARDIAC   (positive) Essential hypertension   (positive) Superficial venous thrombosis of arm, left      GI   (positive) Gastroesophageal reflux disease without esophagitis      ENDO   (positive) Type 2 diabetes mellitus with hyperglycemia, without long-term current use of insulin (HCC)      Other   (positive) Arthritis of right knee   (positive) Seropositive rheumatoid arthritis (HCC)       Physical Exam    Airway   Mallampati: II  TM distance: >3 FB  Neck ROM: full       Cardiovascular - normal exam  Rhythm: regular  Rate: normal  (-) murmur     Dental - normal exam           Pulmonary - normal exam  Breath sounds clear to auscultation     Abdominal    Neurological - normal exam                 Anesthesia Plan    ASA 2       Plan - general       Airway plan will be LMA          Induction: intravenous      Pertinent diagnostic labs and testing reviewed    Informed Consent:    Anesthetic plan and risks discussed with patient.

## 2022-05-18 NOTE — OR NURSING
Family updated by phone.    The pt is awake and oriented. Respirations are regular and easy. Pain is controlled, the pt is comfortable. Dressing dry and intact.

## 2022-05-18 NOTE — PROGRESS NOTES
[facility] Medicine Daily Progress Note    Date of Service  5/18/2022    Chief Complaint  Kary Shah is a 56 y.o. female admitted 5/10/2022 with fever, chills, right knee pain.    Hospital Course  No notes on file    Ms. Kary Shah is a 56 y.o. female with a history of rheumatoid arthritis and osteoarthritis who presented on 5/10/2022 with fever, chills, and right knee pain.  X-ray of right knee showed an effusion and concerning for soft tissue gas.  Arthrocentesis in the emergency room was unsuccessful.  CT was concerning for infected Baker's cyst/abscess.  Orthopedic surgery was consulted status post arthrotomy and drainage of deep infection, incision and drainage of a right thigh abscess on 5/11/2022.  After the procedure 2 drains were left in place.  Infectious disease was consulted and patient was started on doxycycline and levofloxacin.  Drains were discontinued on 5/18/2022.  Patient also had an acute kidney injury which improved with IV fluids.    Orthopedic surgery recommends aspirin for 4 weeks for DVT prophylaxis.  Follow-up in orthopedic surgery outpatient clinic in 2 weeks for wound check and staple/suture removal.      Interval Problem Update  Patient was seen and examined at bedside.  I have personally reviewed and interpreted vitals, labs, and imaging.    5/17.  Afebrile.  Has been hypertensive.  On 0-1 L nasal cannula.  Continue outpatient lisinopril.  Monitor kidney function.  Started on amlodipine.  Denies fevers, chills, chest pains, shortness of breath.  Complains of right knee pain.  Reviewed knee x-ray.  Discussed with orthopedic surgery.  Plan to discontinue drain tomorrow in OR.  N.p.o. after midnight.  5/18.  Has been hypertensive.  Afebrile.  On 0-1 L nasal cannula.  Denies fevers, chills, chest pains, shortness of breath.  She does report some constipation.  Tolerated removal of right knee drains but states knee pain is slightly worse after procedure.  PT/OT.  Increase  amlodipine to 10 mg.  Renal ultrasound is pending.    I have personally seen and examined the patient at bedside. I discussed the plan of care with patient, bedside RN and .    Consultants/Specialty  infectious disease and orthopedics    Code Status  Full Code    Disposition  Patient is not medically cleared for discharge.   Anticipate discharge to to skilled nursing facility.  I have placed the appropriate orders for post-discharge needs.    Review of Systems  Review of Systems   Constitutional: Negative for chills and fever.   HENT: Negative for congestion and sore throat.    Eyes: Negative for blurred vision.   Respiratory: Negative for cough and shortness of breath.    Cardiovascular: Negative for chest pain, palpitations and leg swelling.   Gastrointestinal: Positive for constipation. Negative for abdominal pain, diarrhea, nausea and vomiting.   Genitourinary: Negative for dysuria, frequency and urgency.   Musculoskeletal: Positive for joint pain.   Skin: Negative for rash.   Neurological: Negative for dizziness, weakness and headaches.   Psychiatric/Behavioral: Negative for depression. The patient is not nervous/anxious.    All other systems reviewed and are negative.       Physical Exam  Temp:  [36.9 °C (98.4 °F)-37.4 °C (99.4 °F)] 36.9 °C (98.4 °F)  Pulse:  [82-95] 95  Resp:  [15-18] 16  BP: (148-174)/(69-85) 148/70  SpO2:  [92 %-97 %] 97 %    Physical Exam  Vitals and nursing note reviewed.   Constitutional:       Appearance: Normal appearance. She is obese. She is ill-appearing.   HENT:      Head: Normocephalic and atraumatic.      Right Ear: External ear normal.      Left Ear: External ear normal.      Nose: Nose normal.      Mouth/Throat:      Mouth: Mucous membranes are moist.      Pharynx: Oropharynx is clear.   Eyes:      Extraocular Movements: Extraocular movements intact.      Conjunctiva/sclera: Conjunctivae normal.   Cardiovascular:      Rate and Rhythm: Normal rate and regular rhythm.       Pulses: Normal pulses.      Heart sounds: Normal heart sounds. No murmur heard.  Pulmonary:      Effort: Pulmonary effort is normal. No respiratory distress.      Breath sounds: Normal breath sounds. No stridor. No wheezing or rales.   Abdominal:      General: Abdomen is flat. Bowel sounds are normal. There is no distension.      Palpations: Abdomen is soft. There is no mass.      Tenderness: There is no abdominal tenderness.   Musculoskeletal:         General: Tenderness present.      Cervical back: Normal range of motion.      Comments: Right knee wrapped   Skin:     General: Skin is warm.      Capillary Refill: Capillary refill takes less than 2 seconds.   Neurological:      General: No focal deficit present.      Mental Status: She is alert and oriented to person, place, and time. Mental status is at baseline.      Cranial Nerves: No cranial nerve deficit.   Psychiatric:         Mood and Affect: Mood normal.         Behavior: Behavior normal.         Fluids    Intake/Output Summary (Last 24 hours) at 5/18/2022 0817  Last data filed at 5/18/2022 0215  Gross per 24 hour   Intake --   Output 950 ml   Net -950 ml       Laboratory  Recent Labs     05/16/22  0913 05/17/22  0150 05/18/22  0211   WBC 7.9 7.5 7.6   RBC 3.15* 3.01* 3.14*   HEMOGLOBIN 9.4* 9.0* 9.4*   HEMATOCRIT 28.0* 27.2* 29.1*   MCV 88.9 90.4 92.7   MCH 29.8 29.9 29.9   MCHC 33.6 33.1* 32.3*   RDW 46.9 46.5 49.2   PLATELETCT 255 274 274   MPV 9.9 9.6 9.7     Recent Labs     05/16/22  0913 05/17/22  0150 05/18/22  0211   SODIUM 135 136 136   POTASSIUM 3.8 3.6 3.8   CHLORIDE 101 102 101   CO2 24 24 26   GLUCOSE 223* 139* 129*   BUN 17 22 20   CREATININE 1.56* 1.50* 1.27   CALCIUM 8.7 8.9 9.1                   Imaging  DX-KNEE 2- RIGHT   Final Result      1. Surgical drain terminating within the patellofemoral compartment of the right knee joint. No other radiopaque foreign object.   2. Postsurgical changes in the anterior knee soft tissues, surgical  skin staples.      US-EXTREMITY VENOUS UPPER UNILAT LEFT   Final Result      CT-KNEE WITH RIGHT   Final Result         1. Moderate knee joint effusion with thick synovial enhancement, concerning for infection      2. There is gas within the multilocular fluid collection in the popliteal fossa, concerning for infected Baker's cyst/abscess.      3. Soft tissue gas in the lateral leg as well.      DX-KNEE 2- RIGHT   Final Result         Moderate right knee joint effusion.      Questionable soft tissue gas seen posterior to the distal femur on lateral view.      DX-CHEST-PORTABLE (1 VIEW)   Final Result         1.  There are mild perihilar opacifications which could be due to edema or bronchitis.      2.  No consolidations identified.      US-RENAL    (Results Pending)        Assessment/Plan  Superficial venous thrombosis of arm, left  Assessment & Plan  LUE US from 5/13/2022 shows acute, non-occlusive superficial venous thrombosis in the cephalic vein of the distal bicep attached to the IV.  IV removed.  Pain and swelling have improved.     Arthritis of right knee  Assessment & Plan  Patient underwent right knee arthrotomy and drainage of deep infection, incision and drainage of right thigh abscess on 5/11/2022.  She has 2 drains in place. Vancomycin and Zosyn switched to Ancef on 5/13/2022. Surgical drains were not able to be removed by ortho PA. Plan for OR on 5/17/2022 for drain removal. No positive cultures to date. ID consulted for antibiotic recommendations. Per orthopedic surgery, continue DVT prophylaxis with SCDs and Lovenox until mobilizing then switch to aspirin for 4 weeks. She is to follow-up with orthopedic surgery outpatient clinic in 2 weeks for wound check and suture/staple removal.  Blood and fluid cultures have been negative.      Status post OR drain removal on 5/18/2022.    Type 2 diabetes mellitus with hyperglycemia, without long-term current use of insulin (HCC)- (present on  admission)  Assessment & Plan  Home metformin on hold.  Hemoglobin A1c was 7.7 on 10/22/2021.  Repeat ordered.  Continue SSI with Accu-Cheks and hypoglycemia protocol.    Seropositive rheumatoid arthritis (HCC)  Assessment & Plan  On methotrexate and Plaquenil for seropositive rheumatoid arthritis.  We will place on hold due to acute infection.    Essential hypertension- (present on admission)  Assessment & Plan  Stable. Continue lisinopril       VTE prophylaxis: enoxaparin ppx    I have performed a physical exam and reviewed and updated ROS and Plan today (5/18/2022). In review of yesterday's note (5/17/2022), there are no changes except as documented above.

## 2022-05-19 ENCOUNTER — APPOINTMENT (OUTPATIENT)
Dept: RADIOLOGY | Facility: MEDICAL CENTER | Age: 57
DRG: 854 | End: 2022-05-19
Attending: INTERNAL MEDICINE
Payer: COMMERCIAL

## 2022-05-19 LAB
ANION GAP SERPL CALC-SCNC: 9 MMOL/L (ref 7–16)
BASOPHILS # BLD AUTO: 0.4 % (ref 0–1.8)
BASOPHILS # BLD: 0.03 K/UL (ref 0–0.12)
BUN SERPL-MCNC: 23 MG/DL (ref 8–22)
CALCIUM SERPL-MCNC: 8.5 MG/DL (ref 8.5–10.5)
CHLORIDE SERPL-SCNC: 99 MMOL/L (ref 96–112)
CO2 SERPL-SCNC: 27 MMOL/L (ref 20–33)
CREAT SERPL-MCNC: 1.2 MG/DL (ref 0.5–1.4)
EOSINOPHIL # BLD AUTO: 0.07 K/UL (ref 0–0.51)
EOSINOPHIL NFR BLD: 0.8 % (ref 0–6.9)
ERYTHROCYTE [DISTWIDTH] IN BLOOD BY AUTOMATED COUNT: 46 FL (ref 35.9–50)
GFR SERPLBLD CREATININE-BSD FMLA CKD-EPI: 53 ML/MIN/1.73 M 2
GLUCOSE BLD STRIP.AUTO-MCNC: 134 MG/DL (ref 65–99)
GLUCOSE BLD STRIP.AUTO-MCNC: 140 MG/DL (ref 65–99)
GLUCOSE BLD STRIP.AUTO-MCNC: 167 MG/DL (ref 65–99)
GLUCOSE BLD STRIP.AUTO-MCNC: 169 MG/DL (ref 65–99)
GLUCOSE SERPL-MCNC: 152 MG/DL (ref 65–99)
HCT VFR BLD AUTO: 24.5 % (ref 37–47)
HGB BLD-MCNC: 8.4 G/DL (ref 12–16)
IMM GRANULOCYTES # BLD AUTO: 0.19 K/UL (ref 0–0.11)
IMM GRANULOCYTES NFR BLD AUTO: 2.2 % (ref 0–0.9)
LYMPHOCYTES # BLD AUTO: 0.81 K/UL (ref 1–4.8)
LYMPHOCYTES NFR BLD: 9.5 % (ref 22–41)
MAGNESIUM SERPL-MCNC: 1.5 MG/DL (ref 1.5–2.5)
MCH RBC QN AUTO: 29.9 PG (ref 27–33)
MCHC RBC AUTO-ENTMCNC: 34.3 G/DL (ref 33.6–35)
MCV RBC AUTO: 87.2 FL (ref 81.4–97.8)
MONOCYTES # BLD AUTO: 0.96 K/UL (ref 0–0.85)
MONOCYTES NFR BLD AUTO: 11.2 % (ref 0–13.4)
NEUTROPHILS # BLD AUTO: 6.48 K/UL (ref 2–7.15)
NEUTROPHILS NFR BLD: 75.9 % (ref 44–72)
NRBC # BLD AUTO: 0 K/UL
NRBC BLD-RTO: 0 /100 WBC
PHOSPHATE SERPL-MCNC: 4.6 MG/DL (ref 2.5–4.5)
PLATELET # BLD AUTO: 258 K/UL (ref 164–446)
PMV BLD AUTO: 9.6 FL (ref 9–12.9)
POTASSIUM SERPL-SCNC: 4 MMOL/L (ref 3.6–5.5)
RBC # BLD AUTO: 2.81 M/UL (ref 4.2–5.4)
SODIUM SERPL-SCNC: 135 MMOL/L (ref 135–145)
WBC # BLD AUTO: 8.5 K/UL (ref 4.8–10.8)

## 2022-05-19 PROCEDURE — A9270 NON-COVERED ITEM OR SERVICE: HCPCS | Performed by: HOSPITALIST

## 2022-05-19 PROCEDURE — 84100 ASSAY OF PHOSPHORUS: CPT

## 2022-05-19 PROCEDURE — 700111 HCHG RX REV CODE 636 W/ 250 OVERRIDE (IP): Performed by: ORTHOPAEDIC SURGERY

## 2022-05-19 PROCEDURE — 700102 HCHG RX REV CODE 250 W/ 637 OVERRIDE(OP): Performed by: INTERNAL MEDICINE

## 2022-05-19 PROCEDURE — 83735 ASSAY OF MAGNESIUM: CPT

## 2022-05-19 PROCEDURE — 97530 THERAPEUTIC ACTIVITIES: CPT

## 2022-05-19 PROCEDURE — 82962 GLUCOSE BLOOD TEST: CPT

## 2022-05-19 PROCEDURE — A9270 NON-COVERED ITEM OR SERVICE: HCPCS | Performed by: INTERNAL MEDICINE

## 2022-05-19 PROCEDURE — 73502 X-RAY EXAM HIP UNI 2-3 VIEWS: CPT | Mod: RT

## 2022-05-19 PROCEDURE — 700102 HCHG RX REV CODE 250 W/ 637 OVERRIDE(OP): Performed by: HOSPITALIST

## 2022-05-19 PROCEDURE — 80048 BASIC METABOLIC PNL TOTAL CA: CPT

## 2022-05-19 PROCEDURE — 99233 SBSQ HOSP IP/OBS HIGH 50: CPT | Performed by: INTERNAL MEDICINE

## 2022-05-19 PROCEDURE — 85025 COMPLETE CBC W/AUTO DIFF WBC: CPT

## 2022-05-19 PROCEDURE — 36415 COLL VENOUS BLD VENIPUNCTURE: CPT

## 2022-05-19 PROCEDURE — 770004 HCHG ROOM/CARE - ONCOLOGY PRIVATE *

## 2022-05-19 RX ADMIN — ATORVASTATIN CALCIUM 20 MG: 20 TABLET, FILM COATED ORAL at 17:14

## 2022-05-19 RX ADMIN — SENNOSIDES AND DOCUSATE SODIUM 2 TABLET: 50; 8.6 TABLET ORAL at 05:32

## 2022-05-19 RX ADMIN — OXYCODONE HYDROCHLORIDE 10 MG: 10 TABLET ORAL at 14:36

## 2022-05-19 RX ADMIN — OXYCODONE HYDROCHLORIDE 10 MG: 10 TABLET ORAL at 17:14

## 2022-05-19 RX ADMIN — INSULIN HUMAN 2 UNITS: 100 INJECTION, SOLUTION PARENTERAL at 21:14

## 2022-05-19 RX ADMIN — LEVOFLOXACIN 250 MG: 250 TABLET, FILM COATED ORAL at 05:12

## 2022-05-19 RX ADMIN — FOLIC ACID 1 MG: 1 TABLET ORAL at 05:12

## 2022-05-19 RX ADMIN — LISINOPRIL 10 MG: 10 TABLET ORAL at 05:12

## 2022-05-19 RX ADMIN — DOXYCYCLINE 100 MG: 100 TABLET, FILM COATED ORAL at 17:13

## 2022-05-19 RX ADMIN — OXYCODONE HYDROCHLORIDE 10 MG: 10 TABLET ORAL at 21:12

## 2022-05-19 RX ADMIN — ENOXAPARIN SODIUM 40 MG: 40 INJECTION SUBCUTANEOUS at 05:12

## 2022-05-19 RX ADMIN — DOXYCYCLINE 100 MG: 100 TABLET, FILM COATED ORAL at 05:12

## 2022-05-19 RX ADMIN — OXYCODONE 5 MG: 5 TABLET ORAL at 11:26

## 2022-05-19 RX ADMIN — INSULIN HUMAN 2 UNITS: 100 INJECTION, SOLUTION PARENTERAL at 11:26

## 2022-05-19 RX ADMIN — AMLODIPINE BESYLATE 10 MG: 10 TABLET ORAL at 05:12

## 2022-05-19 ASSESSMENT — PAIN DESCRIPTION - PAIN TYPE
TYPE: ACUTE PAIN

## 2022-05-19 ASSESSMENT — COGNITIVE AND FUNCTIONAL STATUS - GENERAL
WALKING IN HOSPITAL ROOM: A LOT
MOBILITY SCORE: 9
MOVING TO AND FROM BED TO CHAIR: UNABLE
SUGGESTED CMS G CODE MODIFIER MOBILITY: CM
MOVING FROM LYING ON BACK TO SITTING ON SIDE OF FLAT BED: UNABLE
TURNING FROM BACK TO SIDE WHILE IN FLAT BAD: A LOT
CLIMB 3 TO 5 STEPS WITH RAILING: TOTAL
STANDING UP FROM CHAIR USING ARMS: A LOT

## 2022-05-19 ASSESSMENT — ENCOUNTER SYMPTOMS
SORE THROAT: 0
DIARRHEA: 0
DEPRESSION: 0
CONSTIPATION: 1
NAUSEA: 0
SHORTNESS OF BREATH: 0
DIZZINESS: 0
FEVER: 0
PALPITATIONS: 0
HEADACHES: 0
CHILLS: 0
NERVOUS/ANXIOUS: 0
VOMITING: 0
ABDOMINAL PAIN: 0
WEAKNESS: 0
BLURRED VISION: 0
COUGH: 0

## 2022-05-19 ASSESSMENT — GAIT ASSESSMENTS: GAIT LEVEL OF ASSIST: UNABLE TO PARTICIPATE

## 2022-05-19 NOTE — THERAPY
Physical Therapy   Daily Treatment     Patient Name: Kary Shah  Age:  56 y.o., Sex:  female  Medical Record #: [id number]  Today's Date: 5/19/2022     Precautions  Precautions: Fall Risk;Weight Bearing As Tolerated Right Lower Extremity  Comments: s/p R knee I & D    Assessment    Patient agreeable to PT tx session.  Patient very limited by R groin pain and slight R knee pain.  She required min for supine <> sit and STS x 1 with platform FWW.  Patient was able to take one small step with RLE however unable to bear weight through RLE to move LLE.  Patient yelled and grimaced in pain with active and passive R hip flexion and R hip passive IR.  Patient may benefit from R hip imaging to rule out further injury.  PT to continue to follow, recommend post acute placement.    Plan    Continue current treatment plan.    DC Equipment Recommendations: Unable to determine at this time  Discharge Recommendations: Recommend post-acute placement for additional physical therapy services prior to discharge home     Objective   05/19/22 1432   Precautions   Precautions Fall Risk;Weight Bearing As Tolerated Right Lower Extremity   Comments s/p R knee I & D   Cognition    Cognition / Consciousness WDL   Level of Consciousness Alert   Comments Pleasant & cooperative, limited by pain   Other Treatments   Other Treatments Provided Pt c/o significant R groin pain, yelling in pain with WB, passive and active R hip flexion and passive R hip IR   Balance   Sitting Balance (Static) Fair   Sitting Balance (Dynamic) Fair -   Standing Balance (Static) Fair -   Standing Balance (Dynamic) Poor -   Weight Shift Sitting Fair   Weight Shift Standing Poor   Skilled Intervention Verbal Cuing;Tactile Cuing;Compensatory Strategies   Comments w/ FWW   Gait Analysis   Gait Level Of Assist Unable to Participate   Comments Pt able to take small step with RLE, unable to WB through RLE to move LLE   Bed Mobility    Supine to Sit Minimal Assist   Sit to  Supine Minimal Assist   Scooting Minimal Assist   Skilled Intervention Verbal Cuing;Tactile Cuing;Facilitation   Comments HOB flat w/ rails   Functional Mobility   Sit to Stand Minimal Assist   Bed, Chair, Wheelchair Transfer Moderate Assist   Transfer Method Stand Step (scoot step)   Mobility supine > EOB > STS x 1 > back to bed   Skilled Intervention Verbal Cuing;Tactile Cuing;Compensatory Strategies   Comments Pt used platform walker due to c/o pain in LUE and difficulty gripping   Activity Tolerance   Sitting in Chair NT   Sitting Edge of Bed 10 min   Standing 4 min   Comments limited by pain   Short Term Goals    Short Term Goal # 1 pt will get OOB and return BTB with HOB flat and rails down and SPV for safety with bed mobility at home. ( 6 tx's)   Goal Outcome # 1 goal not met   Short Term Goal # 2 pt will transfer with the LRAD and SPV in 6 Tx's to safely transfer at home.   Goal Outcome # 2 Goal not met   Short Term Goal # 3 pt will ambulate ' with the LRAD and SPV in 6 Tx's   Goal Outcome # 3 Goal not met   Short Term Goal # 4 pt will se her instructions and continue to work on her exercises at home.   Goal Outcome # 4 Goal not met   Anticipated Discharge Equipment and Recommendations   DC Equipment Recommendations Unable to determine at this time   Discharge Recommendations Recommend post-acute placement for additional physical therapy services prior to discharge home

## 2022-05-19 NOTE — PROGRESS NOTES
[facility] Medicine Daily Progress Note    Date of Service  5/19/2022    Chief Complaint  Kary Shah is a 56 y.o. female admitted 5/10/2022 with fever, chills, right knee pain.    Hospital Course  No notes on file    Ms. Kary Shah is a 56 y.o. female with a history of rheumatoid arthritis and osteoarthritis who presented on 5/10/2022 with fever, chills, and right knee pain.  X-ray of right knee showed an effusion and concerning for soft tissue gas.  Arthrocentesis in the emergency room was unsuccessful.  CT was concerning for infected Baker's cyst/abscess.  Orthopedic surgery was consulted status post arthrotomy and drainage of deep infection, incision and drainage of a right thigh abscess on 5/11/2022.  After the procedure 2 drains were left in place.  Infectious disease was consulted and patient was started on doxycycline and levofloxacin.  Drains were discontinued on 5/18/2022.  Patient also had an acute kidney injury which improved with IV fluids.    Orthopedic surgery recommends aspirin for 4 weeks for DVT prophylaxis.  Follow-up in orthopedic surgery outpatient clinic in 2 weeks for wound check and staple/suture removal.      Interval Problem Update  Patient was seen and examined at bedside.  I have personally reviewed and interpreted vitals, labs, and imaging.    5/17.  Afebrile.  Has been hypertensive.  On 0-1 L nasal cannula.  Continue outpatient lisinopril.  Monitor kidney function.  Started on amlodipine.  Denies fevers, chills, chest pains, shortness of breath.  Complains of right knee pain.  Reviewed knee x-ray.  Discussed with orthopedic surgery.  Plan to discontinue drain tomorrow in OR.  N.p.o. after midnight.  5/18.  Has been hypertensive.  Afebrile.  On 0-1 L nasal cannula.  Denies fevers, chills, chest pains, shortness of breath.  She does report some constipation.  Tolerated removal of right knee drains but states knee pain is slightly worse after procedure.  PT/OT.  Increase  amlodipine to 10 mg.  Renal ultrasound is pending.  5/19.  Afebrile.  Hypertension is improved.  On 0-2 L nasal cannula.  Denies fevers, chills, chest pains, shortness of breath.  Knee feels better with drains out but now has right hip pain.  He is concerned since there was a right thigh abscess which drain was removed but did appear to be soft tissue gas on prior CT knee.  PT/OT recommended SNF.  Will get x-rays of hip to rule out injury, soft tissue gas.    I have personally seen and examined the patient at bedside. I discussed the plan of care with patient, bedside RN and .    Consultants/Specialty  infectious disease and orthopedics    Code Status  Full Code    Disposition  Patient is not medically cleared for discharge.   Anticipate discharge to to skilled nursing facility.  I have placed the appropriate orders for post-discharge needs.    Review of Systems  Review of Systems   Constitutional: Negative for chills and fever.   HENT: Negative for congestion and sore throat.    Eyes: Negative for blurred vision.   Respiratory: Negative for cough and shortness of breath.    Cardiovascular: Negative for chest pain, palpitations and leg swelling.   Gastrointestinal: Positive for constipation. Negative for abdominal pain, diarrhea, nausea and vomiting.   Genitourinary: Negative for dysuria, frequency and urgency.   Musculoskeletal: Positive for joint pain.   Skin: Negative for rash.   Neurological: Negative for dizziness, weakness and headaches.   Psychiatric/Behavioral: Negative for depression. The patient is not nervous/anxious.    All other systems reviewed and are negative.       Physical Exam  Temp:  [36.3 °C (97.3 °F)-37.1 °C (98.7 °F)] 37.1 °C (98.7 °F)  Pulse:  [71-95] 84  Resp:  [12-18] 12  BP: (108-175)/(48-74) 138/62  SpO2:  [91 %-100 %] 96 %    Physical Exam  Vitals and nursing note reviewed.   Constitutional:       Appearance: Normal appearance. She is obese. She is ill-appearing.   HENT:       Head: Normocephalic and atraumatic.      Right Ear: External ear normal.      Left Ear: External ear normal.      Nose: Nose normal.      Mouth/Throat:      Mouth: Mucous membranes are moist.      Pharynx: Oropharynx is clear.   Eyes:      Extraocular Movements: Extraocular movements intact.      Conjunctiva/sclera: Conjunctivae normal.   Cardiovascular:      Rate and Rhythm: Normal rate and regular rhythm.      Pulses: Normal pulses.      Heart sounds: Normal heart sounds. No murmur heard.  Pulmonary:      Effort: Pulmonary effort is normal. No respiratory distress.      Breath sounds: Normal breath sounds. No stridor. No wheezing or rales.   Abdominal:      General: Abdomen is flat. Bowel sounds are normal. There is no distension.      Palpations: Abdomen is soft. There is no mass.      Tenderness: There is no abdominal tenderness.   Musculoskeletal:         General: Tenderness present.      Cervical back: Normal range of motion.      Comments: Right knee wrapped   Skin:     General: Skin is warm.      Capillary Refill: Capillary refill takes less than 2 seconds.   Neurological:      General: No focal deficit present.      Mental Status: She is alert and oriented to person, place, and time. Mental status is at baseline.      Cranial Nerves: No cranial nerve deficit.   Psychiatric:         Mood and Affect: Mood normal.         Behavior: Behavior normal.         Fluids    Intake/Output Summary (Last 24 hours) at 5/19/2022 0734  Last data filed at 5/19/2022 0015  Gross per 24 hour   Intake 400 ml   Output 801 ml   Net -401 ml       Laboratory  Recent Labs     05/17/22  0150 05/18/22  0211 05/19/22  0028   WBC 7.5 7.6 8.5   RBC 3.01* 3.14* 2.81*   HEMOGLOBIN 9.0* 9.4* 8.4*   HEMATOCRIT 27.2* 29.1* 24.5*   MCV 90.4 92.7 87.2   MCH 29.9 29.9 29.9   MCHC 33.1* 32.3* 34.3   RDW 46.5 49.2 46.0   PLATELETCT 274 274 258   MPV 9.6 9.7 9.6     Recent Labs     05/17/22  0150 05/18/22  0211 05/19/22  0028   SODIUM 136 136 135    POTASSIUM 3.6 3.8 4.0   CHLORIDE 102 101 99   CO2 24 26 27   GLUCOSE 139* 129* 152*   BUN 22 20 23*   CREATININE 1.50* 1.27 1.20   CALCIUM 8.9 9.1 8.5                   Imaging  US-RENAL   Final Result      1.  Normal renal ultrasound.      DX-KNEE 2- RIGHT   Final Result      1. Surgical drain terminating within the patellofemoral compartment of the right knee joint. No other radiopaque foreign object.   2. Postsurgical changes in the anterior knee soft tissues, surgical skin staples.      US-EXTREMITY VENOUS UPPER UNILAT LEFT   Final Result      CT-KNEE WITH RIGHT   Final Result         1. Moderate knee joint effusion with thick synovial enhancement, concerning for infection      2. There is gas within the multilocular fluid collection in the popliteal fossa, concerning for infected Baker's cyst/abscess.      3. Soft tissue gas in the lateral leg as well.      DX-KNEE 2- RIGHT   Final Result         Moderate right knee joint effusion.      Questionable soft tissue gas seen posterior to the distal femur on lateral view.      DX-CHEST-PORTABLE (1 VIEW)   Final Result         1.  There are mild perihilar opacifications which could be due to edema or bronchitis.      2.  No consolidations identified.           Assessment/Plan  Superficial venous thrombosis of arm, left  Assessment & Plan  LUE US from 5/13/2022 shows acute, non-occlusive superficial venous thrombosis in the cephalic vein of the distal bicep attached to the IV.  IV removed.  Pain and swelling have improved.     Arthritis of right knee  Assessment & Plan  Patient underwent right knee arthrotomy and drainage of deep infection, incision and drainage of right thigh abscess on 5/11/2022.  She has 2 drains in place. Vancomycin and Zosyn switched to Ancef on 5/13/2022. Surgical drains were not able to be removed by ortho PA. Plan for OR on 5/17/2022 for drain removal. No positive cultures to date. ID consulted for antibiotic recommendations. Per orthopedic  surgery, continue DVT prophylaxis with SCDs and Lovenox until mobilizing then switch to aspirin for 4 weeks. She is to follow-up with orthopedic surgery outpatient clinic in 2 weeks for wound check and suture/staple removal.  Blood and fluid cultures have been negative.      Status post OR drain removal on 5/18/2022.    Type 2 diabetes mellitus with hyperglycemia, without long-term current use of insulin (HCC)- (present on admission)  Assessment & Plan  Home metformin on hold.  Hemoglobin A1c was 7.7 on 10/22/2021.  Repeat ordered.  Continue SSI with Accu-Cheks and hypoglycemia protocol.    Seropositive rheumatoid arthritis (HCC)  Assessment & Plan  On methotrexate and Plaquenil for seropositive rheumatoid arthritis.  We will place on hold due to acute infection.    Essential hypertension- (present on admission)  Assessment & Plan  Continue lisinopril.  Increase amlodipine       VTE prophylaxis: enoxaparin ppx    I have performed a physical exam and reviewed and updated ROS and Plan today (5/19/2022). In review of yesterday's note (5/18/2022), there are no changes except as documented above.

## 2022-05-19 NOTE — CARE PLAN
Problem: Knowledge Deficit - Standard  Goal: Patient and family/care givers will demonstrate understanding of plan of care, disease process/condition, diagnostic tests and medications  Outcome: Progressing     Problem: Respiratory  Goal: Patient will achieve/maintain optimum respiratory ventilation and gas exchange  Outcome: Progressing   The patient is Watcher - Medium risk of patient condition declining or worsening    Shift Goals  Clinical Goals: pain control, monitor R knee  Patient Goals: sleep  Family Goals: N/A    Progress made toward(s) clinical / shift goals: Pt educated regarding POC.     Patient is not progressing towards the following goals:

## 2022-05-19 NOTE — DISCHARGE PLANNING
HTH/SCP TCN chart review completed. Noted pt had I&D yesterday and PT attempted to visit post though pt pain level was too high to participate. Per chart, PT will reattempt as able/appropriate. Will continue to monitor and note that CM is also monitoring for PT note as well. Note that pt reported that her impression of the current plan was to dc to SNF/rehab once medically cleared. TCN will continue to follow and collaborate with discharge planning team as additional post acute needs arise. Thank you.     Previously completed:  - PT HH (5/14), OT highly recommends placement (5/16). Pending PT reassessment  - Choice forms: HH, Infusion, IRF, SNF (1. [facility], 2. [facility], 3. [facility])  - [facility] introduced (Y), referral (sent).

## 2022-05-19 NOTE — DOCUMENTATION QUERY
[facility]                                                                       Query Response Note      PATIENT:               MIRTA ORTIZ  ACCT #:                  [id number]  MRN:                     [id number]  :                      1965  ADMIT DATE:       5/10/2022 7:08 PM  DISCH DATE:          RESPONDING  PROVIDER #:        [id number]           QUERY TEXT:    RAISA is documented in the Medical Record. Please indicate if you are in agreement that this condition is present.    NOTE:  If an appropriate response is not listed below, please respond with a new note.    The patient's Clinical Indicators include:  Patient admitted with Sepsis and Septic Arthritis of the Right Knee  PMH: HTN, TA  Admit B/Cr 17/0.61  5.13 B/Cr 11/0.56  5.15 B/Cr 17/1.56  5.18 B/Cr 20/1.27  5.17 ID Progress Note: RAISA  5.17 Hospitalist Progress Note: Monitor kidney function.   Clinical Indicators: patient noted to be hemodynamically stable with hypertension, moist mucous membranes. I/O record indicates                                fluid deficit, however unclear if all intake being recorded.  TX: LR @ 50/hr, increased to 75/hr, B/Cr monitoring    Thank you,  Bri Zepeda RN, CCDS  Clinical   Connect via Continuity Control  Options provided:   -- Agree with ID Progress Note, RAISA is present   -- Disagree with ID Progress Note, RAISA is not present   -- Unable to determine      Query created by: Bri Zepeda on 2022 1:35 PM    RESPONSE TEXT:    Agree with ID Progress Note, RAISA is present          Electronically signed by:  TAMMIE NICOLE MD 2022 5:29 PM

## 2022-05-19 NOTE — DISCHARGE PLANNING
Case Management Discharge Planning     Admission Date: 5/10/2022  GMLOS: 5.3  ALOS: 2     6-Clicks ADL Score: 13  6-Clicks Mobility Score: 12  PT and/or OT Eval ordered: Yes  Post-acute Referrals Ordered: No  Post-acute Choice Obtained: yes  Has referral(s) been sent to post-acute provider:  No        Anticipated Discharge Dispo: Discharge Disposition: Discharged to home with HHC vs SNF     DME Needed: No     Action(s) Taken: Updated Provider/Nurse on Discharge Plan     Escalations Completed: None     Medically Clear: No     Next Steps: CM will continue to follow to see PT recommendations for discharge  Plan. Will need a PASSR     Barriers to Discharge: Medical clearance, SNF choice received  , f/u PT notes needed .  Is the patient up for discharge tomorrow: No

## 2022-05-20 LAB
ANION GAP SERPL CALC-SCNC: 11 MMOL/L (ref 7–16)
BASOPHILS # BLD AUTO: 0.6 % (ref 0–1.8)
BASOPHILS # BLD: 0.05 K/UL (ref 0–0.12)
BUN SERPL-MCNC: 24 MG/DL (ref 8–22)
CALCIUM SERPL-MCNC: 8.7 MG/DL (ref 8.5–10.5)
CHLORIDE SERPL-SCNC: 102 MMOL/L (ref 96–112)
CO2 SERPL-SCNC: 27 MMOL/L (ref 20–33)
CREAT SERPL-MCNC: 1.14 MG/DL (ref 0.5–1.4)
EOSINOPHIL # BLD AUTO: 0.23 K/UL (ref 0–0.51)
EOSINOPHIL NFR BLD: 2.8 % (ref 0–6.9)
ERYTHROCYTE [DISTWIDTH] IN BLOOD BY AUTOMATED COUNT: 48.5 FL (ref 35.9–50)
GFR SERPLBLD CREATININE-BSD FMLA CKD-EPI: 56 ML/MIN/1.73 M 2
GLUCOSE BLD STRIP.AUTO-MCNC: 137 MG/DL (ref 65–99)
GLUCOSE BLD STRIP.AUTO-MCNC: 137 MG/DL (ref 65–99)
GLUCOSE BLD STRIP.AUTO-MCNC: 168 MG/DL (ref 65–99)
GLUCOSE SERPL-MCNC: 143 MG/DL (ref 65–99)
HCT VFR BLD AUTO: 28.7 % (ref 37–47)
HGB BLD-MCNC: 9.3 G/DL (ref 12–16)
IMM GRANULOCYTES # BLD AUTO: 0.21 K/UL (ref 0–0.11)
IMM GRANULOCYTES NFR BLD AUTO: 2.6 % (ref 0–0.9)
LYMPHOCYTES # BLD AUTO: 1.1 K/UL (ref 1–4.8)
LYMPHOCYTES NFR BLD: 13.4 % (ref 22–41)
MAGNESIUM SERPL-MCNC: 1.3 MG/DL (ref 1.5–2.5)
MCH RBC QN AUTO: 29.6 PG (ref 27–33)
MCHC RBC AUTO-ENTMCNC: 32.4 G/DL (ref 33.6–35)
MCV RBC AUTO: 91.4 FL (ref 81.4–97.8)
MONOCYTES # BLD AUTO: 0.73 K/UL (ref 0–0.85)
MONOCYTES NFR BLD AUTO: 8.9 % (ref 0–13.4)
NEUTROPHILS # BLD AUTO: 5.86 K/UL (ref 2–7.15)
NEUTROPHILS NFR BLD: 71.7 % (ref 44–72)
NRBC # BLD AUTO: 0 K/UL
NRBC BLD-RTO: 0 /100 WBC
PHOSPHATE SERPL-MCNC: 5.3 MG/DL (ref 2.5–4.5)
PLATELET # BLD AUTO: 284 K/UL (ref 164–446)
PMV BLD AUTO: 9.4 FL (ref 9–12.9)
POTASSIUM SERPL-SCNC: 3.8 MMOL/L (ref 3.6–5.5)
PROCALCITONIN SERPL-MCNC: 0.14 NG/ML
RBC # BLD AUTO: 3.14 M/UL (ref 4.2–5.4)
SODIUM SERPL-SCNC: 140 MMOL/L (ref 135–145)
WBC # BLD AUTO: 8.2 K/UL (ref 4.8–10.8)

## 2022-05-20 PROCEDURE — 85025 COMPLETE CBC W/AUTO DIFF WBC: CPT

## 2022-05-20 PROCEDURE — A9270 NON-COVERED ITEM OR SERVICE: HCPCS | Performed by: INTERNAL MEDICINE

## 2022-05-20 PROCEDURE — 83735 ASSAY OF MAGNESIUM: CPT

## 2022-05-20 PROCEDURE — 99232 SBSQ HOSP IP/OBS MODERATE 35: CPT | Performed by: INTERNAL MEDICINE

## 2022-05-20 PROCEDURE — 700102 HCHG RX REV CODE 250 W/ 637 OVERRIDE(OP): Performed by: HOSPITALIST

## 2022-05-20 PROCEDURE — 700105 HCHG RX REV CODE 258: Performed by: INTERNAL MEDICINE

## 2022-05-20 PROCEDURE — 700111 HCHG RX REV CODE 636 W/ 250 OVERRIDE (IP): Performed by: ORTHOPAEDIC SURGERY

## 2022-05-20 PROCEDURE — 700102 HCHG RX REV CODE 250 W/ 637 OVERRIDE(OP): Performed by: INTERNAL MEDICINE

## 2022-05-20 PROCEDURE — 84100 ASSAY OF PHOSPHORUS: CPT

## 2022-05-20 PROCEDURE — 36415 COLL VENOUS BLD VENIPUNCTURE: CPT

## 2022-05-20 PROCEDURE — 80048 BASIC METABOLIC PNL TOTAL CA: CPT

## 2022-05-20 PROCEDURE — 97530 THERAPEUTIC ACTIVITIES: CPT

## 2022-05-20 PROCEDURE — 84145 PROCALCITONIN (PCT): CPT

## 2022-05-20 PROCEDURE — 82962 GLUCOSE BLOOD TEST: CPT | Mod: 91

## 2022-05-20 PROCEDURE — 770004 HCHG ROOM/CARE - ONCOLOGY PRIVATE *

## 2022-05-20 PROCEDURE — A9270 NON-COVERED ITEM OR SERVICE: HCPCS | Performed by: HOSPITALIST

## 2022-05-20 RX ORDER — LANOLIN ALCOHOL/MO/W.PET/CERES
400 CREAM (GRAM) TOPICAL 2 TIMES DAILY
Status: COMPLETED | OUTPATIENT
Start: 2022-05-20 | End: 2022-05-20

## 2022-05-20 RX ORDER — SEVELAMER CARBONATE 800 MG/1
800 TABLET, FILM COATED ORAL
Status: DISCONTINUED | OUTPATIENT
Start: 2022-05-20 | End: 2022-06-01 | Stop reason: HOSPADM

## 2022-05-20 RX ORDER — LISINOPRIL 20 MG/1
20 TABLET ORAL DAILY
Status: DISCONTINUED | OUTPATIENT
Start: 2022-05-21 | End: 2022-05-23

## 2022-05-20 RX ADMIN — DOXYCYCLINE 100 MG: 100 TABLET, FILM COATED ORAL at 17:01

## 2022-05-20 RX ADMIN — SEVELAMER CARBONATE 800 MG: 800 TABLET, FILM COATED ORAL at 17:01

## 2022-05-20 RX ADMIN — OXYCODONE HYDROCHLORIDE 10 MG: 10 TABLET ORAL at 08:58

## 2022-05-20 RX ADMIN — SENNOSIDES AND DOCUSATE SODIUM 2 TABLET: 50; 8.6 TABLET ORAL at 17:01

## 2022-05-20 RX ADMIN — LISINOPRIL 10 MG: 10 TABLET ORAL at 05:07

## 2022-05-20 RX ADMIN — Medication 400 MG: at 08:54

## 2022-05-20 RX ADMIN — SEVELAMER CARBONATE 800 MG: 800 TABLET, FILM COATED ORAL at 08:54

## 2022-05-20 RX ADMIN — ATORVASTATIN CALCIUM 20 MG: 20 TABLET, FILM COATED ORAL at 17:01

## 2022-05-20 RX ADMIN — ENOXAPARIN SODIUM 40 MG: 40 INJECTION SUBCUTANEOUS at 05:07

## 2022-05-20 RX ADMIN — OXYCODONE HYDROCHLORIDE 10 MG: 10 TABLET ORAL at 21:48

## 2022-05-20 RX ADMIN — DOXYCYCLINE 100 MG: 100 TABLET, FILM COATED ORAL at 05:06

## 2022-05-20 RX ADMIN — OXYCODONE 5 MG: 5 TABLET ORAL at 17:01

## 2022-05-20 RX ADMIN — SODIUM CHLORIDE, POTASSIUM CHLORIDE, SODIUM LACTATE AND CALCIUM CHLORIDE: 600; 310; 30; 20 INJECTION, SOLUTION INTRAVENOUS at 21:46

## 2022-05-20 RX ADMIN — SEVELAMER CARBONATE 800 MG: 800 TABLET, FILM COATED ORAL at 11:50

## 2022-05-20 RX ADMIN — Medication 400 MG: at 17:01

## 2022-05-20 RX ADMIN — LEVOFLOXACIN 250 MG: 250 TABLET, FILM COATED ORAL at 05:06

## 2022-05-20 RX ADMIN — AMLODIPINE BESYLATE 10 MG: 10 TABLET ORAL at 05:07

## 2022-05-20 RX ADMIN — FOLIC ACID 1 MG: 1 TABLET ORAL at 05:07

## 2022-05-20 RX ADMIN — INSULIN HUMAN 2 UNITS: 100 INJECTION, SOLUTION PARENTERAL at 11:50

## 2022-05-20 RX ADMIN — SENNOSIDES AND DOCUSATE SODIUM 2 TABLET: 50; 8.6 TABLET ORAL at 05:06

## 2022-05-20 ASSESSMENT — ENCOUNTER SYMPTOMS
NERVOUS/ANXIOUS: 0
DIARRHEA: 0
SORE THROAT: 0
VOMITING: 0
NAUSEA: 0
COUGH: 0
WEAKNESS: 0
FEVER: 0
PALPITATIONS: 0
SHORTNESS OF BREATH: 0
CHILLS: 0
CONSTIPATION: 1
BLURRED VISION: 0
ABDOMINAL PAIN: 0
DIZZINESS: 0
HEADACHES: 0
DEPRESSION: 0

## 2022-05-20 ASSESSMENT — COGNITIVE AND FUNCTIONAL STATUS - GENERAL
DRESSING REGULAR LOWER BODY CLOTHING: A LOT
EATING MEALS: A LOT
PERSONAL GROOMING: A LOT
DRESSING REGULAR UPPER BODY CLOTHING: A LOT
SUGGESTED CMS G CODE MODIFIER DAILY ACTIVITY: CL
TOILETING: A LOT
DAILY ACTIVITIY SCORE: 12
HELP NEEDED FOR BATHING: A LOT

## 2022-05-20 ASSESSMENT — PAIN DESCRIPTION - PAIN TYPE
TYPE: ACUTE PAIN

## 2022-05-20 NOTE — PROGRESS NOTES
"   Orthopaedic Progress Note    Interval changes:  Patient doing well  Cleared by ortho for DC home pending medicine clearance    ROS - Patient denies any new issues.  Pain well controlled.    BP (!) 148/62   Pulse 90   Temp 37.3 °C (99.2 °F) (Temporal)   Resp 18   Ht 1.448 m (4' 9\")   Wt 65.5 kg (144 lb 6.4 oz)   SpO2 95%       Patient seen and examined  No acute distress  Breathing non labored  RRR  Dressing clean dry and intact.  surgical incision is well approximated and is dry and clean.  There is no erythema, induration, or signs of infection.  Distally neurovascularly intact, cap refill < 2 sec     Recent Labs     05/18/22  0211 05/19/22  0028 05/20/22  0013   WBC 7.6 8.5 8.2   RBC 3.14* 2.81* 3.14*   HEMOGLOBIN 9.4* 8.4* 9.3*   HEMATOCRIT 29.1* 24.5* 28.7*   MCV 92.7 87.2 91.4   MCH 29.9 29.9 29.6   MCHC 32.3* 34.3 32.4*   RDW 49.2 46.0 48.5   PLATELETCT 274 258 284   MPV 9.7 9.6 9.4       Active Hospital Problems    Diagnosis    • Superficial venous thrombosis of arm, left [I82.612]    • Arthritis of right knee [M17.11]    • Type 2 diabetes mellitus with hyperglycemia, without long-term current use of insulin (HCC) [E11.65]    • Essential hypertension [I10]        Assessment/Plan:  Patient doing well  Cleared by ortho for DC home pending medicine clearance  POD#1 S/P Removal of retained drain right knee  Wt bearing status - WBAT  Wound care/Drains - Dressings to be changed every other day by nursing  Future Procedures - none planned   Lovenox: Start 5/19, Duration-until ambulatory > 150'  Sutures/Staples out- 14 days post operatively  PT/OT-initiated  Antibiotics: adoxa 100mg po BID  DVT Prophylaxis- TEDS/SCDs/Foot pumps  Coy-none  Case Coordination for Discharge Planning - Disposition home   "

## 2022-05-20 NOTE — PROGRESS NOTES
"   Orthopaedic Progress Note    Interval changes:  Patient doing well  Cleared by ortho for DC home pending medicine clearance    ROS - Patient denies any new issues.  Pain well controlled.    BP (!) 148/62   Pulse 90   Temp 37.3 °C (99.2 °F) (Temporal)   Resp 18   Ht 1.448 m (4' 9\")   Wt 65.5 kg (144 lb 6.4 oz)   SpO2 95%       Patient seen and examined  No acute distress  Breathing non labored  RRR  RLE dressing changed, incision without issue, moves all toes, cap refill <2 sec.     Recent Labs     05/18/22  0211 05/19/22  0028 05/20/22  0013   WBC 7.6 8.5 8.2   RBC 3.14* 2.81* 3.14*   HEMOGLOBIN 9.4* 8.4* 9.3*   HEMATOCRIT 29.1* 24.5* 28.7*   MCV 92.7 87.2 91.4   MCH 29.9 29.9 29.6   MCHC 32.3* 34.3 32.4*   RDW 49.2 46.0 48.5   PLATELETCT 274 258 284   MPV 9.7 9.6 9.4       Active Hospital Problems    Diagnosis    • Superficial venous thrombosis of arm, left [I82.612]    • Arthritis of right knee [M17.11]    • Type 2 diabetes mellitus with hyperglycemia, without long-term current use of insulin (HCC) [E11.65]    • Essential hypertension [I10]        Assessment/Plan:  Patient doing well  Cleared by ortho for DC home pending medicine clearance  POD#2 S/P Removal of retained drain right knee  Wt bearing status - WBAT  Wound care/Drains - Dressings to be changed every other day by nursing  Future Procedures - none planned   Lovenox: Start 5/19, Duration-until ambulatory > 150'  Sutures/Staples out- 14 days post operatively  PT/OT-initiated  Antibiotics: adoxa 100mg po BID  DVT Prophylaxis- TEDS/SCDs/Foot pumps  Coy-none  Case Coordination for Discharge Planning - Disposition home   "

## 2022-05-20 NOTE — PROGRESS NOTES
[facility] Medicine Daily Progress Note    Date of Service  5/20/2022    Chief Complaint  Kary Shah is a 56 y.o. female admitted 5/10/2022 with fever, chills, right knee pain.    Hospital Course  No notes on file    Ms. Kary Shah is a 56 y.o. female with a history of rheumatoid arthritis and osteoarthritis who presented on 5/10/2022 with fever, chills, and right knee pain.  X-ray of right knee showed an effusion and concerning for soft tissue gas.  Arthrocentesis in the emergency room was unsuccessful.  CT was concerning for infected Baker's cyst/abscess.  Orthopedic surgery was consulted status post arthrotomy and drainage of deep infection, incision and drainage of a right thigh abscess on 5/11/2022.  After the procedure 2 drains were left in place.  Infectious disease was consulted and patient was started on doxycycline and levofloxacin for right knee septic arthritis.  Drains were discontinued on 5/18/2022.  Patient went on to develop right hip pain.  Plain films showed no right hip or pelvic fracture, mild degenerative changes of both hips, no gross soft tissue abnormality.     Patient also had an acute kidney injury which improved with IV fluids.  She was started on sevelamer for hyperphosphatemia.    Orthopedic surgery recommends aspirin for 4 weeks for DVT prophylaxis.  Follow-up in orthopedic surgery outpatient clinic in 2 weeks for wound check and staple/suture removal.      Interval Problem Update  Patient was seen and examined at bedside.  I have personally reviewed and interpreted vitals, labs, and imaging.    5/17.  Afebrile.  Has been hypertensive.  On 0-1 L nasal cannula.  Continue outpatient lisinopril.  Monitor kidney function.  Started on amlodipine.  Denies fevers, chills, chest pains, shortness of breath.  Complains of right knee pain.  Reviewed knee x-ray.  Discussed with orthopedic surgery.  Plan to discontinue drain tomorrow in OR.  N.p.o. after midnight.  5/18.  Has  been hypertensive.  Afebrile.  On 0-1 L nasal cannula.  Denies fevers, chills, chest pains, shortness of breath.  She does report some constipation.  Tolerated removal of right knee drains but states knee pain is slightly worse after procedure.  PT/OT.  Increase amlodipine to 10 mg.  Renal ultrasound is pending.  5/19.  Afebrile.  Hypertension is improved.  On 0-2 L nasal cannula.  Denies fevers, chills, chest pains, shortness of breath.  Knee feels better with drains out but now has right hip pain.  He is concerned since there was a right thigh abscess which drain was removed but did appear to be soft tissue gas on prior CT knee.  PT/OT recommended SNF.  Will get x-rays of hip to rule out injury, soft tissue gas.  5/20.  Afebrile.  Has been hypertensive.  On 0-1 L nasal cannula.  Replete magnesium.  Start Phos binders for hyperphosphatemia.  Monitor kidney function.  Denies fevers, chills, chest pain, shortness of breath.  The pain is improved.  Hip pain is also improved.  Patient reports left hip x-ray yesterday when transferring back to bed the bedside and leveled and within the Coy to the bed she bounced and was having hip pain.  This is improved now at this time.  She is still not able to bear weight.  PT/OT recommend placement.  SNF referrals sent.    I have personally seen and examined the patient at bedside. I discussed the plan of care with patient, bedside RN and .    Consultants/Specialty  infectious disease and orthopedics    Code Status  Full Code    Disposition  Patient is not medically cleared for discharge.   Anticipate discharge to to skilled nursing facility.  I have placed the appropriate orders for post-discharge needs.    Review of Systems  Review of Systems   Constitutional: Negative for chills and fever.   HENT: Negative for congestion and sore throat.    Eyes: Negative for blurred vision.   Respiratory: Negative for cough and shortness of breath.    Cardiovascular: Negative for  chest pain, palpitations and leg swelling.   Gastrointestinal: Positive for constipation. Negative for abdominal pain, diarrhea, nausea and vomiting.   Genitourinary: Negative for dysuria, frequency and urgency.   Musculoskeletal: Positive for joint pain.   Skin: Negative for rash.   Neurological: Negative for dizziness, weakness and headaches.   Psychiatric/Behavioral: Negative for depression. The patient is not nervous/anxious.    All other systems reviewed and are negative.       Physical Exam  Temp:  [36.9 °C (98.5 °F)-37.3 °C (99.2 °F)] 37.3 °C (99.2 °F)  Pulse:  [75-93] 90  Resp:  [14-18] 18  BP: (132-149)/(55-66) 148/62  SpO2:  [94 %-99 %] 95 %    Physical Exam  Vitals and nursing note reviewed.   Constitutional:       Appearance: Normal appearance. She is obese. She is ill-appearing.   HENT:      Head: Normocephalic and atraumatic.      Right Ear: External ear normal.      Left Ear: External ear normal.      Nose: Nose normal.      Mouth/Throat:      Mouth: Mucous membranes are moist.      Pharynx: Oropharynx is clear.   Eyes:      Extraocular Movements: Extraocular movements intact.      Conjunctiva/sclera: Conjunctivae normal.   Cardiovascular:      Rate and Rhythm: Normal rate and regular rhythm.      Pulses: Normal pulses.      Heart sounds: Normal heart sounds. No murmur heard.  Pulmonary:      Effort: Pulmonary effort is normal. No respiratory distress.      Breath sounds: Normal breath sounds. No stridor. No wheezing or rales.   Abdominal:      General: Abdomen is flat. Bowel sounds are normal. There is no distension.      Palpations: Abdomen is soft. There is no mass.      Tenderness: There is no abdominal tenderness.   Musculoskeletal:         General: Tenderness present.      Cervical back: Normal range of motion.      Comments: Right knee wrapped   Skin:     General: Skin is warm.      Capillary Refill: Capillary refill takes less than 2 seconds.   Neurological:      General: No focal deficit  present.      Mental Status: She is alert and oriented to person, place, and time. Mental status is at baseline.      Cranial Nerves: No cranial nerve deficit.   Psychiatric:         Mood and Affect: Mood normal.         Behavior: Behavior normal.         Fluids    Intake/Output Summary (Last 24 hours) at 5/20/2022 0745  Last data filed at 5/20/2022 0334  Gross per 24 hour   Intake --   Output 1150 ml   Net -1150 ml       Laboratory  Recent Labs     05/18/22  0211 05/19/22  0028 05/20/22  0013   WBC 7.6 8.5 8.2   RBC 3.14* 2.81* 3.14*   HEMOGLOBIN 9.4* 8.4* 9.3*   HEMATOCRIT 29.1* 24.5* 28.7*   MCV 92.7 87.2 91.4   MCH 29.9 29.9 29.6   MCHC 32.3* 34.3 32.4*   RDW 49.2 46.0 48.5   PLATELETCT 274 258 284   MPV 9.7 9.6 9.4     Recent Labs     05/18/22  0211 05/19/22  0028 05/20/22  0013   SODIUM 136 135 140   POTASSIUM 3.8 4.0 3.8   CHLORIDE 101 99 102   CO2 26 27 27   GLUCOSE 129* 152* 143*   BUN 20 23* 24*   CREATININE 1.27 1.20 1.14   CALCIUM 9.1 8.5 8.7                   Imaging  DX-HIP-COMPLETE - UNILATERAL 2+ RIGHT   Final Result      1.  No RIGHT hip or pelvic fracture.   2.  Mild degenerative change of both hips.      US-RENAL   Final Result      1.  Normal renal ultrasound.      DX-KNEE 2- RIGHT   Final Result      1. Surgical drain terminating within the patellofemoral compartment of the right knee joint. No other radiopaque foreign object.   2. Postsurgical changes in the anterior knee soft tissues, surgical skin staples.      US-EXTREMITY VENOUS UPPER UNILAT LEFT   Final Result      CT-KNEE WITH RIGHT   Final Result         1. Moderate knee joint effusion with thick synovial enhancement, concerning for infection      2. There is gas within the multilocular fluid collection in the popliteal fossa, concerning for infected Baker's cyst/abscess.      3. Soft tissue gas in the lateral leg as well.      DX-KNEE 2- RIGHT   Final Result         Moderate right knee joint effusion.      Questionable soft tissue gas  seen posterior to the distal femur on lateral view.      DX-CHEST-PORTABLE (1 VIEW)   Final Result         1.  There are mild perihilar opacifications which could be due to edema or bronchitis.      2.  No consolidations identified.           Assessment/Plan  Superficial venous thrombosis of arm, left  Assessment & Plan  LUE US from 5/13/2022 shows acute, non-occlusive superficial venous thrombosis in the cephalic vein of the distal bicep attached to the IV.  IV removed.  Pain and swelling have improved.     Arthritis of right knee  Assessment & Plan  Patient underwent right knee arthrotomy and drainage of deep infection, incision and drainage of right thigh abscess on 5/11/2022.  She has 2 drains in place. Vancomycin and Zosyn switched to Ancef on 5/13/2022. Surgical drains were not able to be removed by ortho PA. Plan for OR on 5/17/2022 for drain removal. No positive cultures to date. ID consulted for antibiotic recommendations. Per orthopedic surgery, continue DVT prophylaxis with SCDs and Lovenox until mobilizing then switch to aspirin for 4 weeks. She is to follow-up with orthopedic surgery outpatient clinic in 2 weeks for wound check and suture/staple removal.  Blood and fluid cultures have been negative.      Status post OR drain removal on 5/18/2022.    Type 2 diabetes mellitus with hyperglycemia, without long-term current use of insulin (HCC)- (present on admission)  Assessment & Plan  Home metformin on hold.  Hemoglobin A1c was 7.7 on 10/22/2021.  Repeat ordered.  Continue SSI with Accu-Cheks and hypoglycemia protocol.    Seropositive rheumatoid arthritis (HCC)  Assessment & Plan  On methotrexate and Plaquenil for seropositive rheumatoid arthritis.  We will place on hold due to acute infection.    Essential hypertension- (present on admission)  Assessment & Plan  Continue lisinopril.  Increase amlodipine       VTE prophylaxis: enoxaparin ppx    I have performed a physical exam and reviewed and updated ROS  and Plan today (5/20/2022). In review of yesterday's note (5/19/2022), there are no changes except as documented above.

## 2022-05-20 NOTE — DISCHARGE PLANNING
[facility] TCN chart review completed. Collaborated with RANGEL Smart. Note that PT has now updated recs (5/19) and recommending post acute placement as well. Noted 6 clicks of 9 now as well.  Discussed with CM as pt has choice as noted below and currently appears would be best served with post acute placement. TCN will continue to follow and collaborate with discharge planning team as additional post acute needs arise. Thank you.    Previously completed:  - Orders received for: PT and OT -> recommending placement  - Choice forms: HH, SNF, Infusion, IRF.   - GSC introduced (Y), referral (sent).

## 2022-05-20 NOTE — CARE PLAN
"The patient is Stable - Low risk of patient condition declining or worsening    Shift Goals  Clinical Goals: Pain management  Patient Goals: Pain management for better mobility  Family Goals: Not present    Progress made toward(s) clinical / shift goals:    Problem: Knowledge Deficit - Standard  Goal: Patient and family/care givers will demonstrate understanding of plan of care, disease process/condition, diagnostic tests and medications  Outcome: Progressing     Problem: Respiratory  Goal: Patient will achieve/maintain optimum respiratory ventilation and gas exchange  Outcome: Progressing     Problem: Physical Regulation  Goal: Diagnostic test results will improve  Outcome: Progressing  Goal: Signs and symptoms of infection will decrease  Outcome: Progressing     Problem: Pain - Standard  Goal: Alleviation of pain or a reduction in pain to the patient’s comfort goal  Outcome: Progressing     Problem: Fall Risk  Goal: Patient will remain free from falls  Outcome: Progressing     Problem: Mobility  Goal: Patient's capacity to carry out activities will improve  Outcome: Progressing     Problem: Infection - Standard  Goal: Patient will remain free from infection  Outcome: Progressing     Problem: Skin Integrity  Goal: Skin integrity is maintained or improved  Outcome: Progressing       Pt A&Ox4. VS: BP (!) 148/62   Pulse 90   Temp 37.3 °C (99.2 °F) (Temporal)   Resp 18   Ht 1.448 m (4' 9\")   Wt 65.5 kg (144 lb 6.4 oz)   SpO2 95%   BMI 31.25 kg/m² . Pt denies n/v, numbness, tingling, SOB and chest pain. Pain in her lower back and right knee; prn oxycodone administered per MAR with improved pain control. PIV patent with positive blood return. OT will try to work with patient today. Pt needs met at this time, call light within reach, hourly rounding in effect, and will continue to monitor.         "

## 2022-05-20 NOTE — THERAPY
"Occupational Therapy  Daily Treatment     Patient Name: Kary Shah  Age:  56 y.o., Sex:  female  Medical Record #: [id number]  Today's Date: 5/20/2022     Precautions  Precautions: Fall Risk, Weight Bearing As Tolerated Right Lower Extremity  Comments: s/p R knee I & D    Assessment    Pt seen for OT treatment. Pt limited by severe pain in R LE, including her right hip. Pt with difficulty un-weighting her LEs to take steps due to UE pain and swelling. Pt was very motivated to get up to the chair but required extensive extra time due to pain.      Plan    Continue current treatment plan.    DC Equipment Recommendations: Unable to determine at this time  Discharge Recommendations: Recommend post-acute placement for additional occupational therapy services prior to discharge home    Subjective    \"Don't help. Let me do it.\"     Objective       05/20/22 1540   Vitals   O2 Delivery Device Silicone Nasal Cannula   Pain 0 - 10 Group   Therapist Pain Assessment During Activity;Nurse Notified  (right hip pain)   Balance   Sitting Balance (Static) Fair   Sitting Balance (Dynamic) Fair -   Standing Balance (Static) Fair -   Standing Balance (Dynamic) Poor   Weight Shift Sitting Fair   Weight Shift Standing Poor   Skilled Intervention Verbal Cuing;Compensatory Strategies   Comments standing with platform FWW   Bed Mobility    Supine to Sit Minimal Assist   Scooting Minimal Assist   Skilled Intervention Verbal Cuing;Sequencing;Compensatory Strategies   Activities of Daily Living   Lower Body Dressing Maximal Assist  (socks)   Toileting   (denied need, recently voided using Pure Wick)   6 Clicks Daily Activity Score 12   Functional Mobility   Sit to Stand Minimal Assist   Bed, Chair, Wheelchair Transfer Minimal Assist   Mobility walked a few steps to recliner chair with min assist and lots of extra time   Skilled Intervention Verbal Cuing;Compensatory Strategies;Sequencing   Patient / Family Goals   Patient / Family Goal " #1 to go to rehab   Short Term Goals   Short Term Goal # 1 Pt will demo LB dressing using LRAD SPV   Goal Outcome # 1 Progressing slower than expected   Short Term Goal # 2 Pt will demo UB dressing SPV   Goal Outcome # 2 Progressing slower than expected   Short Term Goal # 3 Pt will complete functional ADL txfs SPV   Goal Outcome # 3 Progressing slower than expected

## 2022-05-20 NOTE — CARE PLAN
The patient is Stable - Low risk of patient condition declining or worsening    Shift Goals  Clinical Goals: Pain control  Patient Goals: Pain control  Family Goals: Not present    Progress made toward(s) clinical / shift goals: Patient medicated for pain per MAR with decrease in symptoms per patient.     Problem: Knowledge Deficit - Standard  Goal: Patient and family/care givers will demonstrate understanding of plan of care, disease process/condition, diagnostic tests and medications  Outcome: Progressing     Problem: Hemodynamics  Goal: Patient's hemodynamics, fluid balance and neurologic status will be stable or improve  Outcome: Progressing     Problem: Fluid Volume  Goal: Fluid volume balance will be maintained  Outcome: Progressing     Problem: Urinary - Renal Perfusion  Goal: Ability to achieve and maintain adequate renal perfusion and functioning will improve  Outcome: Progressing     Problem: Respiratory  Goal: Patient will achieve/maintain optimum respiratory ventilation and gas exchange  Outcome: Progressing     Problem: Physical Regulation  Goal: Diagnostic test results will improve  Outcome: Progressing  Goal: Signs and symptoms of infection will decrease  Outcome: Progressing     Problem: Pain - Standard  Goal: Alleviation of pain or a reduction in pain to the patient’s comfort goal  Outcome: Progressing     Problem: Fall Risk  Goal: Patient will remain free from falls  Outcome: Progressing     Problem: Mobility  Goal: Patient's capacity to carry out activities will improve  Outcome: Progressing     Problem: Infection - Standard  Goal: Patient will remain free from infection  Outcome: Progressing     Problem: Skin Integrity  Goal: Skin integrity is maintained or improved  Outcome: Progressing       Patient is not progressing towards the following goals:

## 2022-05-21 LAB
ALBUMIN SERPL BCP-MCNC: 2.4 G/DL (ref 3.2–4.9)
ALBUMIN/GLOB SERPL: 0.8 G/DL
ALP SERPL-CCNC: 73 U/L (ref 30–99)
ALT SERPL-CCNC: 5 U/L (ref 2–50)
ANION GAP SERPL CALC-SCNC: 10 MMOL/L (ref 7–16)
AST SERPL-CCNC: 11 U/L (ref 12–45)
BASOPHILS # BLD AUTO: 0.3 % (ref 0–1.8)
BASOPHILS # BLD: 0.03 K/UL (ref 0–0.12)
BILIRUB SERPL-MCNC: 0.3 MG/DL (ref 0.1–1.5)
BUN SERPL-MCNC: 17 MG/DL (ref 8–22)
CALCIUM SERPL-MCNC: 8.7 MG/DL (ref 8.5–10.5)
CHLORIDE SERPL-SCNC: 99 MMOL/L (ref 96–112)
CO2 SERPL-SCNC: 28 MMOL/L (ref 20–33)
CREAT SERPL-MCNC: 0.91 MG/DL (ref 0.5–1.4)
EOSINOPHIL # BLD AUTO: 0.15 K/UL (ref 0–0.51)
EOSINOPHIL NFR BLD: 1.6 % (ref 0–6.9)
ERYTHROCYTE [DISTWIDTH] IN BLOOD BY AUTOMATED COUNT: 47.1 FL (ref 35.9–50)
GFR SERPLBLD CREATININE-BSD FMLA CKD-EPI: 73 ML/MIN/1.73 M 2
GLOBULIN SER CALC-MCNC: 2.9 G/DL (ref 1.9–3.5)
GLUCOSE BLD STRIP.AUTO-MCNC: 129 MG/DL (ref 65–99)
GLUCOSE BLD STRIP.AUTO-MCNC: 141 MG/DL (ref 65–99)
GLUCOSE BLD STRIP.AUTO-MCNC: 192 MG/DL (ref 65–99)
GLUCOSE SERPL-MCNC: 154 MG/DL (ref 65–99)
HCT VFR BLD AUTO: 26.9 % (ref 37–47)
HGB BLD-MCNC: 9 G/DL (ref 12–16)
IMM GRANULOCYTES # BLD AUTO: 0.15 K/UL (ref 0–0.11)
IMM GRANULOCYTES NFR BLD AUTO: 1.6 % (ref 0–0.9)
LYMPHOCYTES # BLD AUTO: 0.77 K/UL (ref 1–4.8)
LYMPHOCYTES NFR BLD: 8.3 % (ref 22–41)
MAGNESIUM SERPL-MCNC: 1.3 MG/DL (ref 1.5–2.5)
MCH RBC QN AUTO: 30.2 PG (ref 27–33)
MCHC RBC AUTO-ENTMCNC: 33.5 G/DL (ref 33.6–35)
MCV RBC AUTO: 90.3 FL (ref 81.4–97.8)
MONOCYTES # BLD AUTO: 0.65 K/UL (ref 0–0.85)
MONOCYTES NFR BLD AUTO: 7 % (ref 0–13.4)
NEUTROPHILS # BLD AUTO: 7.48 K/UL (ref 2–7.15)
NEUTROPHILS NFR BLD: 81.2 % (ref 44–72)
NRBC # BLD AUTO: 0 K/UL
NRBC BLD-RTO: 0 /100 WBC
PHOSPHATE SERPL-MCNC: 4.6 MG/DL (ref 2.5–4.5)
PLATELET # BLD AUTO: 307 K/UL (ref 164–446)
PMV BLD AUTO: 9.3 FL (ref 9–12.9)
POTASSIUM SERPL-SCNC: 3.4 MMOL/L (ref 3.6–5.5)
PROT SERPL-MCNC: 5.3 G/DL (ref 6–8.2)
RBC # BLD AUTO: 2.98 M/UL (ref 4.2–5.4)
SODIUM SERPL-SCNC: 137 MMOL/L (ref 135–145)
WBC # BLD AUTO: 9.2 K/UL (ref 4.8–10.8)

## 2022-05-21 PROCEDURE — 83735 ASSAY OF MAGNESIUM: CPT

## 2022-05-21 PROCEDURE — 700105 HCHG RX REV CODE 258: Performed by: INTERNAL MEDICINE

## 2022-05-21 PROCEDURE — 82962 GLUCOSE BLOOD TEST: CPT

## 2022-05-21 PROCEDURE — 700102 HCHG RX REV CODE 250 W/ 637 OVERRIDE(OP): Performed by: INTERNAL MEDICINE

## 2022-05-21 PROCEDURE — 80053 COMPREHEN METABOLIC PANEL: CPT

## 2022-05-21 PROCEDURE — 700102 HCHG RX REV CODE 250 W/ 637 OVERRIDE(OP): Performed by: HOSPITALIST

## 2022-05-21 PROCEDURE — A9270 NON-COVERED ITEM OR SERVICE: HCPCS | Performed by: INTERNAL MEDICINE

## 2022-05-21 PROCEDURE — 99232 SBSQ HOSP IP/OBS MODERATE 35: CPT | Performed by: INTERNAL MEDICINE

## 2022-05-21 PROCEDURE — A9270 NON-COVERED ITEM OR SERVICE: HCPCS | Performed by: HOSPITALIST

## 2022-05-21 PROCEDURE — 770004 HCHG ROOM/CARE - ONCOLOGY PRIVATE *

## 2022-05-21 PROCEDURE — 84100 ASSAY OF PHOSPHORUS: CPT

## 2022-05-21 PROCEDURE — 85025 COMPLETE CBC W/AUTO DIFF WBC: CPT

## 2022-05-21 PROCEDURE — 36415 COLL VENOUS BLD VENIPUNCTURE: CPT

## 2022-05-21 PROCEDURE — 700111 HCHG RX REV CODE 636 W/ 250 OVERRIDE (IP): Performed by: ORTHOPAEDIC SURGERY

## 2022-05-21 RX ORDER — POTASSIUM CHLORIDE 20 MEQ/1
40 TABLET, EXTENDED RELEASE ORAL ONCE
Status: DISPENSED | OUTPATIENT
Start: 2022-05-21 | End: 2022-05-22

## 2022-05-21 RX ORDER — LANOLIN ALCOHOL/MO/W.PET/CERES
400 CREAM (GRAM) TOPICAL 2 TIMES DAILY
Status: COMPLETED | OUTPATIENT
Start: 2022-05-21 | End: 2022-05-21

## 2022-05-21 RX ADMIN — AMLODIPINE BESYLATE 10 MG: 10 TABLET ORAL at 05:54

## 2022-05-21 RX ADMIN — OXYCODONE HYDROCHLORIDE 10 MG: 10 TABLET ORAL at 09:10

## 2022-05-21 RX ADMIN — ATORVASTATIN CALCIUM 20 MG: 20 TABLET, FILM COATED ORAL at 17:16

## 2022-05-21 RX ADMIN — SODIUM CHLORIDE, POTASSIUM CHLORIDE, SODIUM LACTATE AND CALCIUM CHLORIDE: 600; 310; 30; 20 INJECTION, SOLUTION INTRAVENOUS at 12:12

## 2022-05-21 RX ADMIN — ENOXAPARIN SODIUM 40 MG: 40 INJECTION SUBCUTANEOUS at 05:55

## 2022-05-21 RX ADMIN — INSULIN HUMAN 2 UNITS: 100 INJECTION, SOLUTION PARENTERAL at 12:30

## 2022-05-21 RX ADMIN — Medication 400 MG: at 17:16

## 2022-05-21 RX ADMIN — OXYCODONE HYDROCHLORIDE 10 MG: 10 TABLET ORAL at 13:17

## 2022-05-21 RX ADMIN — DOXYCYCLINE 100 MG: 100 TABLET, FILM COATED ORAL at 05:54

## 2022-05-21 RX ADMIN — SENNOSIDES AND DOCUSATE SODIUM 2 TABLET: 50; 8.6 TABLET ORAL at 05:54

## 2022-05-21 RX ADMIN — LEVOFLOXACIN 250 MG: 250 TABLET, FILM COATED ORAL at 05:58

## 2022-05-21 RX ADMIN — SEVELAMER CARBONATE 800 MG: 800 TABLET, FILM COATED ORAL at 17:12

## 2022-05-21 RX ADMIN — LISINOPRIL 20 MG: 20 TABLET ORAL at 05:54

## 2022-05-21 RX ADMIN — SEVELAMER CARBONATE 800 MG: 800 TABLET, FILM COATED ORAL at 13:14

## 2022-05-21 RX ADMIN — DOXYCYCLINE 100 MG: 100 TABLET, FILM COATED ORAL at 17:16

## 2022-05-21 RX ADMIN — SEVELAMER CARBONATE 800 MG: 800 TABLET, FILM COATED ORAL at 09:11

## 2022-05-21 RX ADMIN — ACETAMINOPHEN 650 MG: 325 TABLET ORAL at 17:12

## 2022-05-21 RX ADMIN — FOLIC ACID 1 MG: 1 TABLET ORAL at 05:54

## 2022-05-21 RX ADMIN — INSULIN HUMAN 2 UNITS: 100 INJECTION, SOLUTION PARENTERAL at 20:34

## 2022-05-21 RX ADMIN — Medication 400 MG: at 09:10

## 2022-05-21 ASSESSMENT — ENCOUNTER SYMPTOMS
SORE THROAT: 0
DIZZINESS: 0
NERVOUS/ANXIOUS: 0
VOMITING: 0
CHILLS: 0
COUGH: 0
SHORTNESS OF BREATH: 0
ABDOMINAL PAIN: 0
HEADACHES: 0
BLURRED VISION: 0
NAUSEA: 0
WEAKNESS: 0
CONSTIPATION: 1
FEVER: 0
DIARRHEA: 0
PALPITATIONS: 0
DEPRESSION: 0

## 2022-05-21 ASSESSMENT — PAIN DESCRIPTION - PAIN TYPE: TYPE: ACUTE PAIN

## 2022-05-21 NOTE — CARE PLAN
The patient is Stable - Low risk of patient condition declining or worsening    Shift Goals  Clinical Goals: Pain management with movement  Patient Goals: Pain management  Family Goals: Not present    Progress made toward(s) clinical / shift goals: Patient medicated for pain per MAR with adequate relief in symptoms per patient.     Problem: Knowledge Deficit - Standard  Goal: Patient and family/care givers will demonstrate understanding of plan of care, disease process/condition, diagnostic tests and medications  Outcome: Progressing     Problem: Respiratory  Goal: Patient will achieve/maintain optimum respiratory ventilation and gas exchange  Outcome: Progressing     Problem: Physical Regulation  Goal: Diagnostic test results will improve  Outcome: Progressing  Goal: Signs and symptoms of infection will decrease  Outcome: Progressing     Problem: Pain - Standard  Goal: Alleviation of pain or a reduction in pain to the patient’s comfort goal  Outcome: Progressing     Problem: Fall Risk  Goal: Patient will remain free from falls  Outcome: Progressing     Problem: Mobility  Goal: Patient's capacity to carry out activities will improve  Outcome: Progressing     Problem: Infection - Standard  Goal: Patient will remain free from infection  Outcome: Progressing     Problem: Skin Integrity  Goal: Skin integrity is maintained or improved  Outcome: Progressing       Patient is not progressing towards the following goals: Patient crying and grimacing and with a significant amount of pain during movement for bed linen change.

## 2022-05-21 NOTE — PROGRESS NOTES
Infectious Disease Progress Note    Author: Jade Alberto M.D. Date & Time of service: 2022  10:50 AM    Chief Complaint:  septic arthritis   Thigh abscess    Interval History:  56 y.o. female with RA admitted 5/10/2022 for bilateral knee pain    AF tolerating abx NPO for procedure to remove drain   AF WBC 9.2 awaiting placement-has painful LAD right groin and continued pain knee-no obvious effusion post drain removal. Denies SE antibiotics    Labs Reviewed, Medications Reviewed and Wound Reviewed.    Review of Systems:  Review of Systems   Constitutional: Negative for fever.   Respiratory: Negative for cough and shortness of breath.    Gastrointestinal: Negative for abdominal pain, diarrhea, nausea and vomiting.   Musculoskeletal: Positive for joint pain.   Skin: Negative for rash.   All other systems reviewed and are negative.      Hemodynamics:  Temp (24hrs), Av.1 °C (98.8 °F), Min:36.6 °C (97.8 °F), Max:37.6 °C (99.7 °F)  Temperature: 37.4 °C (99.3 °F)  Pulse  Av.9  Min: 64  Max: 155   Blood Pressure: 139/60       Physical Exam:  Physical Exam  Vitals and nursing note reviewed.   Constitutional:       General: She is not in acute distress.     Appearance: She is not ill-appearing, toxic-appearing or diaphoretic.   HENT:      Nose: No rhinorrhea.   Eyes:      General: No scleral icterus.     Extraocular Movements: Extraocular movements intact.      Pupils: Pupils are equal, round, and reactive to light.   Cardiovascular:      Rate and Rhythm: Normal rate.   Pulmonary:      Effort: Pulmonary effort is normal. No respiratory distress.      Breath sounds: No stridor.   Abdominal:      General: There is no distension.      Palpations: Abdomen is soft.      Tenderness: There is no abdominal tenderness.   Musculoskeletal:      Cervical back: Neck supple. No tenderness.      Comments: Right knee  Drain removed  No erythema or swelling  Small LAD right groin   Skin:     Coloration: Skin is not  jaundiced.   Neurological:      General: No focal deficit present.      Mental Status: She is alert and oriented to person, place, and time.   Psychiatric:         Mood and Affect: Mood normal.         Behavior: Behavior normal.         Meds:    Current Facility-Administered Medications:   •  magnesium oxide  •  potassium chloride SA  •  sevelamer carbonate  •  lisinopril  •  enoxaparin (LOVENOX) injection  •  amLODIPine  •  LR  •  doxycycline monohydrate  •  levoFLOXacin  •  Pharmacy Consult Request  •  oxyCODONE immediate-release **OR** oxyCODONE immediate-release **OR** morphine injection  •  senna-docusate **AND** polyethylene glycol/lytes **AND** magnesium hydroxide **AND** bisacodyl  •  acetaminophen  •  hydrALAZINE  •  ondansetron  •  ondansetron  •  promethazine  •  promethazine  •  prochlorperazine  •  atorvastatin  •  folic acid  •  insulin regular **AND** POC blood glucose manual result **AND** NOTIFY MD and PharmD **AND** Administer 20 grams of glucose (approximately 8 ounces of fruit juice) every 15 minutes PRN FSBG less than 70 mg/dL **AND** dextrose bolus    Labs:  Recent Labs     05/19/22 0028 05/20/22  0013 05/21/22  0021   WBC 8.5 8.2 9.2   RBC 2.81* 3.14* 2.98*   HEMOGLOBIN 8.4* 9.3* 9.0*   HEMATOCRIT 24.5* 28.7* 26.9*   MCV 87.2 91.4 90.3   MCH 29.9 29.6 30.2   RDW 46.0 48.5 47.1   PLATELETCT 258 284 307   MPV 9.6 9.4 9.3   NEUTSPOLYS 75.90* 71.70 81.20*   LYMPHOCYTES 9.50* 13.40* 8.30*   MONOCYTES 11.20 8.90 7.00   EOSINOPHILS 0.80 2.80 1.60   BASOPHILS 0.40 0.60 0.30     Recent Labs     05/19/22 0028 05/20/22  0013 05/21/22  0021   SODIUM 135 140 137   POTASSIUM 4.0 3.8 3.4*   CHLORIDE 99 102 99   CO2 27 27 28   GLUCOSE 152* 143* 154*   BUN 23* 24* 17     Recent Labs     05/19/22  0028 05/20/22  0013 05/21/22  0021   ALBUMIN  --   --  2.4*   TBILIRUBIN  --   --  0.3   ALKPHOSPHAT  --   --  73   TOTPROTEIN  --   --  5.3*   ALTSGPT  --   --  5   ASTSGOT  --   --  11*   CREATININE 1.20 1.14 0.91        Imaging:  CT-KNEE WITH RIGHT    Result Date: 5/11/2022  5/10/2022 11:08 PM HISTORY/REASON FOR EXAM:  Soft tissue infection suspected, knee, xray done; ?gas in soft tissue of distal femur. TECHNIQUE/ EXAM DESCRIPTION AND NUMBER OF VIEWS:  CT scan of the RIGHT knee with contrast, with reconstructions. Thin helical sections were obtained from the distal femur through the proximal tibia/fibula. Sagittal and coronal reconstructions were generated from the axial images. A total of 80 mL of Omnipaque 350 nonionic contrast was administered IV without complication. Up to date radiation dose reduction adjustments have been utilized to meet ALARA standards for radiation dose reduction. COMPARISON:  Radiograph 5/11/2022. FINDINGS: Moderate knee joint with synovial enhancement. There is gas in the soft tissue lateral leg as well (image 94 series 2) There is gas within the multilocular fluid collection in the popliteal fossa, measuring 3.0 x 4.7 cm There is gas in the soft tissue lateral leg as well (image 94 series 2) Mild osteoarthritis.     1. Moderate knee joint effusion with thick synovial enhancement, concerning for infection 2. There is gas within the multilocular fluid collection in the popliteal fossa, concerning for infected Baker's cyst/abscess. 3. Soft tissue gas in the lateral leg as well.    DX-CHEST-PORTABLE (1 VIEW)    Result Date: 5/10/2022  5/10/2022 8:19 PM HISTORY/REASON FOR EXAM:  possible sepsis. TECHNIQUE/EXAM DESCRIPTION AND NUMBER OF VIEWS: Single portable view of the chest. COMPARISON: None FINDINGS: Heart size is within normal limits. Mild perihilar opacifications are noted. No focal infiltrates or consolidations are identified in the lungs. No pleural fluid collections are identified. No pneumothorax is appreciated.     1.  There are mild perihilar opacifications which could be due to edema or bronchitis. 2.  No consolidations identified.    DX-KNEE 2- RIGHT    Result Date: 5/10/2022  5/10/2022  9:20 PM HISTORY/REASON FOR EXAM:  Pain/Deformity Following Trauma Right leg pain TECHNIQUE/EXAM DESCRIPTION AND NUMBER OF VIEWS:  2 views of the RIGHT knee. COMPARISON: None FINDINGS: No acute fracture or dislocation. Mild osteoarthritis Moderate knee joint effusion. Questionable soft tissue gas seen posterior to the distal femur on lateral view.     Moderate right knee joint effusion. Questionable soft tissue gas seen posterior to the distal femur on lateral view.    US-EXTREMITY VENOUS UPPER UNILAT LEFT    Result Date: 2022   Upper Extremity  Venous Duplex Report  Vascular Laboratory  CONCLUSIONS  Left upper extremity.  Acute, non-occlusive superficial venous thrombosis is seen in the cephalic  vein of the distal bicep attached to the IV.  MIRTA ORTIZ  Exam Date:     2022 16:31  Room #:     Inpatient  Priority:     Routine  Ht (in):             Wt (lb):  Ordering Physician:        ROBERT REYES  Referring Physician:       ERIC BELTRAN  Sonographer:               Cecy Anna RVT  Study Type:                Complete Unilateral  Technical Quality:         Adequate  Age:    56    Gender:     F  MRN:    [id number]  :    1965      BSA:  Indications:     Localized swelling, mass and lump, left upper limb  CPT Codes:       81622  ICD Codes:       R22.32  History:         Swelling of left hand and forearm, pain in left shoulder. No                    prior duplex.  Limitations:  PROCEDURES:  Left upper extremity venous duplex imaging.  The following venous structures were evaluated: internal jugular,  subclavian, axillary, brachial, cephalic, basilic, radial, and ulnar veins.  Serial compression and spectral Doppler flow evaluations were performed.  FINDINGS:  Left upper extremity.  Acute, non-occlusive superficial venous thrombosis is seen in the cephalic  vein of the distal bicep attached to the IV.  All other veins demonstrate complete color filling and compressibility with  normal venous  "flow dynamics including spontaneous flow and respiratory  phasicity.  No deep venous thrombosis.  Flow was evaluated in the contralateral subclavian vein and normal venous  flow dynamics including spontaneous flow and respiratory phasic variation  were demonstrated.  Kendall Burton MD  (Electronically Signed)  Final Date:      13 May 2022 17:32      Micro:  Results     Procedure Component Value Units Date/Time    CULTURE TISSUE W/ GRM STAIN [[id number]] Collected: 05/11/22 1755    Order Status: Completed Specimen: Tissue Updated: 05/16/22 1154     Significant Indicator NEG     Source TISS     Site right knee     Culture Result No growth at 72 hours.     Gram Stain Result Many WBCs.  No organisms seen.      Narrative:      Surgery Specimen    Anaerobic Culture [[id number]] Collected: 05/11/22 1755    Order Status: Completed Specimen: Tissue Updated: 05/16/22 1154     Significant Indicator NEG     Source TISS     Site right knee     Culture Result No Anaerobes isolated.    Narrative:      Surgery Specimen    BLOOD CULTURE [[id number]] Collected: 05/10/22 1924    Order Status: Completed Specimen: Blood from Peripheral Updated: 05/15/22 2100     Significant Indicator NEG     Source BLD     Site PERIPHERAL     Culture Result No growth after 5 days of incubation.    Narrative:      Per Hospital Policy: Only change Specimen Src: to \"Line\" if  specified by physician order.  Left AC    BLOOD CULTURE [[id number]] Collected: 05/10/22 1841    Order Status: Completed Specimen: Blood from Peripheral Updated: 05/15/22 2100     Significant Indicator NEG     Source BLD     Site PERIPHERAL     Culture Result No growth after 5 days of incubation.    Narrative:      Per Hospital Policy: Only change Specimen Src: to \"Line\" if  specified by physician order.  Left AC          Assessment:  Active Hospital Problems    Diagnosis    • *Sepsis (HCC) [A41.9]    • Superficial venous thrombosis of arm, left [I82.612]    • Arthritis of right knee " "[M17.11]    • Type 2 diabetes mellitus with hyperglycemia, without long-term current use of insulin (HCC) [E11.65]    • Essential hypertension [I10]      Presumed right knee septic arthritis  Immunosuppressed  Rheumatoid arthritis  Leukocytosis resolved  Posterior thigh abscess, s/p drainage  S/p Right knee arthrotomy and drainage of deep infection, incision and drainage of right thigh abscess 5/11/2022  DM  RAISA     PLAN:   No fluid analysis sent  Cultures neg knee and blood  Per op notes, fluid appeared purulent. Thigh abscess \"congealed\"  No SA or PSAR isolated, so will treat for more fastidious pathogens  Continue doxy 100 mg PO BID with levofloxacin 500 mg PO daily for 14 days-adjust dose of needed for renal function  Hold cultures for 14 days if possible  Keep BS under 150 to help control current infection  WIll sign off-please reconsult if needed  "

## 2022-05-21 NOTE — PROGRESS NOTES
[facility] Medicine Daily Progress Note    Date of Service  5/21/2022    Chief Complaint  Kary Shah is a 56 y.o. female admitted 5/10/2022 with fever, chills, right knee pain.    Hospital Course  No notes on file    Ms. Kary Shah is a 56 y.o. female with a history of rheumatoid arthritis and osteoarthritis who presented on 5/10/2022 with fever, chills, and right knee pain.  X-ray of right knee showed an effusion and concerning for soft tissue gas.  Arthrocentesis in the emergency room was unsuccessful.  CT was concerning for infected Baker's cyst/abscess.  Orthopedic surgery was consulted status post arthrotomy and drainage of deep infection, incision and drainage of a right thigh abscess on 5/11/2022.  After the procedure 2 drains were left in place.  Infectious disease was consulted and patient was started on doxycycline and levofloxacin for right knee septic arthritis.  Drains were discontinued on 5/18/2022.  Patient went on to develop right hip pain.  Plain films showed no right hip or pelvic fracture, mild degenerative changes of both hips, no gross soft tissue abnormality.     Patient also had an acute kidney injury which improved with IV fluids.  She was started on sevelamer for hyperphosphatemia.    Orthopedic surgery recommends aspirin for 4 weeks for DVT prophylaxis.  Follow-up in orthopedic surgery outpatient clinic in 2 weeks for wound check and staple/suture removal.      Interval Problem Update  Patient was seen and examined at bedside.  I have personally reviewed and interpreted vitals, labs, and imaging.    5/17.  Afebrile.  Has been hypertensive.  On 0-1 L nasal cannula.  Continue outpatient lisinopril.  Monitor kidney function.  Started on amlodipine.  Denies fevers, chills, chest pains, shortness of breath.  Complains of right knee pain.  Reviewed knee x-ray.  Discussed with orthopedic surgery.  Plan to discontinue drain tomorrow in OR.  N.p.o. after midnight.  5/18.  Has  been hypertensive.  Afebrile.  On 0-1 L nasal cannula.  Denies fevers, chills, chest pains, shortness of breath.  She does report some constipation.  Tolerated removal of right knee drains but states knee pain is slightly worse after procedure.  PT/OT.  Increase amlodipine to 10 mg.  Renal ultrasound is pending.  5/19.  Afebrile.  Hypertension is improved.  On 0-2 L nasal cannula.  Denies fevers, chills, chest pains, shortness of breath.  Knee feels better with drains out but now has right hip pain.  He is concerned since there was a right thigh abscess which drain was removed but did appear to be soft tissue gas on prior CT knee.  PT/OT recommended SNF.  Will get x-rays of hip to rule out injury, soft tissue gas.  5/20.  Afebrile.  Has been hypertensive.  On 0-1 L nasal cannula.  Replete magnesium.  Start Phos binders for hyperphosphatemia.  Monitor kidney function.  Denies fevers, chills, chest pain, shortness of breath.  The pain is improved.  Hip pain is also improved.  Patient reports left hip x-ray yesterday when transferring back to bed the bedside and leveled and within the [place] to the bed she bounced and was having hip pain.  This is improved now at this time.  She is still not able to bear weight.  PT/OT recommend placement.  SNF referrals sent.  5/21.  Afebrile.  Has been hypertensive.  On 0-2 L nasal cannula.  Replete magnesium, potassium.  Continue Phos binders.  Creatinine trending down to 0.91.  Continue titrating blood pressure regimen with lisinopril and amlodipine.  Denies fevers, chills, chest pain, shortness of breath.  Right knee and hip pain is improved.  Using heat and ice pack.  Still can hardly bear weight.  Agreeable for SNF placement.    I have personally seen and examined the patient at bedside. I discussed the plan of care with patient, bedside RN and .    Consultants/Specialty  infectious disease and orthopedics    Code Status  Full Code    Disposition  Patient is not  medically cleared for discharge.   Anticipate discharge to to skilled nursing facility.  I have placed the appropriate orders for post-discharge needs.    Review of Systems  Review of Systems   Constitutional: Negative for chills and fever.   HENT: Negative for congestion and sore throat.    Eyes: Negative for blurred vision.   Respiratory: Negative for cough and shortness of breath.    Cardiovascular: Negative for chest pain, palpitations and leg swelling.   Gastrointestinal: Positive for constipation. Negative for abdominal pain, diarrhea, nausea and vomiting.   Genitourinary: Negative for dysuria, frequency and urgency.   Musculoskeletal: Positive for joint pain.   Skin: Negative for rash.   Neurological: Negative for dizziness, weakness and headaches.   Psychiatric/Behavioral: Negative for depression. The patient is not nervous/anxious.    All other systems reviewed and are negative.       Physical Exam  Temp:  [36.6 °C (97.8 °F)-37.6 °C (99.7 °F)] 36.6 °C (97.8 °F)  Pulse:  [67-94] 76  Resp:  [17-18] 17  BP: (145-165)/(57-67) 146/59  SpO2:  [95 %-99 %] 99 %    Physical Exam  Vitals and nursing note reviewed.   Constitutional:       Appearance: Normal appearance. She is obese. She is ill-appearing.   HENT:      Head: Normocephalic and atraumatic.      Right Ear: External ear normal.      Left Ear: External ear normal.      Nose: Nose normal.      Mouth/Throat:      Mouth: Mucous membranes are moist.      Pharynx: Oropharynx is clear.   Eyes:      Extraocular Movements: Extraocular movements intact.      Conjunctiva/sclera: Conjunctivae normal.   Cardiovascular:      Rate and Rhythm: Normal rate and regular rhythm.      Pulses: Normal pulses.      Heart sounds: Normal heart sounds. No murmur heard.  Pulmonary:      Effort: Pulmonary effort is normal. No respiratory distress.      Breath sounds: Normal breath sounds. No stridor. No wheezing or rales.   Abdominal:      General: Abdomen is flat. Bowel sounds are  normal. There is no distension.      Palpations: Abdomen is soft. There is no mass.      Tenderness: There is no abdominal tenderness.   Musculoskeletal:         General: Tenderness present.      Cervical back: Normal range of motion.      Comments: Right knee wrapped   Skin:     General: Skin is warm.      Capillary Refill: Capillary refill takes less than 2 seconds.   Neurological:      General: No focal deficit present.      Mental Status: She is alert and oriented to person, place, and time. Mental status is at baseline.      Cranial Nerves: No cranial nerve deficit.   Psychiatric:         Mood and Affect: Mood normal.         Behavior: Behavior normal.         Fluids    Intake/Output Summary (Last 24 hours) at 5/21/2022 0721  Last data filed at 5/20/2022 1100  Gross per 24 hour   Intake --   Output 1800 ml   Net -1800 ml       Laboratory  Recent Labs     05/19/22  0028 05/20/22  0013 05/21/22  0021   WBC 8.5 8.2 9.2   RBC 2.81* 3.14* 2.98*   HEMOGLOBIN 8.4* 9.3* 9.0*   HEMATOCRIT 24.5* 28.7* 26.9*   MCV 87.2 91.4 90.3   MCH 29.9 29.6 30.2   MCHC 34.3 32.4* 33.5*   RDW 46.0 48.5 47.1   PLATELETCT 258 284 307   MPV 9.6 9.4 9.3     Recent Labs     05/19/22  0028 05/20/22  0013 05/21/22  0021   SODIUM 135 140 137   POTASSIUM 4.0 3.8 3.4*   CHLORIDE 99 102 99   CO2 27 27 28   GLUCOSE 152* 143* 154*   BUN 23* 24* 17   CREATININE 1.20 1.14 0.91   CALCIUM 8.5 8.7 8.7                   Imaging  DX-HIP-COMPLETE - UNILATERAL 2+ RIGHT   Final Result      1.  No RIGHT hip or pelvic fracture.   2.  Mild degenerative change of both hips.      US-RENAL   Final Result      1.  Normal renal ultrasound.      DX-KNEE 2- RIGHT   Final Result      1. Surgical drain terminating within the patellofemoral compartment of the right knee joint. No other radiopaque foreign object.   2. Postsurgical changes in the anterior knee soft tissues, surgical skin staples.      US-EXTREMITY VENOUS UPPER UNILAT LEFT   Final Result      CT-KNEE WITH  RIGHT   Final Result         1. Moderate knee joint effusion with thick synovial enhancement, concerning for infection      2. There is gas within the multilocular fluid collection in the popliteal fossa, concerning for infected Baker's cyst/abscess.      3. Soft tissue gas in the lateral leg as well.      DX-KNEE 2- RIGHT   Final Result         Moderate right knee joint effusion.      Questionable soft tissue gas seen posterior to the distal femur on lateral view.      DX-CHEST-PORTABLE (1 VIEW)   Final Result         1.  There are mild perihilar opacifications which could be due to edema or bronchitis.      2.  No consolidations identified.           Assessment/Plan  Superficial venous thrombosis of arm, left  Assessment & Plan  LUE US from 5/13/2022 shows acute, non-occlusive superficial venous thrombosis in the cephalic vein of the distal bicep attached to the IV.  IV removed.  Pain and swelling have improved.     Arthritis of right knee  Assessment & Plan  Patient underwent right knee arthrotomy and drainage of deep infection, incision and drainage of right thigh abscess on 5/11/2022.  She has 2 drains in place. Vancomycin and Zosyn switched to Ancef on 5/13/2022. Surgical drains were not able to be removed by ortho PA. Plan for OR on 5/17/2022 for drain removal. No positive cultures to date. ID consulted for antibiotic recommendations. Per orthopedic surgery, continue DVT prophylaxis with SCDs and Lovenox until mobilizing then switch to aspirin for 4 weeks. She is to follow-up with orthopedic surgery outpatient clinic in 2 weeks for wound check and suture/staple removal.  Blood and fluid cultures have been negative.      Status post OR drain removal on 5/18/2022.    Type 2 diabetes mellitus with hyperglycemia, without long-term current use of insulin (HCC)- (present on admission)  Assessment & Plan  Home metformin on hold.  Hemoglobin A1c was 7.7 on 10/22/2021.  Repeat ordered.  Continue SSI with Accu-Cheks  and hypoglycemia protocol.    Seropositive rheumatoid arthritis (HCC)  Assessment & Plan  On methotrexate and Plaquenil for seropositive rheumatoid arthritis.  We will place on hold due to acute infection.    Essential hypertension- (present on admission)  Assessment & Plan  Continue lisinopril.  Increase amlodipine       VTE prophylaxis: enoxaparin ppx    I have performed a physical exam and reviewed and updated ROS and Plan today (5/21/2022). In review of yesterday's note (5/20/2022), there are no changes except as documented above.

## 2022-05-22 LAB
ANION GAP SERPL CALC-SCNC: 9 MMOL/L (ref 7–16)
BASOPHILS # BLD AUTO: 0.5 % (ref 0–1.8)
BASOPHILS # BLD: 0.05 K/UL (ref 0–0.12)
BUN SERPL-MCNC: 15 MG/DL (ref 8–22)
CALCIUM SERPL-MCNC: 8.7 MG/DL (ref 8.5–10.5)
CHLORIDE SERPL-SCNC: 101 MMOL/L (ref 96–112)
CO2 SERPL-SCNC: 27 MMOL/L (ref 20–33)
CREAT SERPL-MCNC: 0.79 MG/DL (ref 0.5–1.4)
EOSINOPHIL # BLD AUTO: 0.27 K/UL (ref 0–0.51)
EOSINOPHIL NFR BLD: 3 % (ref 0–6.9)
ERYTHROCYTE [DISTWIDTH] IN BLOOD BY AUTOMATED COUNT: 48 FL (ref 35.9–50)
GFR SERPLBLD CREATININE-BSD FMLA CKD-EPI: 87 ML/MIN/1.73 M 2
GLUCOSE BLD STRIP.AUTO-MCNC: 109 MG/DL (ref 65–99)
GLUCOSE BLD STRIP.AUTO-MCNC: 133 MG/DL (ref 65–99)
GLUCOSE BLD STRIP.AUTO-MCNC: 133 MG/DL (ref 65–99)
GLUCOSE BLD STRIP.AUTO-MCNC: 167 MG/DL (ref 65–99)
GLUCOSE BLD STRIP.AUTO-MCNC: 189 MG/DL (ref 65–99)
GLUCOSE SERPL-MCNC: 153 MG/DL (ref 65–99)
HCT VFR BLD AUTO: 26.8 % (ref 37–47)
HGB BLD-MCNC: 8.6 G/DL (ref 12–16)
IMM GRANULOCYTES # BLD AUTO: 0.1 K/UL (ref 0–0.11)
IMM GRANULOCYTES NFR BLD AUTO: 1.1 % (ref 0–0.9)
LYMPHOCYTES # BLD AUTO: 0.81 K/UL (ref 1–4.8)
LYMPHOCYTES NFR BLD: 8.9 % (ref 22–41)
MAGNESIUM SERPL-MCNC: 1.4 MG/DL (ref 1.5–2.5)
MCH RBC QN AUTO: 29.8 PG (ref 27–33)
MCHC RBC AUTO-ENTMCNC: 32.1 G/DL (ref 33.6–35)
MCV RBC AUTO: 92.7 FL (ref 81.4–97.8)
MONOCYTES # BLD AUTO: 0.6 K/UL (ref 0–0.85)
MONOCYTES NFR BLD AUTO: 6.6 % (ref 0–13.4)
NEUTROPHILS # BLD AUTO: 7.31 K/UL (ref 2–7.15)
NEUTROPHILS NFR BLD: 79.9 % (ref 44–72)
NRBC # BLD AUTO: 0 K/UL
NRBC BLD-RTO: 0 /100 WBC
PHOSPHATE SERPL-MCNC: 4.3 MG/DL (ref 2.5–4.5)
PLATELET # BLD AUTO: 275 K/UL (ref 164–446)
PMV BLD AUTO: 9.4 FL (ref 9–12.9)
POTASSIUM SERPL-SCNC: 3.5 MMOL/L (ref 3.6–5.5)
RBC # BLD AUTO: 2.89 M/UL (ref 4.2–5.4)
SODIUM SERPL-SCNC: 137 MMOL/L (ref 135–145)
WBC # BLD AUTO: 9.1 K/UL (ref 4.8–10.8)

## 2022-05-22 PROCEDURE — 84100 ASSAY OF PHOSPHORUS: CPT

## 2022-05-22 PROCEDURE — 85025 COMPLETE CBC W/AUTO DIFF WBC: CPT

## 2022-05-22 PROCEDURE — A9270 NON-COVERED ITEM OR SERVICE: HCPCS | Performed by: HOSPITALIST

## 2022-05-22 PROCEDURE — A9270 NON-COVERED ITEM OR SERVICE: HCPCS | Performed by: INTERNAL MEDICINE

## 2022-05-22 PROCEDURE — 770004 HCHG ROOM/CARE - ONCOLOGY PRIVATE *

## 2022-05-22 PROCEDURE — 700102 HCHG RX REV CODE 250 W/ 637 OVERRIDE(OP): Performed by: INTERNAL MEDICINE

## 2022-05-22 PROCEDURE — 80048 BASIC METABOLIC PNL TOTAL CA: CPT

## 2022-05-22 PROCEDURE — 700102 HCHG RX REV CODE 250 W/ 637 OVERRIDE(OP): Performed by: HOSPITALIST

## 2022-05-22 PROCEDURE — 700111 HCHG RX REV CODE 636 W/ 250 OVERRIDE (IP): Performed by: ORTHOPAEDIC SURGERY

## 2022-05-22 PROCEDURE — 700105 HCHG RX REV CODE 258: Performed by: INTERNAL MEDICINE

## 2022-05-22 PROCEDURE — 82962 GLUCOSE BLOOD TEST: CPT | Mod: 91

## 2022-05-22 PROCEDURE — 83735 ASSAY OF MAGNESIUM: CPT

## 2022-05-22 PROCEDURE — 36415 COLL VENOUS BLD VENIPUNCTURE: CPT

## 2022-05-22 PROCEDURE — 99232 SBSQ HOSP IP/OBS MODERATE 35: CPT | Performed by: INTERNAL MEDICINE

## 2022-05-22 RX ORDER — POTASSIUM CHLORIDE 20 MEQ/1
40 TABLET, EXTENDED RELEASE ORAL ONCE
Status: COMPLETED | OUTPATIENT
Start: 2022-05-22 | End: 2022-05-22

## 2022-05-22 RX ORDER — LANOLIN ALCOHOL/MO/W.PET/CERES
400 CREAM (GRAM) TOPICAL 2 TIMES DAILY
Status: COMPLETED | OUTPATIENT
Start: 2022-05-22 | End: 2022-05-22

## 2022-05-22 RX ADMIN — SENNOSIDES AND DOCUSATE SODIUM 2 TABLET: 50; 8.6 TABLET ORAL at 06:31

## 2022-05-22 RX ADMIN — OXYCODONE HYDROCHLORIDE 10 MG: 10 TABLET ORAL at 06:38

## 2022-05-22 RX ADMIN — FOLIC ACID 1 MG: 1 TABLET ORAL at 06:32

## 2022-05-22 RX ADMIN — SEVELAMER CARBONATE 800 MG: 800 TABLET, FILM COATED ORAL at 08:37

## 2022-05-22 RX ADMIN — POTASSIUM CHLORIDE 40 MEQ: 1500 TABLET, EXTENDED RELEASE ORAL at 08:37

## 2022-05-22 RX ADMIN — SEVELAMER CARBONATE 800 MG: 800 TABLET, FILM COATED ORAL at 12:54

## 2022-05-22 RX ADMIN — INSULIN HUMAN 2 UNITS: 100 INJECTION, SOLUTION PARENTERAL at 20:45

## 2022-05-22 RX ADMIN — SODIUM CHLORIDE, POTASSIUM CHLORIDE, SODIUM LACTATE AND CALCIUM CHLORIDE: 600; 310; 30; 20 INJECTION, SOLUTION INTRAVENOUS at 15:38

## 2022-05-22 RX ADMIN — ENOXAPARIN SODIUM 40 MG: 40 INJECTION SUBCUTANEOUS at 06:31

## 2022-05-22 RX ADMIN — DOXYCYCLINE 100 MG: 100 TABLET, FILM COATED ORAL at 06:31

## 2022-05-22 RX ADMIN — OXYCODONE HYDROCHLORIDE 10 MG: 10 TABLET ORAL at 12:53

## 2022-05-22 RX ADMIN — Medication 400 MG: at 08:37

## 2022-05-22 RX ADMIN — LISINOPRIL 20 MG: 20 TABLET ORAL at 06:32

## 2022-05-22 RX ADMIN — ATORVASTATIN CALCIUM 20 MG: 20 TABLET, FILM COATED ORAL at 17:16

## 2022-05-22 RX ADMIN — SODIUM CHLORIDE, POTASSIUM CHLORIDE, SODIUM LACTATE AND CALCIUM CHLORIDE: 600; 310; 30; 20 INJECTION, SOLUTION INTRAVENOUS at 02:45

## 2022-05-22 RX ADMIN — DOXYCYCLINE 100 MG: 100 TABLET, FILM COATED ORAL at 17:16

## 2022-05-22 RX ADMIN — LEVOFLOXACIN 250 MG: 250 TABLET, FILM COATED ORAL at 06:33

## 2022-05-22 RX ADMIN — Medication 400 MG: at 17:16

## 2022-05-22 RX ADMIN — AMLODIPINE BESYLATE 10 MG: 10 TABLET ORAL at 06:32

## 2022-05-22 RX ADMIN — SEVELAMER CARBONATE 800 MG: 800 TABLET, FILM COATED ORAL at 17:16

## 2022-05-22 ASSESSMENT — ENCOUNTER SYMPTOMS
ABDOMINAL PAIN: 0
WEAKNESS: 0
CHILLS: 0
COUGH: 0
SORE THROAT: 0
BLURRED VISION: 0
NERVOUS/ANXIOUS: 0
SHORTNESS OF BREATH: 0
DEPRESSION: 0
DIZZINESS: 0
DIARRHEA: 0
HEADACHES: 0
NAUSEA: 0
VOMITING: 0
PALPITATIONS: 0
FEVER: 0
CONSTIPATION: 1

## 2022-05-22 ASSESSMENT — PAIN DESCRIPTION - PAIN TYPE: TYPE: ACUTE PAIN

## 2022-05-22 NOTE — CARE PLAN
The patient is Stable - Low risk of patient condition declining or worsening    Shift Goals  Clinical Goals: Pain control and rest  Patient Goals: pain control  Family Goals: Not present    Progress made toward(s) clinical / shift goals:  Patient alert and orientated and able to call out for assistance. Purewick in place requested by patient to allow maximum resting periods. Pain controlled with PRN medications.    Patient is not progressing towards the following goals:

## 2022-05-22 NOTE — PROGRESS NOTES
[facility] Medicine Daily Progress Note    Date of Service  5/22/2022    Chief Complaint  Kary Shah is a 56 y.o. female admitted 5/10/2022 with fever, chills, right knee pain.    Hospital Course  No notes on file    Ms. Kary Shah is a 56 y.o. female with a history of rheumatoid arthritis and osteoarthritis who presented on 5/10/2022 with fever, chills, and right knee pain.  X-ray of right knee showed an effusion and concerning for soft tissue gas.  Arthrocentesis in the emergency room was unsuccessful.  CT was concerning for infected Baker's cyst/abscess.  Orthopedic surgery was consulted status post arthrotomy and drainage of deep infection, incision and drainage of a right thigh abscess on 5/11/2022.  After the procedure 2 drains were left in place.  Infectious disease was consulted and patient was started on doxycycline and levofloxacin for right knee septic arthritis.  Drains were discontinued on 5/18/2022.  Patient went on to develop right hip pain.  Plain films showed no right hip or pelvic fracture, mild degenerative changes of both hips, no gross soft tissue abnormality.     Patient also had an acute kidney injury which improved with IV fluids.  She was started on sevelamer for hyperphosphatemia.    Orthopedic surgery recommends aspirin for 4 weeks for DVT prophylaxis.  Follow-up in orthopedic surgery outpatient clinic in 2 weeks for wound check and staple/suture removal.      Interval Problem Update  Patient was seen and examined at bedside.  I have personally reviewed and interpreted vitals, labs, and imaging.    5/17.  Afebrile.  Has been hypertensive.  On 0-1 L nasal cannula.  Continue outpatient lisinopril.  Monitor kidney function.  Started on amlodipine.  Denies fevers, chills, chest pains, shortness of breath.  Complains of right knee pain.  Reviewed knee x-ray.  Discussed with orthopedic surgery.  Plan to discontinue drain tomorrow in OR.  N.p.o. after midnight.  5/18.  Has  been hypertensive.  Afebrile.  On 0-1 L nasal cannula.  Denies fevers, chills, chest pains, shortness of breath.  She does report some constipation.  Tolerated removal of right knee drains but states knee pain is slightly worse after procedure.  PT/OT.  Increase amlodipine to 10 mg.  Renal ultrasound is pending.  5/19.  Afebrile.  Hypertension is improved.  On 0-2 L nasal cannula.  Denies fevers, chills, chest pains, shortness of breath.  Knee feels better with drains out but now has right hip pain.  He is concerned since there was a right thigh abscess which drain was removed but did appear to be soft tissue gas on prior CT knee.  PT/OT recommended SNF.  Will get x-rays of hip to rule out injury, soft tissue gas.  5/20.  Afebrile.  Has been hypertensive.  On 0-1 L nasal cannula.  Replete magnesium.  Start Phos binders for hyperphosphatemia.  Monitor kidney function.  Denies fevers, chills, chest pain, shortness of breath.  The pain is improved.  Hip pain is also improved.  Patient reports left hip x-ray yesterday when transferring back to bed the bedside and leveled and within the Coy to the bed she bounced and was having hip pain.  This is improved now at this time.  She is still not able to bear weight.  PT/OT recommend placement.  SNF referrals sent.  5/21.  Afebrile.  Has been hypertensive.  On 0-2 L nasal cannula.  Replete magnesium, potassium.  Continue Phos binders.  Creatinine trending down to 0.91.  Continue titrating blood pressure regimen with lisinopril and amlodipine.  Denies fevers, chills, chest pain, shortness of breath.  Right knee and hip pain is improved.  Using heat and ice pack.  Still can hardly bear weight.  Agreeable for SNF placement.  5/22.  Afebrile.  Has been hypertensive.  On 1-2 L nasal cannula.  Replete mag, potassium.  Denies fevers, chills, chest pains, shortness of breath.  Knee pain and hip pain have improved.  Plan for SNF placement.    I have personally seen and examined the  patient at bedside. I discussed the plan of care with patient, bedside RN and .    Consultants/Specialty  infectious disease and orthopedics    Code Status  Full Code    Disposition  Patient is not medically cleared for discharge.   Anticipate discharge to to skilled nursing facility.  I have placed the appropriate orders for post-discharge needs.    Review of Systems  Review of Systems   Constitutional: Negative for chills and fever.   HENT: Negative for congestion and sore throat.    Eyes: Negative for blurred vision.   Respiratory: Negative for cough and shortness of breath.    Cardiovascular: Negative for chest pain, palpitations and leg swelling.   Gastrointestinal: Positive for constipation. Negative for abdominal pain, diarrhea, nausea and vomiting.   Genitourinary: Negative for dysuria, frequency and urgency.   Musculoskeletal: Positive for joint pain.   Skin: Negative for rash.   Neurological: Negative for dizziness, weakness and headaches.   Psychiatric/Behavioral: Negative for depression. The patient is not nervous/anxious.    All other systems reviewed and are negative.       Physical Exam  Temp:  [36.9 °C (98.4 °F)-37.5 °C (99.5 °F)] 36.9 °C (98.4 °F)  Pulse:  [75-88] 88  Resp:  [16-17] 17  BP: (136-150)/(54-68) 150/59  SpO2:  [97 %-99 %] 98 %    Physical Exam  Vitals and nursing note reviewed.   Constitutional:       Appearance: Normal appearance. She is obese. She is ill-appearing.   HENT:      Head: Normocephalic and atraumatic.      Right Ear: External ear normal.      Left Ear: External ear normal.      Nose: Nose normal.      Mouth/Throat:      Mouth: Mucous membranes are moist.      Pharynx: Oropharynx is clear.   Eyes:      Extraocular Movements: Extraocular movements intact.      Conjunctiva/sclera: Conjunctivae normal.   Cardiovascular:      Rate and Rhythm: Normal rate and regular rhythm.      Pulses: Normal pulses.      Heart sounds: Normal heart sounds. No murmur  heard.  Pulmonary:      Effort: Pulmonary effort is normal. No respiratory distress.      Breath sounds: Normal breath sounds. No stridor. No wheezing or rales.   Abdominal:      General: Abdomen is flat. Bowel sounds are normal. There is no distension.      Palpations: Abdomen is soft. There is no mass.      Tenderness: There is no abdominal tenderness.   Musculoskeletal:         General: Tenderness present.      Cervical back: Normal range of motion.      Comments: Right knee wrapped   Skin:     General: Skin is warm.      Capillary Refill: Capillary refill takes less than 2 seconds.   Neurological:      General: No focal deficit present.      Mental Status: She is alert and oriented to person, place, and time. Mental status is at baseline.      Cranial Nerves: No cranial nerve deficit.   Psychiatric:         Mood and Affect: Mood normal.         Behavior: Behavior normal.         Fluids    Intake/Output Summary (Last 24 hours) at 5/22/2022 0754  Last data filed at 5/22/2022 0641  Gross per 24 hour   Intake 1440 ml   Output 2100 ml   Net -660 ml       Laboratory  Recent Labs     05/20/22  0013 05/21/22  0021 05/22/22  0113   WBC 8.2 9.2 9.1   RBC 3.14* 2.98* 2.89*   HEMOGLOBIN 9.3* 9.0* 8.6*   HEMATOCRIT 28.7* 26.9* 26.8*   MCV 91.4 90.3 92.7   MCH 29.6 30.2 29.8   MCHC 32.4* 33.5* 32.1*   RDW 48.5 47.1 48.0   PLATELETCT 284 307 275   MPV 9.4 9.3 9.4     Recent Labs     05/20/22  0013 05/21/22  0021 05/22/22  0113   SODIUM 140 137 137   POTASSIUM 3.8 3.4* 3.5*   CHLORIDE 102 99 101   CO2 27 28 27   GLUCOSE 143* 154* 153*   BUN 24* 17 15   CREATININE 1.14 0.91 0.79   CALCIUM 8.7 8.7 8.7                   Imaging  DX-HIP-COMPLETE - UNILATERAL 2+ RIGHT   Final Result      1.  No RIGHT hip or pelvic fracture.   2.  Mild degenerative change of both hips.      US-RENAL   Final Result      1.  Normal renal ultrasound.      DX-KNEE 2- RIGHT   Final Result      1. Surgical drain terminating within the patellofemoral  compartment of the right knee joint. No other radiopaque foreign object.   2. Postsurgical changes in the anterior knee soft tissues, surgical skin staples.      US-EXTREMITY VENOUS UPPER UNILAT LEFT   Final Result      CT-KNEE WITH RIGHT   Final Result         1. Moderate knee joint effusion with thick synovial enhancement, concerning for infection      2. There is gas within the multilocular fluid collection in the popliteal fossa, concerning for infected Baker's cyst/abscess.      3. Soft tissue gas in the lateral leg as well.      DX-KNEE 2- RIGHT   Final Result         Moderate right knee joint effusion.      Questionable soft tissue gas seen posterior to the distal femur on lateral view.      DX-CHEST-PORTABLE (1 VIEW)   Final Result         1.  There are mild perihilar opacifications which could be due to edema or bronchitis.      2.  No consolidations identified.           Assessment/Plan  Superficial venous thrombosis of arm, left  Assessment & Plan  LUE US from 5/13/2022 shows acute, non-occlusive superficial venous thrombosis in the cephalic vein of the distal bicep attached to the IV.  IV removed.  Pain and swelling have improved.     Arthritis of right knee  Assessment & Plan  Patient underwent right knee arthrotomy and drainage of deep infection, incision and drainage of right thigh abscess on 5/11/2022.  She has 2 drains in place. Vancomycin and Zosyn switched to Ancef on 5/13/2022. Surgical drains were not able to be removed by ortho PA. Plan for OR on 5/17/2022 for drain removal. No positive cultures to date. ID consulted for antibiotic recommendations. Per orthopedic surgery, continue DVT prophylaxis with SCDs and Lovenox until mobilizing then switch to aspirin for 4 weeks. She is to follow-up with orthopedic surgery outpatient clinic in 2 weeks for wound check and suture/staple removal.  Blood and fluid cultures have been negative.      Status post OR drain removal on 5/18/2022.    Type 2 diabetes  mellitus with hyperglycemia, without long-term current use of insulin (HCC)- (present on admission)  Assessment & Plan  Home metformin on hold.  Hemoglobin A1c was 7.7 on 10/22/2021.  Repeat ordered.  Continue SSI with Accu-Cheks and hypoglycemia protocol.    Seropositive rheumatoid arthritis (HCC)  Assessment & Plan  On methotrexate and Plaquenil for seropositive rheumatoid arthritis.  We will place on hold due to acute infection.    Essential hypertension- (present on admission)  Assessment & Plan  Continue lisinopril.  Increase amlodipine       VTE prophylaxis: enoxaparin ppx    I have performed a physical exam and reviewed and updated ROS and Plan today (5/22/2022). In review of yesterday's note (5/21/2022), there are no changes except as documented above.

## 2022-05-22 NOTE — CARE PLAN
The patient is Watcher - Medium risk of patient condition declining or worsening    Shift Goals  Clinical Goals: pain control, mobilization, fsbs, IS and titratie oxygen  Patient Goals: pain control  Family Goals: Not present    Progress made toward(s) clinical / shift goals:  yes          Problem: Knowledge Deficit - Standard  Goal: Patient and family/care givers will demonstrate understanding of plan of care, disease process/condition, diagnostic tests and medications  Outcome: Progressing     Problem: Respiratory  Goal: Patient will achieve/maintain optimum respiratory ventilation and gas exchange  Outcome: Progressing     Problem: Pain - Standard  Goal: Alleviation of pain or a reduction in pain to the patient’s comfort goal  Outcome: Progressing     Problem: Fall Risk  Goal: Patient will remain free from falls  Outcome: Progressing     Problem: Mobility  Goal: Patient's capacity to carry out activities will improve  Outcome: Progressing     Problem: Infection - Standard  Goal: Patient will remain free from infection  Outcome: Progressing

## 2022-05-23 LAB
ANION GAP SERPL CALC-SCNC: 9 MMOL/L (ref 7–16)
BASOPHILS # BLD AUTO: 0.4 % (ref 0–1.8)
BASOPHILS # BLD: 0.04 K/UL (ref 0–0.12)
BUN SERPL-MCNC: 11 MG/DL (ref 8–22)
CALCIUM SERPL-MCNC: 8.7 MG/DL (ref 8.5–10.5)
CHLORIDE SERPL-SCNC: 102 MMOL/L (ref 96–112)
CO2 SERPL-SCNC: 28 MMOL/L (ref 20–33)
CREAT SERPL-MCNC: 0.83 MG/DL (ref 0.5–1.4)
EOSINOPHIL # BLD AUTO: 0.22 K/UL (ref 0–0.51)
EOSINOPHIL NFR BLD: 2.4 % (ref 0–6.9)
ERYTHROCYTE [DISTWIDTH] IN BLOOD BY AUTOMATED COUNT: 47.4 FL (ref 35.9–50)
GFR SERPLBLD CREATININE-BSD FMLA CKD-EPI: 82 ML/MIN/1.73 M 2
GLUCOSE BLD STRIP.AUTO-MCNC: 101 MG/DL (ref 65–99)
GLUCOSE BLD STRIP.AUTO-MCNC: 124 MG/DL (ref 65–99)
GLUCOSE BLD STRIP.AUTO-MCNC: 126 MG/DL (ref 65–99)
GLUCOSE BLD STRIP.AUTO-MCNC: 161 MG/DL (ref 65–99)
GLUCOSE SERPL-MCNC: 140 MG/DL (ref 65–99)
HCT VFR BLD AUTO: 26.9 % (ref 37–47)
HGB BLD-MCNC: 8.7 G/DL (ref 12–16)
IMM GRANULOCYTES # BLD AUTO: 0.1 K/UL (ref 0–0.11)
IMM GRANULOCYTES NFR BLD AUTO: 1.1 % (ref 0–0.9)
LYMPHOCYTES # BLD AUTO: 0.95 K/UL (ref 1–4.8)
LYMPHOCYTES NFR BLD: 10.5 % (ref 22–41)
MAGNESIUM SERPL-MCNC: 1.4 MG/DL (ref 1.5–2.5)
MCH RBC QN AUTO: 29.6 PG (ref 27–33)
MCHC RBC AUTO-ENTMCNC: 32.3 G/DL (ref 33.6–35)
MCV RBC AUTO: 91.5 FL (ref 81.4–97.8)
MONOCYTES # BLD AUTO: 0.67 K/UL (ref 0–0.85)
MONOCYTES NFR BLD AUTO: 7.4 % (ref 0–13.4)
NEUTROPHILS # BLD AUTO: 7.11 K/UL (ref 2–7.15)
NEUTROPHILS NFR BLD: 78.2 % (ref 44–72)
NRBC # BLD AUTO: 0 K/UL
NRBC BLD-RTO: 0 /100 WBC
PHOSPHATE SERPL-MCNC: 4.4 MG/DL (ref 2.5–4.5)
PLATELET # BLD AUTO: 291 K/UL (ref 164–446)
PMV BLD AUTO: 9.2 FL (ref 9–12.9)
POTASSIUM SERPL-SCNC: 3.7 MMOL/L (ref 3.6–5.5)
RBC # BLD AUTO: 2.94 M/UL (ref 4.2–5.4)
SODIUM SERPL-SCNC: 139 MMOL/L (ref 135–145)
WBC # BLD AUTO: 9.1 K/UL (ref 4.8–10.8)

## 2022-05-23 PROCEDURE — 36415 COLL VENOUS BLD VENIPUNCTURE: CPT

## 2022-05-23 PROCEDURE — A9270 NON-COVERED ITEM OR SERVICE: HCPCS | Performed by: INTERNAL MEDICINE

## 2022-05-23 PROCEDURE — 99232 SBSQ HOSP IP/OBS MODERATE 35: CPT | Performed by: INTERNAL MEDICINE

## 2022-05-23 PROCEDURE — 80048 BASIC METABOLIC PNL TOTAL CA: CPT

## 2022-05-23 PROCEDURE — 83735 ASSAY OF MAGNESIUM: CPT

## 2022-05-23 PROCEDURE — 700105 HCHG RX REV CODE 258: Performed by: INTERNAL MEDICINE

## 2022-05-23 PROCEDURE — 700102 HCHG RX REV CODE 250 W/ 637 OVERRIDE(OP): Performed by: INTERNAL MEDICINE

## 2022-05-23 PROCEDURE — A9270 NON-COVERED ITEM OR SERVICE: HCPCS | Performed by: HOSPITALIST

## 2022-05-23 PROCEDURE — 770004 HCHG ROOM/CARE - ONCOLOGY PRIVATE *

## 2022-05-23 PROCEDURE — 84100 ASSAY OF PHOSPHORUS: CPT

## 2022-05-23 PROCEDURE — 700102 HCHG RX REV CODE 250 W/ 637 OVERRIDE(OP): Performed by: HOSPITALIST

## 2022-05-23 PROCEDURE — 700111 HCHG RX REV CODE 636 W/ 250 OVERRIDE (IP): Performed by: ORTHOPAEDIC SURGERY

## 2022-05-23 PROCEDURE — 82962 GLUCOSE BLOOD TEST: CPT

## 2022-05-23 PROCEDURE — 85025 COMPLETE CBC W/AUTO DIFF WBC: CPT

## 2022-05-23 RX ORDER — LISINOPRIL 20 MG/1
40 TABLET ORAL DAILY
Status: DISCONTINUED | OUTPATIENT
Start: 2022-05-24 | End: 2022-05-27

## 2022-05-23 RX ORDER — POTASSIUM CHLORIDE 20 MEQ/1
40 TABLET, EXTENDED RELEASE ORAL ONCE
Status: COMPLETED | OUTPATIENT
Start: 2022-05-23 | End: 2022-05-23

## 2022-05-23 RX ORDER — LANOLIN ALCOHOL/MO/W.PET/CERES
400 CREAM (GRAM) TOPICAL 2 TIMES DAILY
Status: COMPLETED | OUTPATIENT
Start: 2022-05-23 | End: 2022-05-23

## 2022-05-23 RX ADMIN — POTASSIUM CHLORIDE 40 MEQ: 1500 TABLET, EXTENDED RELEASE ORAL at 07:57

## 2022-05-23 RX ADMIN — Medication 400 MG: at 17:00

## 2022-05-23 RX ADMIN — LEVOFLOXACIN 250 MG: 250 TABLET, FILM COATED ORAL at 05:52

## 2022-05-23 RX ADMIN — FOLIC ACID 1 MG: 1 TABLET ORAL at 05:52

## 2022-05-23 RX ADMIN — ENOXAPARIN SODIUM 40 MG: 40 INJECTION SUBCUTANEOUS at 05:52

## 2022-05-23 RX ADMIN — OXYCODONE HYDROCHLORIDE 10 MG: 10 TABLET ORAL at 16:53

## 2022-05-23 RX ADMIN — LISINOPRIL 20 MG: 20 TABLET ORAL at 05:52

## 2022-05-23 RX ADMIN — SEVELAMER CARBONATE 800 MG: 800 TABLET, FILM COATED ORAL at 12:08

## 2022-05-23 RX ADMIN — DOXYCYCLINE 100 MG: 100 TABLET, FILM COATED ORAL at 17:00

## 2022-05-23 RX ADMIN — OXYCODONE HYDROCHLORIDE 10 MG: 10 TABLET ORAL at 05:53

## 2022-05-23 RX ADMIN — INSULIN HUMAN 2 UNITS: 100 INJECTION, SOLUTION PARENTERAL at 20:29

## 2022-05-23 RX ADMIN — DOXYCYCLINE 100 MG: 100 TABLET, FILM COATED ORAL at 05:52

## 2022-05-23 RX ADMIN — AMLODIPINE BESYLATE 10 MG: 10 TABLET ORAL at 05:52

## 2022-05-23 RX ADMIN — SEVELAMER CARBONATE 800 MG: 800 TABLET, FILM COATED ORAL at 16:53

## 2022-05-23 RX ADMIN — Medication 400 MG: at 07:57

## 2022-05-23 RX ADMIN — SEVELAMER CARBONATE 800 MG: 800 TABLET, FILM COATED ORAL at 07:57

## 2022-05-23 RX ADMIN — SENNOSIDES AND DOCUSATE SODIUM 2 TABLET: 50; 8.6 TABLET ORAL at 05:52

## 2022-05-23 RX ADMIN — ATORVASTATIN CALCIUM 20 MG: 20 TABLET, FILM COATED ORAL at 17:00

## 2022-05-23 RX ADMIN — SODIUM CHLORIDE, POTASSIUM CHLORIDE, SODIUM LACTATE AND CALCIUM CHLORIDE: 600; 310; 30; 20 INJECTION, SOLUTION INTRAVENOUS at 04:30

## 2022-05-23 ASSESSMENT — ENCOUNTER SYMPTOMS
BLURRED VISION: 0
PALPITATIONS: 0
DIZZINESS: 0
SORE THROAT: 0
CONSTIPATION: 1
NAUSEA: 0
CHILLS: 0
COUGH: 0
FEVER: 0
NERVOUS/ANXIOUS: 0
WEAKNESS: 0
VOMITING: 0
HEADACHES: 0
DEPRESSION: 0
DIARRHEA: 0
SHORTNESS OF BREATH: 0
ABDOMINAL PAIN: 0

## 2022-05-23 ASSESSMENT — PAIN DESCRIPTION - PAIN TYPE
TYPE: CHRONIC PAIN
TYPE: ACUTE PAIN

## 2022-05-23 NOTE — PROGRESS NOTES
"   Orthopaedic Progress Note    Interval changes:  Patient doing well  Cleared by ortho for DC home pending medicine clearance    ROS - Patient denies any new issues.  Pain well controlled.    BP (!) 150/68   Pulse 81   Temp 36.9 °C (98.4 °F) (Temporal)   Resp 18   Ht 1.448 m (4' 9\")   Wt 65.5 kg (144 lb 6.4 oz)   SpO2 96%       Patient seen and examined  No acute distress  Breathing non labored  RRR  RLE dressing CDI, DNVI, moves all toes, cap refill <2 sec.     Recent Labs     05/21/22  0021 05/22/22  0113 05/23/22  0229   WBC 9.2 9.1 9.1   RBC 2.98* 2.89* 2.94*   HEMOGLOBIN 9.0* 8.6* 8.7*   HEMATOCRIT 26.9* 26.8* 26.9*   MCV 90.3 92.7 91.5   MCH 30.2 29.8 29.6   MCHC 33.5* 32.1* 32.3*   RDW 47.1 48.0 47.4   PLATELETCT 307 275 291   MPV 9.3 9.4 9.2       Active Hospital Problems    Diagnosis    • Superficial venous thrombosis of arm, left [I82.612]    • Arthritis of right knee [M17.11]    • Type 2 diabetes mellitus with hyperglycemia, without long-term current use of insulin (HCC) [E11.65]    • Essential hypertension [I10]        Assessment/Plan:  Patient doing well  Cleared by ortho for DC home pending medicine clearance  POD#5 S/P Removal of retained drain right knee  Wt bearing status - WBAT  Wound care/Drains - Dressings to be changed every other day by nursing  Future Procedures - none planned   Lovenox: Start 5/19, Duration-until ambulatory > 150'  Sutures/Staples out- 14 days post operatively  PT/OT-initiated  Antibiotics: adoxa 100mg po BID  DVT Prophylaxis- TEDS/SCDs/Foot pumps  Coy-none  Case Coordination for Discharge Planning - Disposition home   "

## 2022-05-23 NOTE — CARE PLAN
Problem: Knowledge Deficit - Standard  Goal: Patient and family/care givers will demonstrate understanding of plan of care, disease process/condition, diagnostic tests and medications  Outcome: Progressing     Problem: Respiratory  Goal: Patient will achieve/maintain optimum respiratory ventilation and gas exchange  Outcome: Progressing     Problem: Physical Regulation  Goal: Diagnostic test results will improve  Outcome: Progressing  Goal: Signs and symptoms of infection will decrease  Outcome: Progressing     Problem: Pain - Standard  Goal: Alleviation of pain or a reduction in pain to the patient’s comfort goal  Outcome: Progressing     Problem: Fall Risk  Goal: Patient will remain free from falls  Outcome: Progressing   The patient is stable    Shift Goals  Clinical Goals: Pain control and rest  Patient Goals: pain control  Family Goals: Not present    Progress made toward(s) clinical / shift goals:  pain control    Patient is not progressing towards the following goals:None

## 2022-05-23 NOTE — PROGRESS NOTES
[facility] Medicine Daily Progress Note    Date of Service  5/23/2022    Chief Complaint  Kary Shah is a 56 y.o. female admitted 5/10/2022 with fever, chills, right knee pain.    Hospital Course  No notes on file    Ms. Kary Shah is a 56 y.o. female with a history of rheumatoid arthritis and osteoarthritis who presented on 5/10/2022 with fever, chills, and right knee pain.  X-ray of right knee showed an effusion and concerning for soft tissue gas.  Arthrocentesis in the emergency room was unsuccessful.  CT was concerning for infected Baker's cyst/abscess.  Orthopedic surgery was consulted status post arthrotomy and drainage of deep infection, incision and drainage of a right thigh abscess on 5/11/2022.  After the procedure 2 drains were left in place.  Infectious disease was consulted and patient was started on doxycycline and levofloxacin for right knee septic arthritis.  Drains were discontinued on 5/18/2022.  Patient went on to develop right hip pain.  Plain films showed no right hip or pelvic fracture, mild degenerative changes of both hips, no gross soft tissue abnormality.     Patient also had an acute kidney injury which improved with IV fluids.  She was started on sevelamer for hyperphosphatemia.    Orthopedic surgery recommends aspirin for 4 weeks for DVT prophylaxis.  Follow-up in orthopedic surgery outpatient clinic in 2 weeks for wound check and staple/suture removal.      Interval Problem Update  Patient was seen and examined at bedside.  I have personally reviewed and interpreted vitals, labs, and imaging.    5/17.  Afebrile.  Has been hypertensive.  On 0-1 L nasal cannula.  Continue outpatient lisinopril.  Monitor kidney function.  Started on amlodipine.  Denies fevers, chills, chest pains, shortness of breath.  Complains of right knee pain.  Reviewed knee x-ray.  Discussed with orthopedic surgery.  Plan to discontinue drain tomorrow in OR.  N.p.o. after midnight.  5/18.  Has  been hypertensive.  Afebrile.  On 0-1 L nasal cannula.  Denies fevers, chills, chest pains, shortness of breath.  She does report some constipation.  Tolerated removal of right knee drains but states knee pain is slightly worse after procedure.  PT/OT.  Increase amlodipine to 10 mg.  Renal ultrasound is pending.  5/19.  Afebrile.  Hypertension is improved.  On 0-2 L nasal cannula.  Denies fevers, chills, chest pains, shortness of breath.  Knee feels better with drains out but now has right hip pain.  He is concerned since there was a right thigh abscess which drain was removed but did appear to be soft tissue gas on prior CT knee.  PT/OT recommended SNF.  Will get x-rays of hip to rule out injury, soft tissue gas.  5/20.  Afebrile.  Has been hypertensive.  On 0-1 L nasal cannula.  Replete magnesium.  Start Phos binders for hyperphosphatemia.  Monitor kidney function.  Denies fevers, chills, chest pain, shortness of breath.  The pain is improved.  Hip pain is also improved.  Patient reports left hip x-ray yesterday when transferring back to bed the bedside and leveled and within the [place] to the bed she bounced and was having hip pain.  This is improved now at this time.  She is still not able to bear weight.  PT/OT recommend placement.  SNF referrals sent.  5/21.  Afebrile.  Has been hypertensive.  On 0-2 L nasal cannula.  Replete magnesium, potassium.  Continue Phos binders.  Creatinine trending down to 0.91.  Continue titrating blood pressure regimen with lisinopril and amlodipine.  Denies fevers, chills, chest pain, shortness of breath.  Right knee and hip pain is improved.  Using heat and ice pack.  Still can hardly bear weight.  Agreeable for SNF placement.  5/22.  Afebrile.  Has been hypertensive.  On 1-2 L nasal cannula.  Replete mag, potassium.  Denies fevers, chills, chest pains, shortness of breath.  Knee pain and hip pain have improved.  Plan for SNF placement.  5/23.  Afebrile.  Hypertension is improved.   ON 1-2L NC.  Increase Lisinopril.  Denies fevers, chills, chest pains, shortness of breath.  Right hip pain is improved.  Right knee pain has resolved.  Continue antibiotics and pain scale.  Planning discharge to SNF.  [facility] paperwork has been filled out.  Patient very anxious about being hospitalized and placement to SNF as she is a single mom and takes care of her son with only her income.  Discussed with social work.    I have personally seen and examined the patient at bedside. I discussed the plan of care with patient, bedside RN and .    Consultants/Specialty  infectious disease and orthopedics    Code Status  Full Code    Disposition  Patient is medically cleared for discharge.   Anticipate discharge to to skilled nursing facility.  I have placed the appropriate orders for post-discharge needs.    Review of Systems  Review of Systems   Constitutional: Negative for chills and fever.   HENT: Negative for congestion and sore throat.    Eyes: Negative for blurred vision.   Respiratory: Negative for cough and shortness of breath.    Cardiovascular: Negative for chest pain, palpitations and leg swelling.   Gastrointestinal: Positive for constipation. Negative for abdominal pain, diarrhea, nausea and vomiting.   Genitourinary: Negative for dysuria, frequency and urgency.   Musculoskeletal: Positive for joint pain.   Skin: Negative for rash.   Neurological: Negative for dizziness, weakness and headaches.   Psychiatric/Behavioral: Negative for depression. The patient is not nervous/anxious.    All other systems reviewed and are negative.       Physical Exam  Temp:  [36.9 °C (98.4 °F)-37.4 °C (99.3 °F)] 36.9 °C (98.4 °F)  Pulse:  [75-89] 75  Resp:  [17-18] 18  BP: (132-151)/(54-68) 148/54  SpO2:  [91 %-98 %] 97 %    Physical Exam  Vitals and nursing note reviewed.   Constitutional:       Appearance: Normal appearance. She is obese. She is ill-appearing.   HENT:      Head: Normocephalic and atraumatic.       Right Ear: External ear normal.      Left Ear: External ear normal.      Nose: Nose normal.      Mouth/Throat:      Mouth: Mucous membranes are moist.      Pharynx: Oropharynx is clear.   Eyes:      Extraocular Movements: Extraocular movements intact.      Conjunctiva/sclera: Conjunctivae normal.   Cardiovascular:      Rate and Rhythm: Normal rate and regular rhythm.      Pulses: Normal pulses.      Heart sounds: Normal heart sounds. No murmur heard.  Pulmonary:      Effort: Pulmonary effort is normal. No respiratory distress.      Breath sounds: Normal breath sounds. No stridor. No wheezing or rales.   Abdominal:      General: Abdomen is flat. Bowel sounds are normal. There is no distension.      Palpations: Abdomen is soft. There is no mass.      Tenderness: There is no abdominal tenderness.   Musculoskeletal:         General: Tenderness present.      Cervical back: Normal range of motion.      Comments: Right knee wrapped   Skin:     General: Skin is warm.      Capillary Refill: Capillary refill takes less than 2 seconds.   Neurological:      General: No focal deficit present.      Mental Status: She is alert and oriented to person, place, and time. Mental status is at baseline.      Cranial Nerves: No cranial nerve deficit.   Psychiatric:         Mood and Affect: Mood normal.         Behavior: Behavior normal.         Fluids    Intake/Output Summary (Last 24 hours) at 5/23/2022 0640  Last data filed at 5/23/2022 0333  Gross per 24 hour   Intake --   Output 3500 ml   Net -3500 ml       Laboratory  Recent Labs     05/21/22  0021 05/22/22  0113 05/23/22 0229   WBC 9.2 9.1 9.1   RBC 2.98* 2.89* 2.94*   HEMOGLOBIN 9.0* 8.6* 8.7*   HEMATOCRIT 26.9* 26.8* 26.9*   MCV 90.3 92.7 91.5   MCH 30.2 29.8 29.6   MCHC 33.5* 32.1* 32.3*   RDW 47.1 48.0 47.4   PLATELETCT 307 275 291   MPV 9.3 9.4 9.2     Recent Labs     05/21/22  0021 05/22/22  0113 05/23/22 0229   SODIUM 137 137 139   POTASSIUM 3.4* 3.5* 3.7   CHLORIDE 99 101  102   CO2 28 27 28   GLUCOSE 154* 153* 140*   BUN 17 15 11   CREATININE 0.91 0.79 0.83   CALCIUM 8.7 8.7 8.7                   Imaging  DX-HIP-COMPLETE - UNILATERAL 2+ RIGHT   Final Result      1.  No RIGHT hip or pelvic fracture.   2.  Mild degenerative change of both hips.      US-RENAL   Final Result      1.  Normal renal ultrasound.      DX-KNEE 2- RIGHT   Final Result      1. Surgical drain terminating within the patellofemoral compartment of the right knee joint. No other radiopaque foreign object.   2. Postsurgical changes in the anterior knee soft tissues, surgical skin staples.      US-EXTREMITY VENOUS UPPER UNILAT LEFT   Final Result      CT-KNEE WITH RIGHT   Final Result         1. Moderate knee joint effusion with thick synovial enhancement, concerning for infection      2. There is gas within the multilocular fluid collection in the popliteal fossa, concerning for infected Baker's cyst/abscess.      3. Soft tissue gas in the lateral leg as well.      DX-KNEE 2- RIGHT   Final Result         Moderate right knee joint effusion.      Questionable soft tissue gas seen posterior to the distal femur on lateral view.      DX-CHEST-PORTABLE (1 VIEW)   Final Result         1.  There are mild perihilar opacifications which could be due to edema or bronchitis.      2.  No consolidations identified.           Assessment/Plan  Superficial venous thrombosis of arm, left  Assessment & Plan  LUE US from 5/13/2022 shows acute, non-occlusive superficial venous thrombosis in the cephalic vein of the distal bicep attached to the IV.  IV removed.  Pain and swelling have improved.     Arthritis of right knee  Assessment & Plan  Patient underwent right knee arthrotomy and drainage of deep infection, incision and drainage of right thigh abscess on 5/11/2022.  She has 2 drains in place. Vancomycin and Zosyn switched to Ancef on 5/13/2022. Surgical drains were not able to be removed by ortho PA. Plan for OR on 5/17/2022 for drain  removal. No positive cultures to date. ID consulted for antibiotic recommendations. Per orthopedic surgery, continue DVT prophylaxis with SCDs and Lovenox until mobilizing then switch to aspirin for 4 weeks. She is to follow-up with orthopedic surgery outpatient clinic in 2 weeks for wound check and suture/staple removal.  Blood and fluid cultures have been negative.      Status post OR drain removal on 5/18/2022.    Type 2 diabetes mellitus with hyperglycemia, without long-term current use of insulin (HCC)- (present on admission)  Assessment & Plan  Home metformin on hold.  Hemoglobin A1c was 7.7 on 10/22/2021.  Repeat ordered.  Continue SSI with Accu-Cheks and hypoglycemia protocol.    Seropositive rheumatoid arthritis (HCC)  Assessment & Plan  On methotrexate and Plaquenil for seropositive rheumatoid arthritis.  We will place on hold due to acute infection.    Essential hypertension- (present on admission)  Assessment & Plan  Continue lisinopril.  Increase amlodipine       VTE prophylaxis: enoxaparin ppx    I have performed a physical exam and reviewed and updated ROS and Plan today (5/23/2022). In review of yesterday's note (5/22/2022), there are no changes except as documented above.

## 2022-05-23 NOTE — PROGRESS NOTES
Bedside report received. Assumed care of patient this morning. Assessment completed      Patient is A&O x 4, pt calls for assistance appropriately  Reports 4 /10 ache to R knee, denied need for pain medication. assisted patient with repositioning which patient reported relief with  Pt is on room air. baseline is room air.   Mobility 1x FWW Bed alarm in use.  Voiding +  Flatus +  Patient up to the chair this morning. Encouraged patient to mobilize and sit in chair for meals.       Plan of care reviewed with the patient. Bed is locked and in the lowest position. Call light is within reach. Patient encouraged to voice needs and concerns, all needs met at this time. Hourly rounding in place.

## 2022-05-23 NOTE — DISCHARGE PLANNING
TCN following. HTH/SCP chart review completed. Collaborated with RANGEL Quintero. Patient seen at bedside. PT OT recommended post-acute placement.   Hospitalist noted on 5/22/22 Patient is not medically cleared for discharge. Anticipate discharge to to skilled nursing facility.      Mbr very tearful and emotional. She verbalizes concerns regarding financial. Alanis is a [place] Mother living wih Son .Mbr states she is worried that being hospitalized, there is no source of income to pay for Mortgage and healthcare. She is asking for assistance to be enrolled in medicare/medicaid.  I notified Primary RANGEL Dahl.         Previously completed:  - Orders received for: PT and OT -> recommending placement  - Choice forms: HH, SNF, Infusion, IRF.   - GSC introduced (Y), referral (sent).

## 2022-05-23 NOTE — DISCHARGE PLANNING
Per request, MARYLU Dupont sent voalte message to [facility] regarding [facility] referral.    [id number]   Agency/Facility Name: 1)[place], 2)[facility], 3)[facility]  Referral sent per Choice form at 1802

## 2022-05-23 NOTE — CARE PLAN
The patient is Stable - Low risk of patient condition declining or worsening    Shift Goals  Clinical Goals: Pain control and rest  Patient Goals: pain control  Family Goals: Not present    Progress made toward(s) clinical / shift goals:    Problem: Pain - Standard  Goal: Alleviation of pain or a reduction in pain to the patient’s comfort goal  Outcome: Progressing     Problem: Fall Risk  Goal: Patient will remain free from falls  Outcome: Progressing     Problem: Mobility  Goal: Patient's capacity to carry out activities will improve  Outcome: Progressing     Problem: Skin Integrity  Goal: Skin integrity is maintained or improved  Outcome: Progressing       Patient is not progressing towards the following goals:

## 2022-05-24 LAB
ANION GAP SERPL CALC-SCNC: 9 MMOL/L (ref 7–16)
BUN SERPL-MCNC: 13 MG/DL (ref 8–22)
CALCIUM SERPL-MCNC: 8.9 MG/DL (ref 8.5–10.5)
CHLORIDE SERPL-SCNC: 102 MMOL/L (ref 96–112)
CO2 SERPL-SCNC: 26 MMOL/L (ref 20–33)
CREAT SERPL-MCNC: 0.71 MG/DL (ref 0.5–1.4)
GFR SERPLBLD CREATININE-BSD FMLA CKD-EPI: 99 ML/MIN/1.73 M 2
GLUCOSE BLD STRIP.AUTO-MCNC: 103 MG/DL (ref 65–99)
GLUCOSE BLD STRIP.AUTO-MCNC: 117 MG/DL (ref 65–99)
GLUCOSE BLD STRIP.AUTO-MCNC: 140 MG/DL (ref 65–99)
GLUCOSE SERPL-MCNC: 131 MG/DL (ref 65–99)
MAGNESIUM SERPL-MCNC: 1.5 MG/DL (ref 1.5–2.5)
PHOSPHATE SERPL-MCNC: 4.6 MG/DL (ref 2.5–4.5)
POTASSIUM SERPL-SCNC: 3.9 MMOL/L (ref 3.6–5.5)
SODIUM SERPL-SCNC: 137 MMOL/L (ref 135–145)

## 2022-05-24 PROCEDURE — 770004 HCHG ROOM/CARE - ONCOLOGY PRIVATE *

## 2022-05-24 PROCEDURE — 82962 GLUCOSE BLOOD TEST: CPT

## 2022-05-24 PROCEDURE — 36415 COLL VENOUS BLD VENIPUNCTURE: CPT

## 2022-05-24 PROCEDURE — A9270 NON-COVERED ITEM OR SERVICE: HCPCS | Performed by: INTERNAL MEDICINE

## 2022-05-24 PROCEDURE — 84100 ASSAY OF PHOSPHORUS: CPT

## 2022-05-24 PROCEDURE — 700102 HCHG RX REV CODE 250 W/ 637 OVERRIDE(OP): Performed by: INTERNAL MEDICINE

## 2022-05-24 PROCEDURE — A9270 NON-COVERED ITEM OR SERVICE: HCPCS | Performed by: HOSPITALIST

## 2022-05-24 PROCEDURE — 99233 SBSQ HOSP IP/OBS HIGH 50: CPT | Performed by: INTERNAL MEDICINE

## 2022-05-24 PROCEDURE — 83735 ASSAY OF MAGNESIUM: CPT

## 2022-05-24 PROCEDURE — 700102 HCHG RX REV CODE 250 W/ 637 OVERRIDE(OP): Performed by: HOSPITALIST

## 2022-05-24 PROCEDURE — 700111 HCHG RX REV CODE 636 W/ 250 OVERRIDE (IP): Performed by: ORTHOPAEDIC SURGERY

## 2022-05-24 PROCEDURE — 97530 THERAPEUTIC ACTIVITIES: CPT

## 2022-05-24 PROCEDURE — 80048 BASIC METABOLIC PNL TOTAL CA: CPT

## 2022-05-24 PROCEDURE — 97116 GAIT TRAINING THERAPY: CPT

## 2022-05-24 RX ADMIN — LEVOFLOXACIN 250 MG: 250 TABLET, FILM COATED ORAL at 05:55

## 2022-05-24 RX ADMIN — FOLIC ACID 1 MG: 1 TABLET ORAL at 05:54

## 2022-05-24 RX ADMIN — OXYCODONE HYDROCHLORIDE 10 MG: 10 TABLET ORAL at 15:13

## 2022-05-24 RX ADMIN — ATORVASTATIN CALCIUM 20 MG: 20 TABLET, FILM COATED ORAL at 17:15

## 2022-05-24 RX ADMIN — SEVELAMER CARBONATE 800 MG: 800 TABLET, FILM COATED ORAL at 17:15

## 2022-05-24 RX ADMIN — SENNOSIDES AND DOCUSATE SODIUM 2 TABLET: 50; 8.6 TABLET ORAL at 05:54

## 2022-05-24 RX ADMIN — SEVELAMER CARBONATE 800 MG: 800 TABLET, FILM COATED ORAL at 12:24

## 2022-05-24 RX ADMIN — DOXYCYCLINE 100 MG: 100 TABLET, FILM COATED ORAL at 17:15

## 2022-05-24 RX ADMIN — OXYCODONE HYDROCHLORIDE 10 MG: 10 TABLET ORAL at 09:53

## 2022-05-24 RX ADMIN — LISINOPRIL 40 MG: 20 TABLET ORAL at 05:55

## 2022-05-24 RX ADMIN — DOXYCYCLINE 100 MG: 100 TABLET, FILM COATED ORAL at 05:55

## 2022-05-24 RX ADMIN — AMLODIPINE BESYLATE 10 MG: 10 TABLET ORAL at 05:55

## 2022-05-24 RX ADMIN — SEVELAMER CARBONATE 800 MG: 800 TABLET, FILM COATED ORAL at 08:27

## 2022-05-24 RX ADMIN — ENOXAPARIN SODIUM 40 MG: 40 INJECTION SUBCUTANEOUS at 05:55

## 2022-05-24 ASSESSMENT — COGNITIVE AND FUNCTIONAL STATUS - GENERAL
TURNING FROM BACK TO SIDE WHILE IN FLAT BAD: A LOT
STANDING UP FROM CHAIR USING ARMS: A LITTLE
SUGGESTED CMS G CODE MODIFIER MOBILITY: CL
WALKING IN HOSPITAL ROOM: A LITTLE
MOVING TO AND FROM BED TO CHAIR: UNABLE
CLIMB 3 TO 5 STEPS WITH RAILING: TOTAL
MOVING FROM LYING ON BACK TO SITTING ON SIDE OF FLAT BED: UNABLE
MOBILITY SCORE: 11

## 2022-05-24 ASSESSMENT — PAIN DESCRIPTION - PAIN TYPE
TYPE: ACUTE PAIN

## 2022-05-24 ASSESSMENT — GAIT ASSESSMENTS
DEVIATION: ANTALGIC;STEP TO;DECREASED BASE OF SUPPORT;DECREASED HEEL STRIKE;DECREASED TOE OFF
DISTANCE (FEET): 4
ASSISTIVE DEVICE: FRONT WHEEL WALKER
GAIT LEVEL OF ASSIST: MINIMAL ASSIST

## 2022-05-24 NOTE — PROGRESS NOTES
"   Orthopaedic Progress Note    Interval changes:  Patient doing well  Cleared by ortho for DC home pending medicine clearance    ROS - Patient denies any new issues.  Pain well controlled.    BP (!) 157/72   Pulse 85   Temp 36.9 °C (98.5 °F) (Temporal)   Resp 16   Ht 1.448 m (4' 9\")   Wt 65.5 kg (144 lb 6.4 oz)   SpO2 95%       Patient seen and examined  No acute distress  Breathing non labored  RRR  RLE dressing CDI, DNVI, moves all toes, cap refill <2 sec.     Recent Labs     05/22/22  0113 05/23/22  0229   WBC 9.1 9.1   RBC 2.89* 2.94*   HEMOGLOBIN 8.6* 8.7*   HEMATOCRIT 26.8* 26.9*   MCV 92.7 91.5   MCH 29.8 29.6   MCHC 32.1* 32.3*   RDW 48.0 47.4   PLATELETCT 275 291   MPV 9.4 9.2       Active Hospital Problems    Diagnosis    • Superficial venous thrombosis of arm, left [I82.612]    • Arthritis of right knee [M17.11]    • Type 2 diabetes mellitus with hyperglycemia, without long-term current use of insulin (HCC) [E11.65]    • Essential hypertension [I10]        Assessment/Plan:  Patient doing well  Cleared by ortho for DC home pending medicine clearance  POD#6 S/P Removal of retained drain right knee  Wt bearing status - WBAT  Wound care/Drains - Dressings to be changed every other day by nursing  Future Procedures - none planned   Lovenox: Start 5/19, Duration-until ambulatory > 150'  Sutures/Staples out- 14 days post operatively  PT/OT-initiated  Antibiotics: adoxa 100mg po BID  DVT Prophylaxis- TEDS/SCDs/Foot pumps  Coy-none  Case Coordination for Discharge Planning - Disposition home   "

## 2022-05-24 NOTE — CARE PLAN
The patient is Stable - Low risk of patient condition declining or worsening    Shift Goals  Clinical Goals: pain control and increase activity and tolerance  Patient Goals: pain control and get strength back  Family Goals: Not present    Progress made toward(s) clinical / shift goals: Patient tolerating pain. Encouraged to use PRN medication before getting to severe.     Patient is not progressing towards the following goals:      Problem: Knowledge Deficit - Standard  Goal: Patient and family/care givers will demonstrate understanding of plan of care, disease process/condition, diagnostic tests and medications  Outcome: Progressing     Problem: Pain - Standard  Goal: Alleviation of pain or a reduction in pain to the patient’s comfort goal  Outcome: Progressing     Problem: Fall Risk  Goal: Patient will remain free from falls  Outcome: Progressing     Problem: Mobility  Goal: Patient's capacity to carry out activities will improve  Outcome: Progressing

## 2022-05-24 NOTE — DISCHARGE PLANNING
Case Management Discharge Planning    Admission Date: 5/10/2022  GMLOS: 5.3  ALOS: 14    6-Clicks ADL Score: 12  6-Clicks Mobility Score: PT and/or OT Eval ordered: Yes  Post-acute Referrals Ordered: No  Post-acute Choice Obtained: yes  Has referral(s) been sent to post-acute provider:  Yes      Anticipated Discharge Dispo: Discharge Disposition: D/T to SNF with Medicare cert in anticipation of skilled care (03)    DME Needed: No     Action(s) Taken: Updated Provider/Nurse on Discharge Plan  Referrals sent to [facility]   Signed choice letter for SNF  (1. [facility], [facility]. [facility], 3. [facility]     Escalations Completed: None     Medically Clear: No     Next Steps: CM will continue to follow to follow  And assist with discharge needs. Will need a PASSR     Barriers to Discharge: SNF acceptance  Is the patient up for discharge tomorrow: Yes

## 2022-05-24 NOTE — CARE PLAN
The patient is Stable - Low risk of patient condition declining or worsening    Shift Goals  Clinical Goals: Knee examined for infection; ambulation; pain management  Patient Goals: Rest, comfort; knee examined for infection  Family Goals: Not present    Progress made toward(s) clinical / shift goals:  ***    Patient is not progressing towards the following goals:

## 2022-05-24 NOTE — CARE PLAN
The patient is Stable - Low risk of patient condition declining or worsening    Shift Goals  Clinical Goals: pain control and increase activity and tolerance  Patient Goals: pain control and get strength back  Family Goals: Not present    Progress made toward(s) clinical / shift goals:  encouraged patient to elevate, use ice and pain medication when needed. Pt currently waiting for SNF acceptance. Pt is aware of current plan of care and agreeable.   Problem: Knowledge Deficit - Standard  Goal: Patient and family/care givers will demonstrate understanding of plan of care, disease process/condition, diagnostic tests and medications  Outcome: Progressing     Problem: Pain - Standard  Goal: Alleviation of pain or a reduction in pain to the patient’s comfort goal  Outcome: Progressing     Problem: Fall Risk  Goal: Patient will remain free from falls  Outcome: Progressing       Patient is not progressing towards the following goals:

## 2022-05-24 NOTE — DISCHARGE PLANNING
Spoke To: Andreina  Agency/Facility Name: [facility]  Plan or Request: per Andreina, she will get info on HTH insurance deducible and daily rate.

## 2022-05-24 NOTE — DISCHARGE PLANNING
"TCN following. HTH/SCP chart review completed. Mbr is S/P  Removal of retained drain right knee 5/18/2022.    Patient seen at bedside. Collaborated with Bedside RN and RANGEL Smart regarding sending  request to [facility] to assist with Medicare/Medicaid enrollment. Mbr has some financial concerns- I discussed this with Primary  to consider option for [facility] to  Provide assistance in offsetting high copay cost.OT PT recommended Post-acute placement, pending acceptance from SNF. Ortho PA-C noted on 5/23/22 that \" patient doing well.Cleared by ortho for DC home pending medicine clearance\".  Hospitalist noted 5/3/2022 noted \" Patient is medically cleared for discharge.   Anticipate discharge to to skilled nursing facility.\"    Previously completed:  - Orders received for: PT and OT -> recommending placement  - Choice forms: HH, SNF, Infusion, IRF.   - GSC introduced (Y), referral (sent).   "

## 2022-05-24 NOTE — THERAPY
Physical Therapy   Daily Treatment     Patient Name: Kary Shah  Age:  56 y.o., Sex:  female  Medical Record #: [id number]  Today's Date: 5/24/2022     Precautions  Precautions: Fall Risk;Weight Bearing As Tolerated Right Lower Extremity    Assessment    Patient motivated to participate in PT tx session.  Patient continues to c/o RLE pain with WB, particularly when attempting L swing phase.  Educated patient on compensatory strategies using FWW to offload painful LE, patient with difficulty implementing cues.  Demonstrated techniques at end of session with somewhat improved understanding.  Patient required min A to transfer chair <> BSC with HHA.  PT to continue to follow.    Plan    Continue current treatment plan.    DC Equipment Recommendations: Unable to determine at this time  Discharge Recommendations: Recommend post-acute placement for additional physical therapy services prior to discharge home     Objective     05/24/22 1205   Precautions   Precautions Fall Risk;Weight Bearing As Tolerated Right Lower Extremity   Cognition    Cognition / Consciousness WDL   Level of Consciousness Alert   Comments Pleasant & motivated, limited by pain   Balance   Sitting Balance (Static) Fair   Sitting Balance (Dynamic) Fair -   Standing Balance (Static) Fair -   Standing Balance (Dynamic) Poor   Weight Shift Sitting Fair   Weight Shift Standing Poor   Skilled Intervention Verbal Cuing;Tactile Cuing;Compensatory Strategies;Facilitation   Gait Analysis   Gait Level Of Assist Minimal Assist   Assistive Device Front Wheel Walker (platform attachment removed)   Distance (Feet) 4   # of Times Distance was Traveled 2   Deviation Antalgic;Step To;Decreased Base Of Support;Decreased Heel Strike;Decreased Toe Off   # of Stairs Climbed 0   Skilled Intervention Verbal Cuing;Tactile Cuing;Compensatory Strategies;Sequencing   Comments Pt unable to lift LLE off ground due to pain with R WB.  Attempted to provide comp strategies  for using FWW   Bed Mobility    Comments NT, up in chair pre/post session   Functional Mobility   Sit to Stand Minimal Assist   Bed, Chair, Wheelchair Transfer Minimal Assist   Toilet Transfers Minimal Assist   Transfer Method Stand Pivot   Mobility short ambulation, transfer chair <> BSC   Skilled Intervention Verbal Cuing;Tactile Cuing;Sequencing;Facilitation   Activity Tolerance   Sitting in Chair Pre/post session   Standing 5 min total   Comments limited by pain   Short Term Goals    Short Term Goal # 1 pt will get OOB and return BTB with HOB flat and rails down and SPV for safety with bed mobility at home. ( 6 tx's)   Goal Outcome # 1 goal not met   Short Term Goal # 2 pt will transfer with the LRAD and SPV in 6 Tx's to safely transfer at home.   Goal Outcome # 2 Goal not met   Short Term Goal # 3 pt will ambulate ' with the LRAD and SPV in 6 Tx's   Goal Outcome # 3 Goal not met   Short Term Goal # 4 pt will se her instructions and continue to work on her exercises at home.   Goal Outcome # 4 Goal not met   Anticipated Discharge Equipment and Recommendations   DC Equipment Recommendations Unable to determine at this time   Discharge Recommendations Recommend post-acute placement for additional physical therapy services prior to discharge home

## 2022-05-24 NOTE — PROGRESS NOTES
Hospital Medicine Daily Progress Note    Date of Service  5/24/2022    Chief Complaint  Kary Shah is a 56 y.o. female admitted 5/10/2022 with 1 week history of fevers and chills, with worsening chronic right knee pain and swelling. She has seropositive rheumatoid arthritis (on methotrexate and Plaquenil), type 2 diabetes, osteoarthritis, essential hypertension, and GERD.     Hospital Course  On admission,labs showed leukocytosis (16.4 K), sodium was 132, anion gap was 17.0, lactic acid was 3.1.      Right knee x-ray showed moderate right knee joint effusion with questionable soft tissue gas seen posterior to the distal femur.     CT right knee showed joint effusion with thick synovial enhancement, concerning for infection. There is gas within the multilocular fluid collection in the popliteal fossa, concerning for infected Baker's cyst/abscess.     ER physician attempted arthrocentesis but was unsuccessful. Orthopedic surgery on-call recommended starting IV antibiotics with formal consult. Vancomycin, Clindamycin and Zosyn were empirically started.  Lactic acid improved to 1.5.     Patient underwent right knee arthrotomy and drainage of deep infection, incision and drainage of right thigh abscess on 5/11/2022. 2 drains were placed.     ID were consulted.  Vancomycin and Zosyn switched to Ancef on 5/13/2022.  Cultures were negative.  Ancef was discontinued and doxycycline 100 mg p.o. twice daily with Levaquin 50 mg daily was started on 5/16/2022 for 14 days.    LUE US from 5/13/2022 shows acute, non-occlusive superficial venous thrombosis in the cephalic vein of the distal bicep attached to the IV. Left arm swelling and pain  improved.     Right thigh drain was removed 5/16/2022. Right knee drain was removed on 5/18/2022.     Interval Problem Update  Afebrile and hemodynamically stable.      I have personally seen and examined the patient at bedside. I discussed the plan of care with patient and bedside  RN.     Consultants/Specialty  orthopedics     Code Status  Full Code     Disposition  Patient is not medically cleared for discharge.   Anticipate discharge to to home with close outpatient follow-up.  I have placed the appropriate orders for post-discharge needs.     Review of Systems  Review of Systems   Constitutional: No fevers or chills  HENT: Negative.    Eyes: Negative.    Respiratory: Negative.    Cardiovascular: Negative.    Gastrointestinal: Negative.    Genitourinary: Negative.    Musculoskeletal: Improved pain in right knee  Skin: Negative.    Neurological: Negative.    Endo/Heme/Allergies: Negative.    Psychiatric/Behavioral: Negative.         Physical Exam  Temp:  [36.8 °C (98.2 °F)-37.3 °C (99.2 °F)] 36.9 °C (98.5 °F)  Pulse:  [81-91] 85  Resp:  [16-18] 16  BP: (138-157)/(57-88) 157/72  SpO2:  [92 %-95 %] 95 %    Physical Exam  Constitutional:       General: She is in NAD.      Appearance: She is obese.   HENT:      Head: Normocephalic.      Nose: Nose normal.      Mouth/Throat:      Mouth: Mucous membranes are moist.   Eyes:      Pupils: Pupils are equal, round, and reactive to light.   Cardiovascular:      Rate and Rhythm: Normal rate.   Pulmonary:      Effort: Pulmonary effort is normal.   Abdominal:      Palpations: Abdomen is soft.   Musculoskeletal:         General: Right knee with dressing     Cervical back: Normal range of motion.      Right lower leg: No edema.      Left lower leg: No edema.   Skin:     General: Skin is warm and dry.   Neurological:      General: No focal deficit present.      Mental Status: She is alert.   Psychiatric:         Mood and Affect: Mood normal.     Fluids    Intake/Output Summary (Last 24 hours) at 5/24/2022 1828  Last data filed at 5/24/2022 1200  Gross per 24 hour   Intake 480 ml   Output 2300 ml   Net -1820 ml       Laboratory  Recent Labs     05/22/22  0113 05/23/22  0229   WBC 9.1 9.1   RBC 2.89* 2.94*   HEMOGLOBIN 8.6* 8.7*   HEMATOCRIT 26.8* 26.9*   MCV  92.7 91.5   MCH 29.8 29.6   MCHC 32.1* 32.3*   RDW 48.0 47.4   PLATELETCT 275 291   MPV 9.4 9.2     Recent Labs     05/22/22  0113 05/23/22  0229 05/24/22  0250   SODIUM 137 139 137   POTASSIUM 3.5* 3.7 3.9   CHLORIDE 101 102 102   CO2 27 28 26   GLUCOSE 153* 140* 131*   BUN 15 11 13   CREATININE 0.79 0.83 0.71   CALCIUM 8.7 8.7 8.9                   Imaging  DX-HIP-COMPLETE - UNILATERAL 2+ RIGHT   Final Result      1.  No RIGHT hip or pelvic fracture.   2.  Mild degenerative change of both hips.      US-RENAL   Final Result      1.  Normal renal ultrasound.      DX-KNEE 2- RIGHT   Final Result      1. Surgical drain terminating within the patellofemoral compartment of the right knee joint. No other radiopaque foreign object.   2. Postsurgical changes in the anterior knee soft tissues, surgical skin staples.      US-EXTREMITY VENOUS UPPER UNILAT LEFT   Final Result      CT-KNEE WITH RIGHT   Final Result         1. Moderate knee joint effusion with thick synovial enhancement, concerning for infection      2. There is gas within the multilocular fluid collection in the popliteal fossa, concerning for infected Baker's cyst/abscess.      3. Soft tissue gas in the lateral leg as well.      DX-KNEE 2- RIGHT   Final Result         Moderate right knee joint effusion.      Questionable soft tissue gas seen posterior to the distal femur on lateral view.      DX-CHEST-PORTABLE (1 VIEW)   Final Result         1.  There are mild perihilar opacifications which could be due to edema or bronchitis.      2.  No consolidations identified.           Assessment/Plan  Arthritis of right knee  Assessment & Plan  Patient underwent right knee arthrotomy and drainage of deep infection, incision and drainage of right thigh abscess on 5/11/2022. 2 drains were palced. Vancomycin and Zosyn switched to Ancef on 5/13/2022.  No positive cultures to date. Ancef switched to doxycycline Levaquin on 5/16/2022, with end date 5/30/2022.  Per orthopedic  surgery, continue DVT prophylaxis with SCDs and Lovenox until mobilizing then switch to aspirin for 4 weeks. She is to follow-up with orthopedic surgery outpatient clinic in 2 weeks for wound check and suture/staple removal. Right thigh drain was removed on 5/16/2022.  Right knee drain was removed on 5/18/2022    Type 2 diabetes mellitus with hyperglycemia, without long-term current use of insulin (HCC)- (present on admission)  Assessment & Plan  Home metformin on hold.  Hemoglobin A1c was 7.7 on 10/22/2021.  Repeat ordered.  Continue SSI with Accu-Cheks and hypoglycemia protocol.    Seropositive rheumatoid arthritis (HCC)  Assessment & Plan  On methotrexate and Plaquenil for seropositive rheumatoid arthritis.  We will place on hold due to acute infection.    Essential hypertension- (present on admission)  Assessment & Plan  Continue lisinopril and amlodipine    Superficial venous thrombosis of arm, left  Assessment & Plan  LUE US from 5/13/2022 shows acute, non-occlusive superficial venous thrombosis in the cephalic vein of the distal bicep attached to the IV.  IV removed.  Pain and swelling resolved       VTE prophylaxis: enoxaparin ppx

## 2022-05-24 NOTE — PROGRESS NOTES
Received report and assumed care of patient at change of shift. Patient is alert and sitting up in bed. Patient is pleasant and vital signs are stable on room air. Patient has complaints of 3/10 pain during assessment, but declines medication at this time. Patient understands plan of care and all questions answered. No other needs.

## 2022-05-24 NOTE — PROGRESS NOTES
Patient has small wound with small amount of discharge coming from it to the left of knee incision. MD notified. RN informed to notify Ortho.

## 2022-05-25 LAB
ANION GAP SERPL CALC-SCNC: 11 MMOL/L (ref 7–16)
BASOPHILS # BLD AUTO: 0.6 % (ref 0–1.8)
BASOPHILS # BLD: 0.05 K/UL (ref 0–0.12)
BUN SERPL-MCNC: 12 MG/DL (ref 8–22)
CALCIUM SERPL-MCNC: 8.6 MG/DL (ref 8.5–10.5)
CHLORIDE SERPL-SCNC: 101 MMOL/L (ref 96–112)
CO2 SERPL-SCNC: 27 MMOL/L (ref 20–33)
CREAT SERPL-MCNC: 0.87 MG/DL (ref 0.5–1.4)
EOSINOPHIL # BLD AUTO: 0.24 K/UL (ref 0–0.51)
EOSINOPHIL NFR BLD: 3 % (ref 0–6.9)
ERYTHROCYTE [DISTWIDTH] IN BLOOD BY AUTOMATED COUNT: 47.8 FL (ref 35.9–50)
GFR SERPLBLD CREATININE-BSD FMLA CKD-EPI: 77 ML/MIN/1.73 M 2
GLUCOSE BLD STRIP.AUTO-MCNC: 101 MG/DL (ref 65–99)
GLUCOSE BLD STRIP.AUTO-MCNC: 117 MG/DL (ref 65–99)
GLUCOSE BLD STRIP.AUTO-MCNC: 133 MG/DL (ref 65–99)
GLUCOSE BLD STRIP.AUTO-MCNC: 142 MG/DL (ref 65–99)
GLUCOSE BLD STRIP.AUTO-MCNC: 97 MG/DL (ref 65–99)
GLUCOSE SERPL-MCNC: 136 MG/DL (ref 65–99)
HCT VFR BLD AUTO: 28.1 % (ref 37–47)
HGB BLD-MCNC: 9 G/DL (ref 12–16)
IMM GRANULOCYTES # BLD AUTO: 0.05 K/UL (ref 0–0.11)
IMM GRANULOCYTES NFR BLD AUTO: 0.6 % (ref 0–0.9)
LYMPHOCYTES # BLD AUTO: 1.03 K/UL (ref 1–4.8)
LYMPHOCYTES NFR BLD: 13 % (ref 22–41)
MCH RBC QN AUTO: 29.2 PG (ref 27–33)
MCHC RBC AUTO-ENTMCNC: 32 G/DL (ref 33.6–35)
MCV RBC AUTO: 91.2 FL (ref 81.4–97.8)
MONOCYTES # BLD AUTO: 0.77 K/UL (ref 0–0.85)
MONOCYTES NFR BLD AUTO: 9.7 % (ref 0–13.4)
NEUTROPHILS # BLD AUTO: 5.81 K/UL (ref 2–7.15)
NEUTROPHILS NFR BLD: 73.1 % (ref 44–72)
NRBC # BLD AUTO: 0 K/UL
NRBC BLD-RTO: 0 /100 WBC
PLATELET # BLD AUTO: 318 K/UL (ref 164–446)
PMV BLD AUTO: 9.5 FL (ref 9–12.9)
POTASSIUM SERPL-SCNC: 3.6 MMOL/L (ref 3.6–5.5)
RBC # BLD AUTO: 3.08 M/UL (ref 4.2–5.4)
SODIUM SERPL-SCNC: 139 MMOL/L (ref 135–145)
WBC # BLD AUTO: 8 K/UL (ref 4.8–10.8)

## 2022-05-25 PROCEDURE — 700111 HCHG RX REV CODE 636 W/ 250 OVERRIDE (IP): Performed by: ORTHOPAEDIC SURGERY

## 2022-05-25 PROCEDURE — 770004 HCHG ROOM/CARE - ONCOLOGY PRIVATE *

## 2022-05-25 PROCEDURE — A9270 NON-COVERED ITEM OR SERVICE: HCPCS | Performed by: INTERNAL MEDICINE

## 2022-05-25 PROCEDURE — 85025 COMPLETE CBC W/AUTO DIFF WBC: CPT

## 2022-05-25 PROCEDURE — 82962 GLUCOSE BLOOD TEST: CPT | Mod: 91

## 2022-05-25 PROCEDURE — 700102 HCHG RX REV CODE 250 W/ 637 OVERRIDE(OP): Performed by: INTERNAL MEDICINE

## 2022-05-25 PROCEDURE — 700102 HCHG RX REV CODE 250 W/ 637 OVERRIDE(OP): Performed by: HOSPITALIST

## 2022-05-25 PROCEDURE — A9270 NON-COVERED ITEM OR SERVICE: HCPCS | Performed by: HOSPITALIST

## 2022-05-25 PROCEDURE — 99232 SBSQ HOSP IP/OBS MODERATE 35: CPT | Performed by: INTERNAL MEDICINE

## 2022-05-25 PROCEDURE — 80048 BASIC METABOLIC PNL TOTAL CA: CPT

## 2022-05-25 PROCEDURE — 97535 SELF CARE MNGMENT TRAINING: CPT

## 2022-05-25 PROCEDURE — 36415 COLL VENOUS BLD VENIPUNCTURE: CPT

## 2022-05-25 RX ADMIN — FOLIC ACID 1 MG: 1 TABLET ORAL at 05:28

## 2022-05-25 RX ADMIN — SENNOSIDES AND DOCUSATE SODIUM 2 TABLET: 50; 8.6 TABLET ORAL at 17:15

## 2022-05-25 RX ADMIN — DOXYCYCLINE 100 MG: 100 TABLET, FILM COATED ORAL at 05:28

## 2022-05-25 RX ADMIN — SEVELAMER CARBONATE 800 MG: 800 TABLET, FILM COATED ORAL at 11:57

## 2022-05-25 RX ADMIN — ATORVASTATIN CALCIUM 20 MG: 20 TABLET, FILM COATED ORAL at 17:15

## 2022-05-25 RX ADMIN — AMLODIPINE BESYLATE 10 MG: 10 TABLET ORAL at 05:29

## 2022-05-25 RX ADMIN — SEVELAMER CARBONATE 800 MG: 800 TABLET, FILM COATED ORAL at 07:49

## 2022-05-25 RX ADMIN — OXYCODONE HYDROCHLORIDE 10 MG: 10 TABLET ORAL at 11:57

## 2022-05-25 RX ADMIN — DOXYCYCLINE 100 MG: 100 TABLET, FILM COATED ORAL at 17:16

## 2022-05-25 RX ADMIN — SENNOSIDES AND DOCUSATE SODIUM 2 TABLET: 50; 8.6 TABLET ORAL at 05:28

## 2022-05-25 RX ADMIN — OXYCODONE HYDROCHLORIDE 10 MG: 10 TABLET ORAL at 17:15

## 2022-05-25 RX ADMIN — SEVELAMER CARBONATE 800 MG: 800 TABLET, FILM COATED ORAL at 17:15

## 2022-05-25 RX ADMIN — LISINOPRIL 40 MG: 20 TABLET ORAL at 05:29

## 2022-05-25 RX ADMIN — ENOXAPARIN SODIUM 40 MG: 40 INJECTION SUBCUTANEOUS at 05:28

## 2022-05-25 RX ADMIN — LEVOFLOXACIN 250 MG: 250 TABLET, FILM COATED ORAL at 05:30

## 2022-05-25 ASSESSMENT — PAIN DESCRIPTION - PAIN TYPE
TYPE: ACUTE PAIN
TYPE: ACUTE PAIN

## 2022-05-25 ASSESSMENT — COGNITIVE AND FUNCTIONAL STATUS - GENERAL
DRESSING REGULAR LOWER BODY CLOTHING: A LOT
DRESSING REGULAR UPPER BODY CLOTHING: A LOT
HELP NEEDED FOR BATHING: A LOT
DAILY ACTIVITIY SCORE: 14
PERSONAL GROOMING: A LITTLE
EATING MEALS: A LITTLE
SUGGESTED CMS G CODE MODIFIER DAILY ACTIVITY: CK
TOILETING: A LOT

## 2022-05-25 NOTE — THERAPY
Occupational Therapy  Daily Treatment     Patient Name: Kary Shah  Age:  56 y.o., Sex:  female  Medical Record #: [id number]  Today's Date: 5/25/2022     Precautions: Fall Risk, Weight Bearing As Tolerated Right Lower Extremity  Comments: s/p R knee I & D    Assessment  Pt pleasant and agreeable to session. Pt primarily limited by hip and shoulder pain, weakness, and poor activity tolerance impacting ADLs and mobility. Pt tolerated functional mobility to the bathroom for toileting with extended time for task completion 2/2 pain. Pt required mod/max A with self cares at this time. Continue to recommend post acute placement. Will follow for skilled OT services.    Plan    Continue current treatment plan.    DC Equipment Recommendations: (P) Unable to determine at this time  Discharge Recommendations: (P) Recommend post-acute placement for additional occupational therapy services prior to discharge home       05/25/22 1236   Cognition    Comments pt pleasant and motivated   Balance   Sitting Balance (Static) Fair   Sitting Balance (Dynamic) Fair -   Standing Balance (Static) Fair -   Standing Balance (Dynamic) Poor   Weight Shift Sitting Fair   Weight Shift Standing Poor   Skilled Intervention Verbal Cuing;Tactile Cuing;Postural Facilitation   Comments with FWW   Activities of Daily Living   Grooming Minimal Assist;Seated   Lower Body Dressing Maximal Assist   Toileting Minimal Assist  (in bathroom on raised toilet)   Skilled Intervention Verbal Cuing;Tactile Cuing;Sequencing   How much help from another person does the patient currently need...   6 Clicks Daily Activity Score 14   Functional Mobility   Sit to Stand Minimal Assist   Bed, Chair, Wheelchair Transfer Minimal Assist   Toilet Transfers Minimal Assist   Mobility recliner>bathroom>back to recliner   Skilled Intervention Verbal Cuing;Tactile Cuing;Sequencing   Comments required extended time, and breaks 2/2 pain, not functional   Activity Tolerance    Sitting in Chair up pre/post session   Standing 8 min   Patient / Family Goals   Patient / Family Goal #1 to go to rehab   Short Term Goals   Short Term Goal # 1 Pt will demo LB dressing using LRAD SPV   Goal Outcome # 1 Progressing slower than expected   Short Term Goal # 2 Pt will demo UB dressing SPV   Goal Outcome # 2 Progressing slower than expected   Short Term Goal # 3 Pt will complete functional ADL txfs SPV   Goal Outcome # 3 Progressing slower than expected   Education Group   Role of Occupational Therapist Patient Response Patient;Acceptance;Explanation   Anticipated Discharge Equipment and Recommendations   DC Equipment Recommendations Unable to determine at this time   Discharge Recommendations Recommend post-acute placement for additional occupational therapy services prior to discharge home

## 2022-05-25 NOTE — DISCHARGE PLANNING
"TCN following. HTH/SCP chart review completed. Collaborating closely  with Primary MARYLU Su, [facility] team . Hospitalist noted on 5/24/222 \"  Patient is not medically cleared for discharge. ..\"   Orthopedic PA noted on 5/24/2022 \" Cleared by ortho for DC home pending medicine clearance\".    Financial difficulty ( inability to afford copayment for SNF services) continue to be a barrier. [facility] currently has 100 SNF days available. [facility] is also responsible for 30% coinsurance after deductible- per member plan detail-- I communicated this with Primary CM  and MARYLU Dupont.   I requested primary CM team to follow up with [facility] in hopes for a financial assistance for this mbr. [facility] appears to have declined acceptance of this mbr.        Previously completed:  - Orders received for: PT and OT -> recommending placement  - Choice forms: HH, SNF, Infusion, IRF.   - [facility]SC introduced (Y), referral (sent).   "

## 2022-05-25 NOTE — CARE PLAN
The patient is Stable - Low risk of patient condition declining or worsening    Shift Goals  Clinical Goals: Pain control; Blood sugar management  Patient Goals: Sleep  Family Goals: Not present    Progress made toward(s) clinical / shift goals: Patient declines pain so far throughout shift. Blood sugar monitoring ACHS with SSI in use per orders/MAR. Care clustered to promote rest.       Problem: Knowledge Deficit - Standard  Goal: Patient and family/care givers will demonstrate understanding of plan of care, disease process/condition, diagnostic tests and medications  Outcome: Progressing     Problem: Respiratory  Goal: Patient will achieve/maintain optimum respiratory ventilation and gas exchange  Outcome: Progressing     Problem: Physical Regulation  Goal: Diagnostic test results will improve  Outcome: Progressing  Goal: Signs and symptoms of infection will decrease  Outcome: Progressing     Problem: Pain - Standard  Goal: Alleviation of pain or a reduction in pain to the patient’s comfort goal  Outcome: Progressing     Problem: Fall Risk  Goal: Patient will remain free from falls  Outcome: Progressing     Problem: Mobility  Goal: Patient's capacity to carry out activities will improve  Outcome: Progressing     Problem: Infection - Standard  Goal: Patient will remain free from infection  Outcome: Progressing     Problem: Skin Integrity  Goal: Skin integrity is maintained or improved  Outcome: Progressing       Patient is not progressing towards the following goals:

## 2022-05-25 NOTE — PROGRESS NOTES
Hospital Medicine Daily Progress Note    Date of Service  5/25/2022    Chief Complaint  Kary Shah is a 56 y.o. female admitted 5/10/2022 with 1 week history of fevers and chills, with worsening chronic right knee pain and swelling. She has seropositive rheumatoid arthritis (on methotrexate and Plaquenil), type 2 diabetes, osteoarthritis, essential hypertension, and GERD.     Hospital Course  On admission,labs showed leukocytosis (16.4 K), sodium was 132, anion gap was 17.0, lactic acid was 3.1.      Right knee x-ray showed moderate right knee joint effusion with questionable soft tissue gas seen posterior to the distal femur.     CT right knee showed joint effusion with thick synovial enhancement, concerning for infection. There is gas within the multilocular fluid collection in the popliteal fossa, concerning for infected Baker's cyst/abscess.     ER physician attempted arthrocentesis but was unsuccessful. Orthopedic surgery on-call recommended starting IV antibiotics with formal consult. Vancomycin, Clindamycin and Zosyn were empirically started.  Lactic acid improved to 1.5.     Patient underwent right knee arthrotomy and drainage of deep infection, incision and drainage of right thigh abscess on 5/11/2022. 2 drains were placed.      ID were consulted.  Vancomycin and Zosyn switched to Ancef on 5/13/2022.  Cultures were negative.  Ancef was discontinued and doxycycline 100 mg p.o. twice daily with Levaquin 50 mg daily was started on 5/16/2022 for 14 days.     LUE US from 5/13/2022 shows acute, non-occlusive superficial venous thrombosis in the cephalic vein of the distal bicep attached to the IV. Left arm swelling and pain  improved.      Right thigh drain was removed 5/16/2022. Right knee drain was removed on 5/18/2022.     Interval Problem Update  Afebrile and hemodynamically stable. Labs are unremarkable.      I have personally seen and examined the patient at bedside. I discussed the plan of care  with patient and bedside RN.     Consultants/Specialty  orthopedics     Code Status  Full Code     Disposition  Patient is not medically cleared for discharge.   Anticipate discharge to to home with close outpatient follow-up.  I have placed the appropriate orders for post-discharge needs.     Review of Systems  Review of Systems   Constitutional: No fevers or chills  HENT: Negative.    Eyes: Negative.    Respiratory: Negative.    Cardiovascular: Negative.    Gastrointestinal: Negative.    Genitourinary: Negative.    Musculoskeletal: Improved pain in right knee  Skin: Negative.    Neurological: Negative.    Endo/Heme/Allergies: Negative.    Psychiatric/Behavioral: Negative.         Physical Exam  Temp:  [36.7 °C (98.1 °F)-37.3 °C (99.1 °F)] 36.8 °C (98.3 °F)  Pulse:  [72-85] 81  Resp:  [16-18] 16  BP: (129-157)/(59-72) 137/59  SpO2:  [92 %-99 %] 99 %    Physical Exam  Constitutional:       General: She is in NAD.      Appearance: She is obese.   HENT:      Head: Normocephalic.      Nose: Nose normal.      Mouth/Throat:      Mouth: Mucous membranes are moist.   Eyes:      Pupils: Pupils are equal, round, and reactive to light.   Cardiovascular:      Rate and Rhythm: Normal rate.   Pulmonary:      Effort: Pulmonary effort is normal.   Abdominal:      Palpations: Abdomen is soft.   Musculoskeletal:         General: Right knee with dressing     Cervical back: Normal range of motion.      Right lower leg: No edema.      Left lower leg: No edema.   Skin:     General: Skin is warm and dry.   Neurological:      General: No focal deficit present.      Mental Status: She is alert.   Psychiatric:         Mood and Affect: Mood normal.     Fluids    Intake/Output Summary (Last 24 hours) at 5/25/2022 1347  Last data filed at 5/25/2022 1256  Gross per 24 hour   Intake 525 ml   Output 1875 ml   Net -1350 ml       Laboratory  Recent Labs     05/23/22  0229 05/25/22  0207   WBC 9.1 8.0   RBC 2.94* 3.08*   HEMOGLOBIN 8.7* 9.0*    HEMATOCRIT 26.9* 28.1*   MCV 91.5 91.2   MCH 29.6 29.2   MCHC 32.3* 32.0*   RDW 47.4 47.8   PLATELETCT 291 318   MPV 9.2 9.5     Recent Labs     05/23/22  0229 05/24/22  0250 05/25/22  0207   SODIUM 139 137 139   POTASSIUM 3.7 3.9 3.6   CHLORIDE 102 102 101   CO2 28 26 27   GLUCOSE 140* 131* 136*   BUN 11 13 12   CREATININE 0.83 0.71 0.87   CALCIUM 8.7 8.9 8.6                   Imaging  DX-HIP-COMPLETE - UNILATERAL 2+ RIGHT   Final Result      1.  No RIGHT hip or pelvic fracture.   2.  Mild degenerative change of both hips.      US-RENAL   Final Result      1.  Normal renal ultrasound.      DX-KNEE 2- RIGHT   Final Result      1. Surgical drain terminating within the patellofemoral compartment of the right knee joint. No other radiopaque foreign object.   2. Postsurgical changes in the anterior knee soft tissues, surgical skin staples.      US-EXTREMITY VENOUS UPPER UNILAT LEFT   Final Result      CT-KNEE WITH RIGHT   Final Result         1. Moderate knee joint effusion with thick synovial enhancement, concerning for infection      2. There is gas within the multilocular fluid collection in the popliteal fossa, concerning for infected Baker's cyst/abscess.      3. Soft tissue gas in the lateral leg as well.      DX-KNEE 2- RIGHT   Final Result         Moderate right knee joint effusion.      Questionable soft tissue gas seen posterior to the distal femur on lateral view.      DX-CHEST-PORTABLE (1 VIEW)   Final Result         1.  There are mild perihilar opacifications which could be due to edema or bronchitis.      2.  No consolidations identified.           Assessment/Plan  Arthritis of right knee  Assessment & Plan  Patient underwent right knee arthrotomy and drainage of deep infection, incision and drainage of right thigh abscess on 5/11/2022. 2 drains were palced. Vancomycin and Zosyn switched to Ancef on 5/13/2022.  No positive cultures to date. Ancef switched to doxycycline Levaquin on 5/16/2022, with end  date 5/30/2022.  Per orthopedic surgery, continue DVT prophylaxis with SCDs and Lovenox until mobilizing then switch to aspirin for 4 weeks. She is to follow-up with orthopedic surgery outpatient clinic in 2 weeks for wound check and suture/staple removal. Right thigh drain was removed on 5/16/2022.  Right knee drain was removed on 5/18/2022    Type 2 diabetes mellitus with hyperglycemia, without long-term current use of insulin (HCC)- (present on admission)  Assessment & Plan  Home metformin on hold.  Hemoglobin A1c was 7.7 on 10/22/2021.  Repeat ordered.  Continue SSI with Accu-Cheks and hypoglycemia protocol.    Seropositive rheumatoid arthritis (HCC)  Assessment & Plan  On methotrexate and Plaquenil for seropositive rheumatoid arthritis.  We will place on hold due to acute infection.    Essential hypertension- (present on admission)  Assessment & Plan  Continue lisinopril and amlodipine    Superficial venous thrombosis of arm, left  Assessment & Plan  LUE US from 5/13/2022 shows acute, non-occlusive superficial venous thrombosis in the cephalic vein of the distal bicep attached to the IV.  IV removed.  Pain and swelling resolved         VTE prophylaxis: enoxaparin ppx    I have performed a physical exam and reviewed and updated ROS and Plan today (5/25/2022). In review of yesterday's note (5/24/2022), there are no changes except as documented above.

## 2022-05-26 LAB
GLUCOSE BLD STRIP.AUTO-MCNC: 113 MG/DL (ref 65–99)
GLUCOSE BLD STRIP.AUTO-MCNC: 129 MG/DL (ref 65–99)
GLUCOSE BLD STRIP.AUTO-MCNC: 154 MG/DL (ref 65–99)
GLUCOSE BLD STRIP.AUTO-MCNC: 168 MG/DL (ref 65–99)

## 2022-05-26 PROCEDURE — 82962 GLUCOSE BLOOD TEST: CPT | Mod: 91

## 2022-05-26 PROCEDURE — 700111 HCHG RX REV CODE 636 W/ 250 OVERRIDE (IP): Performed by: ORTHOPAEDIC SURGERY

## 2022-05-26 PROCEDURE — 700102 HCHG RX REV CODE 250 W/ 637 OVERRIDE(OP): Performed by: INTERNAL MEDICINE

## 2022-05-26 PROCEDURE — A9270 NON-COVERED ITEM OR SERVICE: HCPCS | Performed by: INTERNAL MEDICINE

## 2022-05-26 PROCEDURE — 97116 GAIT TRAINING THERAPY: CPT

## 2022-05-26 PROCEDURE — 770004 HCHG ROOM/CARE - ONCOLOGY PRIVATE *

## 2022-05-26 PROCEDURE — 700102 HCHG RX REV CODE 250 W/ 637 OVERRIDE(OP): Performed by: HOSPITALIST

## 2022-05-26 PROCEDURE — 99232 SBSQ HOSP IP/OBS MODERATE 35: CPT | Performed by: INTERNAL MEDICINE

## 2022-05-26 PROCEDURE — 97535 SELF CARE MNGMENT TRAINING: CPT

## 2022-05-26 PROCEDURE — A9270 NON-COVERED ITEM OR SERVICE: HCPCS | Performed by: HOSPITALIST

## 2022-05-26 RX ADMIN — LEVOFLOXACIN 250 MG: 250 TABLET, FILM COATED ORAL at 05:21

## 2022-05-26 RX ADMIN — SEVELAMER CARBONATE 800 MG: 800 TABLET, FILM COATED ORAL at 08:15

## 2022-05-26 RX ADMIN — ATORVASTATIN CALCIUM 20 MG: 20 TABLET, FILM COATED ORAL at 17:07

## 2022-05-26 RX ADMIN — INSULIN HUMAN 2 UNITS: 100 INJECTION, SOLUTION PARENTERAL at 12:12

## 2022-05-26 RX ADMIN — LISINOPRIL 40 MG: 20 TABLET ORAL at 05:21

## 2022-05-26 RX ADMIN — SEVELAMER CARBONATE 800 MG: 800 TABLET, FILM COATED ORAL at 12:13

## 2022-05-26 RX ADMIN — AMLODIPINE BESYLATE 10 MG: 10 TABLET ORAL at 05:20

## 2022-05-26 RX ADMIN — ENOXAPARIN SODIUM 40 MG: 40 INJECTION SUBCUTANEOUS at 05:20

## 2022-05-26 RX ADMIN — SEVELAMER CARBONATE 800 MG: 800 TABLET, FILM COATED ORAL at 17:07

## 2022-05-26 RX ADMIN — SENNOSIDES AND DOCUSATE SODIUM 2 TABLET: 50; 8.6 TABLET ORAL at 17:07

## 2022-05-26 RX ADMIN — DOXYCYCLINE 100 MG: 100 TABLET, FILM COATED ORAL at 05:20

## 2022-05-26 RX ADMIN — OXYCODONE HYDROCHLORIDE 10 MG: 10 TABLET ORAL at 09:42

## 2022-05-26 RX ADMIN — INSULIN HUMAN 2 UNITS: 100 INJECTION, SOLUTION PARENTERAL at 20:35

## 2022-05-26 RX ADMIN — DOXYCYCLINE 100 MG: 100 TABLET, FILM COATED ORAL at 17:07

## 2022-05-26 RX ADMIN — FOLIC ACID 1 MG: 1 TABLET ORAL at 05:21

## 2022-05-26 ASSESSMENT — PAIN DESCRIPTION - PAIN TYPE
TYPE: ACUTE PAIN
TYPE: ACUTE PAIN

## 2022-05-26 ASSESSMENT — COGNITIVE AND FUNCTIONAL STATUS - GENERAL
MOVING FROM LYING ON BACK TO SITTING ON SIDE OF FLAT BED: UNABLE
MOBILITY SCORE: 11
CLIMB 3 TO 5 STEPS WITH RAILING: TOTAL
WALKING IN HOSPITAL ROOM: A LITTLE
TURNING FROM BACK TO SIDE WHILE IN FLAT BAD: A LOT
SUGGESTED CMS G CODE MODIFIER MOBILITY: CL
STANDING UP FROM CHAIR USING ARMS: A LITTLE
MOVING TO AND FROM BED TO CHAIR: UNABLE

## 2022-05-26 ASSESSMENT — GAIT ASSESSMENTS
ASSISTIVE DEVICE: FRONT WHEEL WALKER
GAIT LEVEL OF ASSIST: MINIMAL ASSIST
DEVIATION: ANTALGIC;STEP TO;DECREASED BASE OF SUPPORT;DECREASED HEEL STRIKE;DECREASED TOE OFF
DISTANCE (FEET): 10

## 2022-05-26 NOTE — DISCHARGE PLANNING
TCN following. HTH/SCP chart review completed. Mrb is S/P Removal of retained drain right knee on 5/18/2022.  Collaborated with [address]. RANGEL is aware of prior RANGEL Smart's correspondence with [facility]- RANGEL to update TCN regarding this. [facility]br's financial difficulties continue to be a barrier. Hospitalist noted on 5/25/22 that patient is not medically cleared for discharge.[facility]br currently has 100 SNF days available. [facility] is also responsible for 30% coinsurance after deductible- per member plan detail          Previously completed:  - Orders received for: PT and OT -> recommending placement  - Choice forms: HH, SNF, Infusion, IRF.   - GSC introduced (Y), referral (sent).

## 2022-05-26 NOTE — PROGRESS NOTES
Hospital Medicine Daily Progress Note    Date of Service  5/26/2022    Chief Complaint  Kary Shah is a 56 y.o. female admitted 5/10/2022 with 1 week history of fevers and chills, with worsening chronic right knee pain and swelling. She has seropositive rheumatoid arthritis (on methotrexate and Plaquenil), type 2 diabetes, osteoarthritis, essential hypertension, and GERD.     Hospital Course  On admission,labs showed leukocytosis (16.4 K), sodium was 132, anion gap was 17.0, lactic acid was 3.1.      Right knee x-ray showed moderate right knee joint effusion with questionable soft tissue gas seen posterior to the distal femur.     CT right knee showed joint effusion with thick synovial enhancement, concerning for infection. There is gas within the multilocular fluid collection in the popliteal fossa, concerning for infected Baker's cyst/abscess.     ER physician attempted arthrocentesis but was unsuccessful. Orthopedic surgery on-call recommended starting IV antibiotics with formal consult. Vancomycin, Clindamycin and Zosyn were empirically started.  Lactic acid improved to 1.5.     Patient underwent right knee arthrotomy and drainage of deep infection, incision and drainage of right thigh abscess on 5/11/2022. 2 drains were placed.      ID were consulted.  Vancomycin and Zosyn switched to Ancef on 5/13/2022.  Cultures were negative.  Ancef was discontinued and doxycycline 100 mg p.o. twice daily with Levaquin 50 mg daily was started on 5/16/2022 for 14 days.     LUE US from 5/13/2022 shows acute, non-occlusive superficial venous thrombosis in the cephalic vein of the distal bicep attached to the IV. Left arm swelling and pain  improved.      Right thigh drain was removed 5/16/2022. Right knee drain was removed on 5/18/2022.     Interval Problem Update  Afebrile and hemodynamically stable. Labs are unremarkable. Pain is improving.      I have personally seen and examined the patient at bedside. I  discussed the plan of care with patient and bedside RN.     Consultants/Specialty  orthopedics     Code Status  Full Code     Disposition  Patient is not medically cleared for discharge.   Anticipate discharge to to home with close outpatient follow-up.  I have placed the appropriate orders for post-discharge needs.     Review of Systems  Review of Systems   Constitutional: No fevers or chills  HENT: Negative.    Eyes: Negative.    Respiratory: Negative.    Cardiovascular: Negative.    Gastrointestinal: Negative.    Genitourinary: Negative.    Musculoskeletal: Improved pain in right knee  Skin: Negative.    Neurological: Negative.    Endo/Heme/Allergies: Negative.    Psychiatric/Behavioral: Negative.      Physical Exam  Temp:  [36.6 °C (97.9 °F)-37.2 °C (98.9 °F)] 37.1 °C (98.7 °F)  Pulse:  [] 88  Resp:  [18-19] 19  BP: (119-146)/(36-67) 146/67  SpO2:  [94 %-97 %] 95 %    Physical Exam  Constitutional:       General: She is in NAD.      Appearance: She is obese.   HENT:      Head: Normocephalic.      Nose: Nose normal.      Mouth/Throat:      Mouth: Mucous membranes are moist.   Eyes:      Pupils: Pupils are equal, round, and reactive to light.   Cardiovascular:      Rate and Rhythm: Normal rate.   Pulmonary:      Effort: Pulmonary effort is normal.   Abdominal:      Palpations: Abdomen is soft.   Musculoskeletal:         General: Right knee with dressing     Cervical back: Normal range of motion.      Right lower leg: No edema.      Left lower leg: No edema.   Skin:     General: Skin is warm and dry.   Neurological:      General: No focal deficit present.      Mental Status: She is alert.   Psychiatric:         Mood and Affect: Mood normal.        Fluids    Intake/Output Summary (Last 24 hours) at 5/26/2022 1315  Last data filed at 5/26/2022 0341  Gross per 24 hour   Intake 220 ml   Output 650 ml   Net -430 ml       Laboratory  Recent Labs     05/25/22  0207   WBC 8.0   RBC 3.08*   HEMOGLOBIN 9.0*    HEMATOCRIT 28.1*   MCV 91.2   MCH 29.2   MCHC 32.0*   RDW 47.8   PLATELETCT 318   MPV 9.5     Recent Labs     05/24/22  0250 05/25/22  0207   SODIUM 137 139   POTASSIUM 3.9 3.6   CHLORIDE 102 101   CO2 26 27   GLUCOSE 131* 136*   BUN 13 12   CREATININE 0.71 0.87   CALCIUM 8.9 8.6                   Imaging  DX-HIP-COMPLETE - UNILATERAL 2+ RIGHT   Final Result      1.  No RIGHT hip or pelvic fracture.   2.  Mild degenerative change of both hips.      US-RENAL   Final Result      1.  Normal renal ultrasound.      DX-KNEE 2- RIGHT   Final Result      1. Surgical drain terminating within the patellofemoral compartment of the right knee joint. No other radiopaque foreign object.   2. Postsurgical changes in the anterior knee soft tissues, surgical skin staples.      US-EXTREMITY VENOUS UPPER UNILAT LEFT   Final Result      CT-KNEE WITH RIGHT   Final Result         1. Moderate knee joint effusion with thick synovial enhancement, concerning for infection      2. There is gas within the multilocular fluid collection in the popliteal fossa, concerning for infected Baker's cyst/abscess.      3. Soft tissue gas in the lateral leg as well.      DX-KNEE 2- RIGHT   Final Result         Moderate right knee joint effusion.      Questionable soft tissue gas seen posterior to the distal femur on lateral view.      DX-CHEST-PORTABLE (1 VIEW)   Final Result         1.  There are mild perihilar opacifications which could be due to edema or bronchitis.      2.  No consolidations identified.           Assessment/Plan  Arthritis of right knee  Assessment & Plan  Patient underwent right knee arthrotomy and drainage of deep infection, incision and drainage of right thigh abscess on 5/11/2022. 2 drains were palced. Vancomycin and Zosyn switched to Ancef on 5/13/2022.  No positive cultures to date. Ancef switched to doxycycline Levaquin on 5/16/2022, with end date 5/30/2022.  Per orthopedic surgery, continue DVT prophylaxis with SCDs and  Lovenox until mobilizing then switch to aspirin for 4 weeks. She is to follow-up with orthopedic surgery outpatient clinic in 2 weeks for wound check and suture/staple removal. Right thigh drain was removed on 5/16/2022.  Right knee drain was removed on 5/18/2022    Type 2 diabetes mellitus with hyperglycemia, without long-term current use of insulin (HCC)- (present on admission)  Assessment & Plan  Home metformin on hold.  Hemoglobin A1c was 7.7 on 10/22/2021.  Repeat ordered.  Continue SSI with Accu-Cheks and hypoglycemia protocol.    Seropositive rheumatoid arthritis (HCC)  Assessment & Plan  On methotrexate and Plaquenil for seropositive rheumatoid arthritis.  We will place on hold due to acute infection.    Essential hypertension- (present on admission)  Assessment & Plan  Continue lisinopril and amlodipine    Superficial venous thrombosis of arm, left  Assessment & Plan  LUE US from 5/13/2022 shows acute, non-occlusive superficial venous thrombosis in the cephalic vein of the distal bicep attached to the IV.  IV removed.  Pain and swelling resolved       VTE prophylaxis: enoxaparin ppx    I have performed a physical exam and reviewed and updated ROS and Plan today (5/26/2022). In review of yesterday's note (5/25/2022), there are no changes except as documented above.

## 2022-05-26 NOTE — CARE PLAN
The patient is Watcher - Medium risk of patient condition declining or worsening    Shift Goals  Clinical Goals: pain control  Patient Goals: increase mobility; rest/sleep  Family Goals: n/a    Progress made toward(s) clinical / shift goals: pain control; increased mobility; rest    Patient is not progressing towards the following goals: N/A      Problem: Knowledge Deficit - Standard  Goal: Patient and family/care givers will demonstrate understanding of plan of care, disease process/condition, diagnostic tests and medications  Outcome: Progressing  Note: Pt is alert and oriented x 4; is aware and understands plan of care; all questions have been answered at this time.      Problem: Pain - Standard  Goal: Alleviation of pain or a reduction in pain to the patient’s comfort goal  Outcome: Progressing  Note: Pain has been assessed and pt is able to verbalize needs and makes them known. Pain has been controlled with PRN medications.     Problem: Fall Risk  Goal: Patient will remain free from falls  Outcome: Progressing     Problem: Mobility  Goal: Patient's capacity to carry out activities will improve  Outcome: Progressing  Note: Pt is able to ambulate to the restroom with one person assistance

## 2022-05-26 NOTE — THERAPY
Physical Therapy   Daily Treatment     Patient Name: Kary Shah  Age:  56 y.o., Sex:  female  Medical Record #: [id number]  Today's Date: 5/26/2022     Precautions  Precautions: Fall Risk;Weight Bearing As Tolerated Right Lower Extremity    Assessment    Patient motivated to participate in PT tx session.  She demonstrated increased ambulation distance today.  Patient continues to ambulate with slow pace and antalgic, step to gait though demonstrated slight improvement in progressing LLE today.  Provided compensatory strategies for gait such as using Ues on FWW to offload painful R leg however patient reported hx of R shoulder pain.  After seated rest break educated patient on standing weight shifts to increase tolerance for RLE WB.  Patient demonstrated 5 reps x 2 R weight shift with seated rest break between sets.  At beginning of session, educated patient on post acute placement vs home at  level.  Patient expressed she wanted to work on ambulation and would prefer placement.  Educated patient that placement is likely the best, safest option.  Continue to recommend placement.  PT to continue to follow.    Plan    Continue current treatment plan.    DC Equipment Recommendations: Unable to determine at this time  Discharge Recommendations: Recommend post-acute placement for additional physical therapy services prior to discharge home     Objective     05/26/22 1009   Cognition    Cognition / Consciousness WDL   Level of Consciousness Alert   Comments Pleasant & motivated   Balance   Sitting Balance (Static) Fair   Sitting Balance (Dynamic) Fair   Standing Balance (Static) Fair -   Standing Balance (Dynamic) Poor   Weight Shift Sitting Fair   Weight Shift Standing Poor   Skilled Intervention Verbal Cuing;Tactile Cuing;Postural Facilitation   Comments w/ FWW   Gait Analysis   Gait Level Of Assist Minimal Assist   Assistive Device Front Wheel Walker   Distance (Feet) 10   # of Times Distance was Traveled 2    Deviation Antalgic;Step To;Decreased Base Of Support;Decreased Heel Strike;Decreased Toe Off   # of Stairs Climbed 0   Skilled Intervention Verbal Cuing;Tactile Cuing;Sequencing;Compensatory Strategies   Comments Cues to use UEs on FWW to offload RLE, pt c/o R shoulder pain with WB   Bed Mobility    Comments NT, up in chair pre/post session   Functional Mobility   Sit to Stand Minimal Assist   Bed, Chair, Wheelchair Transfer Minimal Assist   Transfer Method Stand Step   Mobility short ambulation in room   Skilled Intervention Verbal Cuing;Tactile Cuing;Sequencing   Activity Tolerance   Sitting in Chair Pre/post session   Standing 8-10 min total   Short Term Goals    Short Term Goal # 1 pt will get OOB and return BTB with HOB flat and rails down and SPV for safety with bed mobility at home. ( 6 tx's)   Goal Outcome # 1 goal not met   Short Term Goal # 2 pt will transfer with the LRAD and SPV in 6 Tx's to safely transfer at home.   Goal Outcome # 2 Goal not met   Short Term Goal # 3 pt will ambulate ' with the LRAD and SPV in 6 Tx's   Goal Outcome # 3 Goal not met   Short Term Goal # 4 pt will se her instructions and continue to work on her exercises at home.   Goal Outcome # 4 Goal not met   Anticipated Discharge Equipment and Recommendations   DC Equipment Recommendations Unable to determine at this time   Discharge Recommendations Recommend post-acute placement for additional physical therapy services prior to discharge home

## 2022-05-27 LAB
ANION GAP SERPL CALC-SCNC: 13 MMOL/L (ref 7–16)
BASOPHILS # BLD AUTO: 0.9 % (ref 0–1.8)
BASOPHILS # BLD: 0.07 K/UL (ref 0–0.12)
BUN SERPL-MCNC: 12 MG/DL (ref 8–22)
CALCIUM SERPL-MCNC: 8.8 MG/DL (ref 8.5–10.5)
CHLORIDE SERPL-SCNC: 100 MMOL/L (ref 96–112)
CO2 SERPL-SCNC: 24 MMOL/L (ref 20–33)
CREAT SERPL-MCNC: 0.8 MG/DL (ref 0.5–1.4)
EOSINOPHIL # BLD AUTO: 0.2 K/UL (ref 0–0.51)
EOSINOPHIL NFR BLD: 2.6 % (ref 0–6.9)
ERYTHROCYTE [DISTWIDTH] IN BLOOD BY AUTOMATED COUNT: 45.9 FL (ref 35.9–50)
GFR SERPLBLD CREATININE-BSD FMLA CKD-EPI: 85 ML/MIN/1.73 M 2
GLUCOSE BLD STRIP.AUTO-MCNC: 125 MG/DL (ref 65–99)
GLUCOSE BLD STRIP.AUTO-MCNC: 129 MG/DL (ref 65–99)
GLUCOSE BLD STRIP.AUTO-MCNC: 162 MG/DL (ref 65–99)
GLUCOSE BLD STRIP.AUTO-MCNC: 97 MG/DL (ref 65–99)
GLUCOSE SERPL-MCNC: 165 MG/DL (ref 65–99)
HCT VFR BLD AUTO: 28.9 % (ref 37–47)
HGB BLD-MCNC: 9.5 G/DL (ref 12–16)
IMM GRANULOCYTES # BLD AUTO: 0.05 K/UL (ref 0–0.11)
IMM GRANULOCYTES NFR BLD AUTO: 0.7 % (ref 0–0.9)
LYMPHOCYTES # BLD AUTO: 0.76 K/UL (ref 1–4.8)
LYMPHOCYTES NFR BLD: 9.9 % (ref 22–41)
MCH RBC QN AUTO: 29.4 PG (ref 27–33)
MCHC RBC AUTO-ENTMCNC: 32.9 G/DL (ref 33.6–35)
MCV RBC AUTO: 89.5 FL (ref 81.4–97.8)
MONOCYTES # BLD AUTO: 0.91 K/UL (ref 0–0.85)
MONOCYTES NFR BLD AUTO: 11.9 % (ref 0–13.4)
NEUTROPHILS # BLD AUTO: 5.67 K/UL (ref 2–7.15)
NEUTROPHILS NFR BLD: 74 % (ref 44–72)
NRBC # BLD AUTO: 0 K/UL
NRBC BLD-RTO: 0 /100 WBC
PLATELET # BLD AUTO: 319 K/UL (ref 164–446)
PMV BLD AUTO: 9.4 FL (ref 9–12.9)
POTASSIUM SERPL-SCNC: 3.2 MMOL/L (ref 3.6–5.5)
RBC # BLD AUTO: 3.23 M/UL (ref 4.2–5.4)
SODIUM SERPL-SCNC: 137 MMOL/L (ref 135–145)
WBC # BLD AUTO: 7.7 K/UL (ref 4.8–10.8)

## 2022-05-27 PROCEDURE — 80048 BASIC METABOLIC PNL TOTAL CA: CPT

## 2022-05-27 PROCEDURE — A9270 NON-COVERED ITEM OR SERVICE: HCPCS | Performed by: INTERNAL MEDICINE

## 2022-05-27 PROCEDURE — 700102 HCHG RX REV CODE 250 W/ 637 OVERRIDE(OP): Performed by: INTERNAL MEDICINE

## 2022-05-27 PROCEDURE — 700102 HCHG RX REV CODE 250 W/ 637 OVERRIDE(OP): Performed by: HOSPITALIST

## 2022-05-27 PROCEDURE — 36415 COLL VENOUS BLD VENIPUNCTURE: CPT

## 2022-05-27 PROCEDURE — 99232 SBSQ HOSP IP/OBS MODERATE 35: CPT | Performed by: INTERNAL MEDICINE

## 2022-05-27 PROCEDURE — 82962 GLUCOSE BLOOD TEST: CPT | Mod: 91

## 2022-05-27 PROCEDURE — 97116 GAIT TRAINING THERAPY: CPT

## 2022-05-27 PROCEDURE — 700111 HCHG RX REV CODE 636 W/ 250 OVERRIDE (IP): Performed by: ORTHOPAEDIC SURGERY

## 2022-05-27 PROCEDURE — 770004 HCHG ROOM/CARE - ONCOLOGY PRIVATE *

## 2022-05-27 PROCEDURE — 85025 COMPLETE CBC W/AUTO DIFF WBC: CPT

## 2022-05-27 PROCEDURE — A9270 NON-COVERED ITEM OR SERVICE: HCPCS | Performed by: HOSPITALIST

## 2022-05-27 RX ORDER — LEVOFLOXACIN 250 MG/1
250 TABLET, FILM COATED ORAL
Status: COMPLETED | OUTPATIENT
Start: 2022-05-28 | End: 2022-05-30

## 2022-05-27 RX ORDER — POTASSIUM CHLORIDE 20 MEQ/1
40 TABLET, EXTENDED RELEASE ORAL ONCE
Status: COMPLETED | OUTPATIENT
Start: 2022-05-27 | End: 2022-05-27

## 2022-05-27 RX ORDER — LISINOPRIL 20 MG/1
40 TABLET ORAL
Status: DISCONTINUED | OUTPATIENT
Start: 2022-05-28 | End: 2022-06-01 | Stop reason: HOSPADM

## 2022-05-27 RX ORDER — DOXYCYCLINE 100 MG/1
100 TABLET ORAL EVERY 12 HOURS
Status: COMPLETED | OUTPATIENT
Start: 2022-05-27 | End: 2022-05-30

## 2022-05-27 RX ORDER — FOLIC ACID 1 MG/1
1 TABLET ORAL
Status: DISCONTINUED | OUTPATIENT
Start: 2022-05-28 | End: 2022-06-01 | Stop reason: HOSPADM

## 2022-05-27 RX ORDER — AMLODIPINE BESYLATE 10 MG/1
10 TABLET ORAL
Status: DISCONTINUED | OUTPATIENT
Start: 2022-05-28 | End: 2022-06-01 | Stop reason: HOSPADM

## 2022-05-27 RX ADMIN — SEVELAMER CARBONATE 800 MG: 800 TABLET, FILM COATED ORAL at 17:14

## 2022-05-27 RX ADMIN — LISINOPRIL 40 MG: 20 TABLET ORAL at 05:49

## 2022-05-27 RX ADMIN — AMLODIPINE BESYLATE 10 MG: 10 TABLET ORAL at 05:48

## 2022-05-27 RX ADMIN — OXYCODONE HYDROCHLORIDE 10 MG: 10 TABLET ORAL at 13:27

## 2022-05-27 RX ADMIN — LEVOFLOXACIN 250 MG: 250 TABLET, FILM COATED ORAL at 05:51

## 2022-05-27 RX ADMIN — POTASSIUM CHLORIDE 40 MEQ: 1500 TABLET, EXTENDED RELEASE ORAL at 12:21

## 2022-05-27 RX ADMIN — INSULIN HUMAN 2 UNITS: 100 INJECTION, SOLUTION PARENTERAL at 12:27

## 2022-05-27 RX ADMIN — SEVELAMER CARBONATE 800 MG: 800 TABLET, FILM COATED ORAL at 12:21

## 2022-05-27 RX ADMIN — ENOXAPARIN SODIUM 40 MG: 40 INJECTION SUBCUTANEOUS at 05:49

## 2022-05-27 RX ADMIN — DOXYCYCLINE 100 MG: 100 TABLET, FILM COATED ORAL at 20:29

## 2022-05-27 RX ADMIN — SENNOSIDES AND DOCUSATE SODIUM 2 TABLET: 50; 8.6 TABLET ORAL at 17:14

## 2022-05-27 RX ADMIN — SEVELAMER CARBONATE 800 MG: 800 TABLET, FILM COATED ORAL at 09:07

## 2022-05-27 RX ADMIN — DOXYCYCLINE 100 MG: 100 TABLET, FILM COATED ORAL at 05:49

## 2022-05-27 RX ADMIN — ATORVASTATIN CALCIUM 20 MG: 20 TABLET, FILM COATED ORAL at 17:14

## 2022-05-27 RX ADMIN — FOLIC ACID 1 MG: 1 TABLET ORAL at 05:49

## 2022-05-27 ASSESSMENT — PAIN DESCRIPTION - PAIN TYPE: TYPE: ACUTE PAIN

## 2022-05-27 ASSESSMENT — COGNITIVE AND FUNCTIONAL STATUS - GENERAL
TURNING FROM BACK TO SIDE WHILE IN FLAT BAD: A LITTLE
MOBILITY SCORE: 13
WALKING IN HOSPITAL ROOM: A LITTLE
SUGGESTED CMS G CODE MODIFIER MOBILITY: CL
MOVING FROM LYING ON BACK TO SITTING ON SIDE OF FLAT BED: A LOT
MOVING TO AND FROM BED TO CHAIR: UNABLE
CLIMB 3 TO 5 STEPS WITH RAILING: TOTAL
STANDING UP FROM CHAIR USING ARMS: A LITTLE

## 2022-05-27 ASSESSMENT — GAIT ASSESSMENTS
ASSISTIVE DEVICE: FRONT WHEEL WALKER
GAIT LEVEL OF ASSIST: CONTACT GUARD ASSIST
DEVIATION: ANTALGIC;STEP TO;BRADYKINETIC;DECREASED HEEL STRIKE
DISTANCE (FEET): 15

## 2022-05-27 NOTE — DISCHARGE PLANNING
TCN following. H[facility] chart review completed. Mbr is s/p Removal of retained drain right knee on 5/18/2022. Mbr  repeatedly requested she wants Medicaid and  rehab therapy. Mbr also  Has major  financial concerns ( sole bread winner, single mother)  including  inability to afford 30% coinsurance for SNF. I  am collaborating closely with Primary CM Mag to follow up with  Hospital PFA. Primary CM to notify TCN of PFA's response.   [facility] noted 5/27/2022 patient is not medically cleared for discharge.  TCN  Will continue to follow as additional post-acute needs arise.      Previously completed:  - Orders received for: PT and OT -> recommended post-acute placement  - Choice forms: HH, SNF, Infusion, IRF.   - [facility] introduced (Y), referral (sent).

## 2022-05-27 NOTE — CARE PLAN
The patient is Stable - Low risk of patient condition declining or worsening    Shift Goals  Clinical Goals: pain control; monitor blood sugars  Patient Goals: rest/sleep; pain control  Family Goals: not present    Progress made toward(s) clinical / shift goals: pain has been controlled; blood sugars have been monitored; pt has been resting comfortably    Patient is not progressing towards the following goals: N/A      Problem: Knowledge Deficit - Standard  Goal: Patient and family/care givers will demonstrate understanding of plan of care, disease process/condition, diagnostic tests and medications  Outcome: Progressing  Note: Pt is alert and oriented x 4; is aware and understands plan of care; all questions answered at this time.      Problem: Pain - Standard  Goal: Alleviation of pain or a reduction in pain to the patient’s comfort goal  Outcome: Progressing  Note: Pain has been assessed; pt is able to verbalize needs and makes them known. Pain has been controlled with PRN medications.      Problem: Fall Risk  Goal: Patient will remain free from falls  Outcome: Progressing  Note: Bed is locked in the lowest position and pt is wearing treaded non-slip socks. Pt calls for assistance appropriately before getting out of bed.      Problem: Mobility  Goal: Patient's capacity to carry out activities will improve  Outcome: Progressing     Problem: Infection - Standard  Goal: Patient will remain free from infection  Outcome: Progressing     Problem: Skin Integrity  Goal: Skin integrity is maintained or improved  Outcome: Progressing

## 2022-05-27 NOTE — THERAPY
Physical Therapy   Daily Treatment     Patient Name: Kary Shah  Age:  56 y.o., Sex:  female  Medical Record #: [id number]  Today's Date: 5/27/2022     Precautions  Precautions: Fall Risk;Weight Bearing As Tolerated Right Lower Extremity    Assessment    Patient continues to progress toward acute mobility goals.  Patient demonstrated increased ambulation distance today, ambulating 15 ft x 2, then again when ambulated to bathroom and back to EOB.  Patient demonstrated improved lateral weight shifts, able to lift L heel when shifting to R however c/o pain with attempting to lift L foot completely off floor.  Educated patient to continue ambulating with nursing staff to bathroom.  Patient also reported she has been performing exercises in bed to build strength, encouraged patient to continue as tolerated.  PT to continue to follow.     Plan    Continue current treatment plan.    DC Equipment Recommendations: Unable to determine at this time  Discharge Recommendations: Recommend post-acute placement for additional physical therapy services prior to discharge home    Objective     05/27/22 1524   Precautions   Precautions Fall Risk;Weight Bearing As Tolerated Right Lower Extremity   Cognition    Cognition / Consciousness WDL   Level of Consciousness Alert   Comments Very pleasant & motivated   Neuro-Muscular Treatments   Neuro-Muscular Treatments Weight Shift Left;Weight Shift Right   Balance   Sitting Balance (Static) Fair   Sitting Balance (Dynamic) Fair   Standing Balance (Static) Fair -   Standing Balance (Dynamic) Poor +   Weight Shift Sitting Fair   Weight Shift Standing Poor   Skilled Intervention Verbal Cuing;Tactile Cuing;Compensatory Strategies   Comments w/ FWW   Gait Analysis   Gait Level Of Assist Contact Guard Assist   Assistive Device Front Wheel Walker   Distance (Feet) 15   # of Times Distance was Traveled 5   Deviation Antalgic;Step To;Bradykinetic;Decreased Heel Strike   # of Stairs Climbed 0    Weight Bearing Status WBAT RLE   Skilled Intervention Verbal Cuing;Tactile Cuing;Compensatory Strategies   Bed Mobility    Supine to Sit Minimal Assist   Sit to Supine Minimal Assist   Skilled Intervention Verbal Cuing;Facilitation   Comments Assist for LEs due to pain   Functional Mobility   Sit to Stand Contact Guard Assist   Bed, Chair, Wheelchair Transfer Contact Guard Assist   Toilet Transfers Contact Guard Assist   Transfer Method Stand Step   Mobility ambulation in room, ambulate to bathroom   Skilled Intervention Verbal Cuing;Tactile Cuing;Compensatory Strategies   Comments Pt used BSC due to pain with using low toilet   Activity Tolerance   Sitting Edge of Bed 8-10 min   Standing 10-15 min total   Short Term Goals    Short Term Goal # 1 pt will get OOB and return BTB with HOB flat and rails down and SPV for safety with bed mobility at home. ( 6 tx's)   Goal Outcome # 1 goal not met   Short Term Goal # 2 pt will transfer with the LRAD and SPV in 6 Tx's to safely transfer at home.   Goal Outcome # 2 Goal not met   Short Term Goal # 3 pt will ambulate ' with the LRAD and SPV in 6 Tx's   Goal Outcome # 3 Goal not met   Short Term Goal # 4 pt will se her instructions and continue to work on her exercises at home.   Goal Outcome # 4 Goal not met   Anticipated Discharge Equipment and Recommendations   DC Equipment Recommendations Unable to determine at this time   Discharge Recommendations Recommend post-acute placement for additional physical therapy services prior to discharge home

## 2022-05-27 NOTE — CARE PLAN
The patient is Stable - Low risk of patient condition declining or worsening    Shift Goals  Clinical Goals: pain control  Patient Goals: increase mobility; rest/sleep  Family Goals: n/a    Progress made toward(s) clinical / shift goals:    Problem: Pain - Standard  Goal: Alleviation of pain or a reduction in pain to the patient’s comfort goal  Outcome: Progressing  Note: Patient required one dose PRN pain medication during shift with effective results.

## 2022-05-27 NOTE — PROGRESS NOTES
Hospital Medicine Daily Progress Note    Date of Service  5/27/2022    Chief Complaint  Kary Shah is a 56 y.o. female admitted 5/10/2022 with 1 week history of fevers and chills, with worsening chronic right knee pain and swelling. She has seropositive rheumatoid arthritis (on methotrexate and Plaquenil), type 2 diabetes, osteoarthritis, essential hypertension, and GERD.     Hospital Course  On admission,labs showed leukocytosis (16.4 K), sodium was 132, anion gap was 17.0, lactic acid was 3.1.      Right knee x-ray showed moderate right knee joint effusion with questionable soft tissue gas seen posterior to the distal femur.     CT right knee showed joint effusion with thick synovial enhancement, concerning for infection. There is gas within the multilocular fluid collection in the popliteal fossa, concerning for infected Baker's cyst/abscess.     ER physician attempted arthrocentesis but was unsuccessful. Orthopedic surgery on-call recommended starting IV antibiotics with formal consult. Vancomycin, Clindamycin and Zosyn were empirically started.  Lactic acid improved to 1.5.     Patient underwent right knee arthrotomy and drainage of deep infection, incision and drainage of right thigh abscess on 5/11/2022. 2 drains were placed.      ID were consulted.  Vancomycin and Zosyn switched to Ancef on 5/13/2022.  Cultures were negative.  Ancef was discontinued and doxycycline 100 mg p.o. twice daily with Levaquin 50 mg daily was started on 5/16/2022 for 14 days.     LUE US from 5/13/2022 shows acute, non-occlusive superficial venous thrombosis in the cephalic vein of the distal bicep attached to the IV. Left arm swelling and pain  improved.      Right thigh drain was removed 5/16/2022. Right knee drain was removed on 5/18/2022.     Interval Problem Update  Afebrile and hemodynamically stable. Labs are unremarkable. Pain is improving. Potassium is low, replacement ordered.      I have personally seen and  examined the patient at bedside. I discussed the plan of care with patient and bedside RN.     Consultants/Specialty  orthopedics     Code Status  Full Code     Disposition  Patient is not medically cleared for discharge.   Anticipate discharge to to home with close outpatient follow-up.  I have placed the appropriate orders for post-discharge needs.     Review of Systems  Review of Systems   Constitutional: No fevers or chills  HENT: Negative.    Eyes: Negative.    Respiratory: Negative.    Cardiovascular: Negative.    Gastrointestinal: Negative.    Genitourinary: Negative.    Musculoskeletal: Improved pain in right knee  Skin: Negative.    Neurological: Negative.    Endo/Heme/Allergies: Negative.    Psychiatric/Behavioral: Negative.         Physical Exam  Temp:  [36.8 °C (98.3 °F)-37.2 °C (99 °F)] 37.2 °C (98.9 °F)  Pulse:  [78-84] 82  Resp:  [17-19] 17  BP: (126-141)/(53-72) 138/72  SpO2:  [91 %-95 %] 95 %    Physical Exam  Constitutional:       General: She is in NAD.      Appearance: She is obese.   HENT:      Head: Normocephalic.      Nose: Nose normal.      Mouth/Throat:      Mouth: Mucous membranes are moist.   Eyes:      Pupils: Pupils are equal, round, and reactive to light.   Cardiovascular:      Rate and Rhythm: Normal rate.   Pulmonary:      Effort: Pulmonary effort is normal.   Abdominal:      Palpations: Abdomen is soft.   Musculoskeletal:         General: Right knee with dressing     Cervical back: Normal range of motion.      Right lower leg: No edema.      Left lower leg: No edema.   Skin:     General: Skin is warm and dry.   Neurological:      General: No focal deficit present.      Mental Status: She is alert.   Psychiatric:         Mood and Affect: Mood normal.       Fluids    Intake/Output Summary (Last 24 hours) at 5/27/2022 1053  Last data filed at 5/27/2022 0409  Gross per 24 hour   Intake --   Output 400 ml   Net -400 ml       Laboratory  Recent Labs     05/25/22  0207 05/27/22  0017   WBC  8.0 7.7   RBC 3.08* 3.23*   HEMOGLOBIN 9.0* 9.5*   HEMATOCRIT 28.1* 28.9*   MCV 91.2 89.5   MCH 29.2 29.4   MCHC 32.0* 32.9*   RDW 47.8 45.9   PLATELETCT 318 319   MPV 9.5 9.4     Recent Labs     05/25/22  0207 05/27/22  0017   SODIUM 139 137   POTASSIUM 3.6 3.2*   CHLORIDE 101 100   CO2 27 24   GLUCOSE 136* 165*   BUN 12 12   CREATININE 0.87 0.80   CALCIUM 8.6 8.8                   Imaging  DX-HIP-COMPLETE - UNILATERAL 2+ RIGHT   Final Result      1.  No RIGHT hip or pelvic fracture.   2.  Mild degenerative change of both hips.      US-RENAL   Final Result      1.  Normal renal ultrasound.      DX-KNEE 2- RIGHT   Final Result      1. Surgical drain terminating within the patellofemoral compartment of the right knee joint. No other radiopaque foreign object.   2. Postsurgical changes in the anterior knee soft tissues, surgical skin staples.      US-EXTREMITY VENOUS UPPER UNILAT LEFT   Final Result      CT-KNEE WITH RIGHT   Final Result         1. Moderate knee joint effusion with thick synovial enhancement, concerning for infection      2. There is gas within the multilocular fluid collection in the popliteal fossa, concerning for infected Baker's cyst/abscess.      3. Soft tissue gas in the lateral leg as well.      DX-KNEE 2- RIGHT   Final Result         Moderate right knee joint effusion.      Questionable soft tissue gas seen posterior to the distal femur on lateral view.      DX-CHEST-PORTABLE (1 VIEW)   Final Result         1.  There are mild perihilar opacifications which could be due to edema or bronchitis.      2.  No consolidations identified.           Assessment/Plan  Arthritis of right knee  Assessment & Plan  Patient underwent right knee arthrotomy and drainage of deep infection, incision and drainage of right thigh abscess on 5/11/2022. 2 drains were palced. Vancomycin and Zosyn switched to Ancef on 5/13/2022.  No positive cultures to date. Ancef switched to doxycycline Levaquin on 5/16/2022, with end  date 5/30/2022.  Per orthopedic surgery, continue DVT prophylaxis with SCDs and Lovenox until mobilizing then switch to aspirin for 4 weeks. She is to follow-up with orthopedic surgery outpatient clinic in 2 weeks for wound check and suture/staple removal. Right thigh drain was removed on 5/16/2022.  Right knee drain was removed on 5/18/2022    Type 2 diabetes mellitus with hyperglycemia, without long-term current use of insulin (HCC)- (present on admission)  Assessment & Plan  Home metformin on hold.  Hemoglobin A1c was 7.7 on 10/22/2021.  Repeat ordered.  Continue SSI with Accu-Cheks and hypoglycemia protocol.    Seropositive rheumatoid arthritis (HCC)  Assessment & Plan  On methotrexate and Plaquenil for seropositive rheumatoid arthritis.  We will place on hold due to acute infection.    Essential hypertension- (present on admission)  Assessment & Plan  Continue lisinopril and amlodipine    Superficial venous thrombosis of arm, left  Assessment & Plan  LUE US from 5/13/2022 shows acute, non-occlusive superficial venous thrombosis in the cephalic vein of the distal bicep attached to the IV.  IV removed.  Pain and swelling resolved         VTE prophylaxis: enoxaparin ppx    I have performed a physical exam and reviewed and updated ROS and Plan today (5/27/2022). In review of yesterday's note (5/26/2022), there are no changes except as documented above.

## 2022-05-28 PROBLEM — Z02.9 DISCHARGE PLANNING ISSUES: Status: ACTIVE | Noted: 2022-05-28

## 2022-05-28 PROBLEM — Z75.8 DISCHARGE PLANNING ISSUES: Status: ACTIVE | Noted: 2022-05-28

## 2022-05-28 LAB
ANION GAP SERPL CALC-SCNC: 14 MMOL/L (ref 7–16)
BASOPHILS # BLD AUTO: 0.9 % (ref 0–1.8)
BASOPHILS # BLD: 0.07 K/UL (ref 0–0.12)
BUN SERPL-MCNC: 10 MG/DL (ref 8–22)
CALCIUM SERPL-MCNC: 9.3 MG/DL (ref 8.5–10.5)
CHLORIDE SERPL-SCNC: 101 MMOL/L (ref 96–112)
CO2 SERPL-SCNC: 22 MMOL/L (ref 20–33)
CREAT SERPL-MCNC: 0.77 MG/DL (ref 0.5–1.4)
EOSINOPHIL # BLD AUTO: 0.18 K/UL (ref 0–0.51)
EOSINOPHIL NFR BLD: 2.3 % (ref 0–6.9)
ERYTHROCYTE [DISTWIDTH] IN BLOOD BY AUTOMATED COUNT: 45 FL (ref 35.9–50)
ERYTHROCYTE [DISTWIDTH] IN BLOOD BY AUTOMATED COUNT: 45.2 FL (ref 35.9–50)
GFR SERPLBLD CREATININE-BSD FMLA CKD-EPI: 89 ML/MIN/1.73 M 2
GLUCOSE BLD STRIP.AUTO-MCNC: 122 MG/DL (ref 65–99)
GLUCOSE BLD STRIP.AUTO-MCNC: 147 MG/DL (ref 65–99)
GLUCOSE BLD STRIP.AUTO-MCNC: 98 MG/DL (ref 65–99)
GLUCOSE SERPL-MCNC: 138 MG/DL (ref 65–99)
HCT VFR BLD AUTO: 29.3 % (ref 37–47)
HCT VFR BLD AUTO: 30.3 % (ref 37–47)
HGB BLD-MCNC: 10.1 G/DL (ref 12–16)
HGB BLD-MCNC: 9.6 G/DL (ref 12–16)
IMM GRANULOCYTES # BLD AUTO: 0.04 K/UL (ref 0–0.11)
IMM GRANULOCYTES NFR BLD AUTO: 0.5 % (ref 0–0.9)
LYMPHOCYTES # BLD AUTO: 1.4 K/UL (ref 1–4.8)
LYMPHOCYTES NFR BLD: 17.6 % (ref 22–41)
MAGNESIUM SERPL-MCNC: 1.6 MG/DL (ref 1.5–2.5)
MCH RBC QN AUTO: 29.2 PG (ref 27–33)
MCH RBC QN AUTO: 29.6 PG (ref 27–33)
MCHC RBC AUTO-ENTMCNC: 32.8 G/DL (ref 33.6–35)
MCHC RBC AUTO-ENTMCNC: 33.3 G/DL (ref 33.6–35)
MCV RBC AUTO: 88.9 FL (ref 81.4–97.8)
MCV RBC AUTO: 89.1 FL (ref 81.4–97.8)
MONOCYTES # BLD AUTO: 0.81 K/UL (ref 0–0.85)
MONOCYTES NFR BLD AUTO: 10.2 % (ref 0–13.4)
NEUTROPHILS # BLD AUTO: 5.44 K/UL (ref 2–7.15)
NEUTROPHILS NFR BLD: 68.5 % (ref 44–72)
NRBC # BLD AUTO: 0 K/UL
NRBC BLD-RTO: 0 /100 WBC
PLATELET # BLD AUTO: 316 K/UL (ref 164–446)
PLATELET # BLD AUTO: 338 K/UL (ref 164–446)
PMV BLD AUTO: 9.4 FL (ref 9–12.9)
PMV BLD AUTO: 9.5 FL (ref 9–12.9)
POTASSIUM SERPL-SCNC: 3.7 MMOL/L (ref 3.6–5.5)
RBC # BLD AUTO: 3.29 M/UL (ref 4.2–5.4)
RBC # BLD AUTO: 3.41 M/UL (ref 4.2–5.4)
SODIUM SERPL-SCNC: 137 MMOL/L (ref 135–145)
WBC # BLD AUTO: 6.6 K/UL (ref 4.8–10.8)
WBC # BLD AUTO: 7.9 K/UL (ref 4.8–10.8)

## 2022-05-28 PROCEDURE — 770004 HCHG ROOM/CARE - ONCOLOGY PRIVATE *

## 2022-05-28 PROCEDURE — 36415 COLL VENOUS BLD VENIPUNCTURE: CPT

## 2022-05-28 PROCEDURE — 82962 GLUCOSE BLOOD TEST: CPT | Mod: 91

## 2022-05-28 PROCEDURE — 700102 HCHG RX REV CODE 250 W/ 637 OVERRIDE(OP): Performed by: HOSPITALIST

## 2022-05-28 PROCEDURE — A9270 NON-COVERED ITEM OR SERVICE: HCPCS | Performed by: INTERNAL MEDICINE

## 2022-05-28 PROCEDURE — 85025 COMPLETE CBC W/AUTO DIFF WBC: CPT

## 2022-05-28 PROCEDURE — 700102 HCHG RX REV CODE 250 W/ 637 OVERRIDE(OP): Performed by: INTERNAL MEDICINE

## 2022-05-28 PROCEDURE — 99233 SBSQ HOSP IP/OBS HIGH 50: CPT | Performed by: INTERNAL MEDICINE

## 2022-05-28 PROCEDURE — A9270 NON-COVERED ITEM OR SERVICE: HCPCS | Performed by: HOSPITALIST

## 2022-05-28 PROCEDURE — 85027 COMPLETE CBC AUTOMATED: CPT

## 2022-05-28 PROCEDURE — 700111 HCHG RX REV CODE 636 W/ 250 OVERRIDE (IP): Performed by: ORTHOPAEDIC SURGERY

## 2022-05-28 PROCEDURE — 83735 ASSAY OF MAGNESIUM: CPT

## 2022-05-28 PROCEDURE — 80048 BASIC METABOLIC PNL TOTAL CA: CPT

## 2022-05-28 RX ADMIN — DOXYCYCLINE 100 MG: 100 TABLET, FILM COATED ORAL at 20:14

## 2022-05-28 RX ADMIN — LEVOFLOXACIN 250 MG: 250 TABLET, FILM COATED ORAL at 12:41

## 2022-05-28 RX ADMIN — FOLIC ACID 1 MG: 1 TABLET ORAL at 12:42

## 2022-05-28 RX ADMIN — DOXYCYCLINE 100 MG: 100 TABLET, FILM COATED ORAL at 09:02

## 2022-05-28 RX ADMIN — LISINOPRIL 40 MG: 20 TABLET ORAL at 12:41

## 2022-05-28 RX ADMIN — SEVELAMER CARBONATE 800 MG: 800 TABLET, FILM COATED ORAL at 07:25

## 2022-05-28 RX ADMIN — AMLODIPINE BESYLATE 10 MG: 10 TABLET ORAL at 17:50

## 2022-05-28 RX ADMIN — OXYCODONE HYDROCHLORIDE 10 MG: 10 TABLET ORAL at 12:41

## 2022-05-28 RX ADMIN — SEVELAMER CARBONATE 800 MG: 800 TABLET, FILM COATED ORAL at 11:29

## 2022-05-28 RX ADMIN — SEVELAMER CARBONATE 800 MG: 800 TABLET, FILM COATED ORAL at 16:23

## 2022-05-28 RX ADMIN — ATORVASTATIN CALCIUM 20 MG: 20 TABLET, FILM COATED ORAL at 16:23

## 2022-05-28 RX ADMIN — ENOXAPARIN SODIUM 40 MG: 40 INJECTION SUBCUTANEOUS at 05:31

## 2022-05-28 ASSESSMENT — PAIN DESCRIPTION - PAIN TYPE
TYPE: ACUTE PAIN;SURGICAL PAIN
TYPE: ACUTE PAIN
TYPE: ACUTE PAIN

## 2022-05-28 NOTE — PROGRESS NOTES
Hospital Medicine Daily Progress Note    Date of Service  5/28/2022    Chief Complaint  Kary Shah is a 56 y.o. female admitted 5/10/2022 with 1 week history of fevers and chills, with worsening chronic right knee pain and swelling. She has seropositive rheumatoid arthritis (on methotrexate and Plaquenil), type 2 diabetes, osteoarthritis, essential hypertension, and GERD.     Hospital Course  On admission,labs showed leukocytosis (16.4 K), sodium was 132, anion gap was 17.0, lactic acid was 3.1.      Right knee x-ray showed moderate right knee joint effusion with questionable soft tissue gas seen posterior to the distal femur.     CT right knee showed joint effusion with thick synovial enhancement, concerning for infection. There is gas within the multilocular fluid collection in the popliteal fossa, concerning for infected Baker's cyst/abscess.     ER physician attempted arthrocentesis but was unsuccessful. Orthopedic surgery on-call recommended starting IV antibiotics with formal consult. Vancomycin, Clindamycin and Zosyn were empirically started.  Lactic acid improved to 1.5.     Patient underwent right knee arthrotomy and drainage of deep infection, incision and drainage of right thigh abscess on 5/11/2022. 2 drains were placed.      ID were consulted.  Vancomycin and Zosyn switched to Ancef on 5/13/2022.  Cultures were negative.  Ancef was discontinued and doxycycline 100 mg p.o. twice daily with Levaquin 50 mg daily was started on 5/16/2022 for 14 days.     LUE US from 5/13/2022 shows acute, non-occlusive superficial venous thrombosis in the cephalic vein of the distal bicep attached to the IV. Left arm swelling and pain  improved.      Right thigh drain was removed 5/16/2022. Right knee drain was removed on 5/18/2022.     Interval Problem Update  Afebrile and hemodynamically stable. Labs are unremarkable. Pain is improving.  Per reevaluation by physical therapy on 5/27/2022, postacute placement for  additional physical therapy services prior to home discharge was recommended.     I have personally seen and examined the patient at bedside. I discussed the plan of care with patient and bedside RN.     Consultants/Specialty  orthopedics     Code Status  Full Code     Disposition  Patient is medically cleared for discharge.   Anticipate discharge to postacute placement for additional physical therapy prior to home discharge.  Difficult placement due to copayment issues    I have placed the appropriate orders for post-discharge needs.     Review of Systems  Review of Systems   Constitutional: No fevers or chills  HENT: Negative.    Eyes: Negative.    Respiratory: Negative.    Cardiovascular: Negative.    Gastrointestinal: Negative.    Genitourinary: Negative.    Musculoskeletal: Improved pain in right knee  Skin: Negative.    Neurological: Negative.    Endo/Heme/Allergies: Negative.    Psychiatric/Behavioral: Negative.         Physical Exam  Temp:  [36.1 °C (97 °F)-37.1 °C (98.8 °F)] 36.9 °C (98.5 °F)  Pulse:  [] 105  Resp:  [16-18] 16  BP: (110-129)/(50-69) 110/68  SpO2:  [93 %-96 %] 95 %      Physical Exam  Constitutional:       General: She is in NAD.      Appearance: She is obese.   HENT:      Head: Normocephalic.      Nose: Nose normal.      Mouth/Throat:      Mouth: Mucous membranes are moist.   Eyes:      Pupils: Pupils are equal, round, and reactive to light.   Cardiovascular:      Rate and Rhythm: Normal rate.   Pulmonary:      Effort: Pulmonary effort is normal.   Abdominal:      Palpations: Abdomen is soft.   Musculoskeletal:         General: Right knee with dressing     Cervical back: Normal range of motion.      Right lower leg: No edema.      Left lower leg: No edema.   Skin:     General: Skin is warm and dry.   Neurological:      General: No focal deficit present.      Mental Status: She is alert.   Psychiatric:         Mood and Affect: Mood normal.       Fluids    Intake/Output Summary (Last 24  hours) at 5/28/2022 1045  Last data filed at 5/28/2022 0400  Gross per 24 hour   Intake --   Output 750 ml   Net -750 ml       Laboratory  Recent Labs     05/27/22  0017 05/28/22  0027   WBC 7.7 7.9   RBC 3.23* 3.29*   HEMOGLOBIN 9.5* 9.6*   HEMATOCRIT 28.9* 29.3*   MCV 89.5 89.1   MCH 29.4 29.2   MCHC 32.9* 32.8*   RDW 45.9 45.2   PLATELETCT 319 338   MPV 9.4 9.4     Recent Labs     05/27/22  0017 05/28/22  0027   SODIUM 137 137   POTASSIUM 3.2* 3.7   CHLORIDE 100 101   CO2 24 22   GLUCOSE 165* 138*   BUN 12 10   CREATININE 0.80 0.77   CALCIUM 8.8 9.3                   Imaging  DX-HIP-COMPLETE - UNILATERAL 2+ RIGHT   Final Result      1.  No RIGHT hip or pelvic fracture.   2.  Mild degenerative change of both hips.      US-RENAL   Final Result      1.  Normal renal ultrasound.      DX-KNEE 2- RIGHT   Final Result      1. Surgical drain terminating within the patellofemoral compartment of the right knee joint. No other radiopaque foreign object.   2. Postsurgical changes in the anterior knee soft tissues, surgical skin staples.      US-EXTREMITY VENOUS UPPER UNILAT LEFT   Final Result      CT-KNEE WITH RIGHT   Final Result         1. Moderate knee joint effusion with thick synovial enhancement, concerning for infection      2. There is gas within the multilocular fluid collection in the popliteal fossa, concerning for infected Baker's cyst/abscess.      3. Soft tissue gas in the lateral leg as well.      DX-KNEE 2- RIGHT   Final Result         Moderate right knee joint effusion.      Questionable soft tissue gas seen posterior to the distal femur on lateral view.      DX-CHEST-PORTABLE (1 VIEW)   Final Result         1.  There are mild perihilar opacifications which could be due to edema or bronchitis.      2.  No consolidations identified.           Assessment/Plan  Discharge planning issues  Assessment & Plan  Patient is medically cleared for discharge.  Physical therapy recommend postacute placement for  additional physical therapy services prior to home discharge.  Placement is difficult due to financial issues and patient copayment.  Case management and social work aware.     Arthritis of right knee  Assessment & Plan  Patient underwent right knee arthrotomy and drainage of deep infection, incision and drainage of right thigh abscess on 5/11/2022. 2 drains were palced. Vancomycin and Zosyn switched to Ancef on 5/13/2022.  No positive cultures to date. Ancef switched to doxycycline Levaquin on 5/16/2022, with end date 5/30/2022.  Per orthopedic surgery, continue DVT prophylaxis with SCDs and Lovenox until mobilizing then switch to aspirin for 4 weeks. She is to follow-up with orthopedic surgery outpatient clinic in 2 weeks for wound check and suture/staple removal. Right thigh drain was removed on 5/16/2022.  Right knee drain was removed on 5/18/2022    Type 2 diabetes mellitus with hyperglycemia, without long-term current use of insulin (HCC)- (present on admission)  Assessment & Plan  Home metformin on hold.  Hemoglobin A1c was 7.7 on 10/22/2021.  Repeat ordered.  Continue SSI with Accu-Cheks and hypoglycemia protocol.    Seropositive rheumatoid arthritis (HCC)  Assessment & Plan  On methotrexate and Plaquenil for seropositive rheumatoid arthritis.  We will place on hold due to acute infection.    Essential hypertension- (present on admission)  Assessment & Plan  Continue lisinopril and amlodipine    Superficial venous thrombosis of arm, left  Assessment & Plan  LUE US from 5/13/2022 shows acute, non-occlusive superficial venous thrombosis in the cephalic vein of the distal bicep attached to the IV.  IV removed.  Pain and swelling resolved       VTE prophylaxis: enoxaparin ppx    I have performed a physical exam and reviewed and updated ROS and Plan today (5/28/2022). In review of yesterday's note (5/27/2022), there are no changes except as documented above.

## 2022-05-28 NOTE — CARE PLAN
The patient is Stable - Low risk of patient condition declining or worsening    Shift Goals  Clinical Goals: pain control; rest  Patient Goals: rest  Family Goals: not present    Progress made toward(s) clinical / shift goals: pain has been controlled; pt resting comfortably    Patient is not progressing towards the following goals: n/a      Problem: Knowledge Deficit - Standard  Goal: Patient and family/care givers will demonstrate understanding of plan of care, disease process/condition, diagnostic tests and medications  Outcome: Progressing  Note: Pt is alert and oriented x 4; is aware and understands plan of care.     Problem: Respiratory  Goal: Patient will achieve/maintain optimum respiratory ventilation and gas exchange  Outcome: Met     Problem: Pain - Standard  Goal: Alleviation of pain or a reduction in pain to the patient’s comfort goal  Outcome: Progressing  Note: Pt has had no complaints of pain nor discomfort this shift; pt is talya to verbalize needs and makes them known.     Problem: Fall Risk  Goal: Patient will remain free from falls  Outcome: Progressing     Problem: Skin Integrity  Goal: Skin integrity is maintained or improved  Outcome: Progressing

## 2022-05-28 NOTE — ASSESSMENT & PLAN NOTE
Patient is medically cleared for discharge.  Physical therapy recommend postacute placement for additional physical therapy services prior to home discharge.  Placement is difficult due to financial issues and patient copayment.  Case management and social work aware.

## 2022-05-29 LAB
ANION GAP SERPL CALC-SCNC: 12 MMOL/L (ref 7–16)
BUN SERPL-MCNC: 13 MG/DL (ref 8–22)
CALCIUM SERPL-MCNC: 8.9 MG/DL (ref 8.5–10.5)
CHLORIDE SERPL-SCNC: 102 MMOL/L (ref 96–112)
CO2 SERPL-SCNC: 22 MMOL/L (ref 20–33)
CREAT SERPL-MCNC: 0.68 MG/DL (ref 0.5–1.4)
GFR SERPLBLD CREATININE-BSD FMLA CKD-EPI: 101 ML/MIN/1.73 M 2
GLUCOSE BLD STRIP.AUTO-MCNC: 107 MG/DL (ref 65–99)
GLUCOSE BLD STRIP.AUTO-MCNC: 123 MG/DL (ref 65–99)
GLUCOSE BLD STRIP.AUTO-MCNC: 132 MG/DL (ref 65–99)
GLUCOSE BLD STRIP.AUTO-MCNC: 142 MG/DL (ref 65–99)
GLUCOSE BLD STRIP.AUTO-MCNC: 185 MG/DL (ref 65–99)
GLUCOSE SERPL-MCNC: 136 MG/DL (ref 65–99)
POTASSIUM SERPL-SCNC: 3.6 MMOL/L (ref 3.6–5.5)
SODIUM SERPL-SCNC: 136 MMOL/L (ref 135–145)

## 2022-05-29 PROCEDURE — A9270 NON-COVERED ITEM OR SERVICE: HCPCS | Performed by: INTERNAL MEDICINE

## 2022-05-29 PROCEDURE — 99232 SBSQ HOSP IP/OBS MODERATE 35: CPT | Performed by: INTERNAL MEDICINE

## 2022-05-29 PROCEDURE — 80048 BASIC METABOLIC PNL TOTAL CA: CPT

## 2022-05-29 PROCEDURE — 700102 HCHG RX REV CODE 250 W/ 637 OVERRIDE(OP): Performed by: HOSPITALIST

## 2022-05-29 PROCEDURE — 36415 COLL VENOUS BLD VENIPUNCTURE: CPT

## 2022-05-29 PROCEDURE — 82962 GLUCOSE BLOOD TEST: CPT | Mod: 91

## 2022-05-29 PROCEDURE — 700102 HCHG RX REV CODE 250 W/ 637 OVERRIDE(OP): Performed by: INTERNAL MEDICINE

## 2022-05-29 PROCEDURE — A9270 NON-COVERED ITEM OR SERVICE: HCPCS | Performed by: HOSPITALIST

## 2022-05-29 PROCEDURE — 770004 HCHG ROOM/CARE - ONCOLOGY PRIVATE *

## 2022-05-29 PROCEDURE — 700111 HCHG RX REV CODE 636 W/ 250 OVERRIDE (IP): Performed by: ORTHOPAEDIC SURGERY

## 2022-05-29 RX ADMIN — SEVELAMER CARBONATE 800 MG: 800 TABLET, FILM COATED ORAL at 16:51

## 2022-05-29 RX ADMIN — AMLODIPINE BESYLATE 10 MG: 10 TABLET ORAL at 16:54

## 2022-05-29 RX ADMIN — LISINOPRIL 40 MG: 20 TABLET ORAL at 13:31

## 2022-05-29 RX ADMIN — SEVELAMER CARBONATE 800 MG: 800 TABLET, FILM COATED ORAL at 06:41

## 2022-05-29 RX ADMIN — LEVOFLOXACIN 250 MG: 250 TABLET, FILM COATED ORAL at 13:31

## 2022-05-29 RX ADMIN — DOXYCYCLINE 100 MG: 100 TABLET, FILM COATED ORAL at 20:56

## 2022-05-29 RX ADMIN — FOLIC ACID 1 MG: 1 TABLET ORAL at 13:31

## 2022-05-29 RX ADMIN — ENOXAPARIN SODIUM 40 MG: 40 INJECTION SUBCUTANEOUS at 06:41

## 2022-05-29 RX ADMIN — ATORVASTATIN CALCIUM 20 MG: 20 TABLET, FILM COATED ORAL at 16:54

## 2022-05-29 RX ADMIN — OXYCODONE HYDROCHLORIDE 10 MG: 10 TABLET ORAL at 16:50

## 2022-05-29 RX ADMIN — DOXYCYCLINE 100 MG: 100 TABLET, FILM COATED ORAL at 09:11

## 2022-05-29 RX ADMIN — SEVELAMER CARBONATE 800 MG: 800 TABLET, FILM COATED ORAL at 11:40

## 2022-05-29 RX ADMIN — INSULIN HUMAN 2 UNITS: 100 INJECTION, SOLUTION PARENTERAL at 20:56

## 2022-05-29 ASSESSMENT — PAIN DESCRIPTION - PAIN TYPE: TYPE: ACUTE PAIN

## 2022-05-29 NOTE — PROGRESS NOTES
Hospital Medicine Daily Progress Note    Date of Service  5/29/2022    Chief Complaint  Kary Shah is a 56 y.o. female admitted 5/10/2022 with 1 week history of fevers and chills, with worsening chronic right knee pain and swelling. She has seropositive rheumatoid arthritis (on methotrexate and Plaquenil), type 2 diabetes, osteoarthritis, essential hypertension, and GERD.     Hospital Course  On admission,labs showed leukocytosis (16.4 K), sodium was 132, anion gap was 17.0, lactic acid was 3.1.      Right knee x-ray showed moderate right knee joint effusion with questionable soft tissue gas seen posterior to the distal femur.     CT right knee showed joint effusion with thick synovial enhancement, concerning for infection. There is gas within the multilocular fluid collection in the popliteal fossa, concerning for infected Baker's cyst/abscess.     ER physician attempted arthrocentesis but was unsuccessful. Orthopedic surgery on-call recommended starting IV antibiotics with formal consult. Vancomycin, Clindamycin and Zosyn were empirically started.  Lactic acid improved to 1.5.     Patient underwent right knee arthrotomy and drainage of deep infection, incision and drainage of right thigh abscess on 5/11/2022. 2 drains were placed.      ID were consulted.  Vancomycin and Zosyn switched to Ancef on 5/13/2022.  Cultures were negative.  Ancef was discontinued and doxycycline 100 mg p.o. twice daily with Levaquin 50 mg daily was started on 5/16/2022 for 14 days.     LUE US from 5/13/2022 shows acute, non-occlusive superficial venous thrombosis in the cephalic vein of the distal bicep attached to the IV. Left arm swelling and pain  improved.      Right thigh drain was removed 5/16/2022. Right knee drain was removed on 5/18/2022.     Interval Problem Update  Afebrile and hemodynamically stable. Labs are unremarkable. Pain is improving.  Per reevaluation by physical therapy on 5/27/2022, postacute placement for  additional physical therapy services prior to home discharge was recommended.     I have personally seen and examined the patient at bedside. I discussed the plan of care with patient and bedside RN.     Consultants/Specialty  orthopedics     Code Status  Full Code     Disposition  Patient is medically cleared for discharge.   Anticipate discharge to postacute placement for additional physical therapy prior to home discharge.  Difficult placement due to copayment issues     I have placed the appropriate orders for post-discharge needs.     Review of Systems  Review of Systems   Constitutional: No fevers or chills  HENT: Negative.    Eyes: Negative.    Respiratory: Negative.    Cardiovascular: Negative.    Gastrointestinal: Negative.    Genitourinary: Negative.    Musculoskeletal: Improved pain in right knee  Skin: Negative.    Neurological: Negative.    Endo/Heme/Allergies: Negative.    Psychiatric/Behavioral: Negative.         Physical Exam  Temp:  [36.7 °C (98 °F)-36.8 °C (98.3 °F)] 36.7 °C (98.1 °F)  Pulse:  [50-92] 92  Resp:  [15-17] 17  BP: (121-136)/(55-69) 123/69  SpO2:  [93 %-97 %] 97 %    Physical Exam  Constitutional:       General: She is in NAD.      Appearance: She is obese.   HENT:      Head: Normocephalic.      Nose: Nose normal.      Mouth/Throat:      Mouth: Mucous membranes are moist.   Eyes:      Pupils: Pupils are equal, round, and reactive to light.   Cardiovascular:      Rate and Rhythm: Normal rate.   Pulmonary:      Effort: Pulmonary effort is normal.   Abdominal:      Palpations: Abdomen is soft.   Musculoskeletal:         General: Right knee with dressing     Cervical back: Normal range of motion.      Right lower leg: No edema.      Left lower leg: No edema.   Skin:     General: Skin is warm and dry.   Neurological:      General: No focal deficit present.      Mental Status: She is alert.   Psychiatric:         Mood and Affect: Mood normal.     Fluids    Intake/Output Summary (Last 24  hours) at 5/29/2022 1250  Last data filed at 5/28/2022 1800  Gross per 24 hour   Intake 240 ml   Output --   Net 240 ml       Laboratory  Recent Labs     05/27/22  0017 05/28/22  0027 05/28/22  1856   WBC 7.7 7.9 6.6   RBC 3.23* 3.29* 3.41*   HEMOGLOBIN 9.5* 9.6* 10.1*   HEMATOCRIT 28.9* 29.3* 30.3*   MCV 89.5 89.1 88.9   MCH 29.4 29.2 29.6   MCHC 32.9* 32.8* 33.3*   RDW 45.9 45.2 45.0   PLATELETCT 319 338 316   MPV 9.4 9.4 9.5     Recent Labs     05/27/22  0017 05/28/22  0027 05/29/22  0410   SODIUM 137 137 136   POTASSIUM 3.2* 3.7 3.6   CHLORIDE 100 101 102   CO2 24 22 22   GLUCOSE 165* 138* 136*   BUN 12 10 13   CREATININE 0.80 0.77 0.68   CALCIUM 8.8 9.3 8.9                   Imaging  DX-HIP-COMPLETE - UNILATERAL 2+ RIGHT   Final Result      1.  No RIGHT hip or pelvic fracture.   2.  Mild degenerative change of both hips.      US-RENAL   Final Result      1.  Normal renal ultrasound.      DX-KNEE 2- RIGHT   Final Result      1. Surgical drain terminating within the patellofemoral compartment of the right knee joint. No other radiopaque foreign object.   2. Postsurgical changes in the anterior knee soft tissues, surgical skin staples.      US-EXTREMITY VENOUS UPPER UNILAT LEFT   Final Result      CT-KNEE WITH RIGHT   Final Result         1. Moderate knee joint effusion with thick synovial enhancement, concerning for infection      2. There is gas within the multilocular fluid collection in the popliteal fossa, concerning for infected Baker's cyst/abscess.      3. Soft tissue gas in the lateral leg as well.      DX-KNEE 2- RIGHT   Final Result         Moderate right knee joint effusion.      Questionable soft tissue gas seen posterior to the distal femur on lateral view.      DX-CHEST-PORTABLE (1 VIEW)   Final Result         1.  There are mild perihilar opacifications which could be due to edema or bronchitis.      2.  No consolidations identified.           Assessment/Plan  Discharge planning issues  Assessment &  Plan  Patient is medically cleared for discharge.  Physical therapy recommend postacute placement for additional physical therapy services prior to home discharge.  Placement is difficult due to financial issues and patient copayment.  Case management and social work aware.     Arthritis of right knee  Assessment & Plan  Patient underwent right knee arthrotomy and drainage of deep infection, incision and drainage of right thigh abscess on 5/11/2022. 2 drains were palced. Vancomycin and Zosyn switched to Ancef on 5/13/2022.  No positive cultures to date. Ancef switched to doxycycline Levaquin on 5/16/2022, with end date 5/30/2022.  Per orthopedic surgery, continue DVT prophylaxis with SCDs and Lovenox until mobilizing then switch to aspirin for 4 weeks. Right thigh drain was removed on 5/16/2022. Right knee drain was removed on 5/18/2022. She is to follow-up with orthopedic surgery outpatient clinic 2 weeks for wound check and suture/staple removal.    Type 2 diabetes mellitus with hyperglycemia, without long-term current use of insulin (HCC)- (present on admission)  Assessment & Plan  Home metformin on hold.  Hemoglobin A1c was 7.7 on 10/22/2021.  Repeat ordered.  Continue SSI with Accu-Cheks and hypoglycemia protocol.    Seropositive rheumatoid arthritis (HCC)  Assessment & Plan  On methotrexate and Plaquenil for seropositive rheumatoid arthritis.  We will place on hold due to acute infection.    Essential hypertension- (present on admission)  Assessment & Plan  Continue lisinopril and amlodipine    Superficial venous thrombosis of arm, left  Assessment & Plan  LUE US from 5/13/2022 shows acute, non-occlusive superficial venous thrombosis in the cephalic vein of the distal bicep attached to the IV.  IV removed.  Pain and swelling resolved       VTE prophylaxis: enoxaparin ppx    I have performed a physical exam and reviewed and updated ROS and Plan today (5/29/2022). In review of yesterday's note (5/28/2022), there  are no changes except as documented above.

## 2022-05-29 NOTE — CARE PLAN
The patient is Stable - Low risk of patient condition declining or worsening    Shift Goals  Clinical Goals: Pain control, increase mobility  Patient Goals: Rest  Family Goals:     Progress made toward(s) clinical / shift goals:      Problem: Knowledge Deficit - Standard  Goal: Patient and family/care givers will demonstrate understanding of plan of care, disease process/condition, diagnostic tests and medications  Outcome: Progressing     Problem: Fall Risk  Goal: Patient will remain free from falls  Outcome: Progressing     Problem: Mobility  Goal: Patient's capacity to carry out activities will improve  Outcome: Progressing     Problem: Skin Integrity  Goal: Skin integrity is maintained or improved  Outcome: Progressing       Patient is not progressing towards the following goals:

## 2022-05-29 NOTE — PROGRESS NOTES
Assumed care of patient at bedside report from NOC RN. Updated on POC. Patient currently A & O x 4, Bulgarian speaking only,  in use; on room air; up standby assist with FWW; without complaints of acute pain. Assessment completed. Call light within reach. Whiteboard updated. Fall precautions in place. Bed locked and in lowest position. All questions answered. No other needs indicated at this time.

## 2022-05-29 NOTE — CARE PLAN
The patient is Watcher - Medium risk of patient condition declining or worsening    Shift Goals  Clinical Goals: Pain control, ambulate, up to chair for meals, FSBS, safety  Patient Goals: Rest  Family Goals: not present    Progress made toward(s) clinical / shift goals:    Patient A&Ox4. Patient updated on plan. Patient with no peripheral access, MD aware. Patient up SBA with FWW. Patient updated on plan. Patient reports pain medicated per MAR. R knee with dressing in place. Call light and personal belongings within reach. Hourly rounding in place.   Problem: Knowledge Deficit - Standard  Goal: Patient and family/care givers will demonstrate understanding of plan of care, disease process/condition, diagnostic tests and medications  Outcome: Progressing  Note: Patient A&Ox4, Macedonian speaking. Patient and son updated on plan, all questions answered at this time. Patient agreeable to plan.    Problem: Pain - Standard  Goal: Alleviation of pain or a reduction in pain to the patient’s comfort goal  Outcome: Progressing  Note: Patient reports pain, medicated per MAR. Patient reports relief with intervention.    Problem: Fall Risk  Goal: Patient will remain free from falls  Outcome: Progressing  Note: Patient educated on fall risk. Patient calling for assistance appropriately at this time. Nonskid socks in use. Patients room close to nurses station. Fall precautions in place. Call light and personal belongings within reach. Hourly rounding in place.    Problem: Mobility  Goal: Patient's capacity to carry out activities will improve  Outcome: Progressing  Note: Patient up SBA with FWW. Patient with staggering/shuffle gait. Patient up to chair for meals.   Problem: Skin Integrity  Goal: Skin integrity is maintained or improved  Outcome: Progressing  Note: Dressing in place to R knee. Dressing CDI. Patient turning self Q2 hours, pillows in use for support and positioning. Patient required reminding to turn.      Patient is  not progressing towards the following goals:

## 2022-05-30 LAB
ANION GAP SERPL CALC-SCNC: 13 MMOL/L (ref 7–16)
BASOPHILS # BLD AUTO: 0.7 % (ref 0–1.8)
BASOPHILS # BLD: 0.05 K/UL (ref 0–0.12)
BUN SERPL-MCNC: 10 MG/DL (ref 8–22)
CALCIUM SERPL-MCNC: 9.3 MG/DL (ref 8.5–10.5)
CHLORIDE SERPL-SCNC: 102 MMOL/L (ref 96–112)
CO2 SERPL-SCNC: 23 MMOL/L (ref 20–33)
CREAT SERPL-MCNC: 0.69 MG/DL (ref 0.5–1.4)
EOSINOPHIL # BLD AUTO: 0.22 K/UL (ref 0–0.51)
EOSINOPHIL NFR BLD: 2.9 % (ref 0–6.9)
ERYTHROCYTE [DISTWIDTH] IN BLOOD BY AUTOMATED COUNT: 45.1 FL (ref 35.9–50)
GFR SERPLBLD CREATININE-BSD FMLA CKD-EPI: 101 ML/MIN/1.73 M 2
GLUCOSE BLD STRIP.AUTO-MCNC: 93 MG/DL (ref 65–99)
GLUCOSE BLD STRIP.AUTO-MCNC: 94 MG/DL (ref 65–99)
GLUCOSE SERPL-MCNC: 120 MG/DL (ref 65–99)
HCT VFR BLD AUTO: 31.5 % (ref 37–47)
HGB BLD-MCNC: 10.4 G/DL (ref 12–16)
IMM GRANULOCYTES # BLD AUTO: 0.06 K/UL (ref 0–0.11)
IMM GRANULOCYTES NFR BLD AUTO: 0.8 % (ref 0–0.9)
LYMPHOCYTES # BLD AUTO: 0.95 K/UL (ref 1–4.8)
LYMPHOCYTES NFR BLD: 12.6 % (ref 22–41)
MCH RBC QN AUTO: 29.8 PG (ref 27–33)
MCHC RBC AUTO-ENTMCNC: 33 G/DL (ref 33.6–35)
MCV RBC AUTO: 90.3 FL (ref 81.4–97.8)
MONOCYTES # BLD AUTO: 0.82 K/UL (ref 0–0.85)
MONOCYTES NFR BLD AUTO: 10.9 % (ref 0–13.4)
NEUTROPHILS # BLD AUTO: 5.44 K/UL (ref 2–7.15)
NEUTROPHILS NFR BLD: 72.1 % (ref 44–72)
NRBC # BLD AUTO: 0 K/UL
NRBC BLD-RTO: 0 /100 WBC
PLATELET # BLD AUTO: 343 K/UL (ref 164–446)
PMV BLD AUTO: 9.5 FL (ref 9–12.9)
POTASSIUM SERPL-SCNC: 3.9 MMOL/L (ref 3.6–5.5)
RBC # BLD AUTO: 3.49 M/UL (ref 4.2–5.4)
SODIUM SERPL-SCNC: 138 MMOL/L (ref 135–145)
WBC # BLD AUTO: 7.5 K/UL (ref 4.8–10.8)

## 2022-05-30 PROCEDURE — 700102 HCHG RX REV CODE 250 W/ 637 OVERRIDE(OP): Performed by: INTERNAL MEDICINE

## 2022-05-30 PROCEDURE — 82962 GLUCOSE BLOOD TEST: CPT | Mod: 91

## 2022-05-30 PROCEDURE — 700102 HCHG RX REV CODE 250 W/ 637 OVERRIDE(OP): Performed by: HOSPITALIST

## 2022-05-30 PROCEDURE — 85025 COMPLETE CBC W/AUTO DIFF WBC: CPT

## 2022-05-30 PROCEDURE — A9270 NON-COVERED ITEM OR SERVICE: HCPCS | Performed by: HOSPITALIST

## 2022-05-30 PROCEDURE — A9270 NON-COVERED ITEM OR SERVICE: HCPCS | Performed by: INTERNAL MEDICINE

## 2022-05-30 PROCEDURE — 97530 THERAPEUTIC ACTIVITIES: CPT

## 2022-05-30 PROCEDURE — 80048 BASIC METABOLIC PNL TOTAL CA: CPT

## 2022-05-30 PROCEDURE — 700111 HCHG RX REV CODE 636 W/ 250 OVERRIDE (IP): Performed by: ORTHOPAEDIC SURGERY

## 2022-05-30 PROCEDURE — 97116 GAIT TRAINING THERAPY: CPT

## 2022-05-30 PROCEDURE — 36415 COLL VENOUS BLD VENIPUNCTURE: CPT

## 2022-05-30 PROCEDURE — 770004 HCHG ROOM/CARE - ONCOLOGY PRIVATE *

## 2022-05-30 PROCEDURE — 99232 SBSQ HOSP IP/OBS MODERATE 35: CPT | Performed by: INTERNAL MEDICINE

## 2022-05-30 RX ADMIN — ATORVASTATIN CALCIUM 20 MG: 20 TABLET, FILM COATED ORAL at 17:10

## 2022-05-30 RX ADMIN — SEVELAMER CARBONATE 800 MG: 800 TABLET, FILM COATED ORAL at 12:14

## 2022-05-30 RX ADMIN — OXYCODONE HYDROCHLORIDE 10 MG: 10 TABLET ORAL at 13:31

## 2022-05-30 RX ADMIN — SEVELAMER CARBONATE 800 MG: 800 TABLET, FILM COATED ORAL at 09:18

## 2022-05-30 RX ADMIN — LISINOPRIL 40 MG: 20 TABLET ORAL at 12:14

## 2022-05-30 RX ADMIN — LEVOFLOXACIN 250 MG: 250 TABLET, FILM COATED ORAL at 14:11

## 2022-05-30 RX ADMIN — AMLODIPINE BESYLATE 10 MG: 10 TABLET ORAL at 17:10

## 2022-05-30 RX ADMIN — ENOXAPARIN SODIUM 40 MG: 40 INJECTION SUBCUTANEOUS at 06:19

## 2022-05-30 RX ADMIN — DOXYCYCLINE 100 MG: 100 TABLET, FILM COATED ORAL at 09:18

## 2022-05-30 RX ADMIN — FOLIC ACID 1 MG: 1 TABLET ORAL at 12:14

## 2022-05-30 RX ADMIN — SEVELAMER CARBONATE 800 MG: 800 TABLET, FILM COATED ORAL at 17:10

## 2022-05-30 ASSESSMENT — COGNITIVE AND FUNCTIONAL STATUS - GENERAL
MOVING TO AND FROM BED TO CHAIR: A LOT
CLIMB 3 TO 5 STEPS WITH RAILING: TOTAL
MOBILITY SCORE: 14
TURNING FROM BACK TO SIDE WHILE IN FLAT BAD: A LITTLE
STANDING UP FROM CHAIR USING ARMS: A LITTLE
WALKING IN HOSPITAL ROOM: A LITTLE
MOVING FROM LYING ON BACK TO SITTING ON SIDE OF FLAT BED: A LOT
SUGGESTED CMS G CODE MODIFIER MOBILITY: CL

## 2022-05-30 ASSESSMENT — PAIN DESCRIPTION - PAIN TYPE: TYPE: ACUTE PAIN

## 2022-05-30 ASSESSMENT — GAIT ASSESSMENTS
ASSISTIVE DEVICE: FRONT WHEEL WALKER
DISTANCE (FEET): 15
DEVIATION: ANTALGIC;STEP TO;DECREASED HEEL STRIKE
GAIT LEVEL OF ASSIST: CONTACT GUARD ASSIST

## 2022-05-30 NOTE — CARE PLAN
Problem: Pain - Standard  Goal: Alleviation of pain or a reduction in pain to the patient’s comfort goal  Outcome: Progressing   Pain moderately controlled with oxycodone 10 mg PRN during PT. When at rest, pt denies pain.  Problem: Fall Risk  Goal: Patient will remain free from falls  Outcome: Progressing  Pt up with FWW and SBA. Pt to call when OOB. Pt up in chair and ambulated with PT.   The patient is Stable - Low risk of patient condition declining or worsening    Shift Goals  Clinical Goals: pain control and mobilization  Patient Goals: Sleep  Family Goals: not present    Progress made toward(s) clinical / shift goals:  stable VS, mobilization, and pain control    Patient is not progressing towards the following goals:

## 2022-05-30 NOTE — PROGRESS NOTES
Hospital Medicine Daily Progress Note    Date of Service  5/30/2022      Chief Complaint  Kary Shah is a 56 y.o. female admitted 5/10/2022 with 1 week history of fevers and chills, with worsening chronic right knee pain and swelling. She has seropositive rheumatoid arthritis (on methotrexate and Plaquenil), type 2 diabetes, osteoarthritis, essential hypertension, and GERD.     Hospital Course  On admission,labs showed leukocytosis (16.4 K), sodium was 132, anion gap was 17.0, lactic acid was 3.1.      Right knee x-ray showed moderate right knee joint effusion with questionable soft tissue gas seen posterior to the distal femur.     CT right knee showed joint effusion with thick synovial enhancement, concerning for infection. There is gas within the multilocular fluid collection in the popliteal fossa, concerning for infected Baker's cyst/abscess.     ER physician attempted arthrocentesis but was unsuccessful. Orthopedic surgery on-call recommended starting IV antibiotics with formal consult. Vancomycin, Clindamycin and Zosyn were empirically started.  Lactic acid improved to 1.5.     Patient underwent right knee arthrotomy and drainage of deep infection, incision and drainage of right thigh abscess on 5/11/2022. 2 drains were placed.      ID were consulted.  Vancomycin and Zosyn switched to Ancef on 5/13/2022.  Cultures were negative.  Ancef was discontinued and doxycycline 100 mg p.o. twice daily with Levaquin 50 mg daily was started on 5/16/2022 for 14 days.     LUE US from 5/13/2022 shows acute, non-occlusive superficial venous thrombosis in the cephalic vein of the distal bicep attached to the IV. Left arm swelling and pain  improved.      Right thigh drain was removed 5/16/2022. Right knee drain was removed on 5/18/2022.     Interval Problem Update  Afebrile and hemodynamically stable. Labs are unremarkable. Pain is improving.  Per reevaluation by physical therapy on 5/27/2022, postacute placement  for additional physical therapy services prior to home discharge was recommended.     I have personally seen and examined the patient at bedside. I discussed the plan of care with patient and bedside RN.     Consultants/Specialty  orthopedics     Code Status  Full Code     Disposition  Patient is medically cleared for discharge.   Anticipate discharge to postacute placement for additional physical therapy prior to home discharge.  Difficult placement due to copayment issues     I have placed the appropriate orders for post-discharge needs.     Review of Systems  Review of Systems   Constitutional: No fevers or chills  HENT: Negative.    Eyes: Negative.    Respiratory: Negative.    Cardiovascular: Negative.    Gastrointestinal: Negative.    Genitourinary: Negative.    Musculoskeletal: Improved pain in right knee  Skin: Negative.    Neurological: Negative.    Endo/Heme/Allergies: Negative.    Psychiatric/Behavioral: Negative.        Physical Exam  Temp:  [36.6 °C (97.8 °F)-37.2 °C (99 °F)] 37.1 °C (98.7 °F)  Pulse:  [] 84  Resp:  [17-18] 18  BP: (114-137)/(54-60) 114/54  SpO2:  [91 %-95 %] 92 %    Physical Exam  Constitutional:       General: She is in NAD.      Appearance: She is obese.   HENT:      Head: Normocephalic.      Nose: Nose normal.      Mouth/Throat:      Mouth: Mucous membranes are moist.   Eyes:      Pupils: Pupils are equal, round, and reactive to light.   Cardiovascular:      Rate and Rhythm: Normal rate.   Pulmonary:      Effort: Pulmonary effort is normal.   Abdominal:      Palpations: Abdomen is soft.   Musculoskeletal:         General: Right knee with dressing     Cervical back: Normal range of motion.      Right lower leg: No edema.      Left lower leg: No edema.   Skin:     General: Skin is warm and dry.   Neurological:      General: No focal deficit present.      Mental Status: She is alert.   Psychiatric:         Mood and Affect: Mood normal.       Fluids    Intake/Output Summary (Last  24 hours) at 5/30/2022 1018  Last data filed at 5/29/2022 1400  Gross per 24 hour   Intake 360 ml   Output --   Net 360 ml       Laboratory  Recent Labs     05/28/22  0027 05/28/22  1856 05/30/22  0224   WBC 7.9 6.6 7.5   RBC 3.29* 3.41* 3.49*   HEMOGLOBIN 9.6* 10.1* 10.4*   HEMATOCRIT 29.3* 30.3* 31.5*   MCV 89.1 88.9 90.3   MCH 29.2 29.6 29.8   MCHC 32.8* 33.3* 33.0*   RDW 45.2 45.0 45.1   PLATELETCT 338 316 343   MPV 9.4 9.5 9.5     Recent Labs     05/28/22  0027 05/29/22  0410 05/30/22  0224   SODIUM 137 136 138   POTASSIUM 3.7 3.6 3.9   CHLORIDE 101 102 102   CO2 22 22 23   GLUCOSE 138* 136* 120*   BUN 10 13 10   CREATININE 0.77 0.68 0.69   CALCIUM 9.3 8.9 9.3                   Imaging  DX-HIP-COMPLETE - UNILATERAL 2+ RIGHT   Final Result      1.  No RIGHT hip or pelvic fracture.   2.  Mild degenerative change of both hips.      US-RENAL   Final Result      1.  Normal renal ultrasound.      DX-KNEE 2- RIGHT   Final Result      1. Surgical drain terminating within the patellofemoral compartment of the right knee joint. No other radiopaque foreign object.   2. Postsurgical changes in the anterior knee soft tissues, surgical skin staples.      US-EXTREMITY VENOUS UPPER UNILAT LEFT   Final Result      CT-KNEE WITH RIGHT   Final Result         1. Moderate knee joint effusion with thick synovial enhancement, concerning for infection      2. There is gas within the multilocular fluid collection in the popliteal fossa, concerning for infected Baker's cyst/abscess.      3. Soft tissue gas in the lateral leg as well.      DX-KNEE 2- RIGHT   Final Result         Moderate right knee joint effusion.      Questionable soft tissue gas seen posterior to the distal femur on lateral view.      DX-CHEST-PORTABLE (1 VIEW)   Final Result         1.  There are mild perihilar opacifications which could be due to edema or bronchitis.      2.  No consolidations identified.           Assessment/Plan  Discharge planning  issues  Assessment & Plan  Patient is medically cleared for discharge.  Physical therapy recommend postacute placement for additional physical therapy services prior to home discharge.  Placement is difficult due to financial issues and patient copayment.  Case management and social work aware.     Arthritis of right knee  Assessment & Plan  Patient underwent right knee arthrotomy and drainage of deep infection, incision and drainage of right thigh abscess on 5/11/2022. 2 drains were palced. Vancomycin and Zosyn switched to Ancef on 5/13/2022.  No positive cultures to date. Ancef switched to doxycycline Levaquin on 5/16/2022, with end date 5/30/2022.  Per orthopedic surgery, continue DVT prophylaxis with SCDs and Lovenox until mobilizing then switch to aspirin for 4 weeks. Right thigh drain was removed on 5/16/2022. Right knee drain was removed on 5/18/2022. She is to follow-up with orthopedic surgery outpatient clinic 2 weeks for wound check and suture/staple removal.    Type 2 diabetes mellitus with hyperglycemia, without long-term current use of insulin (HCC)- (present on admission)  Assessment & Plan  Home metformin on hold.  Hemoglobin A1c was 7.7 on 10/22/2021.  Repeat ordered.  Continue SSI with Accu-Cheks and hypoglycemia protocol.    Seropositive rheumatoid arthritis (HCC)  Assessment & Plan  On methotrexate and Plaquenil for seropositive rheumatoid arthritis.  We will place on hold due to acute infection.    Essential hypertension- (present on admission)  Assessment & Plan  Continue lisinopril and amlodipine    Superficial venous thrombosis of arm, left  Assessment & Plan  LUE US from 5/13/2022 shows acute, non-occlusive superficial venous thrombosis in the cephalic vein of the distal bicep attached to the IV.  IV removed.  Pain and swelling resolved         VTE prophylaxis: enoxaparin ppx    I have performed a physical exam and reviewed and updated ROS and Plan today (5/30/2022). In review of yesterday's  note (5/29/2022), there are no changes except as documented above.

## 2022-05-30 NOTE — DISCHARGE PLANNING
"[facility] TCN chart review completed. Collaborated with RANGEL Torres. I notified CM regarding this member's financial difficulty, sole provider for her family , inability to afford 30% co-insurance for SNF services. Furthermore, I updated current RANGEL Torres  regarding my previous request for RANGEL Quintero  to contact [facility] to enroll this mbr to Medicaid. RANGEL Torres noted today 5/30/22 \" Medicaid application has been started. SW awaiting response. \"     Patient seen at bedside, siting in a reclining chair.Mbr states that a person by name of Janet called her and told her she did not qualify for Medicaid because she makes too much money \".  Mbr still needs financial assistance somehow.  TCN will continue to follow and collaborate with discharge planning team as additional post acute needs arise. Thank you.    Previously completed:  - Orders received for: PT and OT -> recommended post-acute placement  - Choice forms: HH, SNF, Infusion, IRF.   - [facility] introduced (Y), referral (sent).                 "

## 2022-05-30 NOTE — DISCHARGE PLANNING
Hospital Care Management- Medical Social Work      LMSW emailed PFA to see if Medicaid application has been started. SW awaiting response.     Update: Per PFA, pt has been screened and is over income ($3440 monthly) for [place] Medicaid, and thus was provided with financial assistance program application.

## 2022-05-30 NOTE — CARE PLAN
The patient is Stable - Low risk of patient condition declining or worsening    Shift Goals  Clinical Goals: Pain control  Patient Goals: Sleep      Progress made toward(s) clinical / shift goals: See note below regarding pain control. Patient has been sleeping for a couple hours so far this shift    Problem: Pain - Standard  Goal: Alleviation of pain or a reduction in pain to the patient’s comfort goal  Outcome: Progressing  Note: Patient does not report having pain at this time or needing pain meds, tends to have more pain when she ambulates.     Problem: Mobility  Goal: Patient's capacity to carry out activities will improve  Outcome: Progressing  Note: Patient has chosen to get up to use the restroom rather than use the purewick tonight. Has not had to use the restroom yet

## 2022-05-30 NOTE — CARE PLAN
The patient is Stable - Low risk of patient condition declining or worsening    Shift Goals  Clinical Goals: pain management; safe mobilization  Patient Goals: mobilize; discharge planning  Family Goals: not present    Progress made toward(s) clinical / shift goals:    Problem: Knowledge Deficit - Standard  Goal: Patient and family/care givers will demonstrate understanding of plan of care, disease process/condition, diagnostic tests and medications  Outcome: Progressing     Problem: Pain - Standard  Goal: Alleviation of pain or a reduction in pain to the patient’s comfort goal  Outcome: Progressing     Problem: Fall Risk  Goal: Patient will remain free from falls  Outcome: Progressing     Problem: Mobility  Goal: Patient's capacity to carry out activities will improve  Outcome: Progressing     Discussed daily POC. Provided emotional support. Encouraged increased mobilization and up to chair. Medicated per MAR for pain, tolerated well.    Patient is not progressing towards the following goals:

## 2022-05-30 NOTE — THERAPY
Physical Therapy   Daily Treatment     Patient Name: Kary Shah  Age:  56 y.o., Sex:  female  Medical Record #: [id number]  Today's Date: 5/30/2022     Precautions  Precautions: Fall Risk;Weight Bearing As Tolerated Right Lower Extremity    Assessment    Patient continues to progress toward acute PT goals.  Patient demonstrated decreased pain with ambulation today.  She demonstrated improved L swing phase today though continues to have quick swing with short step length and antalgic, step to pattern.  Educated patient on standing LE exercises including hip abduction, R single limb stance with UE support on FWW, and LAQ.  Patient reported exercises she has been doing in supine.  Provided supine and seated LE exercise handouts, focusing on quad and glute muscle activation.  Patient demonstrated good understanding and compliance.  PT to continue to follow.    Plan    Continue current treatment plan.    DC Equipment Recommendations: Unable to determine at this time  Discharge Recommendations: Recommend post-acute placement for additional physical therapy services prior to discharge home     Objective     05/30/22 1447   Precautions   Precautions Fall Risk;Weight Bearing As Tolerated Right Lower Extremity   Cognition    Cognition / Consciousness WDL   Level of Consciousness Alert   Comments Very motivated & pleasant   Neuro-Muscular Treatments   Neuro-Muscular Treatments Weight Shift Left;Weight Shift Right   Balance   Sitting Balance (Static) Fair   Sitting Balance (Dynamic) Fair   Standing Balance (Static) Fair   Standing Balance (Dynamic) Fair -   Weight Shift Sitting Fair   Weight Shift Standing Fair   Skilled Intervention Verbal Cuing;Tactile Cuing;Facilitation   Comments w/ FWW   Gait Analysis   Gait Level Of Assist Contact Guard Assist   Assistive Device Front Wheel Walker   Distance (Feet) 15   # of Times Distance was Traveled 6   Deviation Antalgic;Step To;Decreased Heel Strike   # of Stairs Climbed 0    Skilled Intervention Verbal Cuing;Tactile Cuing;Compensatory Strategies   Comments Improved L swing phase however still antalgic with short step length   Bed Mobility    Comments NT, up in chair pre/post session   Functional Mobility   Sit to Stand Contact Guard Assist   Bed, Chair, Wheelchair Transfer Contact Guard Assist   Transfer Method Stand Step   Mobility ambulation in room   Skilled Intervention Verbal Cuing;Tactile Cuing   Activity Tolerance   Sitting in Chair Pre/post session   Standing 15 min total   Short Term Goals    Short Term Goal # 1 pt will get OOB and return BTB with HOB flat and rails down and SPV for safety with bed mobility at home. ( 6 tx's)   Goal Outcome # 1 (Not addressed)   Short Term Goal # 2 pt will transfer with the LRAD and SPV in 6 Tx's to safely transfer at home.   Goal Outcome # 2 Goal not met   Short Term Goal # 3 pt will ambulate ' with the LRAD and SPV in 6 Tx's   Goal Outcome # 3 Goal not met   Short Term Goal # 4 pt will se her instructions and continue to work on her exercises at home.   Goal Outcome # 4 Goal not met   Anticipated Discharge Equipment and Recommendations   DC Equipment Recommendations Unable to determine at this time   Discharge Recommendations Recommend post-acute placement for additional physical therapy services prior to discharge home

## 2022-05-31 PROBLEM — E87.6 HYPOKALEMIA: Status: ACTIVE | Noted: 2022-05-31

## 2022-05-31 LAB
ANION GAP SERPL CALC-SCNC: 12 MMOL/L (ref 7–16)
BASOPHILS # BLD AUTO: 0.7 % (ref 0–1.8)
BASOPHILS # BLD: 0.05 K/UL (ref 0–0.12)
BUN SERPL-MCNC: 11 MG/DL (ref 8–22)
CALCIUM SERPL-MCNC: 9.3 MG/DL (ref 8.5–10.5)
CHLORIDE SERPL-SCNC: 100 MMOL/L (ref 96–112)
CO2 SERPL-SCNC: 25 MMOL/L (ref 20–33)
CREAT SERPL-MCNC: 0.71 MG/DL (ref 0.5–1.4)
EOSINOPHIL # BLD AUTO: 0.21 K/UL (ref 0–0.51)
EOSINOPHIL NFR BLD: 2.8 % (ref 0–6.9)
ERYTHROCYTE [DISTWIDTH] IN BLOOD BY AUTOMATED COUNT: 45.5 FL (ref 35.9–50)
GFR SERPLBLD CREATININE-BSD FMLA CKD-EPI: 99 ML/MIN/1.73 M 2
GLUCOSE BLD STRIP.AUTO-MCNC: 100 MG/DL (ref 65–99)
GLUCOSE BLD STRIP.AUTO-MCNC: 113 MG/DL (ref 65–99)
GLUCOSE BLD STRIP.AUTO-MCNC: 113 MG/DL (ref 65–99)
GLUCOSE BLD STRIP.AUTO-MCNC: 137 MG/DL (ref 65–99)
GLUCOSE BLD STRIP.AUTO-MCNC: 140 MG/DL (ref 65–99)
GLUCOSE BLD STRIP.AUTO-MCNC: 142 MG/DL (ref 65–99)
GLUCOSE SERPL-MCNC: 133 MG/DL (ref 65–99)
HCT VFR BLD AUTO: 31.4 % (ref 37–47)
HGB BLD-MCNC: 10.2 G/DL (ref 12–16)
IMM GRANULOCYTES # BLD AUTO: 0.03 K/UL (ref 0–0.11)
IMM GRANULOCYTES NFR BLD AUTO: 0.4 % (ref 0–0.9)
LYMPHOCYTES # BLD AUTO: 1.05 K/UL (ref 1–4.8)
LYMPHOCYTES NFR BLD: 14.1 % (ref 22–41)
MCH RBC QN AUTO: 29.2 PG (ref 27–33)
MCHC RBC AUTO-ENTMCNC: 32.5 G/DL (ref 33.6–35)
MCV RBC AUTO: 90 FL (ref 81.4–97.8)
MONOCYTES # BLD AUTO: 0.84 K/UL (ref 0–0.85)
MONOCYTES NFR BLD AUTO: 11.3 % (ref 0–13.4)
NEUTROPHILS # BLD AUTO: 5.27 K/UL (ref 2–7.15)
NEUTROPHILS NFR BLD: 70.7 % (ref 44–72)
NRBC # BLD AUTO: 0 K/UL
NRBC BLD-RTO: 0 /100 WBC
PLATELET # BLD AUTO: 325 K/UL (ref 164–446)
PMV BLD AUTO: 10 FL (ref 9–12.9)
POTASSIUM SERPL-SCNC: 3.5 MMOL/L (ref 3.6–5.5)
RBC # BLD AUTO: 3.49 M/UL (ref 4.2–5.4)
SODIUM SERPL-SCNC: 137 MMOL/L (ref 135–145)
WBC # BLD AUTO: 7.5 K/UL (ref 4.8–10.8)

## 2022-05-31 PROCEDURE — 82962 GLUCOSE BLOOD TEST: CPT | Mod: 91

## 2022-05-31 PROCEDURE — 700102 HCHG RX REV CODE 250 W/ 637 OVERRIDE(OP): Performed by: INTERNAL MEDICINE

## 2022-05-31 PROCEDURE — 80048 BASIC METABOLIC PNL TOTAL CA: CPT

## 2022-05-31 PROCEDURE — A9270 NON-COVERED ITEM OR SERVICE: HCPCS | Performed by: HOSPITALIST

## 2022-05-31 PROCEDURE — 700102 HCHG RX REV CODE 250 W/ 637 OVERRIDE(OP): Performed by: HOSPITALIST

## 2022-05-31 PROCEDURE — A9270 NON-COVERED ITEM OR SERVICE: HCPCS | Performed by: INTERNAL MEDICINE

## 2022-05-31 PROCEDURE — 85025 COMPLETE CBC W/AUTO DIFF WBC: CPT

## 2022-05-31 PROCEDURE — 36415 COLL VENOUS BLD VENIPUNCTURE: CPT

## 2022-05-31 PROCEDURE — 99232 SBSQ HOSP IP/OBS MODERATE 35: CPT | Performed by: INTERNAL MEDICINE

## 2022-05-31 PROCEDURE — 97535 SELF CARE MNGMENT TRAINING: CPT

## 2022-05-31 PROCEDURE — 97116 GAIT TRAINING THERAPY: CPT

## 2022-05-31 PROCEDURE — 700111 HCHG RX REV CODE 636 W/ 250 OVERRIDE (IP): Performed by: ORTHOPAEDIC SURGERY

## 2022-05-31 PROCEDURE — 770004 HCHG ROOM/CARE - ONCOLOGY PRIVATE *

## 2022-05-31 RX ORDER — POTASSIUM CHLORIDE 20 MEQ/1
40 TABLET, EXTENDED RELEASE ORAL ONCE
Status: COMPLETED | OUTPATIENT
Start: 2022-05-31 | End: 2022-05-31

## 2022-05-31 RX ADMIN — OXYCODONE HYDROCHLORIDE 10 MG: 10 TABLET ORAL at 04:33

## 2022-05-31 RX ADMIN — FOLIC ACID 1 MG: 1 TABLET ORAL at 13:31

## 2022-05-31 RX ADMIN — SEVELAMER CARBONATE 800 MG: 800 TABLET, FILM COATED ORAL at 17:04

## 2022-05-31 RX ADMIN — ENOXAPARIN SODIUM 40 MG: 40 INJECTION SUBCUTANEOUS at 04:32

## 2022-05-31 RX ADMIN — LISINOPRIL 40 MG: 20 TABLET ORAL at 13:31

## 2022-05-31 RX ADMIN — AMLODIPINE BESYLATE 10 MG: 10 TABLET ORAL at 17:03

## 2022-05-31 RX ADMIN — ATORVASTATIN CALCIUM 20 MG: 20 TABLET, FILM COATED ORAL at 17:03

## 2022-05-31 RX ADMIN — POTASSIUM CHLORIDE 40 MEQ: 1500 TABLET, EXTENDED RELEASE ORAL at 17:03

## 2022-05-31 RX ADMIN — SENNOSIDES AND DOCUSATE SODIUM 2 TABLET: 50; 8.6 TABLET ORAL at 04:34

## 2022-05-31 RX ADMIN — OXYCODONE 5 MG: 5 TABLET ORAL at 13:35

## 2022-05-31 RX ADMIN — SEVELAMER CARBONATE 800 MG: 800 TABLET, FILM COATED ORAL at 09:02

## 2022-05-31 RX ADMIN — SEVELAMER CARBONATE 800 MG: 800 TABLET, FILM COATED ORAL at 11:25

## 2022-05-31 ASSESSMENT — PAIN DESCRIPTION - PAIN TYPE
TYPE: ACUTE PAIN

## 2022-05-31 ASSESSMENT — COGNITIVE AND FUNCTIONAL STATUS - GENERAL
WALKING IN HOSPITAL ROOM: A LITTLE
MOVING FROM LYING ON BACK TO SITTING ON SIDE OF FLAT BED: A LOT
SUGGESTED CMS G CODE MODIFIER MOBILITY: CL
MOBILITY SCORE: 14
CLIMB 3 TO 5 STEPS WITH RAILING: TOTAL
TURNING FROM BACK TO SIDE WHILE IN FLAT BAD: A LITTLE
STANDING UP FROM CHAIR USING ARMS: A LITTLE
MOVING TO AND FROM BED TO CHAIR: A LOT

## 2022-05-31 ASSESSMENT — GAIT ASSESSMENTS
GAIT LEVEL OF ASSIST: CONTACT GUARD ASSIST
ASSISTIVE DEVICE: FRONT WHEEL WALKER
DEVIATION: ANTALGIC;STEP TO;DECREASED BASE OF SUPPORT

## 2022-05-31 NOTE — PROGRESS NOTES
Hospital Medicine Daily Progress Note    Date of Service  5/31/2022    Chief Complaint  Kary Shah is a 56 y.o. female admitted 5/10/2022 with 1 week history of fevers and chills, with worsening chronic right knee pain and swelling. She has seropositive rheumatoid arthritis (on methotrexate and Plaquenil), type 2 diabetes, osteoarthritis, essential hypertension, and GERD.     Hospital Course  On admission,labs showed leukocytosis (16.4 K), sodium was 132, anion gap was 17.0, lactic acid was 3.1.      Right knee x-ray showed moderate right knee joint effusion with questionable soft tissue gas seen posterior to the distal femur.     CT right knee showed joint effusion with thick synovial enhancement, concerning for infection. There is gas within the multilocular fluid collection in the popliteal fossa, concerning for infected Baker's cyst/abscess.     ER physician attempted arthrocentesis but was unsuccessful. Orthopedic surgery on-call recommended starting IV antibiotics with formal consult. Vancomycin, Clindamycin and Zosyn were empirically started.  Lactic acid improved to 1.5.     Patient underwent right knee arthrotomy and drainage of deep infection, incision and drainage of right thigh abscess on 5/11/2022. 2 drains were placed.      ID were consulted.  Vancomycin and Zosyn switched to Ancef on 5/13/2022.  Cultures were negative.  Ancef was discontinued and doxycycline 100 mg p.o. twice daily with Levaquin 50 mg daily was started on 5/16/2022 for 14 days.     LUE US from 5/13/2022 shows acute, non-occlusive superficial venous thrombosis in the cephalic vein of the distal bicep attached to the IV. Left arm swelling and pain  improved.      Right thigh drain was removed 5/16/2022. Right knee drain was removed on 5/18/2022.     Interval Problem Update  Afebrile and hemodynamically stable. K is low, will replace. Pain is improving.  Per reevaluation by physical therapy on 5/27/2022, postacute placement  for additional physical therapy services prior to home discharge was recommended.  PT has been requested to reevaluate for home discharge. Orthopedic surgery to be requested for suture removal.     I have personally seen and examined the patient at bedside. I discussed the plan of care with patient and bedside RN.     Consultants/Specialty  orthopedics     Code Status  Full Code     Disposition  Patient is medically cleared for discharge.   Anticipate discharge to postacute placement for additional physical therapy prior to home discharge.  Difficult placement due to copayment issues     I have placed the appropriate orders for post-discharge needs.     Review of Systems  Review of Systems   Constitutional: No fevers or chills  HENT: Negative.    Eyes: Negative.    Respiratory: Negative.    Cardiovascular: Negative.    Gastrointestinal: Negative.    Genitourinary: Negative.    Musculoskeletal: Improved pain in right knee  Skin: Negative.    Neurological: Negative.    Endo/Heme/Allergies: Negative.    Psychiatric/Behavioral: Negative.         Physical Exam  Temp:  [36.6 °C (97.8 °F)-37.1 °C (98.7 °F)] 36.7 °C (98 °F)  Pulse:  [72-88] 72  Resp:  [17-18] 17  BP: (120-129)/(49-98) 120/61  SpO2:  [93 %-98 %] 93 %    Physical Exam  Constitutional:       General: She is in NAD.      Appearance: She is obese.   HENT:      Head: Normocephalic.      Nose: Nose normal.      Mouth/Throat:      Mouth: Mucous membranes are moist.   Eyes:      Pupils: Pupils are equal, round, and reactive to light.   Cardiovascular:      Rate and Rhythm: Normal rate.   Pulmonary:      Effort: Pulmonary effort is normal.   Abdominal:      Palpations: Abdomen is soft.   Musculoskeletal:         General: Right knee with dressing     Cervical back: Normal range of motion.      Right lower leg: No edema.      Left lower leg: No edema.   Skin:     General: Skin is warm and dry.   Neurological:      General: No focal deficit present.      Mental Status:  She is alert.   Psychiatric:         Mood and Affect: Mood normal.     Fluids  No intake or output data in the 24 hours ending 05/31/22 1408    Laboratory  Recent Labs     05/28/22  1856 05/30/22  0224 05/31/22  0546   WBC 6.6 7.5 7.5   RBC 3.41* 3.49* 3.49*   HEMOGLOBIN 10.1* 10.4* 10.2*   HEMATOCRIT 30.3* 31.5* 31.4*   MCV 88.9 90.3 90.0   MCH 29.6 29.8 29.2   MCHC 33.3* 33.0* 32.5*   RDW 45.0 45.1 45.5   PLATELETCT 316 343 325   MPV 9.5 9.5 10.0     Recent Labs     05/29/22  0410 05/30/22  0224 05/31/22  0546   SODIUM 136 138 137   POTASSIUM 3.6 3.9 3.5*   CHLORIDE 102 102 100   CO2 22 23 25   GLUCOSE 136* 120* 133*   BUN 13 10 11   CREATININE 0.68 0.69 0.71   CALCIUM 8.9 9.3 9.3                   Imaging  DX-HIP-COMPLETE - UNILATERAL 2+ RIGHT   Final Result      1.  No RIGHT hip or pelvic fracture.   2.  Mild degenerative change of both hips.      US-RENAL   Final Result      1.  Normal renal ultrasound.      DX-KNEE 2- RIGHT   Final Result      1. Surgical drain terminating within the patellofemoral compartment of the right knee joint. No other radiopaque foreign object.   2. Postsurgical changes in the anterior knee soft tissues, surgical skin staples.      US-EXTREMITY VENOUS UPPER UNILAT LEFT   Final Result      CT-KNEE WITH RIGHT   Final Result         1. Moderate knee joint effusion with thick synovial enhancement, concerning for infection      2. There is gas within the multilocular fluid collection in the popliteal fossa, concerning for infected Baker's cyst/abscess.      3. Soft tissue gas in the lateral leg as well.      DX-KNEE 2- RIGHT   Final Result         Moderate right knee joint effusion.      Questionable soft tissue gas seen posterior to the distal femur on lateral view.      DX-CHEST-PORTABLE (1 VIEW)   Final Result         1.  There are mild perihilar opacifications which could be due to edema or bronchitis.      2.  No consolidations identified.           Assessment/Plan  Discharge planning  issues  Assessment & Plan  Patient is medically cleared for discharge.  Physical therapy recommend postacute placement for additional physical therapy services prior to home discharge.  Placement is difficult due to financial issues and patient copayment.  Case management and social work aware.     Hypokalemia  Assessment & Plan  Monitor and replace potassium as needed    Arthritis of right knee  Assessment & Plan  Patient underwent right knee arthrotomy and drainage of deep infection, incision and drainage of right thigh abscess on 5/11/2022. 2 drains were palced. Vancomycin and Zosyn switched to Ancef on 5/13/2022.  No positive cultures to date. Ancef switched to doxycycline Levaquin on 5/16/2022, with end date 5/30/2022.  Per orthopedic surgery, continue DVT prophylaxis with SCDs and Lovenox until mobilizing then switch to aspirin for 4 weeks. Right thigh drain was removed on 5/16/2022. Right knee drain was removed on 5/18/2022. She is to follow-up with orthopedic surgery outpatient clinic 2 weeks for wound check and suture/staple removal.    Type 2 diabetes mellitus with hyperglycemia, without long-term current use of insulin (HCC)- (present on admission)  Assessment & Plan  Home metformin on hold.  Hemoglobin A1c was 7.7 on 10/22/2021.  Repeat ordered.  Continue SSI with Accu-Cheks and hypoglycemia protocol.    Seropositive rheumatoid arthritis (HCC)  Assessment & Plan  On methotrexate and Plaquenil for seropositive rheumatoid arthritis.  We will place on hold due to acute infection.    Essential hypertension- (present on admission)  Assessment & Plan  Continue lisinopril and amlodipine    Superficial venous thrombosis of arm, left  Assessment & Plan  LUE US from 5/13/2022 shows acute, non-occlusive superficial venous thrombosis in the cephalic vein of the distal bicep attached to the IV.  IV removed.  Pain and swelling resolved       VTE prophylaxis: enoxaparin ppx    I have performed a physical exam and  reviewed and updated ROS and Plan today (5/31/2022). In review of yesterday's note (5/30/2022), there are no changes except as documented above.

## 2022-05-31 NOTE — CARE PLAN
The patient is Stable - Low risk of patient condition declining or worsening    Shift Goals  Clinical Goals: increase mobility  Patient Goals: increase mobility, discharge  Family Goals: not present    Progress made toward(s) clinical / shift goals:    Problem: Knowledge Deficit - Standard  Goal: Patient and family/care givers will demonstrate understanding of plan of care, disease process/condition, diagnostic tests and medications  Outcome: Progressing     Problem: Physical Regulation  Goal: Diagnostic test results will improve  Outcome: Progressing  Goal: Signs and symptoms of infection will decrease  Outcome: Progressing     Problem: Pain - Standard  Goal: Alleviation of pain or a reduction in pain to the patient’s comfort goal  Outcome: Progressing  Flowsheets  Taken 5/30/2022 2252 by Nya Maxwell R.NHany  Pain Rating Scale (NPRS): 3  Taken 5/30/2022 0800 by Marcus Wong R.N.  Non Verbal Scale: Calm     Problem: Fall Risk  Goal: Patient will remain free from falls  Outcome: Progressing     Problem: Mobility  Goal: Patient's capacity to carry out activities will improve  Outcome: Progressing  Flowsheets  Taken 5/30/2022 2252  Mobility:   Encouraged mobilization per interdisciplinary team recommendations   Provided assistive devices  Taken 5/30/2022 2000  Activity Performed:   Ambulate   Up to bathroom     Problem: Infection - Standard  Goal: Patient will remain free from infection  Outcome: Progressing  Flowsheets (Taken 5/17/2022 2118 by Erika Phillips, R.N.)  Standard Infection Interventions:   Assessed for signs and symptoms of infection   Implemented standard precautions   Instructed patient/family on signs and symptoms of infection   Provided education on proper hand hygiene and infection prevention measures     Problem: Skin Integrity  Goal: Skin integrity is maintained or improved  Outcome: Progressing       Patient is not progressing towards the following goals:

## 2022-05-31 NOTE — THERAPY
Physical Therapy   Daily Treatment     Patient Name: Kary Shah  Age:  56 y.o., Sex:  female  Medical Record #: [id number]  Today's Date: 5/31/2022     Precautions  Precautions: Fall Risk;Weight Bearing As Tolerated Right Lower Extremity    Assessment    Patient continues to progress toward acute mobility goals.  Today she ambulated ~60 ft x 2 with FWW, taking short standing rest breaks as needed for pain.  After supine and standing exercises (see below), patient ambulated ~15 ft x 2 with increased step length and L swing through gait with significant UE support on FWW and c/o pain.  Patient agreeable to attempt stairs during next session.  Significant time spent discussing home setup, equipment needs, compensatory strategies for mobility, and PT POC for possible DC home.  Discussed session results and recommendations with RN and .  PT to continue to follow.    Plan    Continue current treatment plan.    DC Equipment Recommendations: Front-Wheel Walker  Discharge Recommendations: Other - (Recommend post acute placement due to limited gait/safety with ambulation.  If placement not available, recommend home health PT, FWW, and assistance/SPV for mobility.  Pt may need transportation home if unable to negotiate stairs during next PT session)     Objective     05/31/22 1501   Precautions   Precautions Fall Risk;Weight Bearing As Tolerated Right Lower Extremity   Cognition    Cognition / Consciousness WDL   Level of Consciousness Alert   Comments Very motivated & pleasant   Supine Lower Body Exercise   Supine Lower Body Exercises Yes   Bridges Two Legged;1 set of 10   Hip Abduction Side Lying;Hook Lying;1 set of 10;Medium Resistance Theraband;Bilateral;Right  (Pink theraband for hook lying, no band for L sidelying)   Standing Lower Body Exercises   Standing Lower Body Exercises Yes   Hip Abduction 1 set of 10;Right    Other Exercises R single limb stance x 2 seconds x 6   Other Treatments   Other  Treatments Provided Significant time spent discussing home setup, equipment needs, and problem solving for return home   Balance   Sitting Balance (Static) Fair +   Sitting Balance (Dynamic) Fair   Standing Balance (Static) Fair   Standing Balance (Dynamic) Fair -   Weight Shift Sitting Fair   Weight Shift Standing Fair   Skilled Intervention Verbal Cuing;Tactile Cuing;Compensatory Strategies   Comments w/ FWW   Gait Analysis   Gait Level Of Assist Contact Guard Assist   Assistive Device Front Wheel Walker   Distance (Feet) (60'x 2, 15' x 2)   Deviation Antalgic;Step To;Decreased Base Of Support   Weight Bearing Status WBAT RLE   Skilled Intervention Verbal Cuing;Tactile Cuing;Compensatory Strategies   Comments Improved L swing through and step length after R single limb balance exercise, though pt continues to c/o R hip pain with R WB   Bed Mobility    Supine to Sit Supervised   Sit to Supine Minimal Assist (for LEs)   Skilled Intervention Verbal Cuing;Facilitation   Functional Mobility   Sit to Stand Standby Assist   Bed, Chair, Wheelchair Transfer Standby Assist   Toilet Transfers Standby Assist   Transfer Method Stand Step   Mobility ambulation in room, hallway   Skilled Intervention Verbal Cuing   Activity Tolerance   Sitting in Chair Pre/post session   Sitting Edge of Bed 5 min   Standing 15-20 min total   Short Term Goals    Short Term Goal # 1 pt will get OOB and return BTB with HOB flat and rails down and SPV for safety with bed mobility at home. ( 6 tx's)   Goal Outcome # 1 goal not met   Short Term Goal # 2 pt will transfer with the LRAD and SPV in 6 Tx's to safely transfer at home.   Goal Outcome # 2 Goal not met   Short Term Goal # 3 pt will ambulate ' with the LRAD and SPV in 6 Tx's   Goal Outcome # 3 Goal not met   Short Term Goal # 4 Pt will negotiate 2 platform steps with FWW and min A within 6 visits in order to access home (added/edited 5/31)   Anticipated Discharge Equipment and  Recommendations   DC Equipment Recommendations Front-Wheel Walker   Discharge Recommendations Other - (Recommend post acute placement due to limited gait/safety with ambulation.  If placement not available, recommend home health PT, FWW, and assistance/SPV for mobility.  Pt may need transportation home if unable to negotiate stairs during next PT session)

## 2022-05-31 NOTE — DISCHARGE PLANNING
TCN following. HTH/SCP chart review completed. Deferring to Primary CM . Mbr has financial concerns.  As of 5/30- Hospitalist has medically cleared this mbr for DIscharge . Notified [facility] Team.    Previously completed:  - Orders received for: PT and OT -> recommended post-acute placement  - Choice forms: HH, SNF, Infusion, IRF.   - GSC introduced (Y), referral (sent).

## 2022-05-31 NOTE — DISCHARGE PLANNING
"Case Management Discharge Planning    Admission Date: 5/10/2022  GMLOS: 5.3  ALOS: 21    6-Clicks ADL Score: 14  6-Clicks Mobility Score: 14  PT and/or OT Eval ordered: Yes  Post-acute Referrals Ordered: Yes  Post-acute Choice Obtained: Yes  Has referral(s) been sent to post-acute provider:  Yes      Anticipated Discharge Dispo: Discharge Disposition: D/T to SNF with Medicare cert in anticipation of skilled care (03)    DME Needed: Yes    DME Ordered: No    Action(s) Taken: Discussed pt in rounds. Pt is medically cleared for discharge. Post acute recommendation was SNF, however, pt cannot afford the copay and is over income for Medicaid. Consulted with Insurance CM regarding in home services and DME. Spoke with PT regarding HH and DME. PT is recommending FWW and plans to work with pt on the stairs tomorrow. Met with pt. Utilized language line  #[id number]. Informed pt that she will be discharged home tomorrow. Obtained choice for HH and DME. Pt states that she has 2 steps at home and her son can assist her with getting in the house. Pt also states that she has a \"high toilet\" and does not wish to purchase a bedside commode at this time.   Spoke to pt's son, Shawn, when he arrived. He reported that pt has a FWW at home. He also confirmed that he can take her home tomorrow.     Escalations Completed: Insurance     Medically Clear: Yes    Next Steps: Send referral to [facility] and DME company if needed.     Barriers to Discharge: Pending acceptance to HH.     Is the patient up for discharge tomorrow: Yes    Is transport arranged for discharge disposition: Yes. Son is able to transport pt home.         "

## 2022-05-31 NOTE — FACE TO FACE
Face to Face Supporting Documentation - Home Health    The encounter with this patient was in whole or in part the primary reason for home health admission.    Date of encounter:   Patient:                    MRN:                       YOB: 2023  Kary Shah  [id number]  1965     Home health to see patient for:  Wound Care, Physical Therapy evaluation and treatment and Occupational therapy evaluation and treatment    Skilled need for:  Exacerbation of Chronic Disease State Rhematoid arthritis, septic arthritis    Skilled nursing interventions to include:  Wound Care    Homebound status evidenced by:  Need the aid of supportive devices such as crutches, canes, wheelchairs or walkers, Require the use of special transportation, Needs the assistance of another person in order to leave the home or Have a condition such that leaving his or her home is medically contraindicated. Leaving home requires a considerable and taxing effort. There is a normal inability to leave the home.    Community Physician to provide follow up care: ESTEBAN Dooley     Optional Interventions? No      I certify the face to face encounter for this home health care referral meets the CMS requirements and the encounter/clinical assessment with the patient was, in whole, or in part, for the medical condition(s) listed above, which is the primary reason for home health care. Based on my clinical findings: the service(s) are medically necessary, support the need for home health care, and the homebound criteria are met.  I certify that this patient has had a face to face encounter by myself.  Lindy Gasca M.D. - NPI: [id number]

## 2022-06-01 VITALS
HEART RATE: 93 BPM | TEMPERATURE: 98.7 F | WEIGHT: 144.4 LBS | DIASTOLIC BLOOD PRESSURE: 70 MMHG | BODY MASS INDEX: 31.15 KG/M2 | HEIGHT: 57 IN | OXYGEN SATURATION: 93 % | RESPIRATION RATE: 17 BRPM | SYSTOLIC BLOOD PRESSURE: 131 MMHG

## 2022-06-01 PROBLEM — I82.612 SUPERFICIAL VENOUS THROMBOSIS OF ARM, LEFT: Status: RESOLVED | Noted: 2022-05-14 | Resolved: 2022-06-01

## 2022-06-01 LAB
ANION GAP SERPL CALC-SCNC: 14 MMOL/L (ref 7–16)
BUN SERPL-MCNC: 10 MG/DL (ref 8–22)
CALCIUM SERPL-MCNC: 9 MG/DL (ref 8.5–10.5)
CHLORIDE SERPL-SCNC: 101 MMOL/L (ref 96–112)
CO2 SERPL-SCNC: 22 MMOL/L (ref 20–33)
CREAT SERPL-MCNC: 0.68 MG/DL (ref 0.5–1.4)
GFR SERPLBLD CREATININE-BSD FMLA CKD-EPI: 101 ML/MIN/1.73 M 2
GLUCOSE BLD STRIP.AUTO-MCNC: 138 MG/DL (ref 65–99)
GLUCOSE BLD STRIP.AUTO-MCNC: 95 MG/DL (ref 65–99)
GLUCOSE SERPL-MCNC: 183 MG/DL (ref 65–99)
POTASSIUM SERPL-SCNC: 3.6 MMOL/L (ref 3.6–5.5)
SODIUM SERPL-SCNC: 137 MMOL/L (ref 135–145)

## 2022-06-01 PROCEDURE — 36415 COLL VENOUS BLD VENIPUNCTURE: CPT

## 2022-06-01 PROCEDURE — 700102 HCHG RX REV CODE 250 W/ 637 OVERRIDE(OP): Performed by: INTERNAL MEDICINE

## 2022-06-01 PROCEDURE — 80048 BASIC METABOLIC PNL TOTAL CA: CPT

## 2022-06-01 PROCEDURE — A9270 NON-COVERED ITEM OR SERVICE: HCPCS | Performed by: INTERNAL MEDICINE

## 2022-06-01 PROCEDURE — 99239 HOSP IP/OBS DSCHRG MGMT >30: CPT | Performed by: INTERNAL MEDICINE

## 2022-06-01 PROCEDURE — 700102 HCHG RX REV CODE 250 W/ 637 OVERRIDE(OP): Performed by: HOSPITALIST

## 2022-06-01 PROCEDURE — 97116 GAIT TRAINING THERAPY: CPT

## 2022-06-01 PROCEDURE — 82962 GLUCOSE BLOOD TEST: CPT | Mod: 91

## 2022-06-01 PROCEDURE — A9270 NON-COVERED ITEM OR SERVICE: HCPCS | Performed by: HOSPITALIST

## 2022-06-01 PROCEDURE — 700111 HCHG RX REV CODE 636 W/ 250 OVERRIDE (IP): Performed by: ORTHOPAEDIC SURGERY

## 2022-06-01 RX ADMIN — LISINOPRIL 40 MG: 20 TABLET ORAL at 12:45

## 2022-06-01 RX ADMIN — SEVELAMER CARBONATE 800 MG: 800 TABLET, FILM COATED ORAL at 12:24

## 2022-06-01 RX ADMIN — FOLIC ACID 1 MG: 1 TABLET ORAL at 12:44

## 2022-06-01 RX ADMIN — OXYCODONE 5 MG: 5 TABLET ORAL at 08:31

## 2022-06-01 RX ADMIN — SEVELAMER CARBONATE 800 MG: 800 TABLET, FILM COATED ORAL at 08:32

## 2022-06-01 RX ADMIN — SENNOSIDES AND DOCUSATE SODIUM 2 TABLET: 50; 8.6 TABLET ORAL at 06:05

## 2022-06-01 RX ADMIN — ENOXAPARIN SODIUM 40 MG: 40 INJECTION SUBCUTANEOUS at 06:05

## 2022-06-01 ASSESSMENT — COGNITIVE AND FUNCTIONAL STATUS - GENERAL
STANDING UP FROM CHAIR USING ARMS: A LITTLE
WALKING IN HOSPITAL ROOM: A LITTLE
MOBILITY SCORE: 15
CLIMB 3 TO 5 STEPS WITH RAILING: A LOT
MOVING FROM LYING ON BACK TO SITTING ON SIDE OF FLAT BED: A LOT
SUGGESTED CMS G CODE MODIFIER MOBILITY: CK
MOVING TO AND FROM BED TO CHAIR: A LOT
TURNING FROM BACK TO SIDE WHILE IN FLAT BAD: A LITTLE

## 2022-06-01 ASSESSMENT — GAIT ASSESSMENTS
DISTANCE (FEET): 20
GAIT LEVEL OF ASSIST: CONTACT GUARD ASSIST
ASSISTIVE DEVICE: FRONT WHEEL WALKER
DEVIATION: ANTALGIC;STEP TO;DECREASED BASE OF SUPPORT

## 2022-06-01 NOTE — DISCHARGE PLANNING
Per [facility] approved. PT/OT will be delayed a few days.    6/2/22 at 0800- MARYLU received vm from [facility] stating they would need chart notes and therapy notes indication why the pt would need a wc. Also the order would need to be changed to qualify  For a FWW (pt could not receive both.)  MARYLU sent message to w.

## 2022-06-01 NOTE — DISCHARGE PLANNING
Case Management Discharge Planning    Admission Date: 5/10/2022  GMLOS: 5.3  ALOS: 22    6-Clicks ADL Score: 14  6-Clicks Mobility Score: 15  PT and/or OT Eval ordered: Yes  Post-acute Referrals Ordered: Yes  Post-acute Choice Obtained: Yes  Has referral(s) been sent to post-acute provider:  Yes      Anticipated Discharge Dispo: Discharge Disposition: D/T to home under HHA care in anticipation of covered skilled care (06)    DME Needed: Yes    DME Ordered: Yes    Action(s) Taken: LCSW met with pt. Utilized language line  #[id number]. Reviewed IMM and obtained signature. Provided pt with a copy.   Pt was declined by [facility]. Escalated to leadership and insurance TCN. Referral was sent to [facility].   PT recommends a wheelchair. Referral sent. Pt has a FWW at home and family to assist. Son will pick pt up this afternoon.   Provided pt with work letter and information on applying for SNAP per pt's request.     Escalations Completed: Insurance  and Leadership    Medically Clear: Yes    Next Steps: F/U with [facility].     Barriers to Discharge: None    Is the patient up for discharge tomorrow: No

## 2022-06-01 NOTE — DISCHARGE PLANNING
Received Choice form at 0800  Agency/Facility Name: [facility]  Referral sent per Choice form 0805    Per epic, [facility] declined    1030- Referral sent per choice to [facility]    1105- Spoke To: Nickie  Agency/Facility Name: [facility]  Plan or Request: referral in review    1355- Referral sent per choice to [facility] for wc.

## 2022-06-01 NOTE — CARE PLAN
The patient is Stable - Low risk of patient condition declining or worsening    Shift Goals  Clinical Goals: Pt will have adequate pain control  Patient Goals: Figure out discharge plan  Family Goals: not present    Progress made toward(s) clinical / shift goals:        Problem: Knowledge Deficit - Standard  Goal: Patient and family/care givers will demonstrate understanding of plan of care, disease process/condition, diagnostic tests and medications  Outcome: Progressing  Note: Patient is tentatively planning to DC tomorrow with home health    Case management is working on getting her a wheelchair     Problem: Pain - Standard  Goal: Alleviation of pain or a reduction in pain to the patient’s comfort goal  Outcome: Progressing  Note: Pain was well controlled today with PRN oxycodone       Patient is not progressing towards the following goals:

## 2022-06-01 NOTE — CARE PLAN
The patient is Stable - Low risk of patient condition declining or worsening    Shift Goals  Clinical Goals: maintain safety  Patient Goals: discharge  Family Goals: not present    Progress made toward(s) clinical / shift goals:    Problem: Knowledge Deficit - Standard  Goal: Patient and family/care givers will demonstrate understanding of plan of care, disease process/condition, diagnostic tests and medications  Outcome: Progressing     Problem: Physical Regulation  Goal: Diagnostic test results will improve  Outcome: Progressing  Goal: Signs and symptoms of infection will decrease  Outcome: Progressing     Problem: Pain - Standard  Goal: Alleviation of pain or a reduction in pain to the patient’s comfort goal  Outcome: Progressing     Problem: Fall Risk  Goal: Patient will remain free from falls  Outcome: Progressing     Problem: Mobility  Goal: Patient's capacity to carry out activities will improve  Outcome: Progressing     Problem: Infection - Standard  Goal: Patient will remain free from infection  Outcome: Progressing     Problem: Skin Integrity  Goal: Skin integrity is maintained or improved  Outcome: Progressing       Patient is not progressing towards the following goals:

## 2022-06-01 NOTE — DISCHARGE INSTRUCTIONS
Resume your home rheumatoid arthritis medications    Follow-up with your primary care physician and rheumatologist within 1 to 2 days    Follow-up with orthopedic surgery as recommended    Incision and Drainage, Care After  This sheet gives you information about how to care for yourself after your procedure. Your health care provider may also give you more specific instructions. If you have problems or questions, contact your health care provider.  What can I expect after the procedure?  After the procedure, it is common to have:  Pain or discomfort around the incision site.  Blood, fluid, or pus (drainage) from the incision.  Redness and firm skin around the incision site.  Follow these instructions at home:  Medicines  Take over-the-counter and prescription medicines only as told by your health care provider.  If you were prescribed an antibiotic medicine, use or take it as told by your health care provider. Do not stop using the antibiotic even if you start to feel better.  Wound care  Follow instructions from your health care provider about how to take care of your wound. Make sure you:  Wash your hands with soap and water before and after you change your bandage (dressing). If soap and water are not available, use hand .  Change your dressing and packing as told by your health care provider.  If your dressing is dry or stuck when you try to remove it, moisten or wet the dressing with saline or water so that it can be removed without harming your skin or tissues.  If your wound is packed, leave it in place until your health care provider tells you to remove it. To remove the packing, moisten or wet the packing with saline or water so that it can be removed without harming your skin or tissues.  Leave stitches (sutures), skin glue, or adhesive strips in place. These skin closures may need to stay in place for 2 weeks or longer. If adhesive strip edges start to loosen and curl up, you may trim the loose  edges. Do not remove adhesive strips completely unless your health care provider tells you to do that.  Check your wound every day for signs of infection. Check for:  More redness, swelling, or pain.  More fluid or blood.  Warmth.  Pus or a bad smell.  If you were sent home with a drain tube in place, follow instructions from your health care provider about:  How to empty it.  How to care for it at home.    General instructions  Rest the affected area.  Do not take baths, swim, or use a hot tub until your health care provider approves. Ask your health care provider if you may take showers. You may only be allowed to take sponge baths.  Return to your normal activities as told by your health care provider. Ask your health care provider what activities are safe for you. Your health care provider may put you on activity or lifting restrictions.  The incision will continue to drain. It is normal to have some clear or slightly bloody drainage. The amount of drainage should lessen each day.  Do not apply any creams, ointments, or liquids unless you have been told to by your health care provider.  Keep all follow-up visits as told by your health care provider. This is important.  Contact a health care provider if:  Your cyst or abscess returns.  You have a fever or chills.  You have more redness, swelling, or pain around your incision.  You have more fluid or blood coming from your incision.  Your incision feels warm to the touch.  You have pus or a bad smell coming from your incision.  You have red streaks above or below the incision site.  Get help right away if:  You have severe pain or bleeding.  You cannot eat or drink without vomiting.  You have decreased urine output.  You become short of breath.  You have chest pain.  You cough up blood.  The affected area becomes numb or starts to tingle.  These symptoms may represent a serious problem that is an emergency. Do not wait to see if the symptoms will go away. Get  medical help right away. Call your local emergency services (911 in the [place] Do not drive yourself to the hospital.  Summary  After this procedure, it is common to have fluid, blood, or pus coming from the surgery site.  Follow all home care instructions. You will be told how to take care of your incision, how to check for infection, and how to take medicines.  If you were prescribed an antibiotic medicine, take it as told by your health care provider. Do not stop taking the antibiotic even if you start to feel better.  Contact a health care provider if you have increased redness, swelling, or pain around your incision. Get help right away if you have chest pain, you vomit, you cough up blood, or you have shortness of breath.  Keep all follow-up visits as told by your health care provider. This is important.  This information is not intended to replace advice given to you by your health care provider. Make sure you discuss any questions you have with your health care provider.  Document Released: 03/11/2013 Document Revised: 11/18/2019 Document Reviewed: 11/18/2019  [employer] Patient Education © 2020 [employer]    Discharge Instructions    Discharged to home by car with relative. Discharged via wheelchair, hospital escort: Yes.  Special equipment needed: Not Applicable    Be sure to schedule a follow-up appointment with your primary care doctor or any specialists as instructed.     Discharge Plan:   Diet Plan: Discussed  Activity Level: Discussed  Confirmed Follow up Appointment: Patient to Call and Schedule Appointment  Confirmed Symptoms Management: Discussed  Medication Reconciliation Updated: Yes    I understand that a diet low in cholesterol, fat, and sodium is recommended for good health. Unless I have been given specific instructions below for another diet, I accept this instruction as my diet prescription.   Other diet: Diabetic as tolerated    Special Instructions: Sepsis, Diagnosis, Adult  Sepsis is a  serious bodily reaction to an infection. The infection that triggers sepsis may be from a bacteria, virus, or fungus. Sepsis can result from an infection in any part of your body. Infections that commonly lead to sepsis include skin, lung, and urinary tract infections.  Sepsis is a medical emergency that must be treated right away in a hospital. In severe cases, it can lead to septic shock. Septic shock can weaken your heart and cause your blood pressure to drop. This can cause your central nervous system and your body's organs to stop working.  What are the causes?  This condition is caused by a severe reaction to infections from bacteria, viruses, or fungus. The germs that most often lead to sepsis include:  Escherichia coli (E. coli) bacteria.  Staphylococcus aureus (staph) bacteria.  Some types of Streptococcus bacteria.  The most common infections affect these organs:  The lung (pneumonia).  The kidneys or bladder (urinary tract infection).  The skin (cellulitis).  The bowel, gallbladder, or pancreas.  What increases the risk?  You are more likely to develop this condition if:  Your body's disease-fighting system (immune system) is weakened.  You are age 65 or older.  You are male.  You had surgery or you have been hospitalized.  You have these devices inserted into your body:  A small, thin tube (catheter).  IV line.  Breathing tube.  Drainage tube.  You are not getting enough nutrients from food (malnourished).  You have a long-term (chronic) disease, such as cancer, lung disease, kidney disease, or diabetes.  You are .  What are the signs or symptoms?  Symptoms of this condition may include:  Fever.  Chills or feeling very cold.  Confusion or anxiety.  Fatigue.  Muscle aches.  Shortness of breath.  Nausea and vomiting.  Urinating much less than usual.  Fast heart rate (tachycardia).  Rapid breathing (hyperventilation).  Changes in skin color. Your skin may look blotchy, pale, or blue.  Cool,  clammy, or sweaty skin.  Skin rash.  Other symptoms depend on the source of your infection.  How is this diagnosed?  This condition is diagnosed based on:  Your symptoms.  Your medical history.  A physical exam.  Other tests may also be done to find out the cause of the infection and how severe the sepsis is. These tests may include:  Blood tests.  Urine tests.  Swabs from other areas of your body that may have an infection. These samples may be tested (cultured) to find out what type of bacteria is causing the infection.  Chest X-ray to check for pneumonia. Other imaging tests, such as a CT scan, may also be done.  Lumbar puncture. This removes a small amount of the fluid that surrounds your brain and spinal cord. The fluid is then examined for infection.  How is this treated?  This condition must be treated in a hospital. Based on the cause of your infection, you may be given an antibiotic, antiviral, or antifungal medicine.  You may also receive:  Fluids through an IV.  Oxygen and breathing assistance.  Medicines to increase your blood pressure.  Kidney dialysis. This process cleans your blood if your kidneys have failed.  Surgery to remove infected tissue.  Blood transfusion if needed.  Medicine to prevent blood clots.  Nutrients to correct imbalances in basic body function (metabolism). You may:  Receive important salts and minerals (electrolytes) through an IV.  Have your blood sugar level adjusted.  Follow these instructions at home:  Medicines    Take over-the-counter and prescription medicines only as told by your health care provider.  If you were prescribed an antibiotic, antiviral, or antifungal medicine, take it as told by your health care provider. Do not stop taking the medicine even if you start to feel better.  General instructions  If you have a catheter or other indwelling device, ask to have it removed as soon as possible.  Keep all follow-up visits as told by your health care provider. This is  important.  Contact a health care provider if:  You do not feel like you are getting better or regaining strength.  You are having trouble coping with your recovery.  You frequently feel tired.  You feel worse or do not seem to get better after surgery.  You think you may have an infection after surgery.  Get help right away if:  You have any symptoms of sepsis.  You have difficulty breathing.  You have a rapid or skipping heartbeat.  You become confused or disoriented.  You have a high fever.  Your skin becomes blotchy, pale, or blue.  You have an infection that is getting worse or not getting better.  These symptoms may represent a serious problem that is an emergency. Do not wait to see if the symptoms will go away. Get medical help right away. Call your local emergency services (911 in the [place] Do not drive yourself to the hospital.  Summary  Sepsis is a medical emergency that requires immediate treatment in a hospital.  This condition is caused by a severe reaction to infections from bacteria, viruses, or fungus.  Based on the cause of your infection, you may be given an antibiotic, antiviral, or antifungal medicine.  Treatment may also include IV fluids, breathing assistance, and kidney dialysis.  This information is not intended to replace advice given to you by your health care provider. Make sure you discuss any questions you have with your health care provider.  Document Released: 09/15/2004 Document Revised: 07/26/2019 Document Reviewed: 07/26/2019  [employer] Patient Education © 2020 [employer]    Is patient discharged on Warfarin / Coumadin?   No     Depression / Suicide Risk    As you are discharged from this [facility] facility, it is important to learn how to keep safe from harming yourself.    Recognize the warning signs:  Abrupt changes in personality, positive or negative- including increase in energy   Giving away possessions  Change in eating patterns- significant weight changes-  positive  or negative  Change in sleeping patterns- unable to sleep or sleeping all the time   Unwillingness or inability to communicate  Depression  Unusual sadness, discouragement and loneliness  Talk of wanting to die  Neglect of personal appearance   Rebelliousness- reckless behavior  Withdrawal from people/activities they love  Confusion- inability to concentrate     If you or a loved one observes any of these behaviors or has concerns about self-harm, here's what you can do:  Talk about it- your feelings and reasons for harming yourself  Remove any means that you might use to hurt yourself (examples: pills, rope, extension cords, firearm)  Get professional help from the community (Mental Health, Substance Abuse, psychological counseling)  Do not be alone:Call your Safe Contact- someone whom you trust who will be there for you.  Call your local CRISIS HOTLINE 652-4361 or 884-705-8202  Call your local Children's Mobile Crisis Response Team [place] (439) 452-0582 or www.[employer]  Call the toll free National Suicide Prevention Hotlines   National Suicide Prevention Lifeline 917-826-YARX (7182)  National Hope Line Network 800-SUICIDE (554-1297)

## 2022-06-01 NOTE — DISCHARGE PLANNING
"HTH/SCP TCN chart review completed. Collaborated with RANGEL Hall. Patient seen at bedside, Right knee incision ith steri strips open to air. Mbr is  emotional as she  verbalize her  financial concerns, states he receives child support but it all goes towards his 18yo Son who is a minor. She also requested for \" food stamps\" assistance- I notified Primary RANGEL Hall. I was notified by CM that  [facility] has declined this mbr. Mbr states she has a FWW at home.Updated PT eval noted 6/1/2022 \" Discharge Recommendations: Other - (Recommend post acute placement due to limited gait/safety with ambulation & stairs.  If not available, recommend home health PT, WC, FWW, & assistance/SPV for mobility.).  Anticipate Mbr will need a wheelchair according to PT    Will defer this mbr  To Primary CM and discharge planning team. TCN will continue to follow and collaborate with discharge planning team as additional post acute needs arise. Thank you.        Previously completed:  - Orders received for: PT and OT -> recommended post-acute placement  - Choice forms: HH, SNF, Infusion, IRF. RANGEL Hall  obtained DME choice 5/30/2022  - [facility] introduced (Y), referral (sent).                 "

## 2022-06-01 NOTE — THERAPY
"Physical Therapy   Daily Treatment     Patient Name: Kary Shah  Age:  56 y.o., Sex:  female  Medical Record #: [id number]  Today's Date: 6/1/2022     Precautions  Precautions: Fall Risk;Weight Bearing As Tolerated Right Lower Extremity    Assessment    Patient agreeable to PT tx session.  She continues to have antalgic, step to gait, improved with warm up and increased distance.  Patient negotiated 3 steps with B alexys, min-mod A.  Educated patient on sequencing of stairs and compensatory strategies.  Patient required rest breaks between stairs due to extreme pain, yelling in pain with each step.  She initially ascended with RLE first, improved negotiation with  LLE leading.  Patient had increased pain with descending stairs due to leading with painful RLE, required assistance to bring LLE off stairs.  Educated patient that her son could use WC at home to bump her up the stairs, educated on sequencing.  She also stated that her son's father may be able to assist with getting into the house at DC.  Educated and demonstrated sequencing of FWW on platform step (platform not available for practice today), patient demonstrated understanding.  Spoke with CM regarding WC recommendation.  Patient stated she owns FWW.  Recommend home health PT and family assistance with mobility.    Plan    Continue current treatment plan.    [place] Equipment Recommendations: Wheelchair (Standard 18\" WC with leg rests that pt can propel & family can propel from behind to assist with stairs. Pt reported she owns FWW, if not will need FWW.)  Discharge Recommendations: Other - (Recommend post acute placement due to limited gait/safety with ambulation & stairs.  If not available, recommend home health PT, WC, FWW, & assistance/SPV for mobility.)     Objective     06/01/22 0859   Precautions   Precautions Fall Risk;Weight Bearing As Tolerated Right Lower Extremity   Pain   Pain Scales 0 to 10 Scale    Pain 0 - 10 Group   Therapist Pain " Assessment During Activity;Nurse Notified (Not quantified, RN gave pain meds)   Cognition    Cognition / Consciousness WDL   Level of Consciousness Alert   Comments Pleasant & motivated   Other Treatments   Other Treatments Provided Gait and stair training   Balance   Sitting Balance (Static) Fair +   Sitting Balance (Dynamic) Fair   Standing Balance (Static) Fair   Standing Balance (Dynamic) Fair -   Weight Shift Sitting Fair   Weight Shift Standing Fair   Skilled Intervention Verbal Cuing;Tactile Cuing;Sequencing;Facilitation;Compensatory Strategies   Comments w/ FWW   Gait Analysis   Gait Level Of Assist Contact Guard Assist   Assistive Device Front Wheel Walker   Distance (Feet) 20   # of Times Distance was Traveled 2   Deviation Antalgic;Step To;Decreased Base Of Support   # of Stairs Climbed 3   Level of Assist with Stairs Minimal Assist (min A -mod A)   Weight Bearing Status WBAT RLE   Skilled Intervention Verbal Cuing;Tactile Cuing;Compensatory Strategies;Sequencing   Comments Extended time for stairs with cues for sequencing.  Pt screaming in pain with each stair   Bed Mobility    Supine to Sit Supervised   Sit to Supine Standby Assist (cues to use LLE to assist RLE)   Skilled Intervention Verbal Cuing;Tactile Cuing;Compensatory Strategies   Functional Mobility   Sit to Stand Standby Assist   Bed, Chair, Wheelchair Transfer Standby Assist   Transfer Method Stand Step   Mobility ambulation, stairs   Skilled Intervention Verbal Cuing   Activity Tolerance   Sitting Edge of Bed 10 min   Standing 25 min   Comments limited by pain   Short Term Goals    Short Term Goal # 1 pt will get OOB and return BTB with HOB flat and rails down and SPV for safety with bed mobility at home. ( 6 tx's)   Goal Outcome # 1 goal not met   Short Term Goal # 2 pt will transfer with the LRAD and SPV in 6 Tx's to safely transfer at home.   Goal Outcome # 2 Goal not met   Short Term Goal # 3 pt will ambulate ' with the LRAD and SPV  "in 6 Tx's   Goal Outcome # 3 Goal not met   Short Term Goal # 4 Pt will negotiate 2 platform steps with FWW and min A within 6 visits in order to access home   Goal Outcome # 4 Goal not met   Anticipated Discharge Equipment and Recommendations   DC Equipment Recommendations Wheelchair (Standard 18\" WC with leg rests that pt can propel & family can propel from behind to assist with stairs. Pt reported she owns FWW, if not will need FWW.)   Discharge Recommendations Other - (Recommend post acute placement due to limited gait/safety with ambulation & stairs.  If not available, recommend home health PT, WC, FWW, & assistance/SPV for mobility.)     "

## 2022-06-01 NOTE — DISCHARGE SUMMARY
Discharge Summary    CHIEF COMPLAINT ON ADMISSION  Chief Complaint   Patient presents with   • Weakness     Patient reports weakness worsening today at 2pm   • Leg Pain     Patient reports severe bilateral leg pain that makes it difficult to walk   • Shortness of Breath     Patient reports slight intermittent SOB       Reason for Admission  leg pain     Admission Date  5/10/2022    CODE STATUS  Full Code    HPI & HOSPITAL COURSE  Kary Shah is a 56 y.o. female admitted 5/10/2022 with 1 week history of fevers and chills, with worsening chronic right knee pain and swelling. She has seropositive rheumatoid arthritis (on methotrexate and Plaquenil), type 2 diabetes, osteoarthritis, essential hypertension, and GERD.     On admission,labs showed leukocytosis (16.4 K), sodium was 132, anion gap was 17.0, lactic acid was 3.1.      Right knee x-ray showed moderate right knee joint effusion with questionable soft tissue gas seen posterior to the distal femur.     CT right knee showed joint effusion with thick synovial enhancement, concerning for infection. There is gas within the multilocular fluid collection in the popliteal fossa, concerning for infected Baker's cyst/abscess.     ER physician attempted arthrocentesis but was unsuccessful. Orthopedic surgery on-call recommended starting IV antibiotics with formal consult. Vancomycin, Clindamycin and Zosyn were empirically started.  Lactic acid improved to 1.5.     Patient underwent right knee arthrotomy and drainage of deep infection, incision and drainage of right thigh abscess on 5/11/2022. 2 drains were placed.      ID were consulted.  Vancomycin and Zosyn switched to Ancef on 5/13/2022.  Cultures were negative.  Ancef was discontinued and doxycycline 100 mg p.o. twice daily with Levaquin 50 mg daily was started on 5/16/2022 for 14 days.     NISHANT US from 5/13/2022 shows acute, non-occlusive superficial venous thrombosis in the cephalic vein of the distal bicep  attached to the IV. Left arm swelling and pain  improved.      Right thigh drain was removed 5/16/2022. Right knee drain was removed on 5/18/2022.  Patient has been hemodynamically stable and afebrile for several days.  No leukocytosis since 5/15/2022.     Pain is continuously improving.  Patient is able to ambulate without assistance.  Sutures were removed by orthopedic surgery today.  Patient is to follow with orthopedic surgery as recommended by them.  She has completed her antibiotic course, and is to follow-up with her primary care physician and rheumatologist within the next few days.    Home Health, walker, wheelchair and bedside commode have been ordered.  Patient is stable for discharge to home.      Therefore, she is discharged in fair and stable condition to home with close outpatient follow-up.      Discharge Date  6/1/2022    FOLLOW UP ITEMS POST DISCHARGE  Primary care physician and rheumatologist within 1 to 2 days    Orthopedic surgery as recommended    DISCHARGE DIAGNOSES  Active Problems:    Discharge planning issues POA: Unknown    Essential hypertension POA: Yes    Type 2 diabetes mellitus with hyperglycemia, without long-term current use of insulin (HCC) POA: Yes    Arthritis of right knee POA: Unknown    Hypokalemia POA: Unknown  Resolved Problems:    Sepsis (HCC) POA: Yes    Left arm swelling POA: Unknown    Superficial venous thrombosis of arm, left POA: Unknown      FOLLOW UP  Future Appointments   Date Time Provider Department Center   6/9/2022  2:30 PM Malathi Che M.D. [facility] None     No follow-up provider specified.    MEDICATIONS ON DISCHARGE     Medication List      CONTINUE taking these medications      Instructions   atorvastatin 20 MG Tabs  Commonly known as: LIPITOR   Take 1 Tablet by mouth every evening.  Dose: 20 mg     folic acid 1 MG Tabs  Commonly known as: FOLVITE   1 tab po qday     hydroxychloroquine 200 MG Tabs  Commonly known as: Plaquenil   1 tab po bid      lisinopril 10 MG Tabs  Commonly known as: PRINIVIL   Take 1 tablet by mouth once daily     metFORMIN  MG Tb24  Commonly known as: GLUCOPHAGE XR   Take 1 Tablet by mouth 2 times a day.  Dose: 750 mg     methotrexate 2.5 MG tablet   6 tabs po qweek (every Sunday)     sulindac 200 MG Tabs  Commonly known as: CLINORIL   1 tab po bid prn with food joint pain            Allergies  No Known Allergies    DIET  Orders Placed This Encounter   Procedures   • Diet Order Diet: Consistent CHO (Diabetic)     Standing Status:   Standing     Number of Occurrences:   1     Order Specific Question:   Diet:     Answer:   Consistent CHO (Diabetic) [4]       ACTIVITY  As tolerated.    CONSULTATIONS  Orthopedic surgery  Infectious disease    PROCEDURES  Right knee arthrotomy and drainage of deep infection, incision and drainage of right thigh abscess.  Drain placement and removal    LABORATORY  Lab Results   Component Value Date    SODIUM 137 05/31/2022    POTASSIUM 3.5 (L) 05/31/2022    CHLORIDE 100 05/31/2022    CO2 25 05/31/2022    GLUCOSE 133 (H) 05/31/2022    BUN 11 05/31/2022    CREATININE 0.71 05/31/2022        Lab Results   Component Value Date    WBC 7.5 05/31/2022    HEMOGLOBIN 10.2 (L) 05/31/2022    HEMATOCRIT 31.4 (L) 05/31/2022    PLATELETCT 325 05/31/2022        Total time of the discharge process exceeds 37 minutes.

## 2022-06-01 NOTE — PROGRESS NOTES
"   Orthopaedic Progress Note    Interval changes:  Patient doing well  Sutures and staples removed and seristrips placed with benzoin  Cleared by ortho for DC home pending medicine clearance    ROS - Patient denies any new issues.  Pain well controlled.    /66   Pulse 91   Temp 36.7 °C (98.1 °F) (Temporal)   Resp 17   Ht 1.448 m (4' 9\")   Wt 65.5 kg (144 lb 6.4 oz)   SpO2 98%       Patient seen and examined  No acute distress  Breathing non labored  RRR  RLE staples and sutures removed and seristrips placed with benzoin, DNVI, moves all toes, cap refill <2 sec.     Recent Labs     05/30/22  0224 05/31/22  0546   WBC 7.5 7.5   RBC 3.49* 3.49*   HEMOGLOBIN 10.4* 10.2*   HEMATOCRIT 31.5* 31.4*   MCV 90.3 90.0   MCH 29.8 29.2   MCHC 33.0* 32.5*   RDW 45.1 45.5   PLATELETCT 343 325   MPV 9.5 10.0       Active Hospital Problems    Diagnosis    • Hypokalemia [E87.6]    • Discharge planning issues [Z02.9]    • Arthritis of right knee [M17.11]    • Type 2 diabetes mellitus with hyperglycemia, without long-term current use of insulin (HCC) [E11.65]    • Essential hypertension [I10]        Assessment/Plan:  Patient doing well  Cleared by ortho for DC home pending medicine clearance  POD#14 S/P Removal of retained drain right knee  Wt bearing status - WBAT  Wound care/Drains - Dressings to be changed every other day by nursing  Future Procedures - none planned   Lovenox: Start 5/19, Duration-until ambulatory > 150'  Sutures/Staples out- 14 days post operatively  PT/OT-initiated  Antibiotics: completed  DVT Prophylaxis- TEDS/SCDs/Foot pumps  Coy-none  Case Coordination for Discharge Planning - Disposition home   "

## 2022-06-06 PROBLEM — M79.604 PAIN OF RIGHT LOWER EXTREMITY: Status: ACTIVE | Noted: 2022-06-06

## 2022-06-06 PROBLEM — M00.9 PYOGENIC ARTHRITIS OF RIGHT KNEE JOINT (HCC): Status: ACTIVE | Noted: 2022-06-06

## 2022-06-09 ENCOUNTER — OFFICE VISIT (OUTPATIENT)
Dept: RHEUMATOLOGY | Facility: MEDICAL CENTER | Age: 57
End: 2022-06-09
Payer: COMMERCIAL

## 2022-06-09 VITALS
SYSTOLIC BLOOD PRESSURE: 134 MMHG | RESPIRATION RATE: 16 BRPM | WEIGHT: 146 LBS | BODY MASS INDEX: 31.5 KG/M2 | TEMPERATURE: 98.2 F | OXYGEN SATURATION: 95 % | HEART RATE: 108 BPM | HEIGHT: 57 IN | DIASTOLIC BLOOD PRESSURE: 70 MMHG

## 2022-06-09 DIAGNOSIS — Z79.899 LONG-TERM USE OF PLAQUENIL: ICD-10-CM

## 2022-06-09 DIAGNOSIS — M06.9 RHEUMATOID ARTHRITIS, INVOLVING UNSPECIFIED SITE, UNSPECIFIED WHETHER RHEUMATOID FACTOR PRESENT (HCC): ICD-10-CM

## 2022-06-09 DIAGNOSIS — E11.65 TYPE 2 DIABETES MELLITUS WITH HYPERGLYCEMIA, WITHOUT LONG-TERM CURRENT USE OF INSULIN (HCC): ICD-10-CM

## 2022-06-09 DIAGNOSIS — G89.29 CHRONIC RIGHT SHOULDER PAIN: ICD-10-CM

## 2022-06-09 DIAGNOSIS — Z79.631 METHOTREXATE, LONG TERM, CURRENT USE: ICD-10-CM

## 2022-06-09 DIAGNOSIS — M25.861 CYST OF RIGHT KNEE JOINT: ICD-10-CM

## 2022-06-09 DIAGNOSIS — M19.90 OSTEOARTHRITIS, UNSPECIFIED OSTEOARTHRITIS TYPE, UNSPECIFIED SITE: ICD-10-CM

## 2022-06-09 DIAGNOSIS — I10 ESSENTIAL HYPERTENSION: ICD-10-CM

## 2022-06-09 DIAGNOSIS — M25.511 CHRONIC RIGHT SHOULDER PAIN: ICD-10-CM

## 2022-06-09 PROCEDURE — 99214 OFFICE O/P EST MOD 30 MIN: CPT | Performed by: INTERNAL MEDICINE

## 2022-06-09 RX ORDER — METHOTREXATE 2.5 MG/1
TABLET ORAL
Qty: 72 TABLET | Refills: 0 | Status: SHIPPED | OUTPATIENT
Start: 2022-06-09 | End: 2023-03-02 | Stop reason: SDUPTHER

## 2022-06-09 RX ORDER — HYDROXYCHLOROQUINE SULFATE 200 MG/1
TABLET, FILM COATED ORAL
Qty: 180 TABLET | Refills: 1 | Status: SHIPPED | OUTPATIENT
Start: 2022-06-09 | End: 2023-01-25

## 2022-06-09 RX ORDER — FOLIC ACID 1 MG/1
TABLET ORAL
Qty: 90 TABLET | Refills: 3 | Status: SHIPPED | OUTPATIENT
Start: 2022-06-09 | End: 2023-10-03 | Stop reason: SDUPTHER

## 2022-06-09 ASSESSMENT — FIBROSIS 4 INDEX: FIB4 SCORE: 0.86

## 2022-06-09 NOTE — PROGRESS NOTES
Chief Complaint- joint pain     Subjective:   Kary Shah is a 56 y.o. female here today for follow up of rheumatological issues    All information done through      This is a follow-up visit, third visit with us for this patient who we see in this clinic for serologically positive rheumatoid arthritis.  Patient was diagnosed with rheumatoid arthritis about October 2021 with symptoms of finger swelling and ankle swelling and positive serologies.  Patient is currently on methotrexate at 15 mg p.o. weekly with folic acid 1 mg a day with Plaquenil at 200 mg p.o. twice daily.  Patient states she is doing quite well on this particular regiment of note patient's last ophthalmology evaluation was March 2022.    Since last visit patient has had a fall and hurt her right shoulder, states she will be undergoing surgery through orthopedics late in June 2022.  Also since last visit patient develop a pyogenic cyst in her right knee status post I&D with antibiotics with resolution of the pyogenic cyst to right knee.  Patient currently off of antibiotics.     PMHX  Diabetes  HTN        Fam Hx  F-passed CAD/MI  M-passed CAD/MI, RA  Sx3 one passed from CVA/blood clot in brain  Bx3 one passed secondary ETOH and CAD/MI     Soc Hx  No tob  ETOH once/year  No blood tx  No tattoos     G6PD 13.9 adequate 3/2022  Hemoglobin A1c 7.7 10/2021  RF 83 3/2020  CCP 93 3/2020  Hand x-rays 3/2020-IMPRESSION:  1.  Asymmetric degenerative change of the IP joints diffusely which may represent asymmetric osteoarthritic change. Differential diagnosis includes psoriatic arthritis.  2.  No erosive arthritic change.  Right Hand x-rays 5/2021-IMPRESSION:   No evidence of acute fracture or dislocation.   Osteoarthritis as above described particularly involving the distal interphalangeal joints of the third and fifth digits.  Mild soft tissue swelling about the distal third digit.  Demineralization which limits  "evaluation.     Left Foot x-ray 5/2021-IMPRESSION:  Degenerative changes as above described, most prominent at the first MTP joint.  Soft tissue swelling along the medial foot.  Calcaneal spurring.-     Current Outpatient Medications   Medication Sig Dispense Refill   • methotrexate 2.5 MG tablet 6 tabs po qweek (every Sunday) 72 Tablet 0   • folic acid (FOLVITE) 1 MG Tab 1 tab po qday 90 Tablet 3   • hydroxychloroquine (PLAQUENIL) 200 MG Tab 1 tab po bid 180 Tablet 1   • metFORMIN ER (GLUCOPHAGE XR) 750 MG TABLET SR 24 HR Take 1 Tablet by mouth 2 times a day. 180 Tablet 1   • atorvastatin (LIPITOR) 20 MG Tab Take 1 Tablet by mouth every evening. 90 Tablet 1   • lisinopril (PRINIVIL) 10 MG Tab Take 1 tablet by mouth once daily 90 tablet 3   • sulindac (CLINORIL) 200 MG Tab 1 tab po bid prn with food joint pain (Patient not taking: No sig reported) 180 Tablet 0     No current facility-administered medications for this visit.     She  has a past medical history of Diabetes (HCC), GERD (gastroesophageal reflux disease), Hypertension, and Osteoarthritis of both hands (2015).    ROS   Other than what is mentioned in HPI or physical exam, there is no history of headaches, double vision or blurred vision. No temporal tenderness or jaw claudication. No trouble swallowing difficulties or sore throats.  No chest complaints including chest pain, cough or sputum production. No GI complaints including nausea, vomiting, change in bowel habits, or past peptic ulcer disease. No history of blood in the stools. No urinary complaints including dysuria or frequency. No history of alopecia, photosensitivity, oral ulcerations, Raynaud's phenomena.       Objective:     /70 (BP Location: Left arm, Patient Position: Sitting, BP Cuff Size: Adult)   Pulse (!) 108   Temp 36.8 °C (98.2 °F)   Resp 16   Ht 1.448 m (4' 9\")   Wt 66.2 kg (146 lb)   SpO2 95%  Body mass index is 31.59 kg/m².   Physical Exam:    Constitutional: Alert and " oriented X3, patient is talkative with good eye contact.Skin: Warm, dry, good turgor, no rashes in visible areas.Eye: Equal, round and reactive, conjunctiva clear, lids normal EOM intactENMT: Lips without lesions, good dentition, no oropharyngeal ulcers, moist buccal mucosa, pinna without deformityNeck: Trachea midline, no masses, no thyromegaly.Lymph:  No cervical lymphadenopathy, no axillary lymphadenopathy, no supraclavicular lymphadenopathyRespiratory: Unlabored respiratory effort, lungs clear to auscultation, no wheezes, no ronchi.Cardiovascular: Normal S1, S2, regular rate and rhythm no murmurs rubs or gallops.Abdomen: Soft, non-tender, no masses, no hepatosplenomegaly.Psych: Alert and oriented x3, normal affect and mood.Neuro: Cranial nerves 2-12 are grossly intact, no loss of sensation LEExt:no joint laxity noted in bilateral arms, no joint laxity noted in bilateral legs, positive Heberden's nodes most DIP joints both hands, no synovitis noted, there are well-healed I&D scars on the right knee, mild crepitus in the knees but no synovitis, patient does have good range of motion of the left shoulder the right shoulder has limited range of motion secondary to injury to her right shoulder for which states she will be undergoing surgery late June 2022.  Patient currently walks with a walker as she was just recently discharged from the hospital for the pyogenic cyst in the right knee.    Lab Results   Component Value Date/Time    HEPCAB Negative 02/29/2020 07:20 AM     Lab Results   Component Value Date/Time    SODIUM 137 06/01/2022 09:30 AM    POTASSIUM 3.6 06/01/2022 09:30 AM    CHLORIDE 101 06/01/2022 09:30 AM    CO2 22 06/01/2022 09:30 AM    GLUCOSE 183 (H) 06/01/2022 09:30 AM    BUN 10 06/01/2022 09:30 AM    CREATININE 0.68 06/01/2022 09:30 AM      Lab Results   Component Value Date/Time    WBC 7.5 05/31/2022 05:46 AM    RBC 3.49 (L) 05/31/2022 05:46 AM    HEMOGLOBIN 10.2 (L) 05/31/2022 05:46 AM    HEMATOCRIT  31.4 (L) 05/31/2022 05:46 AM    MCV 90.0 05/31/2022 05:46 AM    MCH 29.2 05/31/2022 05:46 AM    MCHC 32.5 (L) 05/31/2022 05:46 AM    MPV 10.0 05/31/2022 05:46 AM    NEUTSPOLYS 70.70 05/31/2022 05:46 AM    LYMPHOCYTES 14.10 (L) 05/31/2022 05:46 AM    MONOCYTES 11.30 05/31/2022 05:46 AM    EOSINOPHILS 2.80 05/31/2022 05:46 AM    BASOPHILS 0.70 05/31/2022 05:46 AM      Lab Results   Component Value Date/Time    CALCIUM 9.0 06/01/2022 09:30 AM    ASTSGOT 11 (L) 05/21/2022 12:21 AM    ALTSGPT 5 05/21/2022 12:21 AM    ALKPHOSPHAT 73 05/21/2022 12:21 AM    TBILIRUBIN 0.3 05/21/2022 12:21 AM    ALBUMIN 2.4 (L) 05/21/2022 12:21 AM    TOTPROTEIN 5.3 (L) 05/21/2022 12:21 AM     Lab Results   Component Value Date/Time    RHEUMFACTN 83 (H) 03/26/2020 02:20 PM    CCPANTIBODY 93 (H) 03/26/2020 02:20 PM     Lab Results   Component Value Date/Time    SEDRATEWES 40 (H) 03/12/2022 09:05 AM     Lab Results   Component Value Date/Time    HBA1C 7.7 (H) 10/22/2021 08:59 AM     Lab Results   Component Value Date/Time    G6PD 13.9 03/12/2022 09:05 AM     Assessment and Plan:     1. Rheumatoid arthritis, involving unspecified site, unspecified whether rheumatoid factor present (HCC)  Continue methotrexate at 15 mg p.o. weekly with folic acid 1 mg a day with the Plaquenil at 200 mg p.o. twice daily  - methotrexate 2.5 MG tablet; 6 tabs po qweek (every Sunday)  Dispense: 72 Tablet; Refill: 0  - folic acid (FOLVITE) 1 MG Tab; 1 tab po qday  Dispense: 90 Tablet; Refill: 3  - hydroxychloroquine (PLAQUENIL) 200 MG Tab; 1 tab po bid  Dispense: 180 Tablet; Refill: 1  - Comp Metabolic Panel; Future  - CBC WITH DIFFERENTIAL; Future  - Sed Rate; Future    2. Methotrexate, long term, current use  On methotrexate 15 mg p.o. weekly with folic acid 1 mg a day, patient needs monitoring labs every 3 months next labs due August 2022, labs ordered for patient  - methotrexate 2.5 MG tablet; 6 tabs po qweek (every Sunday)  Dispense: 72 Tablet; Refill: 0  - folic  acid (FOLVITE) 1 MG Tab; 1 tab po qday  Dispense: 90 Tablet; Refill: 3  - hydroxychloroquine (PLAQUENIL) 200 MG Tab; 1 tab po bid  Dispense: 180 Tablet; Refill: 1  - Comp Metabolic Panel; Future  - CBC WITH DIFFERENTIAL; Future  - Sed Rate; Future    3. Long-term use of Plaquenil  On Plaquenil 200 mg p.o. twice daily, of note G6PD levels are adequate, patient needs monitoring labs every 6 months, next labs due about November 2022.  Patient needs ophthalmology evaluation every year next ophthalmology evaluation due about March 2023  - methotrexate 2.5 MG tablet; 6 tabs po qweek (every Sunday)  Dispense: 72 Tablet; Refill: 0  - folic acid (FOLVITE) 1 MG Tab; 1 tab po qday  Dispense: 90 Tablet; Refill: 3  - hydroxychloroquine (PLAQUENIL) 200 MG Tab; 1 tab po bid  Dispense: 180 Tablet; Refill: 1  - Comp Metabolic Panel; Future  - CBC WITH DIFFERENTIAL; Future  - Sed Rate; Future    4. Osteoarthritis, unspecified osteoarthritis type, unspecified site  Patient uses sulindac as needed  - methotrexate 2.5 MG tablet; 6 tabs po qweek (every Sunday)  Dispense: 72 Tablet; Refill: 0  - folic acid (FOLVITE) 1 MG Tab; 1 tab po qday  Dispense: 90 Tablet; Refill: 3  - hydroxychloroquine (PLAQUENIL) 200 MG Tab; 1 tab po bid  Dispense: 180 Tablet; Refill: 1  - Comp Metabolic Panel; Future  - CBC WITH DIFFERENTIAL; Future  - Sed Rate; Future    5. Essential hypertension  May impact the type of medications we can use for this patient's arthritis. We will have to keep this under advisement.  - methotrexate 2.5 MG tablet; 6 tabs po qweek (every Sunday)  Dispense: 72 Tablet; Refill: 0  - folic acid (FOLVITE) 1 MG Tab; 1 tab po qday  Dispense: 90 Tablet; Refill: 3  - hydroxychloroquine (PLAQUENIL) 200 MG Tab; 1 tab po bid  Dispense: 180 Tablet; Refill: 1  - Comp Metabolic Panel; Future  - CBC WITH DIFFERENTIAL; Future  - Sed Rate; Future    6. Type 2 diabetes mellitus with hyperglycemia, without long-term current use of insulin  ([facility])  Followed by patients PCP, high blood sugar can exacerbate inflammatory state of patient's arthritis, discussed with patient the need to monitor and control blood sugars, patient states understanding    7. Chronic right shoulder pain  Status post fall status post evaluation by orthopedics where patient will be undergoing surgical intervention late June 2022    8. Cyst of right knee joint  Status post I&D status post antibiotics resolving well.    Followup: Return in about 3 months (around 9/9/2022). or sooner juliet Shah  was seen 30 minutes face-to-face of which more than 50% of the time was spent counseling the patient (excluding time for procedures)  regarding  rheumatological condition and care. Therapy was discussed in detail.      Please note that this dictation was created using voice recognition software. I have made every reasonable attempt to correct obvious errors, but I expect that there are errors of grammar and possibly content that I did not discover before finalizing the note.

## 2022-06-10 PROBLEM — M25.569 POSTOPERATIVE PAIN OF KNEE: Status: ACTIVE | Noted: 2022-06-10

## 2022-06-10 PROBLEM — G89.18 POSTOPERATIVE PAIN OF KNEE: Status: ACTIVE | Noted: 2022-06-10

## 2022-06-30 NOTE — ASSESSMENT & PLAN NOTE
Chronic medical problem.  Patient currently taking lisinopril 10 mg daily.  She would like a medication refill.  She does not check her blood pressure at home.  She will occasionally check her BP at [facility] and states her readings are 140s or less over 60s.  Denies any chest pain, shortness of breath, palpitations, or dizziness.   Refill request from pharmacy for the medication listed below. Patient was last seen 5/24/22  Next appt 8/24/22. Refilled per protocol. Last written as  metFORMIN (GLUCOPHAGE-XR) 750 MG 24 hr tablet 180 tablet 1 1/26/2022     Sig: TAKE 2 TABLETS BY MOUTH Â DAILY WITH BREAKFAST    Sent to pharmacy as: metFORMIN HCl  MG Oral Tablet Extended Release 24 Hour (GLUCOPHAGE-XR)    Class: Eprescribe        atorvastatin (LIPITOR) 20 MG tablet 90 tablet 1 1/11/2022     Sig: TAKE 1 TABLET BY MOUTH AT Â NIGHT    Sent to pharmacy as: Atorvastatin Calcium 20 MG Oral Tablet (LIPITOR)    Class: Eprescribe            Pharmacy:  [facility] Â ([facility]) - [place], [address] [place] [address] [place] [address]   Phone:  [phone] Â Fax:  [phone]   Call when complete?   no

## 2022-07-15 ENCOUNTER — PRE-ADMISSION TESTING (OUTPATIENT)
Dept: ADMISSIONS | Facility: MEDICAL CENTER | Age: 57
End: 2022-07-15
Attending: ORTHOPAEDIC SURGERY
Payer: COMMERCIAL

## 2022-07-15 ENCOUNTER — APPOINTMENT (OUTPATIENT)
Dept: ADMISSIONS | Facility: MEDICAL CENTER | Age: 57
End: 2022-07-15
Payer: COMMERCIAL

## 2022-07-15 NOTE — PREPROCEDURE INSTRUCTIONS
Pre-admit telephone appointment completed. Pt states all instructions given are understood. Medications the patient will take the morning of surgery per anesthesia protocol: Folic Acid and instructed to take other prescribed medications through the day before surgery.    Denies anesthesia complications

## 2022-07-19 ENCOUNTER — PRE-ADMISSION TESTING (OUTPATIENT)
Dept: ADMISSIONS | Facility: MEDICAL CENTER | Age: 57
End: 2022-07-19
Attending: ORTHOPAEDIC SURGERY
Payer: COMMERCIAL

## 2022-07-19 DIAGNOSIS — Z01.812 PRE-OPERATIVE LABORATORY EXAMINATION: ICD-10-CM

## 2022-07-19 LAB
ANION GAP SERPL CALC-SCNC: 12 MMOL/L (ref 7–16)
BUN SERPL-MCNC: 16 MG/DL (ref 8–22)
CALCIUM SERPL-MCNC: 9.2 MG/DL (ref 8.4–10.2)
CHLORIDE SERPL-SCNC: 104 MMOL/L (ref 96–112)
CO2 SERPL-SCNC: 24 MMOL/L (ref 20–33)
CREAT SERPL-MCNC: 0.58 MG/DL (ref 0.5–1.4)
EST. AVERAGE GLUCOSE BLD GHB EST-MCNC: 137 MG/DL
GFR SERPLBLD CREATININE-BSD FMLA CKD-EPI: 105 ML/MIN/1.73 M 2
GLUCOSE SERPL-MCNC: 145 MG/DL (ref 65–99)
HBA1C MFR BLD: 6.4 % (ref 4–5.6)
POTASSIUM SERPL-SCNC: 4.3 MMOL/L (ref 3.6–5.5)
SODIUM SERPL-SCNC: 140 MMOL/L (ref 135–145)

## 2022-07-19 PROCEDURE — 83036 HEMOGLOBIN GLYCOSYLATED A1C: CPT

## 2022-07-19 PROCEDURE — 36415 COLL VENOUS BLD VENIPUNCTURE: CPT

## 2022-07-19 PROCEDURE — 80048 BASIC METABOLIC PNL TOTAL CA: CPT

## 2022-07-27 ENCOUNTER — ANESTHESIA (OUTPATIENT)
Dept: SURGERY | Facility: MEDICAL CENTER | Age: 57
End: 2022-07-27
Payer: COMMERCIAL

## 2022-07-27 ENCOUNTER — ANESTHESIA EVENT (OUTPATIENT)
Dept: SURGERY | Facility: MEDICAL CENTER | Age: 57
End: 2022-07-27
Payer: COMMERCIAL

## 2022-07-27 ENCOUNTER — HOSPITAL ENCOUNTER (OUTPATIENT)
Facility: MEDICAL CENTER | Age: 57
End: 2022-07-27
Attending: ORTHOPAEDIC SURGERY | Admitting: ORTHOPAEDIC SURGERY
Payer: COMMERCIAL

## 2022-07-27 VITALS
OXYGEN SATURATION: 95 % | WEIGHT: 122.36 LBS | DIASTOLIC BLOOD PRESSURE: 48 MMHG | TEMPERATURE: 98.2 F | BODY MASS INDEX: 24.67 KG/M2 | HEART RATE: 96 BPM | SYSTOLIC BLOOD PRESSURE: 108 MMHG | HEIGHT: 59 IN | RESPIRATION RATE: 18 BRPM

## 2022-07-27 DIAGNOSIS — S46.011A TRAUMATIC COMPLETE TEAR OF RIGHT ROTATOR CUFF, INITIAL ENCOUNTER: ICD-10-CM

## 2022-07-27 LAB — GLUCOSE BLD STRIP.AUTO-MCNC: 168 MG/DL (ref 65–99)

## 2022-07-27 PROCEDURE — 700105 HCHG RX REV CODE 258: Performed by: ANESTHESIOLOGY

## 2022-07-27 PROCEDURE — 700111 HCHG RX REV CODE 636 W/ 250 OVERRIDE (IP): Performed by: ANESTHESIOLOGY

## 2022-07-27 PROCEDURE — 700101 HCHG RX REV CODE 250: Performed by: ANESTHESIOLOGY

## 2022-07-27 PROCEDURE — C1713 ANCHOR/SCREW BN/BN,TIS/BN: HCPCS | Performed by: ORTHOPAEDIC SURGERY

## 2022-07-27 PROCEDURE — A9270 NON-COVERED ITEM OR SERVICE: HCPCS | Performed by: ANESTHESIOLOGY

## 2022-07-27 PROCEDURE — 160002 HCHG RECOVERY MINUTES (STAT): Performed by: ORTHOPAEDIC SURGERY

## 2022-07-27 PROCEDURE — 64415 NJX AA&/STRD BRCH PLXS IMG: CPT | Mod: 59 | Performed by: ANESTHESIOLOGY

## 2022-07-27 PROCEDURE — 160029 HCHG SURGERY MINUTES - 1ST 30 MINS LEVEL 4: Performed by: ORTHOPAEDIC SURGERY

## 2022-07-27 PROCEDURE — 160041 HCHG SURGERY MINUTES - EA ADDL 1 MIN LEVEL 4: Performed by: ORTHOPAEDIC SURGERY

## 2022-07-27 PROCEDURE — C1776 JOINT DEVICE (IMPLANTABLE): HCPCS | Performed by: ORTHOPAEDIC SURGERY

## 2022-07-27 PROCEDURE — 700102 HCHG RX REV CODE 250 W/ 637 OVERRIDE(OP): Performed by: ANESTHESIOLOGY

## 2022-07-27 PROCEDURE — 160035 HCHG PACU - 1ST 60 MINS PHASE I: Performed by: ORTHOPAEDIC SURGERY

## 2022-07-27 PROCEDURE — 160036 HCHG PACU - EA ADDL 30 MINS PHASE I: Performed by: ORTHOPAEDIC SURGERY

## 2022-07-27 PROCEDURE — 64415 NJX AA&/STRD BRCH PLXS IMG: CPT | Performed by: ORTHOPAEDIC SURGERY

## 2022-07-27 PROCEDURE — 160046 HCHG PACU - 1ST 60 MINS PHASE II: Performed by: ORTHOPAEDIC SURGERY

## 2022-07-27 PROCEDURE — 160048 HCHG OR STATISTICAL LEVEL 1-5: Performed by: ORTHOPAEDIC SURGERY

## 2022-07-27 PROCEDURE — 01630 ANES OPN/ARTHR PX SHO JT NOS: CPT | Performed by: ANESTHESIOLOGY

## 2022-07-27 PROCEDURE — 700101 HCHG RX REV CODE 250: Performed by: ORTHOPAEDIC SURGERY

## 2022-07-27 PROCEDURE — 160009 HCHG ANES TIME/MIN: Performed by: ORTHOPAEDIC SURGERY

## 2022-07-27 PROCEDURE — 160025 RECOVERY II MINUTES (STATS): Performed by: ORTHOPAEDIC SURGERY

## 2022-07-27 PROCEDURE — 82962 GLUCOSE BLOOD TEST: CPT

## 2022-07-27 PROCEDURE — 76942 ECHO GUIDE FOR BIOPSY: CPT | Mod: 26 | Performed by: ANESTHESIOLOGY

## 2022-07-27 PROCEDURE — 700105 HCHG RX REV CODE 258: Performed by: ORTHOPAEDIC SURGERY

## 2022-07-27 DEVICE — ANCHOR KNOTLESS REELX STT 4.5MM (5EA/BX): Type: IMPLANTABLE DEVICE | Site: SHOULDER | Status: FUNCTIONAL

## 2022-07-27 DEVICE — SYSTEM ORTHOSPACE INSPACE MEDIUM HUMERAL SPACER: Type: IMPLANTABLE DEVICE | Site: SHOULDER | Status: FUNCTIONAL

## 2022-07-27 RX ORDER — HYDRALAZINE HYDROCHLORIDE 20 MG/ML
5 INJECTION INTRAMUSCULAR; INTRAVENOUS
Status: DISCONTINUED | OUTPATIENT
Start: 2022-07-27 | End: 2022-07-27 | Stop reason: HOSPADM

## 2022-07-27 RX ORDER — BUPIVACAINE HYDROCHLORIDE AND EPINEPHRINE 5; 5 MG/ML; UG/ML
INJECTION, SOLUTION EPIDURAL; INTRACAUDAL; PERINEURAL
Status: DISCONTINUED | OUTPATIENT
Start: 2022-07-27 | End: 2022-07-27 | Stop reason: HOSPADM

## 2022-07-27 RX ORDER — BUPIVACAINE HYDROCHLORIDE 2.5 MG/ML
INJECTION, SOLUTION EPIDURAL; INFILTRATION; INTRACAUDAL PRN
Status: DISCONTINUED | OUTPATIENT
Start: 2022-07-27 | End: 2022-07-27 | Stop reason: SURG

## 2022-07-27 RX ORDER — HYDROMORPHONE HYDROCHLORIDE 1 MG/ML
0.4 INJECTION, SOLUTION INTRAMUSCULAR; INTRAVENOUS; SUBCUTANEOUS
Status: DISCONTINUED | OUTPATIENT
Start: 2022-07-27 | End: 2022-07-27 | Stop reason: HOSPADM

## 2022-07-27 RX ORDER — MEPERIDINE HYDROCHLORIDE 25 MG/ML
12.5 INJECTION INTRAMUSCULAR; INTRAVENOUS; SUBCUTANEOUS
Status: DISCONTINUED | OUTPATIENT
Start: 2022-07-27 | End: 2022-07-27 | Stop reason: HOSPADM

## 2022-07-27 RX ORDER — DEXAMETHASONE SODIUM PHOSPHATE 4 MG/ML
INJECTION, SOLUTION INTRA-ARTICULAR; INTRALESIONAL; INTRAMUSCULAR; INTRAVENOUS; SOFT TISSUE PRN
Status: DISCONTINUED | OUTPATIENT
Start: 2022-07-27 | End: 2022-07-27 | Stop reason: HOSPADM

## 2022-07-27 RX ORDER — ALBUTEROL SULFATE 2.5 MG/3ML
2.5 SOLUTION RESPIRATORY (INHALATION)
Status: DISCONTINUED | OUTPATIENT
Start: 2022-07-27 | End: 2022-07-27 | Stop reason: HOSPADM

## 2022-07-27 RX ORDER — OXYCODONE HCL 5 MG/5 ML
10 SOLUTION, ORAL ORAL
Status: COMPLETED | OUTPATIENT
Start: 2022-07-27 | End: 2022-07-27

## 2022-07-27 RX ORDER — CEFAZOLIN SODIUM 1 G/3ML
INJECTION, POWDER, FOR SOLUTION INTRAMUSCULAR; INTRAVENOUS PRN
Status: DISCONTINUED | OUTPATIENT
Start: 2022-07-27 | End: 2022-07-27 | Stop reason: SURG

## 2022-07-27 RX ORDER — DIPHENHYDRAMINE HYDROCHLORIDE 50 MG/ML
12.5 INJECTION INTRAMUSCULAR; INTRAVENOUS
Status: DISCONTINUED | OUTPATIENT
Start: 2022-07-27 | End: 2022-07-27 | Stop reason: HOSPADM

## 2022-07-27 RX ORDER — ROCURONIUM BROMIDE 10 MG/ML
INJECTION, SOLUTION INTRAVENOUS PRN
Status: DISCONTINUED | OUTPATIENT
Start: 2022-07-27 | End: 2022-07-27 | Stop reason: SURG

## 2022-07-27 RX ORDER — HALOPERIDOL 5 MG/ML
1 INJECTION INTRAMUSCULAR
Status: DISCONTINUED | OUTPATIENT
Start: 2022-07-27 | End: 2022-07-27 | Stop reason: HOSPADM

## 2022-07-27 RX ORDER — HYDROMORPHONE HYDROCHLORIDE 1 MG/ML
0.2 INJECTION, SOLUTION INTRAMUSCULAR; INTRAVENOUS; SUBCUTANEOUS
Status: DISCONTINUED | OUTPATIENT
Start: 2022-07-27 | End: 2022-07-27 | Stop reason: HOSPADM

## 2022-07-27 RX ORDER — ONDANSETRON 2 MG/ML
4 INJECTION INTRAMUSCULAR; INTRAVENOUS
Status: DISCONTINUED | OUTPATIENT
Start: 2022-07-27 | End: 2022-07-27 | Stop reason: HOSPADM

## 2022-07-27 RX ORDER — MIDAZOLAM HYDROCHLORIDE 1 MG/ML
INJECTION INTRAMUSCULAR; INTRAVENOUS PRN
Status: DISCONTINUED | OUTPATIENT
Start: 2022-07-27 | End: 2022-07-27 | Stop reason: SURG

## 2022-07-27 RX ORDER — OXYCODONE HCL 5 MG/5 ML
5 SOLUTION, ORAL ORAL
Status: COMPLETED | OUTPATIENT
Start: 2022-07-27 | End: 2022-07-27

## 2022-07-27 RX ORDER — SODIUM CHLORIDE, SODIUM LACTATE, POTASSIUM CHLORIDE, CALCIUM CHLORIDE 600; 310; 30; 20 MG/100ML; MG/100ML; MG/100ML; MG/100ML
INJECTION, SOLUTION INTRAVENOUS CONTINUOUS
Status: DISCONTINUED | OUTPATIENT
Start: 2022-07-27 | End: 2022-07-27 | Stop reason: HOSPADM

## 2022-07-27 RX ORDER — PHENYLEPHRINE HCL IN 0.9% NACL 0.5 MG/5ML
SYRINGE (ML) INTRAVENOUS PRN
Status: DISCONTINUED | OUTPATIENT
Start: 2022-07-27 | End: 2022-07-27 | Stop reason: SURG

## 2022-07-27 RX ORDER — HYDROMORPHONE HYDROCHLORIDE 1 MG/ML
0.5 INJECTION, SOLUTION INTRAMUSCULAR; INTRAVENOUS; SUBCUTANEOUS
Status: DISCONTINUED | OUTPATIENT
Start: 2022-07-27 | End: 2022-07-27 | Stop reason: HOSPADM

## 2022-07-27 RX ORDER — SODIUM CHLORIDE, SODIUM LACTATE, POTASSIUM CHLORIDE, CALCIUM CHLORIDE 600; 310; 30; 20 MG/100ML; MG/100ML; MG/100ML; MG/100ML
INJECTION, SOLUTION INTRAVENOUS CONTINUOUS
Status: ACTIVE | OUTPATIENT
Start: 2022-07-27 | End: 2022-07-27

## 2022-07-27 RX ORDER — LIDOCAINE HYDROCHLORIDE 20 MG/ML
INJECTION, SOLUTION EPIDURAL; INFILTRATION; INTRACAUDAL; PERINEURAL PRN
Status: DISCONTINUED | OUTPATIENT
Start: 2022-07-27 | End: 2022-07-27 | Stop reason: SURG

## 2022-07-27 RX ORDER — ONDANSETRON 2 MG/ML
INJECTION INTRAMUSCULAR; INTRAVENOUS PRN
Status: DISCONTINUED | OUTPATIENT
Start: 2022-07-27 | End: 2022-07-27 | Stop reason: SURG

## 2022-07-27 RX ADMIN — Medication 200 MCG: at 07:32

## 2022-07-27 RX ADMIN — BUPIVACAINE HYDROCHLORIDE 30 ML: 2.5 INJECTION, SOLUTION EPIDURAL; INFILTRATION; INTRACAUDAL; PERINEURAL at 06:45

## 2022-07-27 RX ADMIN — Medication 200 MCG: at 07:19

## 2022-07-27 RX ADMIN — FENTANYL CITRATE 100 MCG: 50 INJECTION, SOLUTION INTRAMUSCULAR; INTRAVENOUS at 07:08

## 2022-07-27 RX ADMIN — ROCURONIUM BROMIDE 20 MG: 10 INJECTION, SOLUTION INTRAVENOUS at 07:58

## 2022-07-27 RX ADMIN — DEXAMETHASONE SODIUM PHOSPHATE 2 MG: 4 INJECTION, SOLUTION INTRA-ARTICULAR; INTRALESIONAL; INTRAMUSCULAR; INTRAVENOUS; SOFT TISSUE at 06:45

## 2022-07-27 RX ADMIN — FENTANYL CITRATE 25 MCG: 50 INJECTION, SOLUTION INTRAMUSCULAR; INTRAVENOUS at 09:44

## 2022-07-27 RX ADMIN — Medication 100 MCG: at 07:26

## 2022-07-27 RX ADMIN — LIDOCAINE HYDROCHLORIDE 60 MG: 20 INJECTION, SOLUTION EPIDURAL; INFILTRATION; INTRACAUDAL at 07:08

## 2022-07-27 RX ADMIN — PHENYLEPHRINE HYDROCHLORIDE 100 MCG/MIN: 10 INJECTION INTRAVENOUS at 07:41

## 2022-07-27 RX ADMIN — Medication 200 MCG: at 07:13

## 2022-07-27 RX ADMIN — MIDAZOLAM HYDROCHLORIDE 2 MG: 1 INJECTION, SOLUTION INTRAMUSCULAR; INTRAVENOUS at 06:42

## 2022-07-27 RX ADMIN — PROPOFOL 140 MG: 10 INJECTION, EMULSION INTRAVENOUS at 07:08

## 2022-07-27 RX ADMIN — Medication 100 MCG: at 07:23

## 2022-07-27 RX ADMIN — SODIUM CHLORIDE, POTASSIUM CHLORIDE, SODIUM LACTATE AND CALCIUM CHLORIDE: 600; 310; 30; 20 INJECTION, SOLUTION INTRAVENOUS at 08:42

## 2022-07-27 RX ADMIN — ROCURONIUM BROMIDE 50 MG: 10 INJECTION, SOLUTION INTRAVENOUS at 07:08

## 2022-07-27 RX ADMIN — SUGAMMADEX 200 MG: 100 INJECTION, SOLUTION INTRAVENOUS at 08:41

## 2022-07-27 RX ADMIN — Medication 100 MCG: at 07:11

## 2022-07-27 RX ADMIN — ONDANSETRON 4 MG: 2 INJECTION INTRAMUSCULAR; INTRAVENOUS at 08:41

## 2022-07-27 RX ADMIN — CEFAZOLIN 2 G: 330 INJECTION, POWDER, FOR SOLUTION INTRAMUSCULAR; INTRAVENOUS at 07:08

## 2022-07-27 RX ADMIN — OXYCODONE HYDROCHLORIDE 5 MG: 5 SOLUTION ORAL at 09:52

## 2022-07-27 RX ADMIN — Medication 100 MCG: at 07:16

## 2022-07-27 RX ADMIN — SODIUM CHLORIDE, POTASSIUM CHLORIDE, SODIUM LACTATE AND CALCIUM CHLORIDE: 600; 310; 30; 20 INJECTION, SOLUTION INTRAVENOUS at 07:03

## 2022-07-27 ASSESSMENT — PAIN SCALES - GENERAL: PAIN_LEVEL: 2

## 2022-07-27 ASSESSMENT — PAIN DESCRIPTION - PAIN TYPE
TYPE: SURGICAL PAIN
TYPE: CHRONIC PAIN
TYPE: SURGICAL PAIN

## 2022-07-27 ASSESSMENT — FIBROSIS 4 INDEX: FIB4 SCORE: 0.88

## 2022-07-27 NOTE — ANESTHESIA TIME REPORT
Anesthesia Start and Stop Event Times     Date Time Event    7/27/2022 0625 Ready for Procedure     0703 Anesthesia Start     0856 Anesthesia Stop        Responsible Staff  07/27/22    Name Role Begin End    Jey Hussein M.D. Anesth 0703 0856        Overtime Reason:  no overtime (within assigned shift)    Comments:

## 2022-07-27 NOTE — OR NURSING
0853-Pt arrived from OR post right shoulder arthroscopy, report received. Pt breathing via oral airway with O2 mask at 6L. Dressing CDI. Cold pack placed to site.     0855-oral airway removed and continued on O2 mask       0900-pt waking more. She is able to move her finger. She reports some numbness on the operative arm and denies any pain or nausea. BP remains low and anesthesia aware. Will continue to infuse IVF and report to anesthesia if BP not improved or trending down.     0910-report to Lanny ALVAREZ

## 2022-07-27 NOTE — ANESTHESIA PROCEDURE NOTES
Peripheral Block    Date/Time: 7/27/2022 6:42 AM  Performed by: Jey Hussein M.D.  Authorized by: Jey Hussein M.D.     Patient Location:  Pre-op  Start Time:  7/27/2022 6:42 AM  End Time:  7/27/2022 6:46 AM  Reason for Block: at surgeon's request and post-op pain management ONLY    patient identified, IV checked, site marked, risks and benefits discussed, surgical consent, monitors and equipment checked, pre-op evaluation and timeout performed    Patient Position:  Sitting  Prep: ChloraPrep    Monitoring:  Heart rate, continuous pulse ox and cardiac monitor  Block Region:  Upper Extremity  Upper Extremity - Block Type:  BRACHIAL PLEXUS block, Interscalene approach    Laterality:  Right  Procedures: ultrasound guided  Image captured, interpreted and electronically stored.  Local Infiltration:  Lidocaine  Strength:  1 %  Dose:  3 ml  Block Type:  Single-shot  Needle Length:  100mm  Needle Gauge:  21 G  Needle Localization:  Ultrasound guidance  Injection Assessment:  Negative aspiration for heme, no paresthesia on injection, incremental injection and local visualized surrounding nerve on ultrasound  Evidence of intravascular injection: No     US Guided Interscalene Brachial Plexus Block   US transducer placed on the neck in oblique plane approximately at the level of C6.  Anterior and Middle Scalene (MSM) muscles identified with nerve trunks identified between the muscles.  Needle inserted lateral to probe and advanced under direct visualization through the MSM into a perineural position.  After negative aspiration, LA injected with ease and visualized surrounding the nerve trunks.

## 2022-07-27 NOTE — ANESTHESIA POSTPROCEDURE EVALUATION
Patient: Kary Shah    Procedure Summary     Date: 07/27/22 Room / Location:  OR  / SURGERY [facility]    Anesthesia Start: 0703 Anesthesia Stop: 0856    Procedures:       RIGHT SHOULDER ARTHROSCOPY WITH ROTATOR CUFF REPAIR, SUBACROMIAL DECOMPRESSION, DEBRIDEMENT, BALLOON (Right Shoulder)      DECOMPRESSION, SHOULDER, ARTHROSCOPIC (Right Shoulder)      ARTHROSCOPY, SHOULDER, WITH EXTENSIVE DEBRIDEMENT (Right Shoulder) Diagnosis: (TEAR OF RIGHT ROTATOR CUFF)    Surgeons: Edmund Menjivar M.D. Responsible Provider: Jey Hussein M.D.    Anesthesia Type: general, peripheral nerve block ASA Status: 2          Final Anesthesia Type: general, peripheral nerve block  Last vitals  BP   Blood Pressure: 105/46    Temp   36.6 °C (97.9 °F)    Pulse   94   Resp   16    SpO2   94 %      Anesthesia Post Evaluation    Patient location during evaluation: PACU  Patient participation: complete - patient participated  Level of consciousness: awake and alert  Pain score: 2    Airway patency: patent  Anesthetic complications: no  Cardiovascular status: hemodynamically stable  Respiratory status: acceptable  Hydration status: euvolemic    PONV: none          No complications documented.

## 2022-07-27 NOTE — OR NURSING
0910: assumed care of pt from Delvin ALVAREZ. Pt in no pain, reporting some dizziness; pt is receiving IV fluids per anesthesia verbal report to give half a liter LR. Otherwise no pain or nausea. R shoulder dressing C/D/I.     0925: no change.     0930: no change.     0945: pt c/o moderate shoulder pain, medicated per MAR.     1000: pt resting comfortably, able to tolerate sips of water on room air. Called pt's son and left voicemail updating him but no response.     1015: R shoulder dressing unchanged. Pt reporting pain is now tolerable.    1030: pt instructed on IS usage, only able to pull 500 mL of air while in gurney in PACU.     1040: pt meets criteria for stage 2, report called to Haylie ALVAREZ.     1042: pt discharged to stage 2 via [facility].

## 2022-07-27 NOTE — ANESTHESIA PREPROCEDURE EVALUATION
Case: [id number] Date/Time: 07/27/22 0645    Procedures:       RIGHT SHOULDER ARTHROSCOPY WITH ROTATOR CUFF REPAIR, SUBACROMIAL DECOMPRESSION, DEBRIDEMENT, OPEN BICEPS TENODESIS AND REPAIRS AS INDICATED      DECOMPRESSION, SHOULDER, ARTHROSCOPIC      ARTHROSCOPY, SHOULDER, WITH EXTENSIVE DEBRIDEMENT      TENODESIS, BICEPS    Pre-op diagnosis: TEAR OF RIGHT ROTATOR CUFF    Location: [facility] OR 02 / SURGERY [facility]    Surgeons: Edmund Menjivar M.D.          Relevant Problems   CARDIAC   (positive) Essential hypertension      GI   (positive) Gastroesophageal reflux disease without esophagitis      ENDO   (positive) Type 2 diabetes mellitus with hyperglycemia, without long-term current use of insulin (HCC)      Other   (positive) Arthritis of right knee   (positive) Seropositive rheumatoid arthritis (HCC)       Physical Exam    Airway   Mallampati: II  TM distance: >3 FB  Neck ROM: full       Cardiovascular - normal exam  Rhythm: regular  Rate: normal  (-) murmur     Dental - normal exam           Pulmonary - normal exam  Breath sounds clear to auscultation     Abdominal    Neurological - normal exam                 Anesthesia Plan    ASA 2       Plan - general and peripheral nerve block     Peripheral nerve block will be post-op pain control  Airway plan will be ETT          Induction: intravenous    Postoperative Plan: Postoperative administration of opioids is intended.    Pertinent diagnostic labs and testing reviewed    Informed Consent:    Anesthetic plan and risks discussed with patient.    Use of blood products discussed with: patient whom consented to blood products.

## 2022-07-27 NOTE — DISCHARGE INSTRUCTIONS
Instrucciones Para [place]  (Home Care Instructions)    ACTIVIDAD: Descanse y tome todo con mucha calma las primeras 24 horas después de rosario cirugía.  Sheila persona adulta responsable debe permanecer con usted ziyad china periodo de tiempo.  Es normal sentirse sonoliento o sonolienta ziyad esas primeras horas.  Le recomendamos que no tutu nada que requiera equilibrio, surinder decisiones a mucha coordinación de rosario parte.    NO TUTU ESTO PURANTE LAS PRIMERAS 24 HORAS:   Manejar o conducir algún vehiculo, operar maquinarias o utilizar electrodomesticos.   Beber cerveza o algún otro tipo de bebida alcohólica.   Surinder decisiones importantes o firmar documentos legales.        DIETA: Para evitar las nauseas, prosiga despacito con rosario dieta a medida que pueda ir tolerándola mejor, evite comidas muy condimentadas o grasosas ziyad china primer día.  Vaya agregando comidas más substanciadas a rosario dieta a medida que asi lo indique rosario médica.  Los bebés pueden beber leche preparada o formula, ásl emerita también leche del seno de la madre a medida que vayan teniendo hambre.  SIGA AGREGANDO LIQUIDOS Y COMIDAS CON FIBRA PARA EVITAR ESTREÑIMIENTO.    EMERITA BAÑARSE Y CAMBIAR LOS VENDAJES DE LA CIRUGIA: Mantenga los apósitos limpios y secos. Usted puede ducharse con los apósitos puestos, katia no sumerja a la herida. Usted se puede quitar los apósitos en 48 horas y ducharse emerita normal.    MEDICAMENTOS/MEDICINAS:  Vuelva a surinder ilsa medicamentos diarios.  [place] los medicamentos que se le prescribe con un poco de comida.  Si no le prescribe ningún tipo de medicamento, entonces puede surinder medicinas para el dolor que no contienen aspirina, si las necesita.  LAS MEDICINAS PARA EL DOLOR PUEDEN ESTREÑIRLE MUCHO.  [place] un suavizante para el excremento o materia fecal (stool softener) o un laxativo emerita por ejemplo: senokot, pericolase, o leche de magnesia, si lo necesita.    La prescripción la administro .  La ultima sosis de medicina para el dolor  fue administrada a las 9:50 de la manana (5 miligramos de Oxicodina).     Se debe hacer zunilda consulta medica con el doctor, Líame para hacer la gin.    Usted debe LIAMAR A ROSARIO MEDICO si tiene los siguientes síntomas:   -   Zunilda fiebre más fadi de 101 grados Fahrenheit.   -   Un dolor incesante aún con los medicamentos, o nauseas y vómito persistente.   -   Un sangrado excesivo (itzel que traspasa los vendajes o gasas) o algúln tipo de drenaje inesperado que proviene de la henda.     -   Un color lobo exagerado o hinchazón alrededor del área en donde se le hizo incisión o lucian, o un drenaje de pus o con olor daily proveniente de la henda.   -    La inhabilidad de orinar o vaciar rosario vejiga en 8 horas.   -    Problemas con a respiración o makenzie en el pecho.    Usted debe llamar al 911 si se presentan problemas con el dolor al respirar o el pecho.  Si no se puede ponnoer en comunicación con un medica o con el centro de cirugía, usted debe ir a la estación de emergencia (emergency room) más cercana o a un centro de atención de urgencia (urgent care center).  El teléfono del medico es: [phone]    LOS SÍNTOMAS DE UN LEVE RESFRIO SON MUY NORMALES.  ADEMÁS USTED PUEDE LLEGAR A SENTIR MAKENZIE GENERALES DE MÚSCULOS, IRRITACIÓN EN LA GARGANTA, MAKENZIE DE KIA Y/O UN POCO DE NAUSEAS.    Sie tiene alguna pregunta, llame a rosario médico.  Si rosario médico no se encuentra disponible, por favor llame al Centro de Cirugía at [phone].  el Centro está abierto de Lunes a Viernes desde las 7:00 de la manana hasta las 5:00 de la noche.      Mi firma a continuación indica que he recibido y entiendco estas instrucciones acera de los cuidados en la casa (Home Care Instructions)    Usted recibirá zunilda encuesta en la correspondencia en las siguientes semanas y le pedimos que por favor tome un momento para completar william encuesta y regresaría a hosotros.  Nuestro objetivó es brindarle un cuidado muy yanes y par lo tanto apreciamos ilsa  coméntanos.  Muchas derek por jalil escogido el [facility]ugía de [facility].      Diet    Resume pre-operative diet upon discharge from the hospital. Depending on how you are feeling and whether you have nausea or not, you may wish to stay with a bland diet for the first few days. However, you can advance your diet as quickly as you feel ready.      Shoulder Surgery Discharge Instructions    ACTIVITY  No active motion of the shoulder. You may perform pendulum exercises 3-5 x/day. No heavy lifting greater than 2 pounds with operative extremity. Do not put any weight on the right arm until further notice. Do not lift with your injured arm.  Do not lean into or carry anything in the injured arm.  Do not use your arm to push yourself up in bed or from a chair.  Avoid pulling and pushing with the arm on your affected side.   Follow these restrictions until cleared by your follow-up provider.     Usted no se debe levantar algo mas de 2 libras con el brazo. No se debe soportar peso en el brazo. No se debe demonstrar movimiento activo con el brazo.       ICE:  Apply ice as much as possible (20 minutes on, 20 minutes off). This will help reduce pain and swelling.     Debes de poner hielo encima de la herida (20 minutos puesto, y 20 minutos josh). Copan se ayudara con el dolor y la inflamacion.       SLING / SHOULDER IMMOBILIZER:  The sling should be on at all times, except when bathing and doing your demonstrated exercises.     Mantenga el cabestrillo puesto siempre, katia se puede quitarlo para ducharse y tambien cuando hagas ilsa ejercicios prescritos.      DRESSING AND WOUND CARE    Keep dressings clean and intact. You may shower with the bandages in place but do not soak the wound. You may remove the bandages after 48 hrs and continue to shower as normal.        FOLLOW-UP  Make sure that you have an appointment 7-14 days following surgery.Your procedure/rehabilitation will be discussed and  physical therapy may be prescribed at that time.         Bloqueo de los nervios intercostales  Intercostal Nerve Block  Un bloqueo de los nervios intercostales es un procedimiento que se realiza para bloquear el dolor en el tórax o en las costillas. Ziyad emanuel procedimiento, el médico le inyectará un medicamento para adormecer la dennys (anestesia local) en el espacio entre las costillas, donde hay nervios llamados nervios intercostales. Estos nervios controlan la sensibilidad en el tórax y en la dennys de las costillas. En algunos casos, se puede aplicar más de zunilda inyección si es necesario bloquear zunilda dennys amplia. Marlo vez deba someterse a emanuel procedimiento para:  Adormecer la dennys ziyad zunilda cirugía cerca de la pared torácica, timmy zunilda cirugía en la mama o la vesícula biliar. En estos casos, el bloqueo nervioso puede combinarse con otro tipo de anestesia.  Reducir el dolor después de zunilda cirugía de tórax.  Bloquear el dolor ziyad la colocación de zunilda sonda pleural.  Reducir el dolor después de zunilda lesión en el tórax, por ejemplo, zunilda fractura de dania.  Reducir el dolor producido por el cáncer.  Aliviar el dolor torácico crónico.  Aliviar el dolor de zunilda infección por culebrilla.  Si el procedimiento se realiza para tratar el dolor a florecita plazo, es posible que también se inyecte un antiinflamatorio de acción prolongada (corticoesteroides) junto con la anestesia.  Informe al médico acerca de lo siguiente:  Cualquier alergia que tenga.  Todos los medicamentos que utiliza, incluidos vitaminas, hierbas, gotas oftálmicas, cremas y medicamentos de venta josh.  Cualquier problema previo que usted o algún miembro de rosario josi hayan tenido con los anestésicos.  Cualquier trastorno de la itzel que tenga.  Cirugías a las que se haya sometido.  Cualquier afección médica que tenga.  Si está embarazada o podría estarlo.  ¿Cuáles son los riesgos?  En general, se trata de un procedimiento seguro. Sin embargo, pueden  ocurrir complicaciones, por ejemplo:  Infección.  Sangrado.  Reacciones alérgicas a la anestesia local.  Daño a otras estructuras, timmy un nervio o un vaso sanguíneo que está cerca de un nervio intercostal.  Daño pulmonar que produce el colapso del pulmón (neumotórax).  ¿Qué ocurre antes del procedimiento?  Consulte al médico sobre:  Cambiar o suspender los medicamentos que kathryn habitualmente. [place] es muy importante si kathryn medicamentos para la diabetes o anticoagulantes.  Surinder medicamentos timmy aspirina e ibuprofeno. Estos medicamentos pueden tener un efecto anticoagulante en la itzel. No tome estos medicamentos a menos que el médico se lo indique.  Surinder medicamentos de venta josh, vitaminas, hierbas y suplementos.  Siga las instrucciones del médico con respecto a las restricciones de comidas o bebidas antes del procedimiento.  No consuma ningún producto que contenga nicotina ni tabaco ziyad al menos las 4 semanas anteriores al procedimiento. Estos productos incluyen cigarrillos, cigarrillos electrónicos y tabaco para mascar. Si necesita ayuda para dejar de fumar, consulte al médico.  Yadira que alguien lo lleve a rosario casa desde el hospital o la clínica.  Pregúntele al médico qué medidas se tomarán para ayudar a prevenir zunilda infección. Estas medidas pueden incluir:  Rasurar el vello en el sitio de la inyección.  Chucho la piel con un jabón antiséptico.  ¿Qué ocurre ziyad el procedimiento?  Le colocarán zunilda vía intravenosa en zunilda vena.  Le administrarán un medicamento para ayudarlo a relajarse (sedante).  El médico le inyectará anestesia local para adormecer la piel en la dennys donde se introducirá la aguja.  Luego, el médico hará lo siguiente:  Colocará zunilda aguja a través de la dennys adormecida del tórax entre las costillas.  Usará radiografías o ecografías para guiar la aguja hacia el lugar correcto.  Inyectará la anestesia o la anestesia y los corticoesteroides en la dennys cercana a un nervio  intercostal.  Retirará la aguja.  Si se necesita más de un bloqueo, se repetirá el procedimiento entre otras costillas.  El procedimiento puede variar según el médico y el hospital.  ¿Qué puedo esperar después del procedimiento?  Le controlarán la presión arterial, la frecuencia cardíaca, la frecuencia respiratoria y el nivel de oxígeno en la itzel hasta que le den el fadi del hospital o la clínica.  Es posible que sienta menos dolor de inmediato.  Si recibe tratamiento para un dolor crónico, es posible que vuelva a sentir un poco de dolor cuando desaparezca el efecto de la anestesia. Después de algunos días, debería comenzar a sentir un alivio del dolor con el medicamento con corticoesteroides.  Siga estas instrucciones en rosario casa:         [place] los medicamentos de venta josh y los recetados solamente timmy se lo haya indicado el médico.  No conduzca ziyad 24 horas si le administraron un sedante ziyad el procedimiento.  Retome ilsa actividades normales timmy se lo haya indicado el médico. Pregúntele al médico qué actividades son seguras para usted.  Controle el sitio de la inyección todos los días para detectar signos de infección. Esté atento a los siguientes signos:  Enrojecimiento, hinchazón o dolor.  Líquido o itzel.  Calor.  Pus o mal olor.  Concurra a todas las visitas de seguimiento timmy se lo haya indicado el médico. [place] es importante.  Comuníquese con rosario médico si:  Tiene fiebre o escalofríos.  Tiene signos de infección en el sitio de la inyección.  Tiene tos o falta de aire.  No obtiene alivio para el dolor.  Resumen  Un bloqueo de los nervios intercostales es un procedimiento que se realiza para bloquear el dolor en el tórax o en las costillas.  Es posible que necesite emanuel tratamiento para bloquear el dolor ziyad un procedimiento o para bloquear el dolor prolongado debido a zunilda afección crónica.  El médico le inyectará un medicamento para adormecer la dennys (anestesia local) en el espacio entre las  "costillas, donde hay nervios llamados nervios intercostales.  Si el procedimiento se realiza para tratar el dolor a florecita plazo, es posible que también se inyecte un antiinflamatorio de acción prolongada (corticoesteroides) junto con la anestesia.  Si recibe tratamiento para un dolor crónico, es posible que vuelva a sentir un poco de dolor cuando desaparezca el efecto de la anestesia. Es posible que pasen algunos días antes de que sienta un alivio del dolor con el medicamento con corticoesteroides.  Esta información no tiene timmy fin reemplazar el consejo del médico. Asegúrese de hacerle al médico cualquier pregunta que tenga.  Document Released: 09/12/2019 Document Revised: 09/12/2019 Document Reviewed: 09/12/2019  Elsevier Patient Education © 2020 [employer]        Shoulder Peripheral Nerve Block Discharge Instructions    Shoulder Surgery Side Effects  In addition to numbness and weakness you may experience other symptoms  Other nerves that are close to those nerves injected can also be affected by local anesthesia  You may experience a hoarseness in your voice  Your breathing may feel different  You may also notice drooping of your eyelid, pupil constriction, and decreased sweating, on the side of your surgery  All of these side effects are normal and will resolve when the local anesthetic wears off     Prevent Injury  Protect the limb like a baby  Beware of exposing your limb to extreme heat or cold or trauma  The limb may be injured without you noticing because it is numb  Keep the limb elevated whenever possible  Do not sleep on the limb  Change the position of the limb regularly  Avoid putting pressure on your surgical limb    Pain Control  The initial block on the day of surgery will make your extremity feel \"numb\"  Any consecutive injection including prior to discharge from the hospital will make your extremity feel \"numb\"  You may feel an aching or burning when the local anesthesia starts to wear " off  Take pain pills as prescribed by your surgeon  Call your surgeon or anesthesiologist if you do not have adequate pain control

## 2022-07-27 NOTE — ANESTHESIA PROCEDURE NOTES
Airway    Date/Time: 7/27/2022 7:10 AM  Performed by: Jey Hussein M.D.  Authorized by: Jey Hussein M.D.     Location:  OR  Urgency:  Elective  Difficult Airway: No    Indications for Airway Management:  Anesthesia      Spontaneous Ventilation: absent    Sedation Level:  Deep  Preoxygenated: Yes    Patient Position:  Sniffing  Mask Difficulty Assessment:  1 - vent by mask  Final Airway Type:  Endotracheal airway  Final Endotracheal Airway:  ETT  Cuffed: Yes    Technique Used for Successful ETT Placement:  Direct laryngoscopy    Insertion Site:  Oral  Blade Type:  [place]  Laryngoscope Blade/Videolaryngoscope Blade Size:  2  ETT Size (mm):  7.0  Measured from:  Lips  ETT to Lips (cm):  21  Placement Verified by: auscultation and capnometry    Cormack-Lehane Classification:  Grade I - full view of glottis  Number of Attempts at Approach:  1   Atraumatic intubation by MS Jovanni Eastman

## 2022-07-27 NOTE — OR NURSING
1042- Patient arrived to phase 2. Patient changed out of surgical gown into clothes. Patient is A&Ox4. Patient reports no pain and no nausea. Vital signs taken and patient assessed. Used the ipad  to communicate with the patient and family.  Bebo [id number] removed and discharge instructions read. All questions answered. Discharged to care of responsible adult.

## 2022-07-28 NOTE — OP REPORT
Date of surgery: 7/27/2022    Preoperative diagnosis:  1-right rotator cuff tendon tear  2- right glenoid labral tear  3-right biceps tendinitis  4-right subacromial impingement syndrome    Postop diagnosis:  1- massive rotator cuff tendon tear including the supraspinatus, infraspinatus and subscapularis tendons  2- right glenoid labral tear  3- right long head of the biceps tendon complete disruption  4- right subacromial impingement syndrome  5- right acromioclavicular joint arthritis    Surgery performed:  1- right shoulder arthroscopy with placement of InSpace balloon spacer  2-right shoulder arthroscopy with subscapularis rotator cuff tendon repair  3- right shoulder arthroscopy with extensive debridement  4-right shoulder arthroscopy with subacromial decompression  5-right shoulder arthroscopy with distal clavicle excision      Surgeon: Edmund Menjivar MD    Anesthesia: General, block for postoperative pain control    Findings: Massive rotator cuff tendon tear including the supraspinatus, infraspinatus and subscapularis tendons.  Irreparable supraspinatus and infraspinatus tendon tearing.  Complete disruption of the long head of the biceps from the glenoid labrum.  Extensive tearing and fraying of the glenoid labrum.  Extensive synovitis throughout the glenohumeral joint.    Complications: None    Indication for procedure: Kary is a pleasant lady who sustained a work-related injury which resulted in the preoperative diagnoses.  Nonoperative treatments fail to resolve her issues and due to the nature of her injury, the MRI findings consistent with the preoperative diagnoses and her significantly limited range of motion and pain the decision was made to take her to the operating room today for the above-mentioned procedure.    Description of procedure: On the date of surgery Kary was seen in the preoperative area where informed consent was obtained with all risks and benefits of the procedure explained  and all questions answered.  She wished to proceed with the surgery.  Proper site was marked.  She was subsequently taken the operating room and placed in the supine position with all bony promises well-padded.  General endotracheal anesthesia was induced.  She was then placed into an elevated beachchair position and the right upper extremity was then prepped and draped in usual sterile fashion.      A timeout was performed with all persons in attendance agreeing on the proper patient, proper surgical site and proper surgery be performed.    Standard posterior portal was made.  The arthroscope was placed into the posterior portal and an arthroscopic inspection of the joint was undertaken.  There is found to be complete disruption of the biceps tendon at its glenoid labral insertion.  There was massive rotator cuff tendon tearing including the supraspinatus, and infraspinatus and subscapularis tendons.  There was significant retraction of the supraspinatus and infraspinatus tendons.  There was significant synovitis throughout the shoulder as well.  There was significant glenoid labral tearing from the 11 o'clock position to the 3 o'clock position.    A standard anterior portal was made.  I then began debriding the glenoid labrum, areas of synovitis, subscapularis tendon and the supra and infraspinatus tendon stumps.  I then made a standard lateral portal.  I then used a tissue grasper to evaluate the mobility of the supra and infraspinatus tendons.  I was able to bring them over toward the greater tuberosity, however, they were still lacking over 1 cm to bring them adjacent to the footprint.  I then evaluated the subscapularis tendon and 1 elected to perform a direct repair of this tendon as it was still able to be retracted into the lesser tuberosity.  I used a fiber tape and placed a luggage tag stitch around the subscapularis.  I then used a [place] ReelX anchor to perform a direct repair of the subscapularis  tendon.  This had excellent reapproximation of the tendon to the lesser tuberosity as well as tensioning of the subscapularis tendon.    I then placed the arthroscope into the subacromial space.  I then performed an extensive debridement of the subacromial space to perform a complete subacromial decompression.  This was done using a shaver as well as Bovie electrocautery.  The CA ligament was released using electrocautery.  The AC joint had a large osteophyte and was found to be arthritic.  I then elected to perform a distal clavicle excision.  This was done through the anterior portal which was also placed into the subacromial space.  I resected roughly 8 mm of the distal clavicle to prevent any further impingement of the clavicle on the acromion.    I then reevaluated the supra and infraspinatus tendons after completing the subacromial decompression.  I was still unable to bring the supra and infraspinatus tendons adjacent to the footprint.  There was insufficient mobility of these tendons in order to perform a direct repair and therefore I elected to perform a balloon arthroplasty using the [employer] InSpace device.  I took measurements to determine the appropriate size of the balloon.  I then placed a size medium balloon placed within the subacromial space.  This was visualized through the arthroscope.  The balloon was fully inflated and then roughly 8 cc of fluid was withdrawn back out of the balloon.  This was done because ice slightly upsized the balloon secondary to the nature of the massive rotator cuff tendon tearing and the space to be filled.  This had good positioning.  The shoulder was taken through range of motion while visualizing the stability of the implant.  There is no evidence of subluxation or displacement of the implant.  Final images were obtained.    The wounds were then closed using 4-0 nylon in a horizontal mattress fashion.  They were then dressed with Xeroform, 4 x 4 and Tegaderm  dressings.  The right upper extremity is placed into a shoulder immobilizer.  General endotracheal anesthesia was reversed.  She was taken to the PACU in good and stable condition.    Disposition: She will be discharged home on a regular diet.  She will have no active use of the right upper extremity.  She should keep the right upper extremity in sling except for when performing pendulum exercises and showering.  She should apply ice as much as possible for the next several days.  She was prescribed narcotic pain medication and instructed not to drive or operate machinery while takes medication.  She may shower with the bandages in place but should not soak the wounds.  The bandages may be removed after 48 hours at which time she may begin showering and washing her shoulder as normal but should not soak the wounds.

## 2022-07-29 ENCOUNTER — PATIENT OUTREACH (OUTPATIENT)
Dept: HEALTH INFORMATION MANAGEMENT | Facility: OTHER | Age: 57
End: 2022-07-29
Payer: COMMERCIAL

## 2022-07-29 NOTE — PROGRESS NOTES
"Received referral from [facility], \"[facility] Referral, [place], Risk 4, DM, HTN\".    Pt not eligible for [facility] program due to her insurance coverage. [facility] program is only accepting Medicare DCE patients and [facility] patients at this time.   "

## 2022-09-19 ENCOUNTER — APPOINTMENT (OUTPATIENT)
Dept: RHEUMATOLOGY | Facility: MEDICAL CENTER | Age: 57
End: 2022-09-19
Attending: INTERNAL MEDICINE
Payer: COMMERCIAL

## 2022-10-24 ENCOUNTER — TELEPHONE (OUTPATIENT)
Dept: RHEUMATOLOGY | Facility: MEDICAL CENTER | Age: 57
End: 2022-10-24

## 2022-10-24 ENCOUNTER — OFFICE VISIT (OUTPATIENT)
Dept: RHEUMATOLOGY | Facility: MEDICAL CENTER | Age: 57
End: 2022-10-24
Attending: INTERNAL MEDICINE
Payer: COMMERCIAL

## 2022-10-24 ENCOUNTER — HOSPITAL ENCOUNTER (OUTPATIENT)
Dept: LAB | Facility: MEDICAL CENTER | Age: 57
End: 2022-10-24
Attending: INTERNAL MEDICINE
Payer: COMMERCIAL

## 2022-10-24 VITALS
HEART RATE: 102 BPM | WEIGHT: 124 LBS | RESPIRATION RATE: 14 BRPM | SYSTOLIC BLOOD PRESSURE: 140 MMHG | TEMPERATURE: 98.2 F | DIASTOLIC BLOOD PRESSURE: 60 MMHG | BODY MASS INDEX: 25.04 KG/M2 | OXYGEN SATURATION: 97 %

## 2022-10-24 DIAGNOSIS — M06.9 RHEUMATOID ARTHRITIS, INVOLVING UNSPECIFIED SITE, UNSPECIFIED WHETHER RHEUMATOID FACTOR PRESENT (HCC): ICD-10-CM

## 2022-10-24 DIAGNOSIS — M19.90 OSTEOARTHRITIS, UNSPECIFIED OSTEOARTHRITIS TYPE, UNSPECIFIED SITE: ICD-10-CM

## 2022-10-24 DIAGNOSIS — Z79.620 ADALIMUMAB (HUMIRA) LONG-TERM USE: ICD-10-CM

## 2022-10-24 DIAGNOSIS — Z79.899 LONG-TERM USE OF PLAQUENIL: ICD-10-CM

## 2022-10-24 DIAGNOSIS — Z79.631 METHOTREXATE, LONG TERM, CURRENT USE: ICD-10-CM

## 2022-10-24 DIAGNOSIS — I10 ESSENTIAL HYPERTENSION: ICD-10-CM

## 2022-10-24 DIAGNOSIS — E11.65 TYPE 2 DIABETES MELLITUS WITH HYPERGLYCEMIA, WITHOUT LONG-TERM CURRENT USE OF INSULIN (HCC): ICD-10-CM

## 2022-10-24 LAB
ALBUMIN SERPL BCP-MCNC: 3.7 G/DL (ref 3.2–4.9)
ALBUMIN/GLOB SERPL: 1 G/DL
ALP SERPL-CCNC: 110 U/L (ref 30–99)
ALT SERPL-CCNC: 6 U/L (ref 2–50)
ANION GAP SERPL CALC-SCNC: 15 MMOL/L (ref 7–16)
AST SERPL-CCNC: 16 U/L (ref 12–45)
BASOPHILS # BLD AUTO: 0.6 % (ref 0–1.8)
BASOPHILS # BLD: 0.04 K/UL (ref 0–0.12)
BILIRUB SERPL-MCNC: 0.3 MG/DL (ref 0.1–1.5)
BUN SERPL-MCNC: 20 MG/DL (ref 8–22)
CALCIUM SERPL-MCNC: 9.4 MG/DL (ref 8.5–10.5)
CHLORIDE SERPL-SCNC: 103 MMOL/L (ref 96–112)
CO2 SERPL-SCNC: 22 MMOL/L (ref 20–33)
CREAT SERPL-MCNC: 0.49 MG/DL (ref 0.5–1.4)
EOSINOPHIL # BLD AUTO: 0.08 K/UL (ref 0–0.51)
EOSINOPHIL NFR BLD: 1.2 % (ref 0–6.9)
ERYTHROCYTE [DISTWIDTH] IN BLOOD BY AUTOMATED COUNT: 49.3 FL (ref 35.9–50)
ERYTHROCYTE [SEDIMENTATION RATE] IN BLOOD BY WESTERGREN METHOD: 55 MM/HOUR (ref 0–25)
GFR SERPLBLD CREATININE-BSD FMLA CKD-EPI: 109 ML/MIN/1.73 M 2
GLOBULIN SER CALC-MCNC: 3.7 G/DL (ref 1.9–3.5)
GLUCOSE SERPL-MCNC: 188 MG/DL (ref 65–99)
HBV CORE IGM SER QL: NORMAL
HBV SURFACE AG SER QL: NORMAL
HCT VFR BLD AUTO: 35.7 % (ref 37–47)
HCV AB SER QL: NORMAL
HGB BLD-MCNC: 11 G/DL (ref 12–16)
IMM GRANULOCYTES # BLD AUTO: 0.03 K/UL (ref 0–0.11)
IMM GRANULOCYTES NFR BLD AUTO: 0.4 % (ref 0–0.9)
LYMPHOCYTES # BLD AUTO: 0.61 K/UL (ref 1–4.8)
LYMPHOCYTES NFR BLD: 9 % (ref 22–41)
MCH RBC QN AUTO: 25.6 PG (ref 27–33)
MCHC RBC AUTO-ENTMCNC: 30.8 G/DL (ref 33.6–35)
MCV RBC AUTO: 83 FL (ref 81.4–97.8)
MONOCYTES # BLD AUTO: 0.38 K/UL (ref 0–0.85)
MONOCYTES NFR BLD AUTO: 5.6 % (ref 0–13.4)
NEUTROPHILS # BLD AUTO: 5.64 K/UL (ref 2–7.15)
NEUTROPHILS NFR BLD: 83.2 % (ref 44–72)
NRBC # BLD AUTO: 0 K/UL
NRBC BLD-RTO: 0 /100 WBC
PLATELET # BLD AUTO: 368 K/UL (ref 164–446)
PMV BLD AUTO: 10.4 FL (ref 9–12.9)
POTASSIUM SERPL-SCNC: 3.9 MMOL/L (ref 3.6–5.5)
PROT SERPL-MCNC: 7.4 G/DL (ref 6–8.2)
RBC # BLD AUTO: 4.3 M/UL (ref 4.2–5.4)
SODIUM SERPL-SCNC: 140 MMOL/L (ref 135–145)
WBC # BLD AUTO: 6.8 K/UL (ref 4.8–10.8)

## 2022-10-24 PROCEDURE — 99212 OFFICE O/P EST SF 10 MIN: CPT | Performed by: INTERNAL MEDICINE

## 2022-10-24 PROCEDURE — 86705 HEP B CORE ANTIBODY IGM: CPT

## 2022-10-24 PROCEDURE — 80053 COMPREHEN METABOLIC PANEL: CPT

## 2022-10-24 PROCEDURE — 700111 HCHG RX REV CODE 636 W/ 250 OVERRIDE (IP): Performed by: INTERNAL MEDICINE

## 2022-10-24 PROCEDURE — 99214 OFFICE O/P EST MOD 30 MIN: CPT | Performed by: INTERNAL MEDICINE

## 2022-10-24 PROCEDURE — 85025 COMPLETE CBC W/AUTO DIFF WBC: CPT

## 2022-10-24 PROCEDURE — 85652 RBC SED RATE AUTOMATED: CPT

## 2022-10-24 PROCEDURE — 86480 TB TEST CELL IMMUN MEASURE: CPT

## 2022-10-24 PROCEDURE — 87340 HEPATITIS B SURFACE AG IA: CPT

## 2022-10-24 PROCEDURE — 86803 HEPATITIS C AB TEST: CPT

## 2022-10-24 PROCEDURE — 36415 COLL VENOUS BLD VENIPUNCTURE: CPT

## 2022-10-24 RX ORDER — PREDNISONE 10 MG/1
TABLET ORAL
Qty: 30 TABLET | Refills: 1 | Status: SHIPPED | OUTPATIENT
Start: 2022-10-24 | End: 2023-01-25

## 2022-10-24 RX ORDER — METHYLPREDNISOLONE ACETATE 40 MG/ML
20 INJECTION, SUSPENSION INTRA-ARTICULAR; INTRALESIONAL; INTRAMUSCULAR; SOFT TISSUE ONCE
Status: COMPLETED | OUTPATIENT
Start: 2022-10-24 | End: 2022-10-24

## 2022-10-24 RX ADMIN — METHYLPREDNISOLONE ACETATE 20 MG: 40 INJECTION, SUSPENSION INTRA-ARTICULAR; INTRALESIONAL; INTRAMUSCULAR; SOFT TISSUE at 12:31

## 2022-10-24 ASSESSMENT — FIBROSIS 4 INDEX: FIB4 SCORE: 0.88

## 2022-10-24 NOTE — TELEPHONE ENCOUNTER
Received request for Humira, ran a test claim PA is required.    Submitted PA via CoverMyMeds Key is [id number]

## 2022-10-24 NOTE — PROGRESS NOTES
Chief Complaint- joint pain     Subjective:   Kary Sahh is a 57 y.o. female here today for follow up of rheumatological issues    All information done through      This is a follow-up visit for this patient who we see in this clinic for serologically positive rheumatoid arthritis.  Patient was diagnosed with rheumatoid arthritis about October 2021 with symptoms of finger swelling and ankle swelling with positive serologies.  Patient is currently on methotrexate 15 mg p.o. weekly and Plaquenil 200 mg p.o. twice daily.  Patient comes in today stating that she is still having problems with bilateral knee pain and swelling. Patient's last ophthalmology evaluation was March 2022.     PMHX  Diabetes  HTN     Fam Hx  F-passed CAD/MI  M-passed CAD/MI, RA  Sx3 one passed from CVA/blood clot in brain  Bx3 one passed secondary ETOH and CAD/MI     Soc Hx  No tob  ETOH once/year  No blood tx  No tattoos    HBsAg/HBcAb neg 10/2022  HCV neg 10/2022  Quantiferon Gold neg 10/2022     G6PD 13.9 adequate 3/2022  Hemoglobin A1c 7.7 10/2021  RF 83 3/2020  CCP 93 3/2020  Hand x-rays 3/2020-IMPRESSION:  1.  Asymmetric degenerative change of the IP joints diffusely which may represent asymmetric osteoarthritic change. Differential diagnosis includes psoriatic arthritis.  2.  No erosive arthritic change.  Right Hand x-rays 5/2021-IMPRESSION:   No evidence of acute fracture or dislocation.   Osteoarthritis as above described particularly involving the distal interphalangeal joints of the third and fifth digits.  Mild soft tissue swelling about the distal third digit.  Demineralization which limits evaluation.     Left Foot x-ray 5/2021-IMPRESSION:  Degenerative changes as above described, most prominent at the first MTP joint.  Soft tissue swelling along the medial foot.  Calcaneal spurring.-     Current Outpatient Medications   Medication Sig Dispense Refill    predniSONE (DELTASONE) 10 MG Tab 1 tab po qday 30  Tablet 1    Adalimumab 40 MG/0.4ML Pen-injector Kit 40 mg sq every 14 days 6 Each 0    methotrexate 2.5 MG tablet 6 tabs po qweek (every Sunday) 72 Tablet 0    folic acid (FOLVITE) 1 MG Tab 1 tab po qday 90 Tablet 3    hydroxychloroquine (PLAQUENIL) 200 MG Tab 1 tab po bid 180 Tablet 1    sulindac (CLINORIL) 200 MG Tab 1 tab po bid prn with food joint pain 180 Tablet 0    atorvastatin (LIPITOR) 20 MG Tab Take 1 Tablet by mouth every evening. 90 Tablet 1    lisinopril (PRINIVIL) 10 MG Tab Take 1 tablet by mouth once daily 90 tablet 3    metFORMIN ER (GLUCOPHAGE XR) 750 MG TABLET SR 24 HR Take 1 Tablet by mouth 2 times a day. (Patient not taking: Reported on 10/24/2022) 180 Tablet 1     Current Facility-Administered Medications   Medication Dose Route Frequency Provider Last Rate Last Admin    methylPREDNISolone acetate (DEPO-MEDROL) injection 20 mg  20 mg Intramuscular Once Maalthi Che M.D.         She  has a past medical history of Diabetes (HCC), GERD (gastroesophageal reflux disease), Hypertension, and Osteoarthritis of both hands (2015).    ROS   Other than what is mentioned in HPI or physical exam, there is no history of headaches, double vision or blurred vision. No temporal tenderness or jaw claudication. No trouble swallowing difficulties or sore throats.  No chest complaints including chest pain, cough or sputum production. No GI complaints including nausea, vomiting, change in bowel habits, or past peptic ulcer disease. No history of blood in the stools. No urinary complaints including dysuria or frequency. No history of alopecia, photosensitivity, oral ulcerations, Raynaud's phenomena.       Objective:     BP (!) 140/60   Pulse (!) 102   Temp 36.8 °C (98.2 °F) (Temporal)   Resp 14   Wt 56.2 kg (124 lb)   SpO2 97%  Body mass index is 25.04 kg/m².   Physical Exam:    Constitutional: Alert and oriented X3, patient is talkative with good eye contact.Skin: Warm, dry, good turgor, no rashes in  visible areas.Eye: Equal, round and reactive, conjunctiva clear, lids normal EOM intactENMT: Lips without lesions, good dentition, no oropharyngeal ulcers, moist buccal mucosa, pinna without deformityNeck: Trachea midline, no masses, no thyromegaly.Lymph:  No cervical lymphadenopathy, no axillary lymphadenopathy, no supraclavicular lymphadenopathyRespiratory: Unlabored respiratory effort, lungs clear to auscultation, no wheezes, no ronchi.Cardiovascular: Normal S1, S2, Regular rate and rhythm, no murmurs rubs or gallops  .Abdomen: Soft, non-tender, no masses, no hepatosplenomegaly.Psych: Alert and oriented x3, normal affect and mood.Neuro: Cranial nerves 2-12 are grossly intact, no loss of sensation LEExt:no joint laxity noted in bilateral arms, no joint laxity noted in bilateral legs, effusions, well-healed surgical site medial aspect right knee status post removal of a pyogenic cyst.  Shoulders full range of motion, hands without any jennifer swan-neck or boutonniere deformities, there is ulnar styloid prominence bilaterally, toes without crossover toes and without splay toes    Lab Results   Component Value Date/Time    HEPCAB Negative 02/29/2020 07:20 AM     Lab Results   Component Value Date/Time    SODIUM 140 07/19/2022 11:55 AM    POTASSIUM 4.3 07/19/2022 11:55 AM    CHLORIDE 104 07/19/2022 11:55 AM    CO2 24 07/19/2022 11:55 AM    GLUCOSE 145 (H) 07/19/2022 11:55 AM    BUN 16 07/19/2022 11:55 AM    CREATININE 0.58 07/19/2022 11:55 AM      Lab Results   Component Value Date/Time    WBC 7.5 05/31/2022 05:46 AM    RBC 3.49 (L) 05/31/2022 05:46 AM    HEMOGLOBIN 10.2 (L) 05/31/2022 05:46 AM    HEMATOCRIT 31.4 (L) 05/31/2022 05:46 AM    MCV 90.0 05/31/2022 05:46 AM    MCH 29.2 05/31/2022 05:46 AM    MCHC 32.5 (L) 05/31/2022 05:46 AM    MPV 10.0 05/31/2022 05:46 AM    NEUTSPOLYS 70.70 05/31/2022 05:46 AM    LYMPHOCYTES 14.10 (L) 05/31/2022 05:46 AM    MONOCYTES 11.30 05/31/2022 05:46 AM    EOSINOPHILS 2.80 05/31/2022  05:46 AM    BASOPHILS 0.70 05/31/2022 05:46 AM      Lab Results   Component Value Date/Time    CALCIUM 9.2 07/19/2022 11:55 AM    ASTSGOT 11 (L) 05/21/2022 12:21 AM    ALTSGPT 5 05/21/2022 12:21 AM    ALKPHOSPHAT 73 05/21/2022 12:21 AM    TBILIRUBIN 0.3 05/21/2022 12:21 AM    ALBUMIN 2.4 (L) 05/21/2022 12:21 AM    TOTPROTEIN 5.3 (L) 05/21/2022 12:21 AM     Lab Results   Component Value Date/Time    RHEUMFACTN 83 (H) 03/26/2020 02:20 PM    CCPANTIBODY 93 (H) 03/26/2020 02:20 PM     Lab Results   Component Value Date/Time    SEDRATEWES 40 (H) 03/12/2022 09:05 AM     Lab Results   Component Value Date/Time    HBA1C 6.4 (H) 07/19/2022 11:55 AM     Lab Results   Component Value Date/Time    G6PD 13.9 03/12/2022 09:05 AM     Assessment and Plan:     1. Rheumatoid arthritis, involving unspecified site, unspecified whether rheumatoid factor present (HCC)  Not doing well on methotrexate with Plaquenil, long discussion with patient we opted to start Humira 40 mg subcu every 2 weeks continue methotrexate 15 mg p.o. weekly with Plaquenil at 200 mg p.o. twice daily, once established on Humira we may end up tapering down or decreasing the dose of either medication.  L we are waiting for authorization of Humira we will do prednisone 10 mg a day as a bridging medication, also today will give methylprednisolone 20 mg IM x1.  - predniSONE (DELTASONE) 10 MG Tab; 1 tab po qday  Dispense: 30 Tablet; Refill: 1  - Adalimumab 40 MG/0.4ML Pen-injector Kit; 40 mg sq every 14 days  Dispense: 6 Each; Refill: 0  - methylPREDNISolone acetate (DEPO-MEDROL) injection 20 mg  - HEP B CORE AB IGM; Future  - HEP B SURFACE ANTIGEN; Future  - HEP C VIRUS ANTIBODY; Future  - Quantiferon Gold Plus TB (4 tube); Future  - Comp Metabolic Panel; Future  - CBC WITH DIFFERENTIAL; Future  - Sed Rate; Future    2. Adalimumab (Humira) long-term use  Start Humira 40 mg subcu every 2 weeks, patient is due for screening labs, screening labs ordered for patient we  will also do monitoring labs as well.  We reviewed risks of biological medications with patient including hematological pathology, cancer risks, neurological and infection issues especially in the Covid-19 pandemic environment, patient states understanding.  - predniSONE (DELTASONE) 10 MG Tab; 1 tab po qday  Dispense: 30 Tablet; Refill: 1  - Adalimumab 40 MG/0.4ML Pen-injector Kit; 40 mg sq every 14 days  Dispense: 6 Each; Refill: 0  - HEP B CORE AB IGM; Future  - HEP B SURFACE ANTIGEN; Future  - HEP C VIRUS ANTIBODY; Future  - Quantiferon Gold Plus TB (4 tube); Future  - Comp Metabolic Panel; Future  - CBC WITH DIFFERENTIAL; Future  - Sed Rate; Future    3. Methotrexate, long term, current use  Continue methotrexate 15 mg p.o. weekly with folic acid 1 mg a day, patient does need monitoring labs every 3 months, patient due for labs now, labs ordered for patient  - predniSONE (DELTASONE) 10 MG Tab; 1 tab po qday  Dispense: 30 Tablet; Refill: 1  - Adalimumab 40 MG/0.4ML Pen-injector Kit; 40 mg sq every 14 days  Dispense: 6 Each; Refill: 0  - methylPREDNISolone acetate (DEPO-MEDROL) injection 20 mg  - HEP B CORE AB IGM; Future  - HEP B SURFACE ANTIGEN; Future  - HEP C VIRUS ANTIBODY; Future  - Quantiferon Gold Plus TB (4 tube); Future  - Comp Metabolic Panel; Future  - CBC WITH DIFFERENTIAL; Future  - Sed Rate; Future    4. Long-term use of Plaquenil  Continue Plaquenil 200 mg p.o. twice daily, patient needs monitoring labs every 6 months, labs ordered for patient, of note G6PD levels are adequate, patient does need eye exam every year, next eye exam due about March 2023.  - predniSONE (DELTASONE) 10 MG Tab; 1 tab po qday  Dispense: 30 Tablet; Refill: 1  - Adalimumab 40 MG/0.4ML Pen-injector Kit; 40 mg sq every 14 days  Dispense: 6 Each; Refill: 0  - methylPREDNISolone acetate (DEPO-MEDROL) injection 20 mg  - HEP B CORE AB IGM; Future  - HEP B SURFACE ANTIGEN; Future  - HEP C VIRUS ANTIBODY; Future  - Quantiferon Gold  Plus TB (4 tube); Future  - Comp Metabolic Panel; Future  - CBC WITH DIFFERENTIAL; Future  - Sed Rate; Future    5. Osteoarthritis, unspecified osteoarthritis type, unspecified site  Patient uses NSAIDS prn  - predniSONE (DELTASONE) 10 MG Tab; 1 tab po qday  Dispense: 30 Tablet; Refill: 1  - Adalimumab 40 MG/0.4ML Pen-injector Kit; 40 mg sq every 14 days  Dispense: 6 Each; Refill: 0  - methylPREDNISolone acetate (DEPO-MEDROL) injection 20 mg  - HEP B CORE AB IGM; Future  - HEP B SURFACE ANTIGEN; Future  - HEP C VIRUS ANTIBODY; Future  - Quantiferon Gold Plus TB (4 tube); Future  - Comp Metabolic Panel; Future  - CBC WITH DIFFERENTIAL; Future  - Sed Rate; Future    6. Essential hypertension  May impact the type of medications we can use for this patient's arthritis. We will have to keep this under advisement.  - HEP B CORE AB IGM; Future  - HEP B SURFACE ANTIGEN; Future  - HEP C VIRUS ANTIBODY; Future  - Quantiferon Gold Plus TB (4 tube); Future  - Comp Metabolic Panel; Future  - CBC WITH DIFFERENTIAL; Future  - Sed Rate; Future    7. Type 2 diabetes mellitus with hyperglycemia, without long-term current use of insulin (HCC)  Followed by patients PCP, high blood sugar can exacerbate inflammatory state of patient's arthritis, discussed with patient the need to monitor and control blood sugars, patient states understanding    Followup: Return in about 3 months (around 1/24/2023). or sooner juliet Shah  was seen 30 minutes face-to-face of which more than 50% of the time was spent counseling the patient (excluding time for procedures)  regarding  rheumatological condition and care. Therapy was discussed in detail.      Please note that this dictation was created using voice recognition software. I have made every reasonable attempt to correct obvious errors, but I expect that there are errors of grammar and possibly content that I did not discover before finalizing the note.

## 2022-10-25 LAB
GAMMA INTERFERON BACKGROUND BLD IA-ACNC: 0.06 IU/ML
M TB IFN-G BLD-IMP: NEGATIVE
M TB IFN-G CD4+ BCKGRND COR BLD-ACNC: 0 IU/ML
MITOGEN IGNF BCKGRD COR BLD-ACNC: >10 IU/ML
QFT TB2 - NIL TBQ2: -0.01 IU/ML

## 2022-10-26 PROCEDURE — RXMED WILLOW AMBULATORY MEDICATION CHARGE: Performed by: INTERNAL MEDICINE

## 2022-10-26 NOTE — TELEPHONE ENCOUNTER
Spoke to patient and she would like to receive medication through the [facility]. Order released.    Patient will be receiving medication on 10/31.

## 2022-10-31 ENCOUNTER — DOCUMENTATION (OUTPATIENT)
Dept: PHARMACY | Facility: MEDICAL CENTER | Age: 57
End: 2022-10-31
Payer: COMMERCIAL

## 2022-10-31 NOTE — PROGRESS NOTES
*Physician Intervention*  Messaged MD 10/31/22:  Gustavo Che,     I'm a clinical pharmacist for [facility]. I spoke with patient Friday for routine Humira counseling and she mentioned that she was going to be getting her flu shot this week at work. Are you fine with her starting Humira right away or do you want her to wait a specified time after flu shot to start?    Thank you,  Elsi Bullock, PharmD    Dr. Che replied:   Gustavo Calzada,   In general, I prefer patients to wait 1 to 2 weeks after their vaccination before starting biologic medications so that their immune system has a chance to react to the vaccinations.     Thankyou for checking:)     Called patient and LVM using  [place] [phone] to discuss MD recommendation to wait 1-2 weeks before starting Humira.     Replied to MD:   Thanks so much! LVM for patient to review Humira start date.

## 2022-10-31 NOTE — PROGRESS NOTES
PHARMACIST [facility] - Inflammatory Call Review   10/28/22  Dx: Seropositive Rheumatoid Arthritis      Tx prescribed: Humira 40mg/0.4mL pen, inject 1 pen SQ every 14 days     Duration of therapy: indefinite or until ineffective    Past Txt: MTX, Plaquenil - will be stopped once established   Med Rec/Updated drug list: EMR inaccurate, medication changes reviewed with patient. (+) Advil, (-) Atorvastatin- said she doesn't take it because she doesn't feel any effects, explained that with high cholesterol won't typically have any symptoms but based on cholesterol levels, advised to follow up with MD to see if needed and she said she did just have bloodwork and MD will follow up to discuss results  Common DI Avoidance: live vaccines  Additional Topical (if needed):  none   DI Check:    · Cat D: Sulindac may increase concentration of MTX, caution of NSAIDs with lower doses of MTX, RA trials often allowed concomitant use of 7.5-15mg/week MTX, patient on low dose of 2.5mg weekly MTX      Goals of Therapy:   Decrease disease activity (RAPID-3)    Prevent and reduce flares and inflammation   Alleviate pain    Decrease symptoms associated with RA    Maintain function of Activities of Daily Living (ADLs)   Patient has agreed to/understands goals of therapy during education/counseling     S/Sx(Baseline, Incl affected joints/organs): difficulty walking, mainly knees and ankles, toes, joint stiffness, pain  # of Flares: 0, raises leg when pain   Pain Scale: knees 10/10, feet and ankle even more   Rapid 3: deferred  Labs 10/24/22    CBC: Hgb: low 11, Hct: low 35.7    Chem7: glucose: high 188, Cr: low 0.49    LFTs: ALP: high 110    ESR: high 55    CRP: none    Vitamin D: none     TB Status (if applicable): negative, HBV: nonreactive, HCV: nonreactive     BP/HR: 140/60, 102     Admin/Schedule: Humira 40mg/0.4mL pen, inject 1 pen SQ every 14 days     Inj technique:  SQ tissue, upper thigh or lower abdomen area    ? Take out of fridge  about 30 min     ? deferred further injection training and pen use as she said she will have training with nurse once she has medication, advised to call if she has any questions  Adherence: encouraged phone alarm for reminder     Low risk of non-adherence based on adherence predictor tool score: 0, wants med to work to help RA, $0 copay    Missed dose mgmt: asap then go back to normal schedule     Barriers (if exist): none    Drug Interruptions/Hold (Infection, Abx Use): if fighting off infection let  clinic know as MD may want you to hold Humira to let body fight off infection, depending on symptoms      New:  List Common SE Reviewed: injection site reactions- redness or irritation around injection site that usually goes away after a few hours  List Serious SE Reviewed/Precautions:    serious infections- watch for signs of infection such as fever >100.4, chills, cloudy or painful urine, bad cold- let MD know if fighting off infection, infection prevention   lymphoma and other malignancies have been reported- encouraged annual skin exams, watch for any skin changes or changes in moles- let MD know if notice any changes    Proper Handling/Storage/Excursion/Disposal: Fridge, will come in cooler- take out and place in fridge until time for injection, RT up to 14 days, each pen 1 time use, dispose of in sharps container     Wellness/Lifestyle:     Infection prevention: wash hands, hand , wash fruits and vegetables thoroughly   Vaccines: hasn't had flu vaccine but plans on getting it- advised to always check with MD prior to getting  ADLs: deferred    Additional:  Had a pleasant conversation with patient using  Janes [phone] to review Humira. Advised to continue MTX and Plaquenil and once established on Humira those may be decreased but to continue on them until MD instructs otherwise. 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  She declined injection training and pen use as she will have training with nurse next week once she receives medication. She said she may not be home Monday to receive medication but her sister will be.  Advised to have her take it out of the cooler and place in fridge.  Reviewed dosing, storage, missed doses, and side effects. Reviewed signs of infection and to let MD know if she thinks she's ever fighting infection off as she may want to have her hold the Humira depending on her symptoms. She hasn't had the flu vaccine for this year but is planning on getting it this week at work. Advised to always check with MD prior to receiving and no live vaccines. Reviewed med list and she said she isn't taking Atorvastatin since she can't feel any effects. Explained that with high cholesterol ,won't typically have any symptoms but based on cholesterol levels, advised to follow up with MD to see if needed and she said she did just have bloodwork and MD will follow up to discuss results. Encouraged her to call with any questions. She was appreciative of call. Next Jamal, KERRI and FATOUs.

## 2022-11-02 ENCOUNTER — PHARMACY VISIT (OUTPATIENT)
Dept: PHARMACY | Facility: MEDICAL CENTER | Age: 57
End: 2022-11-02
Payer: COMMERCIAL

## 2022-11-14 ENCOUNTER — TELEPHONE (OUTPATIENT)
Dept: RHEUMATOLOGY | Facility: MEDICAL CENTER | Age: 57
End: 2022-11-14
Payer: COMMERCIAL

## 2022-11-14 NOTE — TELEPHONE ENCOUNTER
Pt was seen a couple weeks ago and you told her that once she was started on the Humira she could stop some og the other medications. Sh cant remember what mediactions you said she could stop?  Please advise and thank you

## 2022-11-21 PROCEDURE — RXMED WILLOW AMBULATORY MEDICATION CHARGE: Performed by: INTERNAL MEDICINE

## 2022-11-22 ENCOUNTER — DOCUMENTATION (OUTPATIENT)
Dept: PHARMACY | Facility: MEDICAL CENTER | Age: 57
End: 2022-11-22
Payer: COMMERCIAL

## 2022-11-22 NOTE — PROGRESS NOTES
**Unable to reach - Chart Review and Refill Activity evaluation**  ICD10 verified:M06.9   List Diagnosis: Rheumatoid arthritis, involving unspecified site, unspecified whether rheumatoid factor present    No Updates or Outside Providers     Review of refill assessment - any changes to dose, new medication, new allergy/health condition: N  I have evaluated data from the Refill Activity. Last appointment with specialty provider was 10/24/22.

## 2022-11-28 ENCOUNTER — PHARMACY VISIT (OUTPATIENT)
Dept: PHARMACY | Facility: MEDICAL CENTER | Age: 57
End: 2022-11-28
Payer: COMMERCIAL

## 2022-12-21 ENCOUNTER — DOCUMENTATION (OUTPATIENT)
Dept: PHARMACY | Facility: MEDICAL CENTER | Age: 57
End: 2022-12-21
Payer: COMMERCIAL

## 2022-12-29 PROCEDURE — RXMED WILLOW AMBULATORY MEDICATION CHARGE: Performed by: INTERNAL MEDICINE

## 2022-12-30 ENCOUNTER — PHARMACY VISIT (OUTPATIENT)
Dept: PHARMACY | Facility: MEDICAL CENTER | Age: 57
End: 2022-12-30
Payer: COMMERCIAL

## 2023-01-25 ENCOUNTER — OFFICE VISIT (OUTPATIENT)
Dept: MEDICAL GROUP | Facility: PHYSICIAN GROUP | Age: 58
End: 2023-01-25
Payer: COMMERCIAL

## 2023-01-25 ENCOUNTER — HOSPITAL ENCOUNTER (OUTPATIENT)
Dept: RADIOLOGY | Facility: MEDICAL CENTER | Age: 58
End: 2023-01-25
Attending: NURSE PRACTITIONER
Payer: COMMERCIAL

## 2023-01-25 VITALS
BODY MASS INDEX: 25.24 KG/M2 | DIASTOLIC BLOOD PRESSURE: 70 MMHG | HEART RATE: 86 BPM | TEMPERATURE: 98.7 F | OXYGEN SATURATION: 98 % | SYSTOLIC BLOOD PRESSURE: 134 MMHG | HEIGHT: 57 IN | WEIGHT: 117 LBS

## 2023-01-25 DIAGNOSIS — Z79.631 METHOTREXATE, LONG TERM, CURRENT USE: ICD-10-CM

## 2023-01-25 DIAGNOSIS — G89.29 BILATERAL CHRONIC KNEE PAIN: ICD-10-CM

## 2023-01-25 DIAGNOSIS — M19.90 OSTEOARTHRITIS, UNSPECIFIED OSTEOARTHRITIS TYPE, UNSPECIFIED SITE: ICD-10-CM

## 2023-01-25 DIAGNOSIS — M25.562 BILATERAL CHRONIC KNEE PAIN: ICD-10-CM

## 2023-01-25 DIAGNOSIS — E11.65 TYPE 2 DIABETES MELLITUS WITH HYPERGLYCEMIA, WITHOUT LONG-TERM CURRENT USE OF INSULIN (HCC): ICD-10-CM

## 2023-01-25 DIAGNOSIS — G89.29 CHRONIC PAIN OF RIGHT KNEE: ICD-10-CM

## 2023-01-25 DIAGNOSIS — M05.9 SEROPOSITIVE RHEUMATOID ARTHRITIS (HCC): ICD-10-CM

## 2023-01-25 DIAGNOSIS — Z79.620 ADALIMUMAB (HUMIRA) LONG-TERM USE: ICD-10-CM

## 2023-01-25 DIAGNOSIS — M25.561 BILATERAL CHRONIC KNEE PAIN: ICD-10-CM

## 2023-01-25 DIAGNOSIS — I10 ESSENTIAL HYPERTENSION: ICD-10-CM

## 2023-01-25 DIAGNOSIS — M25.562 CHRONIC PAIN OF LEFT KNEE: ICD-10-CM

## 2023-01-25 DIAGNOSIS — M06.9 RHEUMATOID ARTHRITIS, INVOLVING UNSPECIFIED SITE, UNSPECIFIED WHETHER RHEUMATOID FACTOR PRESENT (HCC): ICD-10-CM

## 2023-01-25 DIAGNOSIS — G89.29 CHRONIC PAIN OF LEFT KNEE: ICD-10-CM

## 2023-01-25 DIAGNOSIS — M25.561 CHRONIC PAIN OF RIGHT KNEE: ICD-10-CM

## 2023-01-25 DIAGNOSIS — Z79.899 LONG-TERM USE OF PLAQUENIL: ICD-10-CM

## 2023-01-25 DIAGNOSIS — E78.5 DYSLIPIDEMIA: ICD-10-CM

## 2023-01-25 PROCEDURE — 73562 X-RAY EXAM OF KNEE 3: CPT | Mod: RT

## 2023-01-25 PROCEDURE — 99214 OFFICE O/P EST MOD 30 MIN: CPT | Performed by: NURSE PRACTITIONER

## 2023-01-25 PROCEDURE — 73562 X-RAY EXAM OF KNEE 3: CPT | Mod: LT

## 2023-01-25 RX ORDER — ATORVASTATIN CALCIUM 20 MG/1
20 TABLET, FILM COATED ORAL EVERY EVENING
Qty: 90 TABLET | Refills: 3 | Status: SHIPPED | OUTPATIENT
Start: 2023-01-25 | End: 2023-05-31 | Stop reason: SDUPTHER

## 2023-01-25 RX ORDER — METFORMIN HYDROCHLORIDE 750 MG/1
750 TABLET, EXTENDED RELEASE ORAL 2 TIMES DAILY
Qty: 180 TABLET | Refills: 1 | Status: SHIPPED | OUTPATIENT
Start: 2023-01-25 | End: 2023-05-31 | Stop reason: SDUPTHER

## 2023-01-25 RX ORDER — LISINOPRIL 10 MG/1
10 TABLET ORAL DAILY
Qty: 90 TABLET | Refills: 3 | Status: SHIPPED | OUTPATIENT
Start: 2023-01-25 | End: 2023-05-31 | Stop reason: SDUPTHER

## 2023-01-25 ASSESSMENT — FIBROSIS 4 INDEX: FIB4 SCORE: 1.03

## 2023-01-25 ASSESSMENT — PATIENT HEALTH QUESTIONNAIRE - PHQ9: CLINICAL INTERPRETATION OF PHQ2 SCORE: 0

## 2023-01-25 NOTE — TELEPHONE ENCOUNTER
Received request via: [facility]    Was the patient seen in the last year in this department? Yes    Does the patient have an active prescription (recently filled or refills available) for medication(s) requested? No    Does the patient have custodial Plus and need 100 day supply (blood pressure, diabetes and cholesterol meds only)? Medication is not for cholesterol, blood pressure or diabetes

## 2023-01-26 RX ORDER — ADALIMUMAB 40MG/0.4ML
40 KIT SUBCUTANEOUS
Qty: 6 EACH | Refills: 0 | Status: SHIPPED | OUTPATIENT
Start: 2023-01-26 | End: 2023-04-18 | Stop reason: SDUPTHER

## 2023-01-26 NOTE — ASSESSMENT & PLAN NOTE
The bilateral knee pain has been ongoing for 2 years.  The pain is constant. Aggravating factors include standing and laying down. Alleviating factors include hot towels and ibuprofen. Interventions tried include hot compress and ibuprofen. She has seen Dr. Benitez in the past and given steroid injections with pain control. Last year she had a surgery on her right patella due to bump that burst. She states the pain can be severe that at times she feels like she doesn't want to live. She is not sleeping or eating well. 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  History of rheumatoid arthritis and followed by rheumatology.

## 2023-01-26 NOTE — ASSESSMENT & PLAN NOTE
She has been out of her metformin, lisinopril and atorvastatin for the last 3 months. She does need medication refills today.  She is returning next week for diabetes health maintenance.

## 2023-01-26 NOTE — ASSESSMENT & PLAN NOTE
She has been out of her atorvastatin 20 mg her evening dose for the last 3 months.  She needs a medication refill.

## 2023-01-26 NOTE — PROGRESS NOTES
Subjective:     CC: Knee pain and medication refill    HPI:   Kary presents today with the following. A Icelandic interpretor, Fernando, ID number [id number], was used during today's appointment.       Type 2 diabetes mellitus with hyperglycemia, without long-term current use of insulin (HCC)  She has been out of her metformin, lisinopril and atorvastatin for the last 3 months. She does need medication refills today.  She is returning next week for diabetes health maintenance.     Bilateral chronic knee pain  The bilateral knee pain has been ongoing for 2 years.  The pain is constant. Aggravating factors include standing and laying down. Alleviating factors include hot towels and ibuprofen. Interventions tried include hot compress and ibuprofen. She has seen Dr. Benitez in the past and given steroid injections with pain control. Last year she had a surgery on her right patella due to bump that burst. She states the pain can be severe that at times she feels like she doesn't want to live. She is not sleeping or eating well. 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  History of rheumatoid arthritis and followed by rheumatology.    Essential hypertension  Her blood pressure today is 134/74. She has been out of her medication for 3 months.  She needs a refill on her lisinopril 10 mg daily dose.    Dyslipidemia  She has been out of her atorvastatin 20 mg her evening dose for the last 3 months.  She needs a medication refill.          Past Medical History:   Diagnosis Date    Diabetes (HCC)     GERD (gastroesophageal reflux disease)     Hypertension     Osteoarthritis of both hands 2015       Social History     Tobacco Use    Smoking status: Never    Smokeless tobacco: Never   Vaping Use    Vaping Use: Never used   Substance Use Topics    Alcohol use: No    Drug use: No       Current Outpatient Medications Ordered in Epic   Medication Sig Dispense Refill    lisinopril (PRINIVIL) 10 MG Tab Take 1 Tablet by mouth every day. 90 Tablet 3  "   atorvastatin (LIPITOR) 20 MG Tab Take 1 Tablet by mouth every evening. 90 Tablet 3    metFORMIN ER (GLUCOPHAGE XR) 750 MG TABLET SR 24 HR Take 1 Tablet by mouth 2 times a day. 180 Tablet 1    Adalimumab 40 MG/0.4ML Pen-injector Kit Inject 40 mg under the skin every 14 days. 6 Each 0    methotrexate 2.5 MG tablet 6 tabs po qweek (every Sunday) 72 Tablet 0    folic acid (FOLVITE) 1 MG Tab 1 tab po qday 90 Tablet 3     No current Epic-ordered facility-administered medications on file.       Allergies:  Patient has no known allergies.    Health Maintenance: Reviewed.  Returning next week for diabetes health maintenance.      Objective:     Vital signs reviewed  Exam:  /70 (BP Location: Left arm, Patient Position: Sitting, BP Cuff Size: Adult)   Pulse 86   Temp 37.1 °C (98.7 °F) (Temporal)   Ht 1.448 m (4' 9\")   Wt 53.1 kg (117 lb)   SpO2 98%   BMI 25.32 kg/m²  Body mass index is 25.32 kg/m².    Gen: Alert and oriented, No apparent distress. Tearful during the appointment.  Neck: Neck is supple, full ROM.  Lungs: Normal effort, no audible wheezes  CV: Skin pink, warm and dry.  Ext: No clubbing, cyanosis, edema. Bilateral knees are swollen with pain to medial and lateral line. No jaundice seen.      Assessment & Plan:     57 y.o. female with the following -     1. Bilateral chronic knee pain  Chronic exacerbated problem.  Her knees are visibly swollen today and we will check updated imaging.  Referral to orthopedics.  We did discuss that with her history of rheumatoid arthritis this may also be the cause but we will still send her to orthopedics for possible knee injections.  - Referral to Orthopedics    2. Chronic pain of right knee  Chronic exacerbated problem.  Checking updated imaging.  Referral to orthopedics.  - DX-KNEE 3 VIEWS RIGHT; Future    3. Chronic pain of left knee  Chronic exacerbated problem.  Checking updated imaging.  Referral to orthopedics.  - DX-KNEE 3 VIEWS LEFT; Future    4. Type 2 " diabetes mellitus with hyperglycemia, without long-term current use of insulin (HCC)  Chronic exacerbated problem.  Plan to resume her metformin  mg twice daily.  She is returning next week for health maintenance.  We did discuss that I would like for her to see me at least twice a year for management of her diabetes, blood pressure and cholesterol.  She verbalized understanding.  Her last office visit with me was 10/19/2021.  - metFORMIN ER (GLUCOPHAGE XR) 750 MG TABLET SR 24 HR; Take 1 Tablet by mouth 2 times a day.  Dispense: 180 Tablet; Refill: 1    5. Essential hypertension  Chronic exacerbated problem.  Blood pressure stable today.  Refill today on lisinopril 10 mg daily dosing.  - lisinopril (PRINIVIL) 10 MG Tab; Take 1 Tablet by mouth every day.  Dispense: 90 Tablet; Refill: 3    6. Dyslipidemia  Chronic exacerbated problem.  Refill today on her atorvastatin 5 mg every evening.  Will order updated labs at next appointment.  - atorvastatin (LIPITOR) 20 MG Tab; Take 1 Tablet by mouth every evening.  Dispense: 90 Tablet; Refill: 3    7. Seropositive rheumatoid arthritis (HCC)  Chronic stable problem.  Continue follow-up with rheumatology.  Referral to orthopedics for chronic bilateral knee pain.  - Referral to Orthopedics        Return for keep 1/30/2023 appointment.    Please note that this dictation was created using voice recognition software. I have made every reasonable attempt to correct obvious errors, but I expect that there are errors of grammar and possibly content that I did not discover before finalizing the note.

## 2023-01-26 NOTE — ASSESSMENT & PLAN NOTE
Her blood pressure today is 134/74. She has been out of her medication for 3 months.  She needs a refill on her lisinopril 10 mg daily dose.

## 2023-01-27 PROCEDURE — RXMED WILLOW AMBULATORY MEDICATION CHARGE: Performed by: INTERNAL MEDICINE

## 2023-01-30 ENCOUNTER — APPOINTMENT (OUTPATIENT)
Dept: RHEUMATOLOGY | Facility: MEDICAL CENTER | Age: 58
End: 2023-01-30
Attending: INTERNAL MEDICINE
Payer: COMMERCIAL

## 2023-01-30 ENCOUNTER — PHARMACY VISIT (OUTPATIENT)
Dept: PHARMACY | Facility: MEDICAL CENTER | Age: 58
End: 2023-01-30
Payer: COMMERCIAL

## 2023-01-30 ENCOUNTER — HOSPITAL ENCOUNTER (OUTPATIENT)
Facility: MEDICAL CENTER | Age: 58
End: 2023-01-30
Attending: NURSE PRACTITIONER
Payer: COMMERCIAL

## 2023-01-30 ENCOUNTER — OFFICE VISIT (OUTPATIENT)
Dept: MEDICAL GROUP | Facility: PHYSICIAN GROUP | Age: 58
End: 2023-01-30
Payer: COMMERCIAL

## 2023-01-30 VITALS
HEIGHT: 57 IN | HEART RATE: 84 BPM | TEMPERATURE: 97.7 F | BODY MASS INDEX: 25.67 KG/M2 | WEIGHT: 119 LBS | OXYGEN SATURATION: 97 % | DIASTOLIC BLOOD PRESSURE: 70 MMHG | SYSTOLIC BLOOD PRESSURE: 128 MMHG

## 2023-01-30 DIAGNOSIS — Z12.12 SCREENING FOR COLORECTAL CANCER: ICD-10-CM

## 2023-01-30 DIAGNOSIS — I10 ESSENTIAL HYPERTENSION: ICD-10-CM

## 2023-01-30 DIAGNOSIS — Z12.11 SCREENING FOR COLORECTAL CANCER: ICD-10-CM

## 2023-01-30 DIAGNOSIS — M25.562 BILATERAL CHRONIC KNEE PAIN: ICD-10-CM

## 2023-01-30 DIAGNOSIS — M25.561 BILATERAL CHRONIC KNEE PAIN: ICD-10-CM

## 2023-01-30 DIAGNOSIS — Z23 NEED FOR VACCINATION: ICD-10-CM

## 2023-01-30 DIAGNOSIS — Z12.31 ENCOUNTER FOR SCREENING MAMMOGRAM FOR BREAST CANCER: ICD-10-CM

## 2023-01-30 DIAGNOSIS — E78.5 DYSLIPIDEMIA: ICD-10-CM

## 2023-01-30 DIAGNOSIS — G89.29 BILATERAL CHRONIC KNEE PAIN: ICD-10-CM

## 2023-01-30 DIAGNOSIS — E11.65 TYPE 2 DIABETES MELLITUS WITH HYPERGLYCEMIA, WITHOUT LONG-TERM CURRENT USE OF INSULIN (HCC): ICD-10-CM

## 2023-01-30 PROBLEM — Z02.9 DISCHARGE PLANNING ISSUES: Status: RESOLVED | Noted: 2022-05-28 | Resolved: 2023-01-30

## 2023-01-30 PROBLEM — E87.6 HYPOKALEMIA: Status: RESOLVED | Noted: 2022-05-31 | Resolved: 2023-01-30

## 2023-01-30 PROBLEM — M00.9 INFECTION OF RIGHT KNEE (HCC): Status: RESOLVED | Noted: 2022-06-06 | Resolved: 2023-01-30

## 2023-01-30 PROBLEM — R42 DIZZINESS: Status: RESOLVED | Noted: 2020-07-01 | Resolved: 2023-01-30

## 2023-01-30 PROBLEM — Z75.8 DISCHARGE PLANNING ISSUES: Status: RESOLVED | Noted: 2022-05-28 | Resolved: 2023-01-30

## 2023-01-30 PROBLEM — S42.254A CLOSED NONDISPLACED FRACTURE OF GREATER TUBEROSITY OF RIGHT HUMERUS: Status: RESOLVED | Noted: 2022-01-14 | Resolved: 2023-01-30

## 2023-01-30 LAB
HBA1C MFR BLD: 6.3 % (ref ?–5.8)
POCT INT CON NEG: NEGATIVE
POCT INT CON POS: POSITIVE

## 2023-01-30 PROCEDURE — 82570 ASSAY OF URINE CREATININE: CPT

## 2023-01-30 PROCEDURE — 83036 HEMOGLOBIN GLYCOSYLATED A1C: CPT | Performed by: NURSE PRACTITIONER

## 2023-01-30 PROCEDURE — 92250 FUNDUS PHOTOGRAPHY W/I&R: CPT | Mod: TC | Performed by: NURSE PRACTITIONER

## 2023-01-30 PROCEDURE — 82043 UR ALBUMIN QUANTITATIVE: CPT

## 2023-01-30 PROCEDURE — 99214 OFFICE O/P EST MOD 30 MIN: CPT | Mod: 25 | Performed by: NURSE PRACTITIONER

## 2023-01-30 ASSESSMENT — FIBROSIS 4 INDEX: FIB4 SCORE: 1.03

## 2023-01-31 DIAGNOSIS — E11.65 TYPE 2 DIABETES MELLITUS WITH HYPERGLYCEMIA, WITHOUT LONG-TERM CURRENT USE OF INSULIN (HCC): ICD-10-CM

## 2023-01-31 LAB
AMBIGUOUS DTTM AMBI4: NORMAL
CREAT UR-MCNC: 22.95 MG/DL
MICROALBUMIN UR-MCNC: <1.2 MG/DL
MICROALBUMIN/CREAT UR: NORMAL MG/G (ref 0–30)
RETINAL SCREEN: NEGATIVE

## 2023-01-31 PROCEDURE — 90471 IMMUNIZATION ADMIN: CPT | Performed by: NURSE PRACTITIONER

## 2023-01-31 PROCEDURE — 90677 PCV20 VACCINE IM: CPT | Performed by: NURSE PRACTITIONER

## 2023-01-31 NOTE — PROGRESS NOTES
Subjective:     CC: diabetes and x-ray follow-up    HPI:   Kary presents today with the following. A Swiss interpretor, Earl, ID number  [id number] was used during today's appointment.     Type 2 diabetes mellitus with hyperglycemia, without long-term current use of insulin ([facility])  She has resumed her metformin  mg twice daily. She is tolerating the medication.  Initially the first 2 days upset her stomach but after her symptoms improved.  Due for A1c, retinal screening, labs and monofilament today.    Essential hypertension  Her blood pressure today is 128/70. She has resumed her lisinopril 10 mg daily.     Dyslipidemia  She has resumed her atorvastatin 20 mg every evening. Due for updated labs.     Bilateral chronic knee pain  She would like to discuss her recent x-ray results.  Referral to Ortho has been approved and encouraged her to schedule.  Contact number provided on her paperwork today.      Past Medical History:   Diagnosis Date    Closed nondisplaced fracture of greater tuberosity of right humerus 1/14/2022    Diabetes ([facility])     GERD (gastroesophageal reflux disease)     Hypertension     Infection of right knee ([facility]) 6/6/2022    Osteoarthritis of both hands 2015       Social History     Tobacco Use    Smoking status: Never    Smokeless tobacco: Never   Vaping Use    Vaping Use: Never used   Substance Use Topics    Alcohol use: No    Drug use: No       Current Outpatient Medications Ordered in Epic   Medication Sig Dispense Refill    Adalimumab (HUMIRA PEN) 40 MG/0.4ML Pen-injector Kit Inject 40 mg under the skin every 14 days. 6 Each 0    lisinopril (PRINIVIL) 10 MG Tab Take 1 Tablet by mouth every day. 90 Tablet 3    atorvastatin (LIPITOR) 20 MG Tab Take 1 Tablet by mouth every evening. 90 Tablet 3    metFORMIN ER (GLUCOPHAGE XR) 750 MG TABLET SR 24 HR Take 1 Tablet by mouth 2 times a day. 180 Tablet 1    methotrexate 2.5 MG tablet 6 tabs po qweek (every Sunday) 72 Tablet 0    folic acid (FOLVITE) 1  "MG Tab 1 tab po qday 90 Tablet 3     No current Epic-ordered facility-administered medications on file.       Allergies:  Patient has no known allergies.    Health Maintenance: Reviewed and completed today.       Objective:     Vital signs reviewed  Exam:  /70 (BP Location: Right arm, Patient Position: Sitting, BP Cuff Size: Adult)   Pulse 84   Temp 36.5 °C (97.7 °F) (Temporal)   Ht 1.448 m (4' 9\")   Wt 54 kg (119 lb)   SpO2 97%   BMI 25.75 kg/m²  Body mass index is 25.75 kg/m².    Gen: Alert and oriented, No apparent distress.  Lungs: Normal effort, CTA bilaterally, no wheezes, rhonchi, or rales  CV: Regular rate and rhythm. No murmurs, rubs, or gallops.  Ext: No clubbing, cyanosis, edema.      Monofilament testing with a 10 gram force: sensation intact: intact bilaterally  Visual Inspection: Feet without maceration, ulcers, fissures.  Pedal pulses: intact bilaterally      Labs:      Latest Reference Range & Units 01/30/23 16:47   Glycohemoglobin 5.8 % 6.3 !   !: Data is abnormal    Assessment & Plan:     57 y.o. female with the following -     1. Type 2 diabetes mellitus with hyperglycemia, without long-term current use of insulin (HCC)  Chronic exacerbated problem.  We discussed importance of at least every 6-month follow-up for diabetes and she states that she will try better this year.  Return in 6 months for A1c.  Her recent A1c is stable at 6.3%.  She will continue with her metformin  mg twice daily.  Continue with lisinopril and atorvastatin.  Checking updated labs.  We were able to complete her POCT retinal eye exam today.  Monofilament exam completed today.  - POCT  A1C  - Microalbumin Creat Ratio Urine - Clinic Collect; Future  - Comp Metabolic Panel; Future  - Lipid Profile; Future  - Diabetic Monofilament Lower Extremity Exam  - POCT Retinal Eye Exam    2. Essential hypertension  Chronic stable problem.  Blood pressure is at goal today.  Continue with lisinopril 10 mg daily.    3. " Dyslipidemia  Chronic stable problem.  Continue with atorvastatin 20 mg every evening.    4. Bilateral chronic knee pain  Chronic stable problem.  Discussed reviewed her recent imaging results from 1/25/2023.  Imaging did show acute bilateral effusions.  Discussed that she should schedule follow-up with orthopedics.  She verbalized understanding.  Recommend she use over-the-counter Tylenol or topical Voltaren.    5. Encounter for screening mammogram for breast cancer  Chronic stable problem.  Due for mammogram.  Order placed today.  - MA-SCREENING MAMMO BILAT W/TOMOSYNTHESIS W/CAD; Future    6. Screening for colorectal cancer  Chronic stable problem.  Due for updated colon cancer screening.  She is agreeable to fit test.  Order placed today.  - OCCULT BLOOD FECES IMMUNOASSAY (FIT); Future    7. Need for vaccination  Acute uncomplicated problem.  Due for pneumonia vaccine and she is interested. I have placed the below orders and discussed them with an approved delegating provider. The MA is performing the below orders under the direction of Dr. Shay.  - Pneumococcal Conjugate Vaccine 20-Valent (19 yrs+)      Return in about 4 months (around 5/30/2023) for Diabetes.    Please note that this dictation was created using voice recognition software. I have made every reasonable attempt to correct obvious errors, but I expect that there are errors of grammar and possibly content that I did not discover before finalizing the note.

## 2023-01-31 NOTE — ASSESSMENT & PLAN NOTE
She has resumed her metformin  mg twice daily. She is tolerating the medication.  Initially the first 2 days upset her stomach but after her symptoms improved.  Due for A1c, retinal screening, labs and monofilament today.

## 2023-01-31 NOTE — ASSESSMENT & PLAN NOTE
She would like to discuss her recent x-ray results.  Referral to Ortho has been approved and encouraged her to schedule.  Contact number provided on her paperwork today.

## 2023-02-04 ENCOUNTER — HOSPITAL ENCOUNTER (OUTPATIENT)
Dept: LAB | Facility: MEDICAL CENTER | Age: 58
End: 2023-02-04
Attending: NURSE PRACTITIONER
Payer: COMMERCIAL

## 2023-02-04 DIAGNOSIS — E11.65 TYPE 2 DIABETES MELLITUS WITH HYPERGLYCEMIA, WITHOUT LONG-TERM CURRENT USE OF INSULIN (HCC): ICD-10-CM

## 2023-02-04 DIAGNOSIS — I10 ESSENTIAL HYPERTENSION: ICD-10-CM

## 2023-02-04 DIAGNOSIS — M19.90 OSTEOARTHRITIS, UNSPECIFIED OSTEOARTHRITIS TYPE, UNSPECIFIED SITE: ICD-10-CM

## 2023-02-04 DIAGNOSIS — Z79.899 LONG-TERM USE OF PLAQUENIL: ICD-10-CM

## 2023-02-04 DIAGNOSIS — Z79.631 METHOTREXATE, LONG TERM, CURRENT USE: ICD-10-CM

## 2023-02-04 DIAGNOSIS — M06.9 RHEUMATOID ARTHRITIS, INVOLVING UNSPECIFIED SITE, UNSPECIFIED WHETHER RHEUMATOID FACTOR PRESENT (HCC): ICD-10-CM

## 2023-02-04 LAB
ALBUMIN SERPL BCP-MCNC: 3.7 G/DL (ref 3.2–4.9)
ALBUMIN/GLOB SERPL: 1.1 G/DL
ALP SERPL-CCNC: 123 U/L (ref 30–99)
ALT SERPL-CCNC: 6 U/L (ref 2–50)
ANION GAP SERPL CALC-SCNC: 11 MMOL/L (ref 7–16)
AST SERPL-CCNC: 11 U/L (ref 12–45)
BILIRUB SERPL-MCNC: 0.3 MG/DL (ref 0.1–1.5)
BUN SERPL-MCNC: 15 MG/DL (ref 8–22)
CALCIUM ALBUM COR SERPL-MCNC: 9.6 MG/DL (ref 8.5–10.5)
CALCIUM SERPL-MCNC: 9.4 MG/DL (ref 8.5–10.5)
CHLORIDE SERPL-SCNC: 101 MMOL/L (ref 96–112)
CHOLEST SERPL-MCNC: 164 MG/DL (ref 100–199)
CO2 SERPL-SCNC: 24 MMOL/L (ref 20–33)
CREAT SERPL-MCNC: 0.47 MG/DL (ref 0.5–1.4)
GFR SERPLBLD CREATININE-BSD FMLA CKD-EPI: 110 ML/MIN/1.73 M 2
GLOBULIN SER CALC-MCNC: 3.5 G/DL (ref 1.9–3.5)
GLUCOSE SERPL-MCNC: 115 MG/DL (ref 65–99)
HDLC SERPL-MCNC: 31 MG/DL
LDLC SERPL CALC-MCNC: 111 MG/DL
POTASSIUM SERPL-SCNC: 4.1 MMOL/L (ref 3.6–5.5)
PROT SERPL-MCNC: 7.2 G/DL (ref 6–8.2)
SODIUM SERPL-SCNC: 136 MMOL/L (ref 135–145)
TRIGL SERPL-MCNC: 111 MG/DL (ref 0–149)

## 2023-02-04 PROCEDURE — 80061 LIPID PANEL: CPT

## 2023-02-04 PROCEDURE — 80053 COMPREHEN METABOLIC PANEL: CPT

## 2023-02-04 PROCEDURE — 36415 COLL VENOUS BLD VENIPUNCTURE: CPT

## 2023-02-07 ENCOUNTER — TELEPHONE (OUTPATIENT)
Dept: MEDICAL GROUP | Facility: PHYSICIAN GROUP | Age: 58
End: 2023-02-07

## 2023-02-07 NOTE — TELEPHONE ENCOUNTER
Phone Number Called: [phone]    Call outcome: Did not leave a detailed message. Requested patient to call back.    Message: per Soledad - your cholesterol is still up but you can continue with the atorvastatin every evening. We will plan to recheck your lipid panel in 6 months.     Pashto: rosario colesterol todavía está alto, katia puede continuar con la atorvastatina todas las noches. Planearemos volver a revisar rosario panel de lípidos en 6 meses.

## 2023-02-22 PROCEDURE — RXMED WILLOW AMBULATORY MEDICATION CHARGE: Performed by: INTERNAL MEDICINE

## 2023-02-24 ENCOUNTER — PHARMACY VISIT (OUTPATIENT)
Dept: PHARMACY | Facility: MEDICAL CENTER | Age: 58
End: 2023-02-24
Payer: COMMERCIAL

## 2023-02-27 ENCOUNTER — TELEPHONE (OUTPATIENT)
Dept: MEDICAL GROUP | Facility: PHYSICIAN GROUP | Age: 58
End: 2023-02-27
Payer: COMMERCIAL

## 2023-02-28 NOTE — TELEPHONE ENCOUNTER
LAB LEFT MESSAGE ABOUT NEEDING FIT TEST RECOLLECTED. SAMPLE WAS NOT LABELED. PHUONG CALLED PT AND PT WILL  NEW KIT AND LAB SLIP. PT WAS INFORMED ABOUT LABELING SAMPLE. MARYLU

## 2023-03-02 ENCOUNTER — OFFICE VISIT (OUTPATIENT)
Dept: RHEUMATOLOGY | Facility: MEDICAL CENTER | Age: 58
End: 2023-03-02
Attending: INTERNAL MEDICINE
Payer: COMMERCIAL

## 2023-03-02 VITALS
DIASTOLIC BLOOD PRESSURE: 60 MMHG | RESPIRATION RATE: 14 BRPM | TEMPERATURE: 97.7 F | BODY MASS INDEX: 25.32 KG/M2 | SYSTOLIC BLOOD PRESSURE: 140 MMHG | OXYGEN SATURATION: 97 % | WEIGHT: 117 LBS | HEART RATE: 92 BPM

## 2023-03-02 DIAGNOSIS — Z79.620 ADALIMUMAB (HUMIRA) LONG-TERM USE: ICD-10-CM

## 2023-03-02 DIAGNOSIS — E11.65 TYPE 2 DIABETES MELLITUS WITH HYPERGLYCEMIA, WITHOUT LONG-TERM CURRENT USE OF INSULIN (HCC): ICD-10-CM

## 2023-03-02 DIAGNOSIS — I10 ESSENTIAL HYPERTENSION: ICD-10-CM

## 2023-03-02 DIAGNOSIS — Z79.899 LONG-TERM USE OF PLAQUENIL: ICD-10-CM

## 2023-03-02 DIAGNOSIS — Z79.631 METHOTREXATE, LONG TERM, CURRENT USE: ICD-10-CM

## 2023-03-02 DIAGNOSIS — M06.9 RHEUMATOID ARTHRITIS, INVOLVING UNSPECIFIED SITE, UNSPECIFIED WHETHER RHEUMATOID FACTOR PRESENT (HCC): ICD-10-CM

## 2023-03-02 DIAGNOSIS — M19.90 OSTEOARTHRITIS, UNSPECIFIED OSTEOARTHRITIS TYPE, UNSPECIFIED SITE: ICD-10-CM

## 2023-03-02 PROCEDURE — 99212 OFFICE O/P EST SF 10 MIN: CPT | Performed by: INTERNAL MEDICINE

## 2023-03-02 PROCEDURE — 99215 OFFICE O/P EST HI 40 MIN: CPT | Performed by: INTERNAL MEDICINE

## 2023-03-02 RX ORDER — METHOTREXATE 2.5 MG/1
TABLET ORAL
Qty: 72 TABLET | Refills: 0 | Status: SHIPPED | OUTPATIENT
Start: 2023-03-02 | End: 2023-06-05

## 2023-03-02 RX ORDER — SULINDAC 200 MG/1
200 TABLET ORAL 2 TIMES DAILY
COMMUNITY
End: 2023-03-02 | Stop reason: SDUPTHER

## 2023-03-02 RX ORDER — SULINDAC 200 MG/1
200 TABLET ORAL 2 TIMES DAILY
Qty: 180 TABLET | Refills: 0 | Status: SHIPPED | OUTPATIENT
Start: 2023-03-02 | End: 2023-10-03 | Stop reason: SDUPTHER

## 2023-03-02 ASSESSMENT — FIBROSIS 4 INDEX: FIB4 SCORE: 0.71

## 2023-03-02 ASSESSMENT — JOINT PAIN
TOTAL NUMBER OF TENDER JOINTS: 1
TOTAL NUMBER OF SWOLLEN JOINTS: 0

## 2023-03-02 NOTE — PROGRESS NOTES
Chief Complaint- joint pain     Subjective:   Kary Shah is a 57 y.o. female here today for follow up of rheumatological issues    All information done through      This is a follow-up visit for this patient who we see in this clinic for serologically positive rheumatoid arthritis.  Patient was diagnosed with rheumatoid arthritis about October 2021 symptoms of finger pain and swelling in ankle pain and swelling and positive serologies.  Patient is currently on Humira 40 mg subcu every 2 weeks started at last visit and continues to be on methotrexate at 15 mg p.o. weekly with folic acid 1 mg a day with sulindac 200 mg po bid with food prn joint pain.  Patient states this particular combination is doing quite well for her.    Since last visit patient did have a cortisone injection in the left knee with benefit.    S/p plaquenil-patient stopped when started Humira     PMHX  Diabetes  HTN     Fam Hx  F-passed CAD/MI  M-passed CAD/MI, RA  Sx3 one passed from CVA/blood clot in brain  Bx3 one passed secondary ETOH and CAD/MI     Soc Hx  No tob  ETOH once/year  No blood tx  No tattoos     HBsAg/HBcAb neg 10/2022  HCV neg 10/2022  Quantiferon Gold neg 10/2022     G6PD 13.9 adequate 3/2022  Hemoglobin A1c 7.7 10/2021  RF 83 3/2020  CCP 93 3/2020  Hand x-rays 3/2020-IMPRESSION:  1.  Asymmetric degenerative change of the IP joints diffusely which may represent asymmetric osteoarthritic change. Differential diagnosis includes psoriatic arthritis.  2.  No erosive arthritic change.  Right Hand x-rays 5/2021-IMPRESSION:   No evidence of acute fracture or dislocation.   Osteoarthritis as above described particularly involving the distal interphalangeal joints of the third and fifth digits.  Mild soft tissue swelling about the distal third digit.  Demineralization which limits evaluation.     Left Foot x-ray 5/2021-IMPRESSION:  Degenerative changes as above described, most prominent at the first MTP  joint.  Soft tissue swelling along the medial foot.  Calcaneal spurring.-     Current Outpatient Medications   Medication Sig Dispense Refill    sulindac (CLINORIL) 200 MG Tab Take 1 Tablet by mouth 2 times a day. 180 Tablet 0    methotrexate 2.5 MG tablet 6 tabs po qweek (every Sunday) 72 Tablet 0    Adalimumab (HUMIRA PEN) 40 MG/0.4ML Pen-injector Kit Inject 40 mg under the skin every 14 days. 6 Each 0    lisinopril (PRINIVIL) 10 MG Tab Take 1 Tablet by mouth every day. 90 Tablet 3    atorvastatin (LIPITOR) 20 MG Tab Take 1 Tablet by mouth every evening. 90 Tablet 3    metFORMIN ER (GLUCOPHAGE XR) 750 MG TABLET SR 24 HR Take 1 Tablet by mouth 2 times a day. 180 Tablet 1    folic acid (FOLVITE) 1 MG Tab 1 tab po qday 90 Tablet 3     No current facility-administered medications for this visit.     She  has a past medical history of Closed nondisplaced fracture of greater tuberosity of right humerus (1/14/2022), Diabetes ([facility]), GERD (gastroesophageal reflux disease), Hypertension, Infection of right knee ([facility]) (6/6/2022), and Osteoarthritis of both hands (2015).    ROS   Other than what is mentioned in HPI or physical exam, there is no history of headaches, double vision or blurred vision. No temporal tenderness or jaw claudication. No trouble swallowing difficulties or sore throats.  No chest complaints including chest pain, cough or sputum production. No GI complaints including nausea, vomiting, change in bowel habits, or past peptic ulcer disease. No history of blood in the stools. No urinary complaints including dysuria or frequency. No history of alopecia, photosensitivity, oral ulcerations, Raynaud's phenomena.       Objective:     BP (!) 140/60   Pulse 92   Temp 36.5 °C (97.7 °F) (Temporal)   Resp 14   Wt 53.1 kg (117 lb)   SpO2 97%  Body mass index is 25.32 kg/m².   Physical Exam:    Constitutional: Alert and oriented X3, patient is talkative with good eye contact.Skin: Warm, dry, good turgor, no rashes  in visible areas.Eye: Equal, round and reactive, conjunctiva clear, lids normal EOM intactENMT: Lips without lesions, good dentition, no oropharyngeal ulcers, moist buccal mucosa, pinna without deformityNeck: Trachea midline, no masses, no thyromegaly.Lymph:  No cervical lymphadenopathy, no axillary lymphadenopathy, no supraclavicular lymphadenopathyRespiratory: Unlabored respiratory effort, lungs clear to auscultation, no wheezes, no ronchi.Cardiovascular: Normal S1, S2, Regular rate and rhythm, no murmurs rubs or gallops  .Abdomen: Soft, non-tender, no masses, no hepatosplenomegaly.Psych: Alert and oriented x3, normal affect and mood.Neuro: Cranial nerves 2-12 are grossly intact, no loss of sensation LEExt:no joint laxity noted in bilateral arms, no joint laxity noted in bilateral legs, Heberden's nodes most DIP joints both hands, otherwise no jennifer sausage digits no dactylitis no swan-neck or boutonniere deformities, toes without crossover toes and without splay toes      Lab Results   Component Value Date/Time    HEPBCORIGM Non-Reactive 10/24/2022 12:40 PM    HEPBSAG Non-Reactive 10/24/2022 12:40 PM     Lab Results   Component Value Date/Time    HEPCAB Non-Reactive 10/24/2022 12:40 PM     Lab Results   Component Value Date/Time    SODIUM 136 02/04/2023 08:37 AM    POTASSIUM 4.1 02/04/2023 08:37 AM    CHLORIDE 101 02/04/2023 08:37 AM    CO2 24 02/04/2023 08:37 AM    GLUCOSE 115 (H) 02/04/2023 08:37 AM    BUN 15 02/04/2023 08:37 AM    CREATININE 0.47 (L) 02/04/2023 08:37 AM      Lab Results   Component Value Date/Time    WBC 6.8 10/24/2022 12:40 PM    RBC 4.30 10/24/2022 12:40 PM    HEMOGLOBIN 11.0 (L) 10/24/2022 12:40 PM    HEMATOCRIT 35.7 (L) 10/24/2022 12:40 PM    MCV 83.0 10/24/2022 12:40 PM    MCH 25.6 (L) 10/24/2022 12:40 PM    MCHC 30.8 (L) 10/24/2022 12:40 PM    MPV 10.4 10/24/2022 12:40 PM    NEUTSPOLYS 83.20 (H) 10/24/2022 12:40 PM    LYMPHOCYTES 9.00 (L) 10/24/2022 12:40 PM    MONOCYTES 5.60 10/24/2022  12:40 PM    EOSINOPHILS 1.20 10/24/2022 12:40 PM    BASOPHILS 0.60 10/24/2022 12:40 PM      Lab Results   Component Value Date/Time    CALCIUM 9.4 02/04/2023 08:37 AM    ASTSGOT 11 (L) 02/04/2023 08:37 AM    ALTSGPT 6 02/04/2023 08:37 AM    ALKPHOSPHAT 123 (H) 02/04/2023 08:37 AM    TBILIRUBIN 0.3 02/04/2023 08:37 AM    ALBUMIN 3.7 02/04/2023 08:37 AM    TOTPROTEIN 7.2 02/04/2023 08:37 AM     Lab Results   Component Value Date/Time    RHEUMFACTN 83 (H) 03/26/2020 02:20 PM    CCPANTIBODY 93 (H) 03/26/2020 02:20 PM     Lab Results   Component Value Date/Time    SEDRATEWES 55 (H) 10/24/2022 12:40 PM     Lab Results   Component Value Date/Time    HBA1C 6.3 (A) 01/30/2023 04:47 PM     Lab Results   Component Value Date/Time    G6PD 13.9 03/12/2022 09:05 AM     Assessment and Plan:     1. Rheumatoid arthritis, involving unspecified site, unspecified whether rheumatoid factor present (HCC)  Long discussion with patient regarding treatment options, Patient is clinically stable on Humira 40 mg subcu every 2 weeks in combination with methotrexate 15 mg p.o. weekly and folic acid 1 mg a day.  Patient also takes sulindac 200 mg p.o. twice daily with food as needed.  - Comp Metabolic Panel; Standing  - CBC WITH DIFFERENTIAL; Standing  - Sed Rate; Standing  - sulindac (CLINORIL) 200 MG Tab; Take 1 Tablet by mouth 2 times a day.  Dispense: 180 Tablet; Refill: 0  - methotrexate 2.5 MG tablet; 6 tabs po qweek (every Sunday)  Dispense: 72 Tablet; Refill: 0    2. Adalimumab (Humira) long-term use  Currently on Humira 40 mg subcu every 2 weeks, screening labs up-to-date, next screening labs due October 2024, patient needs monitoring labs every 6 months, next labs due about August 2023, patient has standing orders available to her  We reviewed risks of biological medications with patient including hematological pathology, cancer risks, neurological and infection issues especially in the Covid-19 pandemic environment, patient states  understanding.  - Comp Metabolic Panel; Standing  - CBC WITH DIFFERENTIAL; Standing  - Sed Rate; Standing  - sulindac (CLINORIL) 200 MG Tab; Take 1 Tablet by mouth 2 times a day.  Dispense: 180 Tablet; Refill: 0  - methotrexate 2.5 MG tablet; 6 tabs po qweek (every Sunday)  Dispense: 72 Tablet; Refill: 0    3. Methotrexate, long term, current use  Currently on methotrexate 15 mg p.o. weekly with folic acid 1 mg a day, patient needs monitoring labs every 3 months, next labs due about May 2023, patient has standing orders available to her  - Comp Metabolic Panel; Standing  - CBC WITH DIFFERENTIAL; Standing  - Sed Rate; Standing  - sulindac (CLINORIL) 200 MG Tab; Take 1 Tablet by mouth 2 times a day.  Dispense: 180 Tablet; Refill: 0  - methotrexate 2.5 MG tablet; 6 tabs po qweek (every Sunday)  Dispense: 72 Tablet; Refill: 0    4. Osteoarthritis, unspecified osteoarthritis type, unspecified site  Patient uses sulindac 200 mg p.o. twice daily as needed with food, while on NSAIDs patient does need monitoring labs every 6 months, next labs due about August 2023, patient has standing orders available to her  - Comp Metabolic Panel; Standing  - CBC WITH DIFFERENTIAL; Standing  - Sed Rate; Standing  - sulindac (CLINORIL) 200 MG Tab; Take 1 Tablet by mouth 2 times a day.  Dispense: 180 Tablet; Refill: 0  - methotrexate 2.5 MG tablet; 6 tabs po qweek (every Sunday)  Dispense: 72 Tablet; Refill: 0    5. Essential hypertension  May impact the type of medications we can use for this patient's arthritis. We will have to keep this under advisement.  - Comp Metabolic Panel; Standing  - CBC WITH DIFFERENTIAL; Standing  - Sed Rate; Standing  - sulindac (CLINORIL) 200 MG Tab; Take 1 Tablet by mouth 2 times a day.  Dispense: 180 Tablet; Refill: 0  - methotrexate 2.5 MG tablet; 6 tabs po qweek (every Sunday)  Dispense: 72 Tablet; Refill: 0    6. Type 2 diabetes mellitus with hyperglycemia, without long-term current use of insulin  ([facility])  Followed by patients PCP, high blood sugar can exacerbate inflammatory state of patient's arthritis, discussed with patient the need to monitor and control blood sugars, patient states understanding    Followup: Return in about 7 months (around 10/2/2023). or sooner juliet Shah  was seen 30 minutes face-to-face of which more than 50% of the time was spent counseling the patient (excluding time for procedures)  regarding  rheumatological condition and care. Therapy was discussed in detail.      Please note that this dictation was created using voice recognition software. I have made every reasonable attempt to correct obvious errors, but I expect that there are errors of grammar and possibly content that I did not discover before finalizing the note.

## 2023-03-20 PROCEDURE — RXMED WILLOW AMBULATORY MEDICATION CHARGE: Performed by: INTERNAL MEDICINE

## 2023-03-22 ENCOUNTER — PHARMACY VISIT (OUTPATIENT)
Dept: PHARMACY | Facility: MEDICAL CENTER | Age: 58
End: 2023-03-22
Payer: COMMERCIAL

## 2023-04-04 ENCOUNTER — HOSPITAL ENCOUNTER (OUTPATIENT)
Facility: MEDICAL CENTER | Age: 58
End: 2023-04-04
Attending: NURSE PRACTITIONER
Payer: COMMERCIAL

## 2023-04-04 PROCEDURE — 82274 ASSAY TEST FOR BLOOD FECAL: CPT

## 2023-04-08 ENCOUNTER — HOSPITAL ENCOUNTER (OUTPATIENT)
Dept: LAB | Facility: MEDICAL CENTER | Age: 58
End: 2023-04-08
Attending: INTERNAL MEDICINE
Payer: COMMERCIAL

## 2023-04-08 DIAGNOSIS — I10 ESSENTIAL HYPERTENSION: ICD-10-CM

## 2023-04-08 DIAGNOSIS — Z79.620 ADALIMUMAB (HUMIRA) LONG-TERM USE: ICD-10-CM

## 2023-04-08 DIAGNOSIS — M19.90 OSTEOARTHRITIS, UNSPECIFIED OSTEOARTHRITIS TYPE, UNSPECIFIED SITE: ICD-10-CM

## 2023-04-08 DIAGNOSIS — Z79.899 LONG-TERM USE OF PLAQUENIL: ICD-10-CM

## 2023-04-08 DIAGNOSIS — M06.9 RHEUMATOID ARTHRITIS, INVOLVING UNSPECIFIED SITE, UNSPECIFIED WHETHER RHEUMATOID FACTOR PRESENT (HCC): ICD-10-CM

## 2023-04-08 DIAGNOSIS — Z79.631 METHOTREXATE, LONG TERM, CURRENT USE: ICD-10-CM

## 2023-04-08 LAB
BASOPHILS # BLD AUTO: 0.7 % (ref 0–1.8)
BASOPHILS # BLD: 0.05 K/UL (ref 0–0.12)
EOSINOPHIL # BLD AUTO: 0.13 K/UL (ref 0–0.51)
EOSINOPHIL NFR BLD: 1.8 % (ref 0–6.9)
ERYTHROCYTE [DISTWIDTH] IN BLOOD BY AUTOMATED COUNT: 64.7 FL (ref 35.9–50)
HCT VFR BLD AUTO: 36.4 % (ref 37–47)
HGB BLD-MCNC: 11.6 G/DL (ref 12–16)
IMM GRANULOCYTES # BLD AUTO: 0.07 K/UL (ref 0–0.11)
IMM GRANULOCYTES NFR BLD AUTO: 1 % (ref 0–0.9)
LYMPHOCYTES # BLD AUTO: 1.11 K/UL (ref 1–4.8)
LYMPHOCYTES NFR BLD: 15.7 % (ref 22–41)
MCH RBC QN AUTO: 27.4 PG (ref 27–33)
MCHC RBC AUTO-ENTMCNC: 31.9 G/DL (ref 33.6–35)
MCV RBC AUTO: 85.8 FL (ref 81.4–97.8)
MONOCYTES # BLD AUTO: 0.81 K/UL (ref 0–0.85)
MONOCYTES NFR BLD AUTO: 11.5 % (ref 0–13.4)
NEUTROPHILS # BLD AUTO: 4.88 K/UL (ref 2–7.15)
NEUTROPHILS NFR BLD: 69.3 % (ref 44–72)
NRBC # BLD AUTO: 0 K/UL
NRBC BLD-RTO: 0 /100 WBC
PLATELET # BLD AUTO: 372 K/UL (ref 164–446)
PMV BLD AUTO: 10.1 FL (ref 9–12.9)
RBC # BLD AUTO: 4.24 M/UL (ref 4.2–5.4)
WBC # BLD AUTO: 7.1 K/UL (ref 4.8–10.8)

## 2023-04-08 PROCEDURE — 36415 COLL VENOUS BLD VENIPUNCTURE: CPT

## 2023-04-08 PROCEDURE — 85652 RBC SED RATE AUTOMATED: CPT

## 2023-04-08 PROCEDURE — 80053 COMPREHEN METABOLIC PANEL: CPT

## 2023-04-08 PROCEDURE — 85025 COMPLETE CBC W/AUTO DIFF WBC: CPT

## 2023-04-09 LAB
ALBUMIN SERPL BCP-MCNC: 3.8 G/DL (ref 3.2–4.9)
ALBUMIN/GLOB SERPL: 1.2 G/DL
ALP SERPL-CCNC: 171 U/L (ref 30–99)
ALT SERPL-CCNC: 10 U/L (ref 2–50)
ANION GAP SERPL CALC-SCNC: 14 MMOL/L (ref 7–16)
AST SERPL-CCNC: 13 U/L (ref 12–45)
BILIRUB SERPL-MCNC: 0.4 MG/DL (ref 0.1–1.5)
BUN SERPL-MCNC: 15 MG/DL (ref 8–22)
CALCIUM ALBUM COR SERPL-MCNC: 9.3 MG/DL (ref 8.5–10.5)
CALCIUM SERPL-MCNC: 9.1 MG/DL (ref 8.5–10.5)
CHLORIDE SERPL-SCNC: 106 MMOL/L (ref 96–112)
CO2 SERPL-SCNC: 23 MMOL/L (ref 20–33)
CREAT SERPL-MCNC: 0.45 MG/DL (ref 0.5–1.4)
ERYTHROCYTE [SEDIMENTATION RATE] IN BLOOD BY WESTERGREN METHOD: 66 MM/HOUR (ref 0–25)
GFR SERPLBLD CREATININE-BSD FMLA CKD-EPI: 111 ML/MIN/1.73 M 2
GLOBULIN SER CALC-MCNC: 3.3 G/DL (ref 1.9–3.5)
GLUCOSE SERPL-MCNC: 84 MG/DL (ref 65–99)
POTASSIUM SERPL-SCNC: 3.9 MMOL/L (ref 3.6–5.5)
PROT SERPL-MCNC: 7.1 G/DL (ref 6–8.2)
SODIUM SERPL-SCNC: 143 MMOL/L (ref 135–145)

## 2023-04-11 DIAGNOSIS — Z12.11 SCREENING FOR COLORECTAL CANCER: ICD-10-CM

## 2023-04-11 DIAGNOSIS — Z12.12 SCREENING FOR COLORECTAL CANCER: ICD-10-CM

## 2023-04-11 LAB — AMBIGUOUS SPECIMEN AMBIS: NORMAL

## 2023-04-12 LAB — IMM ASSAY OCC BLD FITOB: NEGATIVE

## 2023-04-18 DIAGNOSIS — Z79.620 ADALIMUMAB (HUMIRA) LONG-TERM USE: ICD-10-CM

## 2023-04-18 DIAGNOSIS — M06.9 RHEUMATOID ARTHRITIS, INVOLVING UNSPECIFIED SITE, UNSPECIFIED WHETHER RHEUMATOID FACTOR PRESENT (HCC): ICD-10-CM

## 2023-04-18 DIAGNOSIS — M19.90 OSTEOARTHRITIS, UNSPECIFIED OSTEOARTHRITIS TYPE, UNSPECIFIED SITE: ICD-10-CM

## 2023-04-18 DIAGNOSIS — Z79.899 LONG-TERM USE OF PLAQUENIL: ICD-10-CM

## 2023-04-18 DIAGNOSIS — Z79.631 METHOTREXATE, LONG TERM, CURRENT USE: ICD-10-CM

## 2023-04-19 NOTE — TELEPHONE ENCOUNTER
Received request via: Pharmacy April labs    Was the patient seen in the last year in this department? Yes    Does the patient have an active prescription (recently filled or refills available) for medication(s) requested? No    Does the patient have [facility] Plus and need 100 day supply (blood pressure, diabetes and cholesterol meds only)? Medication is not for cholesterol, blood pressure or diabetes

## 2023-04-20 PROCEDURE — RXMED WILLOW AMBULATORY MEDICATION CHARGE: Performed by: INTERNAL MEDICINE

## 2023-04-20 RX ORDER — ADALIMUMAB 40MG/0.4ML
40 KIT SUBCUTANEOUS
Qty: 6 EACH | Refills: 1 | Status: SHIPPED | OUTPATIENT
Start: 2023-04-20 | End: 2023-10-30 | Stop reason: SDUPTHER

## 2023-04-24 ENCOUNTER — PHARMACY VISIT (OUTPATIENT)
Dept: PHARMACY | Facility: MEDICAL CENTER | Age: 58
End: 2023-04-24
Payer: COMMERCIAL

## 2023-05-05 ENCOUNTER — HOSPITAL ENCOUNTER (OUTPATIENT)
Dept: RADIOLOGY | Facility: MEDICAL CENTER | Age: 58
End: 2023-05-05
Attending: NURSE PRACTITIONER
Payer: COMMERCIAL

## 2023-05-05 DIAGNOSIS — Z12.31 ENCOUNTER FOR SCREENING MAMMOGRAM FOR BREAST CANCER: ICD-10-CM

## 2023-05-05 PROCEDURE — 77063 BREAST TOMOSYNTHESIS BI: CPT

## 2023-05-17 PROCEDURE — RXMED WILLOW AMBULATORY MEDICATION CHARGE: Performed by: INTERNAL MEDICINE

## 2023-05-22 ENCOUNTER — PHARMACY VISIT (OUTPATIENT)
Dept: PHARMACY | Facility: MEDICAL CENTER | Age: 58
End: 2023-05-22
Payer: COMMERCIAL

## 2023-05-31 ENCOUNTER — OFFICE VISIT (OUTPATIENT)
Dept: MEDICAL GROUP | Facility: PHYSICIAN GROUP | Age: 58
End: 2023-05-31
Payer: COMMERCIAL

## 2023-05-31 VITALS
DIASTOLIC BLOOD PRESSURE: 60 MMHG | HEART RATE: 73 BPM | TEMPERATURE: 97.5 F | HEIGHT: 57 IN | WEIGHT: 124 LBS | SYSTOLIC BLOOD PRESSURE: 128 MMHG | OXYGEN SATURATION: 98 % | BODY MASS INDEX: 26.75 KG/M2

## 2023-05-31 DIAGNOSIS — E11.9 TYPE 2 DIABETES MELLITUS WITHOUT COMPLICATION, WITHOUT LONG-TERM CURRENT USE OF INSULIN (HCC): ICD-10-CM

## 2023-05-31 DIAGNOSIS — I10 ESSENTIAL HYPERTENSION: ICD-10-CM

## 2023-05-31 DIAGNOSIS — E78.5 DYSLIPIDEMIA: ICD-10-CM

## 2023-05-31 LAB
HBA1C MFR BLD: 6.2 % (ref ?–5.8)
POCT INT CON NEG: NEGATIVE
POCT INT CON POS: POSITIVE

## 2023-05-31 PROCEDURE — 83036 HEMOGLOBIN GLYCOSYLATED A1C: CPT | Performed by: NURSE PRACTITIONER

## 2023-05-31 PROCEDURE — 3074F SYST BP LT 130 MM HG: CPT | Performed by: NURSE PRACTITIONER

## 2023-05-31 PROCEDURE — 3078F DIAST BP <80 MM HG: CPT | Performed by: NURSE PRACTITIONER

## 2023-05-31 PROCEDURE — 99214 OFFICE O/P EST MOD 30 MIN: CPT | Performed by: NURSE PRACTITIONER

## 2023-05-31 RX ORDER — ATORVASTATIN CALCIUM 20 MG/1
20 TABLET, FILM COATED ORAL EVERY EVENING
Qty: 90 TABLET | Refills: 3 | Status: SHIPPED | OUTPATIENT
Start: 2023-05-31 | End: 2024-01-23 | Stop reason: SDUPTHER

## 2023-05-31 RX ORDER — METFORMIN HYDROCHLORIDE 750 MG/1
750 TABLET, EXTENDED RELEASE ORAL 2 TIMES DAILY
Qty: 180 TABLET | Refills: 3 | Status: SHIPPED | OUTPATIENT
Start: 2023-05-31 | End: 2024-01-23 | Stop reason: SDUPTHER

## 2023-05-31 RX ORDER — LISINOPRIL 10 MG/1
10 TABLET ORAL DAILY
Qty: 90 TABLET | Refills: 3 | Status: SHIPPED | OUTPATIENT
Start: 2023-05-31 | End: 2024-01-23 | Stop reason: SDUPTHER

## 2023-05-31 ASSESSMENT — FIBROSIS 4 INDEX: FIB4 SCORE: 0.64

## 2023-05-31 NOTE — PROGRESS NOTES
Subjective:     CC: Diabetes follow-up and medication refill    HPI:   Kary presents today with the following.   Bogdan, ID number [id number] was used during today's appointment.    Type 2 diabetes mellitus without complication, without long-term current use of insulin ([facility])  Due for A1c today, last 6.3%.  She continues with her metformin  mg twice daily.  She does need a medication refill today. She has been walking inside her house due to her pain. Today she had a left knee injection with ortho. She is eating vegetables, fruits, red meat once a week, chicken 2 times a week, tuna fish, salad, blueberries, dragonfruit. She has decreased her bread and tortilla intake. Her portions are smaller.     Essential hypertension  Her blood pressure today is 128/60.  She continues with lisinopril 10 mg daily.  She would like a medication refill.     Dyslipidemia  She is taking atorvastatin 20 mg every evening. She does need a medication refill.     Past Medical History:   Diagnosis Date    Closed nondisplaced fracture of greater tuberosity of right humerus 1/14/2022    Diabetes ([facility])     GERD (gastroesophageal reflux disease)     Hypertension     Infection of right knee ([facility]) 6/6/2022    Osteoarthritis of both hands 2015       Social History     Tobacco Use    Smoking status: Never    Smokeless tobacco: Never   Vaping Use    Vaping Use: Never used   Substance Use Topics    Alcohol use: No    Drug use: No       Current Outpatient Medications Ordered in Epic   Medication Sig Dispense Refill    metFORMIN ER (GLUCOPHAGE XR) 750 MG TABLET SR 24 HR Take 1 Tablet by mouth 2 times a day. 180 Tablet 3    lisinopril (PRINIVIL) 10 MG Tab Take 1 Tablet by mouth every day. 90 Tablet 3    atorvastatin (LIPITOR) 20 MG Tab Take 1 Tablet by mouth every evening. 90 Tablet 3    Adalimumab (HUMIRA PEN) 40 MG/0.4ML Pen-injector Kit Inject 40 mg under the skin every 14 days. 6 Each 1    sulindac (CLINORIL) 200 MG Tab Take 1  "Tablet by mouth 2 times a day. 180 Tablet 0    methotrexate 2.5 MG tablet 6 tabs po qweek (every Sunday) 72 Tablet 0    folic acid (FOLVITE) 1 MG Tab 1 tab po qday 90 Tablet 3     No current Epic-ordered facility-administered medications on file.       Allergies:  Patient has no known allergies.    Health Maintenance: Reviewed. Followed by Dr. Malone with gynecology and she will schedule her Pap smear.      Objective:     Vital signs reviewed  Exam:  /60 (BP Location: Left arm, Patient Position: Sitting, BP Cuff Size: Adult)   Pulse 73   Temp 36.4 °C (97.5 °F) (Temporal)   Ht 1.448 m (4' 9\")   Wt 56.2 kg (124 lb)   SpO2 98%   BMI 26.83 kg/m²  Body mass index is 26.83 kg/m².    Gen: Alert and oriented, No apparent distress.  Neck: Neck is supple without lymphadenopathy.  Lungs: Normal effort, CTA bilaterally, no wheezes, rhonchi, or rales  CV: Regular rate and rhythm. No murmurs, rubs, or gallops.  Ext: No clubbing, cyanosis, edema.    Labs:      Latest Reference Range & Units 05/31/23 16:55   Glycohemoglobin 5.8 % 6.2 !   !: Data is abnormal    Assessment & Plan:     57 y.o. female with the following -     1. Type 2 diabetes mellitus without complication, without long-term current use of insulin (HCC)  Chronic stable problem.  A1c is stable.  Continue metformin  mg twice daily.  Check A1c every 6 months.  Continue healthy diet and activity as tolerated.  Continue follow-up with Ortho referral for knee pain and right shoulder pain.  States that she is scheduled to have upcoming right shoulder surgery with Dr. Beltrán.  - POCT  A1C  - metFORMIN ER (GLUCOPHAGE XR) 750 MG TABLET SR 24 HR; Take 1 Tablet by mouth 2 times a day.  Dispense: 180 Tablet; Refill: 3    2. Essential hypertension  Chronic stable problem.  Blood pressure is at goal.  Continue lisinopril 10 mg daily.  - lisinopril (PRINIVIL) 10 MG Tab; Take 1 Tablet by mouth every day.  Dispense: 90 Tablet; Refill: 3    3. " Dyslipidemia  Chronic stable problem.  Continue atorvastatin 20 mg every evening.  Due for updated labs.  She states that she will complete this weekend.  Continue healthy diet and exercise as tolerated.  - Lipid Profile; Future  - atorvastatin (LIPITOR) 20 MG Tab; Take 1 Tablet by mouth every evening.  Dispense: 90 Tablet; Refill: 3        Return in about 6 months (around 11/30/2023) for Diabetes, A1C.    Please note that this dictation was created using voice recognition software. I have made every reasonable attempt to correct obvious errors, but I expect that there are errors of grammar and possibly content that I did not discover before finalizing the note.

## 2023-05-31 NOTE — ASSESSMENT & PLAN NOTE
Due for A1c today, last 6.3%.  She continues with her metformin  mg twice daily.  She does need a medication refill today. She has been walking inside her house due to her pain. Today she had a left knee injection with ortho. She is eating vegetables, fruits, red meat once a week, chicken 2 times a week, tuna fish, salad, blueberries, dragonfruit. She has decreased her bread and tortilla intake. Her portions are smaller.

## 2023-05-31 NOTE — ASSESSMENT & PLAN NOTE
Her blood pressure today is 128/60.  She continues with lisinopril 10 mg daily.  She would like a medication refill.

## 2023-06-03 ENCOUNTER — HOSPITAL ENCOUNTER (OUTPATIENT)
Dept: LAB | Facility: MEDICAL CENTER | Age: 58
End: 2023-06-03
Attending: NURSE PRACTITIONER
Payer: COMMERCIAL

## 2023-06-03 DIAGNOSIS — E78.5 DYSLIPIDEMIA: ICD-10-CM

## 2023-06-03 LAB
CHOLEST SERPL-MCNC: 144 MG/DL (ref 100–199)
FASTING STATUS PATIENT QL REPORTED: NORMAL
HDLC SERPL-MCNC: 35 MG/DL
LDLC SERPL CALC-MCNC: 81 MG/DL
TRIGL SERPL-MCNC: 142 MG/DL (ref 0–149)

## 2023-06-03 PROCEDURE — 36415 COLL VENOUS BLD VENIPUNCTURE: CPT

## 2023-06-03 PROCEDURE — 80061 LIPID PANEL: CPT

## 2023-06-29 PROCEDURE — RXMED WILLOW AMBULATORY MEDICATION CHARGE: Performed by: INTERNAL MEDICINE

## 2023-07-06 ENCOUNTER — PHARMACY VISIT (OUTPATIENT)
Dept: PHARMACY | Facility: MEDICAL CENTER | Age: 58
End: 2023-07-06
Payer: COMMERCIAL

## 2023-08-02 PROCEDURE — RXMED WILLOW AMBULATORY MEDICATION CHARGE: Performed by: INTERNAL MEDICINE

## 2023-08-03 ENCOUNTER — PHARMACY VISIT (OUTPATIENT)
Dept: PHARMACY | Facility: MEDICAL CENTER | Age: 58
End: 2023-08-03
Payer: COMMERCIAL

## 2023-08-24 ENCOUNTER — TELEPHONE (OUTPATIENT)
Dept: PHARMACY | Facility: MEDICAL CENTER | Age: 58
End: 2023-08-24
Payer: COMMERCIAL

## 2023-08-24 PROCEDURE — RXMED WILLOW AMBULATORY MEDICATION CHARGE: Performed by: INTERNAL MEDICINE

## 2023-08-24 NOTE — TELEPHONE ENCOUNTER
Contact:     [id number]     Kary Hubbard    Phone number: [phone]    Name of person spoken with and relationship to patient:  Kary-self   Patient’s Adherence:            How patient is doing on medication:  well    How many missed doses and reason: 0    Any new medications: no    Any new conditions: no    Any new allergies: no    Any new side effects: no     Any new diagnoses: no     How many doses remainin    Did patient want to speak with pharmacist: no  Delivery:            Delivery date and method:       Needs by Date:      Signature required:  no    Any additional details for :  na  Teach Appointment Date:  na  Shipping Address:  [address] [place] [id number]  Medication(name, strength and dose):  Humira Pen 40 MG/0.4ML Pnkt inject every 14 days  Copay: 0   Payment Method: na  Supplies:  na  Additional info: [place] patient. used  [facility] #[id number]. Patient stated Humira has helped her with her inflammation and is doing well. .

## 2023-08-29 ENCOUNTER — PHARMACY VISIT (OUTPATIENT)
Dept: PHARMACY | Facility: MEDICAL CENTER | Age: 58
End: 2023-08-29
Payer: COMMERCIAL

## 2023-10-03 ENCOUNTER — OFFICE VISIT (OUTPATIENT)
Dept: RHEUMATOLOGY | Facility: MEDICAL CENTER | Age: 58
End: 2023-10-03
Attending: INTERNAL MEDICINE
Payer: COMMERCIAL

## 2023-10-03 ENCOUNTER — HOSPITAL ENCOUNTER (OUTPATIENT)
Dept: LAB | Facility: MEDICAL CENTER | Age: 58
End: 2023-10-03
Attending: INTERNAL MEDICINE
Payer: COMMERCIAL

## 2023-10-03 VITALS
TEMPERATURE: 98.4 F | BODY MASS INDEX: 27.05 KG/M2 | SYSTOLIC BLOOD PRESSURE: 130 MMHG | DIASTOLIC BLOOD PRESSURE: 60 MMHG | RESPIRATION RATE: 14 BRPM | OXYGEN SATURATION: 95 % | WEIGHT: 125 LBS | HEART RATE: 90 BPM

## 2023-10-03 DIAGNOSIS — Z79.631 METHOTREXATE, LONG TERM, CURRENT USE: ICD-10-CM

## 2023-10-03 DIAGNOSIS — Z79.620 ADALIMUMAB (HUMIRA) LONG-TERM USE: ICD-10-CM

## 2023-10-03 DIAGNOSIS — M19.90 OSTEOARTHRITIS, UNSPECIFIED OSTEOARTHRITIS TYPE, UNSPECIFIED SITE: ICD-10-CM

## 2023-10-03 DIAGNOSIS — I10 ESSENTIAL HYPERTENSION: ICD-10-CM

## 2023-10-03 DIAGNOSIS — M06.9 RHEUMATOID ARTHRITIS, INVOLVING UNSPECIFIED SITE, UNSPECIFIED WHETHER RHEUMATOID FACTOR PRESENT (HCC): ICD-10-CM

## 2023-10-03 DIAGNOSIS — E11.65 TYPE 2 DIABETES MELLITUS WITH HYPERGLYCEMIA, WITHOUT LONG-TERM CURRENT USE OF INSULIN (HCC): ICD-10-CM

## 2023-10-03 LAB
ALBUMIN SERPL BCP-MCNC: 3.8 G/DL (ref 3.2–4.9)
ALBUMIN/GLOB SERPL: 1.2 G/DL
ALP SERPL-CCNC: 156 U/L (ref 30–99)
ALT SERPL-CCNC: 9 U/L (ref 2–50)
ANION GAP SERPL CALC-SCNC: 8 MMOL/L (ref 7–16)
AST SERPL-CCNC: 14 U/L (ref 12–45)
BASOPHILS # BLD AUTO: 0.6 % (ref 0–1.8)
BASOPHILS # BLD: 0.05 K/UL (ref 0–0.12)
BILIRUB SERPL-MCNC: 0.4 MG/DL (ref 0.1–1.5)
BUN SERPL-MCNC: 13 MG/DL (ref 8–22)
CALCIUM ALBUM COR SERPL-MCNC: 9.5 MG/DL (ref 8.5–10.5)
CALCIUM SERPL-MCNC: 9.3 MG/DL (ref 8.5–10.5)
CHLORIDE SERPL-SCNC: 106 MMOL/L (ref 96–112)
CO2 SERPL-SCNC: 27 MMOL/L (ref 20–33)
CREAT SERPL-MCNC: 0.52 MG/DL (ref 0.5–1.4)
EOSINOPHIL # BLD AUTO: 0.22 K/UL (ref 0–0.51)
EOSINOPHIL NFR BLD: 2.7 % (ref 0–6.9)
ERYTHROCYTE [DISTWIDTH] IN BLOOD BY AUTOMATED COUNT: 47.7 FL (ref 35.9–50)
ERYTHROCYTE [SEDIMENTATION RATE] IN BLOOD BY WESTERGREN METHOD: 34 MM/HOUR (ref 0–25)
GFR SERPLBLD CREATININE-BSD FMLA CKD-EPI: 107 ML/MIN/1.73 M 2
GLOBULIN SER CALC-MCNC: 3.3 G/DL (ref 1.9–3.5)
GLUCOSE SERPL-MCNC: 77 MG/DL (ref 65–99)
HCT VFR BLD AUTO: 39.6 % (ref 37–47)
HGB BLD-MCNC: 12.9 G/DL (ref 12–16)
IMM GRANULOCYTES # BLD AUTO: 0.09 K/UL (ref 0–0.11)
IMM GRANULOCYTES NFR BLD AUTO: 1.1 % (ref 0–0.9)
LYMPHOCYTES # BLD AUTO: 1.1 K/UL (ref 1–4.8)
LYMPHOCYTES NFR BLD: 13.6 % (ref 22–41)
MCH RBC QN AUTO: 29.4 PG (ref 27–33)
MCHC RBC AUTO-ENTMCNC: 32.6 G/DL (ref 32.2–35.5)
MCV RBC AUTO: 90.2 FL (ref 81.4–97.8)
MONOCYTES # BLD AUTO: 0.88 K/UL (ref 0–0.85)
MONOCYTES NFR BLD AUTO: 10.9 % (ref 0–13.4)
NEUTROPHILS # BLD AUTO: 5.76 K/UL (ref 1.82–7.42)
NEUTROPHILS NFR BLD: 71.1 % (ref 44–72)
NRBC # BLD AUTO: 0 K/UL
NRBC BLD-RTO: 0 /100 WBC (ref 0–0.2)
PLATELET # BLD AUTO: 343 K/UL (ref 164–446)
PMV BLD AUTO: 9.9 FL (ref 9–12.9)
POTASSIUM SERPL-SCNC: 4.3 MMOL/L (ref 3.6–5.5)
PROT SERPL-MCNC: 7.1 G/DL (ref 6–8.2)
RBC # BLD AUTO: 4.39 M/UL (ref 4.2–5.4)
SODIUM SERPL-SCNC: 141 MMOL/L (ref 135–145)
WBC # BLD AUTO: 8.1 K/UL (ref 4.8–10.8)

## 2023-10-03 PROCEDURE — 99214 OFFICE O/P EST MOD 30 MIN: CPT | Performed by: INTERNAL MEDICINE

## 2023-10-03 PROCEDURE — 80053 COMPREHEN METABOLIC PANEL: CPT

## 2023-10-03 PROCEDURE — 36415 COLL VENOUS BLD VENIPUNCTURE: CPT

## 2023-10-03 PROCEDURE — 99212 OFFICE O/P EST SF 10 MIN: CPT | Performed by: INTERNAL MEDICINE

## 2023-10-03 PROCEDURE — 3075F SYST BP GE 130 - 139MM HG: CPT | Performed by: INTERNAL MEDICINE

## 2023-10-03 PROCEDURE — 3078F DIAST BP <80 MM HG: CPT | Performed by: INTERNAL MEDICINE

## 2023-10-03 PROCEDURE — 85025 COMPLETE CBC W/AUTO DIFF WBC: CPT

## 2023-10-03 PROCEDURE — 85652 RBC SED RATE AUTOMATED: CPT

## 2023-10-03 RX ORDER — FOLIC ACID 1 MG/1
TABLET ORAL
Qty: 90 TABLET | Refills: 3 | Status: SHIPPED | OUTPATIENT
Start: 2023-10-03

## 2023-10-03 RX ORDER — SULINDAC 200 MG/1
200 TABLET ORAL 2 TIMES DAILY
Qty: 180 TABLET | Refills: 0 | Status: SHIPPED | OUTPATIENT
Start: 2023-10-03

## 2023-10-03 ASSESSMENT — FIBROSIS 4 INDEX: FIB4 SCORE: 0.65

## 2023-10-03 NOTE — PROGRESS NOTES
Chief Complaint- joint pain     Subjective:   Kary Shah is a 58 y.o. female here today for follow up of rheumatological issues    All information done through      This is a follow-up visit for this patient who we see in this clinic for serologically positive rheumatoid arthritis.  Patient was diagnosed with rheumatoid arthritis but October 2021 with symptoms of finger pain and swelling.  Patient is currently on Humira 40 mg subcu every 2 weeks and on methotrexate 15 mg p.o. weekly with folic acid 1 mg a day along with sulindac 200 mg p.o. twice daily.  Patient continues to feel that this particular regiment works well for her but does forget to do her every 3 month labs for her methotrexate.    Right TSA     S/p plaquenil-patient stopped when started Humira     PMHX  Diabetes  HTN     Fam Hx  F-passed CAD/MI  M-passed CAD/MI, RA  Sx3 one passed from CVA/blood clot in brain  Bx3 one passed secondary ETOH and CAD/MI     Soc Hx  No tob  ETOH once/year  No blood tx  No tattoos     HBsAg/HBcAb neg 10/2022  HCV neg 10/2022  Quantiferon Gold neg 10/2022     G6PD 13.9 adequate 3/2022  Hemoglobin A1c 7.7 10/2021  RF 83 3/2020  CCP 93 3/2020  Hand x-rays 3/2020-IMPRESSION:  1.  Asymmetric degenerative change of the IP joints diffusely which may represent asymmetric osteoarthritic change. Differential diagnosis includes psoriatic arthritis.  2.  No erosive arthritic change.  Right Hand x-rays 5/2021-IMPRESSION:   No evidence of acute fracture or dislocation.   Osteoarthritis as above described particularly involving the distal interphalangeal joints of the third and fifth digits.  Mild soft tissue swelling about the distal third digit.  Demineralization which limits evaluation.     Left Foot x-ray 5/2021-IMPRESSION:  Degenerative changes as above described, most prominent at the first MTP joint.  Soft tissue swelling along the medial foot.  Calcaneal spurring.-     Current Outpatient Medications    Medication Sig Dispense Refill    sulindac (CLINORIL) 200 MG Tab Take 1 Tablet by mouth 2 times a day. 180 Tablet 0    folic acid (FOLVITE) 1 MG Tab 1 tab po qday 90 Tablet 3    methotrexate 2.5 MG tablet TAKE 6 TABLETS BY MOUTH ONCE A WEEK **  EVERY  SUNDAY  ** 72 Tablet 0    metFORMIN ER (GLUCOPHAGE XR) 750 MG TABLET SR 24 HR Take 1 Tablet by mouth 2 times a day. 180 Tablet 3    lisinopril (PRINIVIL) 10 MG Tab Take 1 Tablet by mouth every day. 90 Tablet 3    atorvastatin (LIPITOR) 20 MG Tab Take 1 Tablet by mouth every evening. 90 Tablet 3    Adalimumab (HUMIRA PEN) 40 MG/0.4ML Pen-injector Kit Inject 40 mg under the skin every 14 days. 6 Each 1     No current facility-administered medications for this visit.     She  has a past medical history of Closed nondisplaced fracture of greater tuberosity of right humerus (1/14/2022), Diabetes (HCC), GERD (gastroesophageal reflux disease), Hypertension, Infection of right knee (HCC) (6/6/2022), and Osteoarthritis of both hands (2015).    ROS   Other than what is mentioned in HPI or physical exam, there is no history of headaches, double vision or blurred vision.  No trouble swallowing difficulties .  No chest complaints including chest pain, cough or sputum production. No GI complaints including nausea, vomiting, change in bowel habits, or past peptic ulcer disease. No history of blood in the stools. No urinary complaints including dysuria or frequency. No history of alopecia, photosensitivity     Objective:     /60   Pulse 90   Temp 36.9 °C (98.4 °F) (Temporal)   Resp 14   Wt 56.7 kg (125 lb)   SpO2 95%  Body mass index is 27.05 kg/m².   Physical Exam:    Constitutional: Alert and oriented X3, patient is talkative with good eye contact.Skin: Warm, dry, good turgor, no rashes in visible areas.Eye: Equal, round and reactive, conjunctiva clear, lids normal EOM intactENMT: Lips without lesions,  pinna without deformityNeck: Trachea midline, no masses, no  thyromegaly.Lymph:  No cervical lymphadenopathy, no axillary lymphadenopathy, no supraclavicular lymphadenopathyRespiratory: Unlabored respiratory effort, lungs clear to auscultation, no wheezes, no ronchi.Cardiovascular: Normal S1, S2, Regular rate and rhythm, no murmurs rubs or gallops  .Abdomen: Soft, non-distended, overweight.Psych: Alert and oriented x3, normal affect and mood.Neuro: Cranial nerves 2-12 are grossly intact Ext:no joint laxity noted in bilateral arms, no joint laxity noted in bilateral legs full range of motion of both shoulders, better range of motion with the right shoulder now status post right TSA, left shoulder with minimal range of motion abduction to about 20 degrees, no flexion contractures in the elbows no nodules no tophi, hands without any swan-neck or boutonniere deformities, toes without crossover toes without splay toes, crepitus in knees but no synovitis, gait without antalgia and without foot drop    Lab Results   Component Value Date/Time    HEPBCORIGM Non-Reactive 10/24/2022 12:40 PM    HEPBSAG Non-Reactive 10/24/2022 12:40 PM     Lab Results   Component Value Date/Time    HEPCAB Non-Reactive 10/24/2022 12:40 PM     Lab Results   Component Value Date/Time    SODIUM 143 04/08/2023 11:30 AM    POTASSIUM 3.9 04/08/2023 11:30 AM    CHLORIDE 106 04/08/2023 11:30 AM    CO2 23 04/08/2023 11:30 AM    GLUCOSE 84 04/08/2023 11:30 AM    BUN 15 04/08/2023 11:30 AM    CREATININE 0.45 (L) 04/08/2023 11:30 AM      Lab Results   Component Value Date/Time    WBC 7.1 04/08/2023 11:30 AM    RBC 4.24 04/08/2023 11:30 AM    HEMOGLOBIN 11.6 (L) 04/08/2023 11:30 AM    HEMATOCRIT 36.4 (L) 04/08/2023 11:30 AM    MCV 85.8 04/08/2023 11:30 AM    MCH 27.4 04/08/2023 11:30 AM    MCHC 31.9 (L) 04/08/2023 11:30 AM    MPV 10.1 04/08/2023 11:30 AM    NEUTSPOLYS 69.30 04/08/2023 11:30 AM    LYMPHOCYTES 15.70 (L) 04/08/2023 11:30 AM    MONOCYTES 11.50 04/08/2023 11:30 AM    EOSINOPHILS 1.80 04/08/2023 11:30 AM     BASOPHILS 0.70 04/08/2023 11:30 AM      Lab Results   Component Value Date/Time    CALCIUM 9.1 04/08/2023 11:30 AM    ASTSGOT 13 04/08/2023 11:30 AM    ALTSGPT 10 04/08/2023 11:30 AM    ALKPHOSPHAT 171 (H) 04/08/2023 11:30 AM    TBILIRUBIN 0.4 04/08/2023 11:30 AM    ALBUMIN 3.8 04/08/2023 11:30 AM    TOTPROTEIN 7.1 04/08/2023 11:30 AM     Lab Results   Component Value Date/Time    RHEUMFACTN 83 (H) 03/26/2020 02:20 PM    CCPANTIBODY 93 (H) 03/26/2020 02:20 PM     Lab Results   Component Value Date/Time    SEDRATEWES 66 (H) 04/08/2023 11:30 AM     Lab Results   Component Value Date/Time    HBA1C 6.2 (A) 05/31/2023 04:55 PM     Lab Results   Component Value Date/Time    G6PD 13.9 03/12/2022 09:05 AM     Assessment and Plan:     1. Rheumatoid arthritis, involving unspecified site, unspecified whether rheumatoid factor present (HCC)  Clinically stable on Humira 40 mg subcu every 2 weeks in combination with methotrexate 15 mg p.o. weekly and folic acid 1 mg a day, patient also takes sulindac as needed  - sulindac (CLINORIL) 200 MG Tab; Take 1 Tablet by mouth 2 times a day.  Dispense: 180 Tablet; Refill: 0  - folic acid (FOLVITE) 1 MG Tab; 1 tab po qday  Dispense: 90 Tablet; Refill: 3  - CBC WITH DIFFERENTIAL; Standing  - Comp Metabolic Panel; Standing  - Sed Rate; Standing    2. Adalimumab (Humira) long-term use  Currently on Humira 40 mg subcu every 2 weeks, screening labs are up-to-date, neck screening labs due October 2024, patient needs monitoring labs every 6 months, next labs due now, labs ordered for patient  We reviewed risks of biological medications with patient including hematological pathology, cancer risks, neurological and infection issues especially in the Covid-19 pandemic environment, patient states understanding.  - sulindac (CLINORIL) 200 MG Tab; Take 1 Tablet by mouth 2 times a day.  Dispense: 180 Tablet; Refill: 0  - folic acid (FOLVITE) 1 MG Tab; 1 tab po qday  Dispense: 90 Tablet; Refill: 3  - CBC  WITH DIFFERENTIAL; Standing  - Comp Metabolic Panel; Standing  - Sed Rate; Standing    3. Methotrexate, long term, current use  Currently on methotrexate 15 mg p.o. weekly and folic acid 1 mg a day, patient needs monitoring labs every 3 months, patient overdue for labs, new standing orders written for labs and given to patient to do today, long discussion with patient involving the necessity to do labs every 3 months, new standing orders written for patient, patient states understanding  - sulindac (CLINORIL) 200 MG Tab; Take 1 Tablet by mouth 2 times a day.  Dispense: 180 Tablet; Refill: 0  - folic acid (FOLVITE) 1 MG Tab; 1 tab po qday  Dispense: 90 Tablet; Refill: 3  - CBC WITH DIFFERENTIAL; Standing  - Comp Metabolic Panel; Standing  - Sed Rate; Standing    4. Osteoarthritis, unspecified osteoarthritis type, unspecified site  Patient uses sulindac as needed, patient needs monitoring labs every 6 months, patient has standing orders available to her  - sulindac (CLINORIL) 200 MG Tab; Take 1 Tablet by mouth 2 times a day.  Dispense: 180 Tablet; Refill: 0  - folic acid (FOLVITE) 1 MG Tab; 1 tab po qday  Dispense: 90 Tablet; Refill: 3  - CBC WITH DIFFERENTIAL; Standing  - Comp Metabolic Panel; Standing  - Sed Rate; Standing    5. Essential hypertension  May impact the type of medications we can use for this patient's arthritis. We will have to keep this under advisement.  - sulindac (CLINORIL) 200 MG Tab; Take 1 Tablet by mouth 2 times a day.  Dispense: 180 Tablet; Refill: 0  - folic acid (FOLVITE) 1 MG Tab; 1 tab po qday  Dispense: 90 Tablet; Refill: 3  - CBC WITH DIFFERENTIAL; Standing  - Comp Metabolic Panel; Standing  - Sed Rate; Standing    6. Type 2 diabetes mellitus with hyperglycemia, without long-term current use of insulin (HCC)  Followed by patients PCP, high blood sugar can exacerbate inflammatory state of patient's arthritis, discussed with patient the need to monitor and control blood sugars, patient  states understanding    Followup: Return in about 4 months (around 2/3/2024). or sooner juliet Ferrells  was seen 30 minutes face-to-face of which more than 50% of the time was spent counseling the patient (excluding time for procedures)  regarding  rheumatological condition and care. Therapy was discussed in detail.      Please note that this dictation was created using voice recognition software. I have made every reasonable attempt to correct obvious errors, but I expect that there are errors of grammar and possibly content that I did not discover before finalizing the note.

## 2023-10-04 PROCEDURE — RXMED WILLOW AMBULATORY MEDICATION CHARGE: Performed by: INTERNAL MEDICINE

## 2023-10-05 ENCOUNTER — PHARMACY VISIT (OUTPATIENT)
Dept: PHARMACY | Facility: MEDICAL CENTER | Age: 58
End: 2023-10-05
Payer: COMMERCIAL

## 2023-10-30 DIAGNOSIS — M19.90 OSTEOARTHRITIS, UNSPECIFIED OSTEOARTHRITIS TYPE, UNSPECIFIED SITE: ICD-10-CM

## 2023-10-30 DIAGNOSIS — Z79.899 LONG-TERM USE OF PLAQUENIL: ICD-10-CM

## 2023-10-30 DIAGNOSIS — Z79.631 METHOTREXATE, LONG TERM, CURRENT USE: ICD-10-CM

## 2023-10-30 DIAGNOSIS — Z79.620 ADALIMUMAB (HUMIRA) LONG-TERM USE: ICD-10-CM

## 2023-10-30 DIAGNOSIS — M06.9 RHEUMATOID ARTHRITIS, INVOLVING UNSPECIFIED SITE, UNSPECIFIED WHETHER RHEUMATOID FACTOR PRESENT (HCC): ICD-10-CM

## 2023-10-30 RX ORDER — ADALIMUMAB 40MG/0.4ML
40 KIT SUBCUTANEOUS
Qty: 6 EACH | Refills: 1 | Status: SHIPPED | OUTPATIENT
Start: 2023-10-30 | End: 2024-01-08 | Stop reason: SDUPTHER

## 2023-10-30 NOTE — TELEPHONE ENCOUNTER
Received request via: Pharmacy Oct labs    Was the patient seen in the last year in this department? Yes    Does the patient have an active prescription (recently filled or refills available) for medication(s) requested? No    Does the patient have FPC Plus and need 100 day supply (blood pressure, diabetes and cholesterol meds only)? Medication is not for cholesterol, blood pressure or diabetes

## 2023-10-31 PROCEDURE — RXMED WILLOW AMBULATORY MEDICATION CHARGE: Performed by: INTERNAL MEDICINE

## 2023-11-02 ENCOUNTER — PHARMACY VISIT (OUTPATIENT)
Dept: PHARMACY | Facility: MEDICAL CENTER | Age: 58
End: 2023-11-02
Payer: COMMERCIAL

## 2023-12-08 PROCEDURE — RXMED WILLOW AMBULATORY MEDICATION CHARGE: Performed by: INTERNAL MEDICINE

## 2023-12-11 ENCOUNTER — PHARMACY VISIT (OUTPATIENT)
Dept: PHARMACY | Facility: MEDICAL CENTER | Age: 58
End: 2023-12-11
Payer: COMMERCIAL

## 2024-01-08 DIAGNOSIS — Z79.899 LONG-TERM USE OF PLAQUENIL: ICD-10-CM

## 2024-01-08 DIAGNOSIS — Z79.620 ADALIMUMAB (HUMIRA) LONG-TERM USE: ICD-10-CM

## 2024-01-08 DIAGNOSIS — M19.90 OSTEOARTHRITIS, UNSPECIFIED OSTEOARTHRITIS TYPE, UNSPECIFIED SITE: ICD-10-CM

## 2024-01-08 DIAGNOSIS — Z79.631 METHOTREXATE, LONG TERM, CURRENT USE: ICD-10-CM

## 2024-01-08 DIAGNOSIS — M06.9 RHEUMATOID ARTHRITIS, INVOLVING UNSPECIFIED SITE, UNSPECIFIED WHETHER RHEUMATOID FACTOR PRESENT (HCC): ICD-10-CM

## 2024-01-08 RX ORDER — ADALIMUMAB 40MG/0.4ML
40 KIT SUBCUTANEOUS
Qty: 6 EACH | Refills: 1 | Status: SHIPPED | OUTPATIENT
Start: 2024-01-08

## 2024-01-11 ENCOUNTER — TELEPHONE (OUTPATIENT)
Dept: RHEUMATOLOGY | Facility: MEDICAL CENTER | Age: 59
End: 2024-01-11

## 2024-01-11 NOTE — TELEPHONE ENCOUNTER
Please notify patient that we have temporarily denied her methotrexate as patient needs to do labs, patient has standing orders available to her from October 3, 2023

## 2024-01-23 ENCOUNTER — HOSPITAL ENCOUNTER (OUTPATIENT)
Facility: MEDICAL CENTER | Age: 59
End: 2024-01-23
Attending: NURSE PRACTITIONER
Payer: COMMERCIAL

## 2024-01-23 ENCOUNTER — HOSPITAL ENCOUNTER (OUTPATIENT)
Dept: RADIOLOGY | Facility: MEDICAL CENTER | Age: 59
End: 2024-01-23
Attending: NURSE PRACTITIONER
Payer: COMMERCIAL

## 2024-01-23 ENCOUNTER — OFFICE VISIT (OUTPATIENT)
Dept: MEDICAL GROUP | Facility: PHYSICIAN GROUP | Age: 59
End: 2024-01-23
Payer: COMMERCIAL

## 2024-01-23 VITALS
SYSTOLIC BLOOD PRESSURE: 132 MMHG | HEIGHT: 57 IN | HEART RATE: 78 BPM | WEIGHT: 126 LBS | DIASTOLIC BLOOD PRESSURE: 68 MMHG | TEMPERATURE: 97.6 F | OXYGEN SATURATION: 96 % | BODY MASS INDEX: 27.18 KG/M2

## 2024-01-23 DIAGNOSIS — E78.5 DYSLIPIDEMIA: ICD-10-CM

## 2024-01-23 DIAGNOSIS — Z11.4 SCREENING FOR HIV (HUMAN IMMUNODEFICIENCY VIRUS): ICD-10-CM

## 2024-01-23 DIAGNOSIS — G89.29 CHRONIC LEFT SHOULDER PAIN: ICD-10-CM

## 2024-01-23 DIAGNOSIS — E11.9 TYPE 2 DIABETES MELLITUS WITHOUT COMPLICATION, WITHOUT LONG-TERM CURRENT USE OF INSULIN (HCC): ICD-10-CM

## 2024-01-23 DIAGNOSIS — M25.512 CHRONIC LEFT SHOULDER PAIN: ICD-10-CM

## 2024-01-23 DIAGNOSIS — I10 ESSENTIAL HYPERTENSION: ICD-10-CM

## 2024-01-23 DIAGNOSIS — Z23 NEED FOR VACCINATION: ICD-10-CM

## 2024-01-23 PROBLEM — G89.18 POSTOPERATIVE PAIN OF KNEE: Status: RESOLVED | Noted: 2022-06-10 | Resolved: 2024-01-23

## 2024-01-23 PROBLEM — M25.569 POSTOPERATIVE PAIN OF KNEE: Status: RESOLVED | Noted: 2022-06-10 | Resolved: 2024-01-23

## 2024-01-23 PROBLEM — S46.011A TRAUMATIC COMPLETE TEAR OF RIGHT ROTATOR CUFF: Status: RESOLVED | Noted: 2022-07-27 | Resolved: 2024-01-23

## 2024-01-23 LAB
HBA1C MFR BLD: 6.4 % (ref ?–5.8)
POCT INT CON NEG: NEGATIVE
POCT INT CON POS: POSITIVE

## 2024-01-23 PROCEDURE — 90686 IIV4 VACC NO PRSV 0.5 ML IM: CPT | Performed by: NURSE PRACTITIONER

## 2024-01-23 PROCEDURE — 99214 OFFICE O/P EST MOD 30 MIN: CPT | Mod: 25 | Performed by: NURSE PRACTITIONER

## 2024-01-23 PROCEDURE — 83036 HEMOGLOBIN GLYCOSYLATED A1C: CPT | Performed by: NURSE PRACTITIONER

## 2024-01-23 PROCEDURE — 90471 IMMUNIZATION ADMIN: CPT | Performed by: NURSE PRACTITIONER

## 2024-01-23 PROCEDURE — 3075F SYST BP GE 130 - 139MM HG: CPT | Performed by: NURSE PRACTITIONER

## 2024-01-23 PROCEDURE — 82570 ASSAY OF URINE CREATININE: CPT

## 2024-01-23 PROCEDURE — 92250 FUNDUS PHOTOGRAPHY W/I&R: CPT | Mod: TC | Performed by: NURSE PRACTITIONER

## 2024-01-23 PROCEDURE — 82043 UR ALBUMIN QUANTITATIVE: CPT

## 2024-01-23 PROCEDURE — 73030 X-RAY EXAM OF SHOULDER: CPT | Mod: LT

## 2024-01-23 PROCEDURE — 3078F DIAST BP <80 MM HG: CPT | Performed by: NURSE PRACTITIONER

## 2024-01-23 RX ORDER — ACETAMINOPHEN 500 MG
500-1000 TABLET ORAL EVERY 6 HOURS PRN
COMMUNITY

## 2024-01-23 RX ORDER — METFORMIN HYDROCHLORIDE 750 MG/1
750 TABLET, EXTENDED RELEASE ORAL 2 TIMES DAILY
Qty: 180 TABLET | Refills: 3 | Status: SHIPPED | OUTPATIENT
Start: 2024-01-23

## 2024-01-23 RX ORDER — IBUPROFEN 200 MG
200 TABLET ORAL EVERY 6 HOURS PRN
COMMUNITY
End: 2024-01-23

## 2024-01-23 RX ORDER — ATORVASTATIN CALCIUM 20 MG/1
20 TABLET, FILM COATED ORAL EVERY EVENING
Qty: 90 TABLET | Refills: 3 | Status: SHIPPED | OUTPATIENT
Start: 2024-01-23

## 2024-01-23 RX ORDER — LISINOPRIL 10 MG/1
10 TABLET ORAL DAILY
Qty: 90 TABLET | Refills: 3 | Status: SHIPPED | OUTPATIENT
Start: 2024-01-23

## 2024-01-23 ASSESSMENT — FIBROSIS 4 INDEX: FIB4 SCORE: 0.8

## 2024-01-23 ASSESSMENT — PATIENT HEALTH QUESTIONNAIRE - PHQ9: CLINICAL INTERPRETATION OF PHQ2 SCORE: 0

## 2024-01-24 LAB
CREAT UR-MCNC: 48.19 MG/DL
MICROALBUMIN UR-MCNC: <1.2 MG/DL
MICROALBUMIN/CREAT UR: NORMAL MG/G (ref 0–30)

## 2024-01-24 NOTE — ASSESSMENT & PLAN NOTE
Continues atorvastatin 20 mg every evening.  No myalgias.  Due for updated labs.  She does need a medication refill today.

## 2024-01-24 NOTE — PROGRESS NOTES
Subjective:     CC: Diabetes, medication refill     HPI:   Kary presents today with the following.  services were used in the patient's primary language of Jordanian.     Name or Number: Dustin, ID number [id number]  Mode of interpretation: iPad    Content of Interpretation:  Diabetes health maintenance, management of atorvastatin and lisinopril, left shoulder pain      Type 2 diabetes mellitus without complication, without long-term current use of insulin (HCC)  Last A1C 6.2% and A1C today is 6.4%. Due for health maintenance. Continues Metformin  mg twice daily. On atorvastatin and lisinopril. She is walking in her house for 30 minutes. She is watching her sugar intake and carbohydrate intake. She drink occasional soda.  She does need a refill on her metformin today.    Essential hypertension  Blood pressure today is 132/68. Continues lisinopril 10 mg daily. Denies chest pain or shortness of breath.  She does need a medication refill today.    Dyslipidemia  Continues atorvastatin 20 mg every evening.  No myalgias.  Due for updated labs.  She does need a medication refill today.    Chronic left shoulder pain  2 years ago had an accident at her job. She had surgery to right shoulder. Now starting to have pain in the left shoulder. The pain started 6-7 months ago. The pain is intermittent. Aggravating factors include raising arm and right arm has to help raise left arm. Alleviating factors include Tylenol. Interventions tried include Tylenol. Denies falls, trauma, bruising, redness, or swelling. Requesting imaging and referral to Dr. Beltrán at [facility]. The right shoulder was through Workers Comp. She states that her employer told her the left shoulder pain is not Workers Comp.         Past Medical History:   Diagnosis Date    Closed nondisplaced fracture of greater tuberosity of right humerus 1/14/2022    Diabetes (HCC)     GERD (gastroesophageal reflux disease)     Hypertension     Infection  "of right knee (HCC) 6/6/2022    Osteoarthritis of both hands 2015       Social History     Tobacco Use    Smoking status: Never    Smokeless tobacco: Never   Vaping Use    Vaping Use: Never used   Substance Use Topics    Alcohol use: No    Drug use: No       Current Outpatient Medications Ordered in Epic   Medication Sig Dispense Refill    acetaminophen (TYLENOL) 500 MG Tab Take 500-1,000 mg by mouth every 6 hours as needed.      metFORMIN ER (GLUCOPHAGE XR) 750 MG TABLET SR 24 HR Take 1 Tablet by mouth 2 times a day. 180 Tablet 3    atorvastatin (LIPITOR) 20 MG Tab Take 1 Tablet by mouth every evening. 90 Tablet 3    lisinopril (PRINIVIL) 10 MG Tab Take 1 Tablet by mouth every day. 90 Tablet 3    sulindac (CLINORIL) 200 MG Tab Take 1 Tablet by mouth 2 times a day. 180 Tablet 0    folic acid (FOLVITE) 1 MG Tab 1 tab po qday 90 Tablet 3    Adalimumab (HUMIRA PEN) 40 MG/0.4ML Pen-injector Kit Inject 40 mg under the skin every 14 days. (Patient not taking: Reported on 1/23/2024) 6 Each 1    methotrexate 2.5 MG tablet TAKE 6 TABLETS BY MOUTH ONCE A WEEK **  EVERY  SUNDAY  ** (Patient not taking: Reported on 1/23/2024) 72 Tablet 0     No current Epic-ordered facility-administered medications on file.       Allergies:  Patient has no known allergies.    Health Maintenance: Orders ordered today      Objective:     Vital signs reviewed  Exam:  /68 (BP Location: Right arm, Patient Position: Sitting, BP Cuff Size: Adult)   Pulse 78   Temp 36.4 °C (97.6 °F) (Temporal)   Ht 1.448 m (4' 9\")   Wt 57.2 kg (126 lb)   SpO2 96%   BMI 27.27 kg/m²  Body mass index is 27.27 kg/m².    Gen: Alert and oriented, No apparent distress.  Lungs: Normal effort, CTA bilaterally, no wheezes, rhonchi, or rales  CV: Regular rate and rhythm. No murmurs, rubs, or gallops.  Ext: No clubbing, cyanosis, edema.  Left shoulder: Pain to AC  and glenohumeral joint. Decreased flexion, abduction. Positive empty can test. No bruising, swelling, " warmth or redness present.   Right shoulder: ROM intact. Negative empty can test.       Monofilament testing with a 10 gram force: sensation intact: intact bilaterally  Visual Inspection: Feet without maceration, ulcers, fissures.  Pedal pulses: intact bilaterally      Labs:      Latest Reference Range & Units 01/23/24 17:47   Glycohemoglobin 5.8 % 6.4 !   !: Data is abnormal    Assessment & Plan:     58 y.o. female with the following -     1. Type 2 diabetes mellitus without complication, without long-term current use of insulin (HCC)  Chronic stable problem.  Continue metformin  mg twice daily.  Continue to work on watching sugar and carbohydrate intake.  Exercise as tolerated.  Diabetes evidence completed today.  Repeat A1c in 6 months.  - Lipid Profile; Future  - POCT A1C  - POCT Retinal Eye Exam  - Microalbumin Creat Ratio Urine - Clinic Collect; Future  - Diabetic Monofilament Lower Extremity Exam  - metFORMIN ER (GLUCOPHAGE XR) 750 MG TABLET SR 24 HR; Take 1 Tablet by mouth 2 times a day.  Dispense: 180 Tablet; Refill: 3    2. Dyslipidemia  Chronic stable problem.  Continue atorvastatin 20 mg every evening.  Due for updated lipid panel.  She may complete with upcoming rheumatology labs.    - Lipid Profile; Future  - atorvastatin (LIPITOR) 20 MG Tab; Take 1 Tablet by mouth every evening.  Dispense: 90 Tablet; Refill: 3    3. Chronic left shoulder pain  Chronic exacerbated problem.  Abnormal exam.  Checking imaging.  Referral to orthopedics.  Discussed to continue with sulindac as needed, stop ibuprofen.   - DX-SHOULDER 2+ LEFT; Future  - Referral to Orthopedics    4. Essential hypertension  Chronic stable problem.  Continue lisinopril 10 mg daily.  - lisinopril (PRINIVIL) 10 MG Tab; Take 1 Tablet by mouth every day.  Dispense: 90 Tablet; Refill: 3    5. Screening for HIV (human immunodeficiency virus)  Acute uncomplicated problem.  Discussed screening and she is in agreement.  - HIV AG/AB COMBO ASSAY  SCREENING; Future    6. Need for vaccination  Acute uncomplicated problem.  She would like her influenza vaccine today. I have placed the below orders and discussed them with an approved delegating provider. The MA is performing the below orders under the direction of Dr. Buckley.  - INFLUENZA VACCINE QUAD INJ (PF)        Return in about 6 months (around 7/23/2024) for Diabetes, A1C.    Please note that this dictation was created using voice recognition software. I have made every reasonable attempt to correct obvious errors, but I expect that there are errors of grammar and possibly content that I did not discover before finalizing the note.

## 2024-01-24 NOTE — ASSESSMENT & PLAN NOTE
Last A1C 6.2% and A1C today is 6.4%. Due for health maintenance. Continues Metformin  mg twice daily. On atorvastatin and lisinopril. She is walking in her house for 30 minutes. She is watching her sugar intake and carbohydrate intake. She drink occasional soda.  She does need a refill on her metformin today.

## 2024-01-24 NOTE — ASSESSMENT & PLAN NOTE
Blood pressure today is 132/68. Continues lisinopril 10 mg daily. Denies chest pain or shortness of breath.  She does need a medication refill today.

## 2024-01-24 NOTE — ASSESSMENT & PLAN NOTE
2 years ago had an accident at her job. She had surgery to right shoulder. Now starting to have pain in the left shoulder. The pain started 6-7 months ago. The pain is intermittent. Aggravating factors include raising arm and right arm has to help raise left arm. Alleviating factors include Tylenol. Interventions tried include Tylenol. Denies falls, trauma, bruising, redness, or swelling. Requesting imaging and referral to Dr. Beltrán at [facility]. The right shoulder was through Workers Comp. She states that her employer told her the left shoulder pain is not Workers Comp.

## 2024-01-26 LAB — RETINAL SCREEN: NEGATIVE

## 2024-06-11 ENCOUNTER — APPOINTMENT (OUTPATIENT)
Dept: RADIOLOGY | Facility: OTHER | Age: 59
End: 2024-06-11
Attending: FAMILY MEDICINE
Payer: COMMERCIAL

## 2024-06-11 ENCOUNTER — OFFICE VISIT (OUTPATIENT)
Dept: SPORTS MEDICINE | Facility: OTHER | Age: 59
End: 2024-06-11
Payer: COMMERCIAL

## 2024-06-11 VITALS
HEART RATE: 78 BPM | DIASTOLIC BLOOD PRESSURE: 76 MMHG | OXYGEN SATURATION: 97 % | RESPIRATION RATE: 16 BRPM | BODY MASS INDEX: 27.18 KG/M2 | HEIGHT: 57 IN | TEMPERATURE: 98.3 F | WEIGHT: 126 LBS | SYSTOLIC BLOOD PRESSURE: 120 MMHG

## 2024-06-11 DIAGNOSIS — G89.29 CHRONIC HIP PAIN, RIGHT: ICD-10-CM

## 2024-06-11 DIAGNOSIS — M16.11 ARTHRITIS OF RIGHT HIP: ICD-10-CM

## 2024-06-11 DIAGNOSIS — M17.12 LOCALIZED OSTEOARTHRITIS OF LEFT KNEE: ICD-10-CM

## 2024-06-11 DIAGNOSIS — M25.551 CHRONIC HIP PAIN, RIGHT: ICD-10-CM

## 2024-06-11 PROCEDURE — 3074F SYST BP LT 130 MM HG: CPT | Performed by: FAMILY MEDICINE

## 2024-06-11 PROCEDURE — 99214 OFFICE O/P EST MOD 30 MIN: CPT | Mod: 25 | Performed by: FAMILY MEDICINE

## 2024-06-11 PROCEDURE — 73502 X-RAY EXAM HIP UNI 2-3 VIEWS: CPT | Mod: TC,FY,RT | Performed by: RADIOLOGY

## 2024-06-11 PROCEDURE — 20610 DRAIN/INJ JOINT/BURSA W/O US: CPT | Mod: LT | Performed by: FAMILY MEDICINE

## 2024-06-11 PROCEDURE — 3078F DIAST BP <80 MM HG: CPT | Performed by: FAMILY MEDICINE

## 2024-06-11 RX ORDER — TRIAMCINOLONE ACETONIDE 40 MG/ML
40 INJECTION, SUSPENSION INTRA-ARTICULAR; INTRAMUSCULAR ONCE
Status: COMPLETED | OUTPATIENT
Start: 2024-06-11 | End: 2024-06-11

## 2024-06-11 RX ADMIN — TRIAMCINOLONE ACETONIDE 40 MG: 40 INJECTION, SUSPENSION INTRA-ARTICULAR; INTRAMUSCULAR at 10:56

## 2024-06-11 ASSESSMENT — FIBROSIS 4 INDEX: FIB4 SCORE: 0.8

## 2024-06-11 NOTE — Clinical Note
Gustavo Moore, I hope this message finds you well... Kary is a very sweet patient.  She came to me today with RIGHT hip pain with known history of seropositive RA. I looked at her x-ray and I feel that these subchondral cysts are pretty significant together with what I think looks like deformity of the femoral head. Do think she would be a candidate for hip replacement now, or should we try an intra-articular injection first. I am happy to do the injection to see how she responds, but I am a bit pessimistic after looking at her x-ray. Let me know your thoughts so I can place the referral for hip replacement if you think it is appropriate. Respectfully,  SB Benitez M.D. [facility] [phone]

## 2024-06-11 NOTE — PROGRESS NOTES
"Chief Complaint   Patient presents with    Groin Pain     R groin pain        Subjective:      Kary Kevin is a 55 y.o. 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     Objective:     /76 (BP Location: Left arm, Patient Position: Sitting, BP Cuff Size: Adult)   Pulse 78   Temp 36.8 °C (98.3 °F) (Temporal)   Resp 16   Ht 1.448 m (4' 9\")   Wt 57.2 kg (126 lb)   SpO2 97%   BMI 27.27 kg/m²     Physical Exam    Mild distress  Normal respiratory effort    Knee exam:  RIGHT KNEE:  Normal alignment  NO swelling    HIP EXAM:    Right hip: Range of motion is intact  POSITIVE pain with internal rotation  sam's test is NEGATIVE  NO tenderness of the trochanteric bursa  NO tenderness of the gluteus medius  Mary's test is NEGATIVE    Left hip: Range of motion is intact  NEGATIVE pain with internal rotation  sam's test is NEGATIVE  NO tenderness of the trochanteric bursa  NO tenderness of the gluteus medius  Mary's test is NEGATIVE    LEFT KNEE:  Normal alignment  Trace effusion  No visual swelling or deformity  MEDIAL joint line tenderness   The legs otherwise neurovascularly intact    NEUTRAL stance  Able to ambulate with ANTALGIC gait     Assessment/Plan:     1. Arthritis of right hip  Referral to Orthopedics      2. Chronic hip pain, right  DX-HIP-COMPLETE - UNILATERAL 2+ RIGHT    Referral to Orthopedics      3. Localized osteoarthritis of left knee  " triamcinolone acetonide (Kenalog-40) injection 40 mg        RIGHT groin pain  Pain is sharp and pressure type pain with consistent throbbing  Symptoms began approximately 3 months ago, insidious onset without injury    RIGHT hip osteoarthritis with subchondral cyst likely related to RA    Given the severity of her presentation and what appears to be deformity of her femoral head  Will consult with orthopedics and whether to proceed with intra-articular injection or if she would be a candidate for total hip replacement at this point    Regarding the LEFT knee, LEFT knee intra-articular corticosteroid injection performed in the office TODAY (June 11, 2024)        6/11/2024 9:24 AM     HISTORY/REASON FOR EXAM:  Atraumatic 3 months of right worsening hip Pain.     TECHNIQUE/EXAM DESCRIPTION AND NUMBER OF VIEWS:  2 views of the RIGHT hip.     COMPARISON: 5/19/2022     FINDINGS:  There is severe right hip joint space narrowing which is now with subchondral cysts and/or erosive change. The narrowing is greatest medially. There is no jennifer protrusio. Mild osteophyte formation is unchanged.     There is new moderate left hip joint space narrowing with mild spurring.     No displaced fracture     IMPRESSION:     New severe right, moderate left hip joint space narrowing and right erosions favored over subchondral cysts. This is suspicious for inflammatory arthropathy such as rheumatoid arthritis. Recommend clinical correlation. This may just represent   osteoarthritis           Exam Ended: 06/11/24  9:25 AM Last Resulted: 06/11/24  9:37 AM        Interpreted in the office today with the patient            5/17/2021 5:06 PM     HISTORY/REASON FOR EXAM:  Pain/Deformity Following Trauma; knee effusion and patellofemoral pain        TECHNIQUE/EXAM DESCRIPTION AND NUMBER OF VIEWS:  3 views of the RIGHT knee.     COMPARISON: None     FINDINGS:  There is no evidence of fracture or dislocation. Alignment is maintained. There is a  small joint effusion. No periosteal new bone formation or osseous erosion is identified.     IMPRESSION:     No evidence of acute fracture or dislocation.     Small joint effusion.                     5/17/2021 5:05 PM     HISTORY/REASON FOR EXAM:  Atraumatic Pain/Swelling/Deformity; patellofemoral pain        TECHNIQUE/EXAM DESCRIPTION AND NUMBER OF VIEWS:  3 views of the LEFT knee.     COMPARISON: None     FINDINGS:  There is no evidence of fracture or dislocation. Alignment is maintained. No joint effusion is seen. No periosteal new bone formation or osseous erosion is identified.     IMPRESSION:     No evidence of acute fracture or dislocation.      Thank you MITESH Smallwood PA-C for allowing me to participate in caring for your patient.

## 2024-06-11 NOTE — PROCEDURES
PROCEDURE NOTE:  left Knee corticosteroid injection  Risks and benefits discussed  Informed consent obtained  Knee prepped in sterile fashion utilizing a monika- inferior approach  40 mg of Kenalog and 5 cc of bupivacaine injected into the knee joint space  Vapocoolant spray was utilized  Patient tolerated the procedure well  Postprocedure care and red flags discussed

## 2024-06-11 NOTE — LETTER
June 11, 2024         Patient: Kary Shah   YOB: 1965   Date of Visit: 6/11/2024           To Whom it May Concern:    Kary Shah was seen in my clinic on 6/11/2024.     If you have any questions or concerns, please don't hesitate to call.          Sincerely,           Dustin Benitez M.D.  Electronically Signed

## 2024-06-24 ENCOUNTER — APPOINTMENT (OUTPATIENT)
Dept: RHEUMATOLOGY | Facility: MEDICAL CENTER | Age: 59
End: 2024-06-24
Attending: INTERNAL MEDICINE
Payer: COMMERCIAL

## 2024-07-08 ENCOUNTER — HOSPITAL ENCOUNTER (OUTPATIENT)
Dept: LAB | Facility: MEDICAL CENTER | Age: 59
End: 2024-07-08
Attending: INTERNAL MEDICINE
Payer: COMMERCIAL

## 2024-07-08 ENCOUNTER — OFFICE VISIT (OUTPATIENT)
Dept: RHEUMATOLOGY | Facility: MEDICAL CENTER | Age: 59
End: 2024-07-08
Attending: INTERNAL MEDICINE
Payer: COMMERCIAL

## 2024-07-08 VITALS
SYSTOLIC BLOOD PRESSURE: 128 MMHG | DIASTOLIC BLOOD PRESSURE: 68 MMHG | WEIGHT: 114 LBS | RESPIRATION RATE: 14 BRPM | BODY MASS INDEX: 24.67 KG/M2 | HEART RATE: 102 BPM | OXYGEN SATURATION: 93 % | TEMPERATURE: 98 F

## 2024-07-08 DIAGNOSIS — Z79.631 METHOTREXATE, LONG TERM, CURRENT USE: ICD-10-CM

## 2024-07-08 DIAGNOSIS — Z79.620 ADALIMUMAB (HUMIRA) LONG-TERM USE: ICD-10-CM

## 2024-07-08 DIAGNOSIS — M19.90 OSTEOARTHRITIS, UNSPECIFIED OSTEOARTHRITIS TYPE, UNSPECIFIED SITE: ICD-10-CM

## 2024-07-08 DIAGNOSIS — M06.9 RHEUMATOID ARTHRITIS, INVOLVING UNSPECIFIED SITE, UNSPECIFIED WHETHER RHEUMATOID FACTOR PRESENT (HCC): ICD-10-CM

## 2024-07-08 DIAGNOSIS — I10 ESSENTIAL HYPERTENSION: ICD-10-CM

## 2024-07-08 DIAGNOSIS — E11.65 TYPE 2 DIABETES MELLITUS WITH HYPERGLYCEMIA, WITHOUT LONG-TERM CURRENT USE OF INSULIN (HCC): ICD-10-CM

## 2024-07-08 LAB
ALBUMIN SERPL BCP-MCNC: 3.8 G/DL (ref 3.2–4.9)
ALBUMIN/GLOB SERPL: 1.4 G/DL
ALP SERPL-CCNC: 118 U/L (ref 30–99)
ALT SERPL-CCNC: 8 U/L (ref 2–50)
ANION GAP SERPL CALC-SCNC: 16 MMOL/L (ref 7–16)
AST SERPL-CCNC: 10 U/L (ref 12–45)
BASOPHILS # BLD AUTO: 0.4 % (ref 0–1.8)
BASOPHILS # BLD: 0.03 K/UL (ref 0–0.12)
BILIRUB SERPL-MCNC: 0.2 MG/DL (ref 0.1–1.5)
BUN SERPL-MCNC: 17 MG/DL (ref 8–22)
CALCIUM ALBUM COR SERPL-MCNC: 9.4 MG/DL (ref 8.5–10.5)
CALCIUM SERPL-MCNC: 9.2 MG/DL (ref 8.5–10.5)
CHLORIDE SERPL-SCNC: 104 MMOL/L (ref 96–112)
CO2 SERPL-SCNC: 21 MMOL/L (ref 20–33)
CREAT SERPL-MCNC: 0.43 MG/DL (ref 0.5–1.4)
EOSINOPHIL # BLD AUTO: 0.12 K/UL (ref 0–0.51)
EOSINOPHIL NFR BLD: 1.5 % (ref 0–6.9)
ERYTHROCYTE [DISTWIDTH] IN BLOOD BY AUTOMATED COUNT: 53.2 FL (ref 35.9–50)
GFR SERPLBLD CREATININE-BSD FMLA CKD-EPI: 111 ML/MIN/1.73 M 2
GLOBULIN SER CALC-MCNC: 2.8 G/DL (ref 1.9–3.5)
GLUCOSE SERPL-MCNC: 80 MG/DL (ref 65–99)
HBV CORE IGM SER QL: NORMAL
HBV SURFACE AG SER QL: NORMAL
HCT VFR BLD AUTO: 32.9 % (ref 37–47)
HCV AB SER QL: NORMAL
HGB BLD-MCNC: 10 G/DL (ref 12–16)
IMM GRANULOCYTES # BLD AUTO: 0.06 K/UL (ref 0–0.11)
IMM GRANULOCYTES NFR BLD AUTO: 0.8 % (ref 0–0.9)
LYMPHOCYTES # BLD AUTO: 0.79 K/UL (ref 1–4.8)
LYMPHOCYTES NFR BLD: 10 % (ref 22–41)
MCH RBC QN AUTO: 25.1 PG (ref 27–33)
MCHC RBC AUTO-ENTMCNC: 30.4 G/DL (ref 32.2–35.5)
MCV RBC AUTO: 82.5 FL (ref 81.4–97.8)
MONOCYTES # BLD AUTO: 0.64 K/UL (ref 0–0.85)
MONOCYTES NFR BLD AUTO: 8.1 % (ref 0–13.4)
NEUTROPHILS # BLD AUTO: 6.23 K/UL (ref 1.82–7.42)
NEUTROPHILS NFR BLD: 79.2 % (ref 44–72)
NRBC # BLD AUTO: 0 K/UL
NRBC BLD-RTO: 0 /100 WBC (ref 0–0.2)
PLATELET # BLD AUTO: 471 K/UL (ref 164–446)
PMV BLD AUTO: 9.2 FL (ref 9–12.9)
POTASSIUM SERPL-SCNC: 3.9 MMOL/L (ref 3.6–5.5)
PROT SERPL-MCNC: 6.6 G/DL (ref 6–8.2)
RBC # BLD AUTO: 3.99 M/UL (ref 4.2–5.4)
SODIUM SERPL-SCNC: 141 MMOL/L (ref 135–145)
WBC # BLD AUTO: 7.9 K/UL (ref 4.8–10.8)

## 2024-07-08 PROCEDURE — 86803 HEPATITIS C AB TEST: CPT

## 2024-07-08 PROCEDURE — 86480 TB TEST CELL IMMUN MEASURE: CPT

## 2024-07-08 PROCEDURE — 85025 COMPLETE CBC W/AUTO DIFF WBC: CPT

## 2024-07-08 PROCEDURE — 99212 OFFICE O/P EST SF 10 MIN: CPT | Performed by: INTERNAL MEDICINE

## 2024-07-08 PROCEDURE — 86705 HEP B CORE ANTIBODY IGM: CPT

## 2024-07-08 PROCEDURE — 3074F SYST BP LT 130 MM HG: CPT | Performed by: INTERNAL MEDICINE

## 2024-07-08 PROCEDURE — 36415 COLL VENOUS BLD VENIPUNCTURE: CPT

## 2024-07-08 PROCEDURE — 99214 OFFICE O/P EST MOD 30 MIN: CPT | Performed by: INTERNAL MEDICINE

## 2024-07-08 PROCEDURE — 87340 HEPATITIS B SURFACE AG IA: CPT

## 2024-07-08 PROCEDURE — 3078F DIAST BP <80 MM HG: CPT | Performed by: INTERNAL MEDICINE

## 2024-07-08 PROCEDURE — 80053 COMPREHEN METABOLIC PANEL: CPT

## 2024-07-08 RX ORDER — MELOXICAM 15 MG/1
1 TABLET ORAL DAILY
COMMUNITY
End: 2024-07-16

## 2024-07-08 ASSESSMENT — JOINT PAIN
TOTAL NUMBER OF SWOLLEN JOINTS: 6
TOTAL NUMBER OF TENDER JOINTS: 7
TOTAL NUMBER OF TENDER JOINTS: 6
TOTAL NUMBER OF SWOLLEN JOINTS: 7

## 2024-07-08 ASSESSMENT — FIBROSIS 4 INDEX: FIB4 SCORE: 0.82

## 2024-07-09 DIAGNOSIS — M19.90 OSTEOARTHRITIS, UNSPECIFIED OSTEOARTHRITIS TYPE, UNSPECIFIED SITE: ICD-10-CM

## 2024-07-09 DIAGNOSIS — Z79.620 ADALIMUMAB (HUMIRA) LONG-TERM USE: ICD-10-CM

## 2024-07-09 DIAGNOSIS — M06.9 RHEUMATOID ARTHRITIS, INVOLVING UNSPECIFIED SITE, UNSPECIFIED WHETHER RHEUMATOID FACTOR PRESENT (HCC): ICD-10-CM

## 2024-07-09 DIAGNOSIS — Z79.899 LONG-TERM USE OF PLAQUENIL: ICD-10-CM

## 2024-07-09 DIAGNOSIS — I10 ESSENTIAL HYPERTENSION: ICD-10-CM

## 2024-07-09 DIAGNOSIS — Z79.631 METHOTREXATE, LONG TERM, CURRENT USE: ICD-10-CM

## 2024-07-09 LAB
GAMMA INTERFERON BACKGROUND BLD IA-ACNC: 0.05 IU/ML
M TB IFN-G BLD-IMP: NEGATIVE
M TB IFN-G CD4+ BCKGRND COR BLD-ACNC: -0.01 IU/ML
MITOGEN IGNF BCKGRD COR BLD-ACNC: 8.13 IU/ML
QFT TB2 - NIL TBQ2: 0 IU/ML

## 2024-07-09 RX ORDER — ADALIMUMAB 40MG/0.4ML
40 KIT SUBCUTANEOUS
Qty: 6 EACH | Refills: 1 | Status: SHIPPED | OUTPATIENT
Start: 2024-07-09

## 2024-07-09 RX ORDER — METHOTREXATE 2.5 MG/1
TABLET ORAL
Qty: 72 TABLET | Refills: 0 | Status: SHIPPED | OUTPATIENT
Start: 2024-07-09

## 2024-07-15 DIAGNOSIS — M06.9 RHEUMATOID ARTHRITIS, INVOLVING UNSPECIFIED SITE, UNSPECIFIED WHETHER RHEUMATOID FACTOR PRESENT (HCC): ICD-10-CM

## 2024-07-15 DIAGNOSIS — I10 ESSENTIAL HYPERTENSION: ICD-10-CM

## 2024-07-15 DIAGNOSIS — Z79.631 METHOTREXATE, LONG TERM, CURRENT USE: ICD-10-CM

## 2024-07-15 DIAGNOSIS — M19.90 OSTEOARTHRITIS, UNSPECIFIED OSTEOARTHRITIS TYPE, UNSPECIFIED SITE: ICD-10-CM

## 2024-07-15 DIAGNOSIS — Z79.620 ADALIMUMAB (HUMIRA) LONG-TERM USE: ICD-10-CM

## 2024-07-16 RX ORDER — SULINDAC 200 MG/1
200 TABLET ORAL 2 TIMES DAILY
Qty: 180 TABLET | Refills: 0 | Status: SHIPPED | OUTPATIENT
Start: 2024-07-16

## 2024-07-25 ENCOUNTER — OFFICE VISIT (OUTPATIENT)
Dept: MEDICAL GROUP | Facility: PHYSICIAN GROUP | Age: 59
End: 2024-07-25
Payer: COMMERCIAL

## 2024-07-25 VITALS
HEIGHT: 56 IN | SYSTOLIC BLOOD PRESSURE: 114 MMHG | DIASTOLIC BLOOD PRESSURE: 62 MMHG | HEART RATE: 86 BPM | WEIGHT: 113.9 LBS | BODY MASS INDEX: 25.62 KG/M2 | OXYGEN SATURATION: 99 % | TEMPERATURE: 99 F

## 2024-07-25 DIAGNOSIS — M19.041 PRIMARY OSTEOARTHRITIS OF BOTH HANDS: ICD-10-CM

## 2024-07-25 DIAGNOSIS — M25.551 CHRONIC HIP PAIN, BILATERAL: ICD-10-CM

## 2024-07-25 DIAGNOSIS — G89.29 CHRONIC HIP PAIN, BILATERAL: ICD-10-CM

## 2024-07-25 DIAGNOSIS — G89.29 BILATERAL CHRONIC KNEE PAIN: ICD-10-CM

## 2024-07-25 DIAGNOSIS — M05.9 SEROPOSITIVE RHEUMATOID ARTHRITIS (HCC): ICD-10-CM

## 2024-07-25 DIAGNOSIS — G89.29 CHRONIC PAIN OF BOTH FEET: ICD-10-CM

## 2024-07-25 DIAGNOSIS — M25.561 BILATERAL CHRONIC KNEE PAIN: ICD-10-CM

## 2024-07-25 DIAGNOSIS — E78.5 DYSLIPIDEMIA: ICD-10-CM

## 2024-07-25 DIAGNOSIS — Z11.4 SCREENING FOR HIV (HUMAN IMMUNODEFICIENCY VIRUS): ICD-10-CM

## 2024-07-25 DIAGNOSIS — M79.671 CHRONIC PAIN OF BOTH FEET: ICD-10-CM

## 2024-07-25 DIAGNOSIS — E11.9 TYPE 2 DIABETES MELLITUS WITHOUT COMPLICATION, WITHOUT LONG-TERM CURRENT USE OF INSULIN (HCC): ICD-10-CM

## 2024-07-25 DIAGNOSIS — M25.562 BILATERAL CHRONIC KNEE PAIN: ICD-10-CM

## 2024-07-25 DIAGNOSIS — M25.552 CHRONIC HIP PAIN, BILATERAL: ICD-10-CM

## 2024-07-25 DIAGNOSIS — M19.042 PRIMARY OSTEOARTHRITIS OF BOTH HANDS: ICD-10-CM

## 2024-07-25 DIAGNOSIS — M79.672 CHRONIC PAIN OF BOTH FEET: ICD-10-CM

## 2024-07-25 LAB
HBA1C MFR BLD: 6.3 % (ref ?–5.8)
POCT INT CON NEG: NEGATIVE
POCT INT CON POS: POSITIVE

## 2024-07-25 PROCEDURE — 83036 HEMOGLOBIN GLYCOSYLATED A1C: CPT | Performed by: NURSE PRACTITIONER

## 2024-07-25 PROCEDURE — 99215 OFFICE O/P EST HI 40 MIN: CPT | Performed by: NURSE PRACTITIONER

## 2024-07-25 PROCEDURE — 3074F SYST BP LT 130 MM HG: CPT | Performed by: NURSE PRACTITIONER

## 2024-07-25 PROCEDURE — 3078F DIAST BP <80 MM HG: CPT | Performed by: NURSE PRACTITIONER

## 2024-07-25 RX ORDER — DULOXETIN HYDROCHLORIDE 30 MG/1
60 CAPSULE, DELAYED RELEASE ORAL DAILY
Qty: 60 CAPSULE | Refills: 1 | Status: SHIPPED | OUTPATIENT
Start: 2024-07-25

## 2024-07-25 RX ORDER — NAPROXEN 250 MG/1
250 TABLET ORAL 2 TIMES DAILY WITH MEALS
COMMUNITY

## 2024-07-25 RX ORDER — TRAMADOL HYDROCHLORIDE 50 MG/1
50 TABLET ORAL EVERY 12 HOURS PRN
Qty: 14 TABLET | Refills: 0 | Status: SHIPPED | OUTPATIENT
Start: 2024-07-25 | End: 2024-08-01

## 2024-07-25 ASSESSMENT — FIBROSIS 4 INDEX: FIB4 SCORE: 0.45

## 2024-07-27 ENCOUNTER — HOSPITAL ENCOUNTER (OUTPATIENT)
Dept: LAB | Facility: MEDICAL CENTER | Age: 59
End: 2024-07-27
Attending: NURSE PRACTITIONER
Payer: COMMERCIAL

## 2024-07-27 DIAGNOSIS — E11.9 TYPE 2 DIABETES MELLITUS WITHOUT COMPLICATION, WITHOUT LONG-TERM CURRENT USE OF INSULIN (HCC): ICD-10-CM

## 2024-07-27 DIAGNOSIS — Z11.4 SCREENING FOR HIV (HUMAN IMMUNODEFICIENCY VIRUS): ICD-10-CM

## 2024-07-27 DIAGNOSIS — E78.5 DYSLIPIDEMIA: ICD-10-CM

## 2024-07-27 LAB
CHOLEST SERPL-MCNC: 165 MG/DL (ref 100–199)
FASTING STATUS PATIENT QL REPORTED: NORMAL
HDLC SERPL-MCNC: 32 MG/DL
HIV 1+2 AB+HIV1 P24 AG SERPL QL IA: NORMAL
LDLC SERPL CALC-MCNC: 112 MG/DL
TRIGL SERPL-MCNC: 105 MG/DL (ref 0–149)

## 2024-07-27 PROCEDURE — 87389 HIV-1 AG W/HIV-1&-2 AB AG IA: CPT

## 2024-07-27 PROCEDURE — 36415 COLL VENOUS BLD VENIPUNCTURE: CPT

## 2024-07-27 PROCEDURE — 80061 LIPID PANEL: CPT

## 2024-09-10 ENCOUNTER — OFFICE VISIT (OUTPATIENT)
Dept: MEDICAL GROUP | Facility: PHYSICIAN GROUP | Age: 59
End: 2024-09-10
Payer: COMMERCIAL

## 2024-09-10 VITALS
TEMPERATURE: 98.3 F | WEIGHT: 112 LBS | HEIGHT: 57 IN | DIASTOLIC BLOOD PRESSURE: 68 MMHG | OXYGEN SATURATION: 96 % | HEART RATE: 76 BPM | SYSTOLIC BLOOD PRESSURE: 116 MMHG | BODY MASS INDEX: 24.16 KG/M2

## 2024-09-10 DIAGNOSIS — M19.042 PRIMARY OSTEOARTHRITIS OF BOTH HANDS: ICD-10-CM

## 2024-09-10 DIAGNOSIS — G89.29 CHRONIC PAIN OF BOTH FEET: ICD-10-CM

## 2024-09-10 DIAGNOSIS — M05.9 SEROPOSITIVE RHEUMATOID ARTHRITIS (HCC): ICD-10-CM

## 2024-09-10 DIAGNOSIS — M19.041 PRIMARY OSTEOARTHRITIS OF BOTH HANDS: ICD-10-CM

## 2024-09-10 DIAGNOSIS — Z12.11 SCREENING FOR COLORECTAL CANCER: ICD-10-CM

## 2024-09-10 DIAGNOSIS — E11.9 TYPE 2 DIABETES MELLITUS WITHOUT COMPLICATION, WITHOUT LONG-TERM CURRENT USE OF INSULIN (HCC): ICD-10-CM

## 2024-09-10 DIAGNOSIS — M25.551 CHRONIC HIP PAIN, BILATERAL: ICD-10-CM

## 2024-09-10 DIAGNOSIS — E78.5 DYSLIPIDEMIA: ICD-10-CM

## 2024-09-10 DIAGNOSIS — M79.671 CHRONIC PAIN OF BOTH FEET: ICD-10-CM

## 2024-09-10 DIAGNOSIS — Z12.12 SCREENING FOR COLORECTAL CANCER: ICD-10-CM

## 2024-09-10 DIAGNOSIS — M79.672 CHRONIC PAIN OF BOTH FEET: ICD-10-CM

## 2024-09-10 DIAGNOSIS — G89.29 BILATERAL CHRONIC KNEE PAIN: ICD-10-CM

## 2024-09-10 DIAGNOSIS — M25.552 CHRONIC HIP PAIN, BILATERAL: ICD-10-CM

## 2024-09-10 DIAGNOSIS — M25.561 BILATERAL CHRONIC KNEE PAIN: ICD-10-CM

## 2024-09-10 DIAGNOSIS — G89.29 CHRONIC HIP PAIN, BILATERAL: ICD-10-CM

## 2024-09-10 DIAGNOSIS — M25.562 BILATERAL CHRONIC KNEE PAIN: ICD-10-CM

## 2024-09-10 PROCEDURE — 3074F SYST BP LT 130 MM HG: CPT | Performed by: NURSE PRACTITIONER

## 2024-09-10 PROCEDURE — 99214 OFFICE O/P EST MOD 30 MIN: CPT | Performed by: NURSE PRACTITIONER

## 2024-09-10 PROCEDURE — 3078F DIAST BP <80 MM HG: CPT | Performed by: NURSE PRACTITIONER

## 2024-09-10 RX ORDER — TRAMADOL HYDROCHLORIDE 50 MG/1
50 TABLET ORAL EVERY 12 HOURS PRN
Qty: 60 TABLET | Refills: 0 | Status: SHIPPED | OUTPATIENT
Start: 2024-09-10 | End: 2024-10-10

## 2024-09-10 RX ORDER — DULOXETIN HYDROCHLORIDE 60 MG/1
60 CAPSULE, DELAYED RELEASE ORAL DAILY
Qty: 90 CAPSULE | Refills: 3 | Status: SHIPPED | OUTPATIENT
Start: 2024-09-10

## 2024-09-10 ASSESSMENT — FIBROSIS 4 INDEX: FIB4 SCORE: 0.45

## 2024-09-10 NOTE — PROGRESS NOTES
Subjective:     CC: Medication management    HPI:   Kary presents today with the following.  She declines  today.      Seropositive rheumatoid arthritis (HCC)  She was started on duloxetine 60 mg daily and tramadol 50 mg 1 tablet every 12 hours as needed. Her pain is better. She is able to walk without pain. Appetite is improved. Sleeping is improved. She has completed Tramadol prescription. She has restarted Humira.     Dyslipidemia  Continues atorvastatin 20 mg every evening.  No myalgias.  Recent labs show LDL of 112 with HDL of 32.  Patient's pain has been improved with daily duloxetine and tramadol as needed. She has cut down on tortillas intake. She is eating chicken, fish and some pork. She is eating vegetarian 3-4 times a week.  Discussion today to work on increasing exercise now that her pain is better controlled and repeat lipid panel in 6 months.      Past Medical History:   Diagnosis Date    Closed nondisplaced fracture of greater tuberosity of right humerus 1/14/2022    Diabetes ([facility])     GERD (gastroesophageal reflux disease)     Hypertension     Infection of right knee ([facility]) 6/6/2022    Osteoarthritis of both hands 2015       Social History     Tobacco Use    Smoking status: Never    Smokeless tobacco: Never   Vaping Use    Vaping status: Never Used   Substance Use Topics    Alcohol use: No    Drug use: No       Current Outpatient Medications Ordered in Epic   Medication Sig Dispense Refill    DULoxetine (CYMBALTA) 60 MG Cap DR Particles delayed-release capsule Take 1 Capsule by mouth every day. 90 Capsule 3    traMADol (ULTRAM) 50 MG Tab Take 1 Tablet by mouth every 12 hours as needed for Severe Pain for up to 30 days. 60 Tablet 0    naproxen (NAPROSYN) 250 MG Tab Take 250 mg by mouth 2 times a day with meals.      sulindac (CLINORIL) 200 MG Tab Take 1 tablet by mouth twice daily 180 Tablet 0    Adalimumab (HUMIRA, 2 PEN,) 40 MG/0.4ML Pen-injector Kit Inject 0.4 mL under the skin  "every 14 days. 6 Each 1    methotrexate 2.5 MG tablet TAKE 6 TABLETS BY MOUTH ONCE A WEEK **  EVERY  SUNDAY  ** 72 Tablet 0    metFORMIN ER (GLUCOPHAGE XR) 750 MG TABLET SR 24 HR Take 1 Tablet by mouth 2 times a day. 180 Tablet 3    atorvastatin (LIPITOR) 20 MG Tab Take 1 Tablet by mouth every evening. 90 Tablet 3    lisinopril (PRINIVIL) 10 MG Tab Take 1 Tablet by mouth every day. 90 Tablet 3    folic acid (FOLVITE) 1 MG Tab 1 tab po qday 90 Tablet 3    ibuprofen (MOTRIN) 100 MG/5ML Suspension Take 10 mg/kg by mouth every 6 hours as needed. (Patient not taking: Reported on 9/10/2024)      acetaminophen (TYLENOL) 500 MG Tab Take 500-1,000 mg by mouth every 6 hours as needed. (Patient not taking: Reported on 9/10/2024)       No current Psychiatric-ordered facility-administered medications on file.       Allergies:  Patient has no known allergies.    Health Maintenance: Reviewed. Followed by Dr. Saima Rod. FIT ordered. She will get influenza vaccine at work.       Objective:     Vital signs reviewed  Exam:  /68 (BP Location: Right arm, Patient Position: Sitting, BP Cuff Size: Adult)   Pulse 76   Temp 36.8 °C (98.3 °F) (Temporal)   Ht 1.448 m (4' 9\")   Wt 50.8 kg (112 lb)   SpO2 96%   BMI 24.24 kg/m²  Body mass index is 24.24 kg/m².    Gen: Alert and oriented, No apparent distress. Smiling today.   Lungs: Normal effort, CTA bilaterally, no wheezes, rhonchi, or rales  CV: Regular rate and rhythm. No murmurs, rubs, or gallops.  Ext: Able to walk without pain      Assessment & Plan:     59 y.o. female with the following -     Discussed and reviewed lab results from 7/27/2024.    1. Dyslipidemia  Chronic exacerbated problem.  Continue atorvastatin 20 mg every evening.  With her pain improved with addition of duloxetine and tramadol she will work on increasing her exercise.  Repeat labs in 6 months around March 2025.  - Lipid Profile; Future    2. Type 2 diabetes mellitus without complication, without long-term " current use of insulin (HCC)  Chronic stable problem.  Due for repeat A1c in 6 months around March 2025.  Continue metformin  mg twice daily.  - HEMOGLOBIN A1C; Future    3. Bilateral chronic knee pain  4. Chronic hip pain, bilateral  5. Chronic pain of both feet  6. Seropositive rheumatoid arthritis (HCC)  7. Primary osteoarthritis of both hands  Chronic stable problem.  Continue duloxetine 60 mg daily and tramadol 50 mg twice daily as needed.  She is able to verbalize that tramadol is only as needed for severe pain.   reviewed today.  Return in 3 months for follow-up.  - DULoxetine (CYMBALTA) 60 MG Cap DR Particles delayed-release capsule; Take 1 Capsule by mouth every day.  Dispense: 90 Capsule; Refill: 3  - traMADol (ULTRAM) 50 MG Tab; Take 1 Tablet by mouth every 12 hours as needed for Severe Pain for up to 30 days.  Dispense: 60 Tablet; Refill: 0    8. Screening for colorectal cancer  Acute uncomplicated problem.  Annual fit test ordered.  - OCCULT BLOOD FECES IMMUNOASSAY (FIT); Future      Return in about 3 months (around 12/10/2024) for medication management.    Please note that this dictation was created using voice recognition software. I have made every reasonable attempt to correct obvious errors, but I expect that there are errors of grammar and possibly content that I did not discover before finalizing the note.

## 2024-09-11 NOTE — ASSESSMENT & PLAN NOTE
Continues atorvastatin 20 mg every evening.  No myalgias.  Recent labs show LDL of 112 with HDL of 32.  Patient's pain has been improved with daily duloxetine and tramadol as needed. She has cut down on tortillas intake. She is eating chicken, fish and some pork. She is eating vegetarian 3-4 times a week.  Discussion today to work on increasing exercise now that her pain is better controlled and repeat lipid panel in 6 months.

## 2024-09-11 NOTE — ASSESSMENT & PLAN NOTE
She was started on duloxetine 60 mg daily and tramadol 50 mg 1 tablet every 12 hours as needed. Her pain is better. She is able to walk without pain. Appetite is improved. Sleeping is improved. She has completed Tramadol prescription. She has restarted Humira.

## 2024-09-12 ENCOUNTER — TELEPHONE (OUTPATIENT)
Dept: MEDICAL GROUP | Facility: PHYSICIAN GROUP | Age: 59
End: 2024-09-12
Payer: COMMERCIAL

## 2024-09-12 NOTE — TELEPHONE ENCOUNTER
DOCUMENTATION OF PAR STATUS:    1. Name of Medication & Dose: Tramdol     2. Name of Prescription Coverage Company & phone #: [email or web address]    3. Date Prior Auth Submitted: 9/12/24    4. What information was given to obtain insurance decision? chartnotes    5. Prior Auth Status? Pending    6. Patient Notified: N\A

## 2024-09-16 NOTE — TELEPHONE ENCOUNTER
FINAL PRIOR AUTHORIZATION STATUS:    1.  Name of Medication & Dose: TRAMADOL      2. Prior Auth Status: Approved through 3/11/25     3. Action Taken: Pharmacy Notified: yes Patient Notified: yes left voicemail message for pt

## 2024-11-14 ENCOUNTER — OFFICE VISIT (OUTPATIENT)
Dept: RHEUMATOLOGY | Facility: MEDICAL CENTER | Age: 59
End: 2024-11-14
Attending: INTERNAL MEDICINE
Payer: COMMERCIAL

## 2024-11-14 ENCOUNTER — HOSPITAL ENCOUNTER (OUTPATIENT)
Dept: LAB | Facility: MEDICAL CENTER | Age: 59
End: 2024-11-14
Attending: INTERNAL MEDICINE
Payer: COMMERCIAL

## 2024-11-14 VITALS
TEMPERATURE: 98.1 F | BODY MASS INDEX: 23.8 KG/M2 | SYSTOLIC BLOOD PRESSURE: 140 MMHG | HEART RATE: 112 BPM | WEIGHT: 110 LBS | OXYGEN SATURATION: 92 % | DIASTOLIC BLOOD PRESSURE: 60 MMHG | RESPIRATION RATE: 16 BRPM

## 2024-11-14 DIAGNOSIS — E55.9 VITAMIN D DEFICIENCY: ICD-10-CM

## 2024-11-14 DIAGNOSIS — M19.90 OSTEOARTHRITIS, UNSPECIFIED OSTEOARTHRITIS TYPE, UNSPECIFIED SITE: ICD-10-CM

## 2024-11-14 DIAGNOSIS — Z79.620 ADALIMUMAB (HUMIRA) LONG-TERM USE: ICD-10-CM

## 2024-11-14 DIAGNOSIS — E11.65 TYPE 2 DIABETES MELLITUS WITH HYPERGLYCEMIA, WITHOUT LONG-TERM CURRENT USE OF INSULIN (HCC): ICD-10-CM

## 2024-11-14 DIAGNOSIS — Z79.631 METHOTREXATE, LONG TERM, CURRENT USE: ICD-10-CM

## 2024-11-14 DIAGNOSIS — I10 ESSENTIAL HYPERTENSION: ICD-10-CM

## 2024-11-14 DIAGNOSIS — M06.9 RHEUMATOID ARTHRITIS, INVOLVING UNSPECIFIED SITE, UNSPECIFIED WHETHER RHEUMATOID FACTOR PRESENT (HCC): ICD-10-CM

## 2024-11-14 LAB
25(OH)D3 SERPL-MCNC: 20 NG/ML (ref 30–100)
ALBUMIN SERPL BCP-MCNC: 3.8 G/DL (ref 3.2–4.9)
ALBUMIN/GLOB SERPL: 1.2 G/DL
ALP SERPL-CCNC: 149 U/L (ref 30–99)
ALT SERPL-CCNC: 8 U/L (ref 2–50)
ANION GAP SERPL CALC-SCNC: 11 MMOL/L (ref 7–16)
AST SERPL-CCNC: 17 U/L (ref 12–45)
BASOPHILS # BLD AUTO: 0.7 % (ref 0–1.8)
BASOPHILS # BLD: 0.05 K/UL (ref 0–0.12)
BILIRUB SERPL-MCNC: 0.3 MG/DL (ref 0.1–1.5)
BUN SERPL-MCNC: 17 MG/DL (ref 8–22)
CALCIUM ALBUM COR SERPL-MCNC: 8.9 MG/DL (ref 8.5–10.5)
CALCIUM SERPL-MCNC: 8.7 MG/DL (ref 8.5–10.5)
CHLORIDE SERPL-SCNC: 103 MMOL/L (ref 96–112)
CO2 SERPL-SCNC: 23 MMOL/L (ref 20–33)
CREAT SERPL-MCNC: 0.54 MG/DL (ref 0.5–1.4)
EOSINOPHIL # BLD AUTO: 0.19 K/UL (ref 0–0.51)
EOSINOPHIL NFR BLD: 2.6 % (ref 0–6.9)
ERYTHROCYTE [DISTWIDTH] IN BLOOD BY AUTOMATED COUNT: 52.8 FL (ref 35.9–50)
GFR SERPLBLD CREATININE-BSD FMLA CKD-EPI: 105 ML/MIN/1.73 M 2
GLOBULIN SER CALC-MCNC: 3.1 G/DL (ref 1.9–3.5)
GLUCOSE SERPL-MCNC: 88 MG/DL (ref 65–99)
HCT VFR BLD AUTO: 37.3 % (ref 37–47)
HGB BLD-MCNC: 12.4 G/DL (ref 12–16)
IMM GRANULOCYTES # BLD AUTO: 0.03 K/UL (ref 0–0.11)
IMM GRANULOCYTES NFR BLD AUTO: 0.4 % (ref 0–0.9)
LYMPHOCYTES # BLD AUTO: 1.91 K/UL (ref 1–4.8)
LYMPHOCYTES NFR BLD: 26.3 % (ref 22–41)
MCH RBC QN AUTO: 29.7 PG (ref 27–33)
MCHC RBC AUTO-ENTMCNC: 33.2 G/DL (ref 32.2–35.5)
MCV RBC AUTO: 89.4 FL (ref 81.4–97.8)
MONOCYTES # BLD AUTO: 0.76 K/UL (ref 0–0.85)
MONOCYTES NFR BLD AUTO: 10.5 % (ref 0–13.4)
NEUTROPHILS # BLD AUTO: 4.32 K/UL (ref 1.82–7.42)
NEUTROPHILS NFR BLD: 59.5 % (ref 44–72)
NRBC # BLD AUTO: 0 K/UL
NRBC BLD-RTO: 0 /100 WBC (ref 0–0.2)
PLATELET # BLD AUTO: 309 K/UL (ref 164–446)
PMV BLD AUTO: 10.1 FL (ref 9–12.9)
POTASSIUM SERPL-SCNC: 4 MMOL/L (ref 3.6–5.5)
PROT SERPL-MCNC: 6.9 G/DL (ref 6–8.2)
RBC # BLD AUTO: 4.17 M/UL (ref 4.2–5.4)
SODIUM SERPL-SCNC: 137 MMOL/L (ref 135–145)
WBC # BLD AUTO: 7.3 K/UL (ref 4.8–10.8)

## 2024-11-14 PROCEDURE — 80053 COMPREHEN METABOLIC PANEL: CPT

## 2024-11-14 PROCEDURE — 99212 OFFICE O/P EST SF 10 MIN: CPT | Performed by: INTERNAL MEDICINE

## 2024-11-14 PROCEDURE — 3078F DIAST BP <80 MM HG: CPT | Performed by: INTERNAL MEDICINE

## 2024-11-14 PROCEDURE — 82306 VITAMIN D 25 HYDROXY: CPT

## 2024-11-14 PROCEDURE — 85025 COMPLETE CBC W/AUTO DIFF WBC: CPT

## 2024-11-14 PROCEDURE — 3077F SYST BP >= 140 MM HG: CPT | Performed by: INTERNAL MEDICINE

## 2024-11-14 PROCEDURE — 36415 COLL VENOUS BLD VENIPUNCTURE: CPT

## 2024-11-14 PROCEDURE — 99214 OFFICE O/P EST MOD 30 MIN: CPT | Performed by: INTERNAL MEDICINE

## 2024-11-14 RX ORDER — METHOTREXATE 2.5 MG/1
TABLET ORAL
Qty: 24 TABLET | Refills: 0 | Status: SHIPPED | OUTPATIENT
Start: 2024-11-14

## 2024-11-14 RX ORDER — MELOXICAM 15 MG/1
TABLET ORAL
Qty: 90 TABLET | Refills: 0 | Status: SHIPPED | OUTPATIENT
Start: 2024-11-14

## 2024-11-14 RX ORDER — FOLIC ACID 1 MG/1
TABLET ORAL
Qty: 90 TABLET | Refills: 0 | Status: SHIPPED | OUTPATIENT
Start: 2024-11-14

## 2024-11-14 ASSESSMENT — JOINT PAIN
TOTAL NUMBER OF TENDER JOINTS: 7
TOTAL NUMBER OF SWOLLEN JOINTS: 7

## 2024-11-14 ASSESSMENT — FIBROSIS 4 INDEX: FIB4 SCORE: 0.45

## 2024-11-14 NOTE — PROGRESS NOTES
Chief Complaint- joint pain     Subjective:   Kary Shah is a 59 y.o. female here today for follow up of rheumatological issues      Patient opts out of using a , uses her son who speaks Korean and English.     This is a follow-up visit for this patient who we see in this clinic for serologically positive rheumatoid arthritis.  Patient was initially diagnosed with serologically positive rheumatoid arthritis October 2021 with symptoms of finger pain and swelling.  Patient continues to be on Humira 40 mg subcu every 2 weeks and is supposed to be on methotrexate at 6 tablets p.o. weekly but has failed to follow through with her every 3-month blood test and so last took methotrexate 1 month ago.  Patient states she would like to restart the methotrexate and is agreeable to getting her blood test done.  .     Right TSA     S/p plaquenil-patient stopped when started Humira  S/p sulindac-ineffective     PMHX  Diabetes  HTN     Fam Hx  F-passed CAD/MI  M-passed CAD/MI, RA  Sx3 one passed from CVA/blood clot in brain  Bx3 one passed secondary ETOH and CAD/MI     Soc Hx  No tob  ETOH once/year  No blood tx  No tattoos     HBsAg/HBcAb neg 7/2024  HCV neg 7/2024  Quantiferon Gold neg 7/2024  G6PD 13.9 adequate 3/2022  Hemoglobin A1c 7.7 10/2021  RF 83 3/2020  CCP 93 3/2020  Hand x-rays 3/2020-IMPRESSION:  1.  Asymmetric degenerative change of the IP joints diffusely which may represent asymmetric osteoarthritic change. Differential diagnosis includes psoriatic arthritis.  2.  No erosive arthritic change.  Right Hand x-rays 5/2021-IMPRESSION:   No evidence of acute fracture or dislocation.   Osteoarthritis as above described particularly involving the distal interphalangeal joints of the third and fifth digits.  Mild soft tissue swelling about the distal third digit.  Demineralization which limits evaluation.     Left Foot x-ray 5/2021-IMPRESSION:  Degenerative changes as above described, most  prominent at the first MTP joint.  Soft tissue swelling along the medial foot.  Calcaneal spurring.-                 Current Outpatient Medications   Medication Sig Dispense Refill    methotrexate 2.5 MG tablet TAKE 6 TABLETS BY MOUTH ONCE A WEEK **  EVERY  SUNDAY  ** 24 Tablet 0    folic acid (FOLVITE) 1 MG Tab 1 tab po qday 90 Tablet 0    meloxicam (MOBIC) 15 MG tablet 1 tab po qday with food prn joint pain 90 Tablet 0    DULoxetine (CYMBALTA) 60 MG Cap DR Particles delayed-release capsule Take 1 Capsule by mouth every day. 90 Capsule 3    Adalimumab (HUMIRA, 2 PEN,) 40 MG/0.4ML Pen-injector Kit Inject 0.4 mL under the skin every 14 days. 6 Each 1    acetaminophen (TYLENOL) 500 MG Tab Take 500-1,000 mg by mouth every 6 hours as needed.      metFORMIN ER (GLUCOPHAGE XR) 750 MG TABLET SR 24 HR Take 1 Tablet by mouth 2 times a day. 180 Tablet 3    atorvastatin (LIPITOR) 20 MG Tab Take 1 Tablet by mouth every evening. 90 Tablet 3    lisinopril (PRINIVIL) 10 MG Tab Take 1 Tablet by mouth every day. 90 Tablet 3     No current facility-administered medications for this visit.     She  has a past medical history of Closed nondisplaced fracture of greater tuberosity of right humerus (1/14/2022), Diabetes (HCC), GERD (gastroesophageal reflux disease), Hypertension, Infection of right knee (HCC) (6/6/2022), and Osteoarthritis of both hands (2015).    ROS   Other than what is mentioned in HPI or physical exam, there is no history of headaches, double vision or blurred vision.  No trouble swallowing difficulties .  No chest complaints including chest pain, cough or sputum production. No GI complaints including nausea, vomiting, change in bowel habits, or past peptic ulcer disease. No history of blood in the stools. No urinary complaints including dysuria or frequency. No history of alopecia, photosensitivity     Objective:     BP (!) 140/60   Pulse (!) 112   Temp 36.7 °C (98.1 °F) (Temporal)   Resp 16   Wt 49.9 kg (110 lb)    SpO2 92%  Body mass index is 23.8 kg/m².   Physical Exam:    Constitutional: Alert and oriented X3, patient is talkative with good eye contact.Skin: Warm, dry, good turgor, no rashes in visible areas.Eye: Equal, round and reactive, conjunctiva clear, lids normal EOM intactENMT: Lips without lesions,  pinna without deformityNeck: Trachea midline, no masses, no thyromegaly.Lymph:  No cervical lymphadenopathy, no axillary lymphadenopathy, no supraclavicular lymphadenopathyRespiratory: Unlabored respiratory effort, lungs clear to auscultation, no wheezes, no ronchi.Cardiovascular: Normal S1, S2,Regular rate and rhythm, no murmurs rubs or gallops   .Abdomen: Soft, non-distended, overweight.Psych: Alert and oriented x3, normal affect and mood.Neuro: Cranial nerves 2-12 are grossly intact Ext:no joint laxity noted in bilateral arms, no joint laxity noted in bilateral legs      Lab Results   Component Value Date/Time    HEPBCORIGM Non-Reactive 07/08/2024 03:18 PM    HEPBSAG Non-Reactive 07/08/2024 03:18 PM     Lab Results   Component Value Date/Time    HEPCAB Non-Reactive 07/08/2024 03:18 PM     Lab Results   Component Value Date/Time    SODIUM 141 07/08/2024 03:18 PM    POTASSIUM 3.9 07/08/2024 03:18 PM    CHLORIDE 104 07/08/2024 03:18 PM    CO2 21 07/08/2024 03:18 PM    GLUCOSE 80 07/08/2024 03:18 PM    BUN 17 07/08/2024 03:18 PM    CREATININE 0.43 (L) 07/08/2024 03:18 PM      Lab Results   Component Value Date/Time    WBC 7.9 07/08/2024 03:18 PM    RBC 3.99 (L) 07/08/2024 03:18 PM    HEMOGLOBIN 10.0 (L) 07/08/2024 03:18 PM    HEMATOCRIT 32.9 (L) 07/08/2024 03:18 PM    MCV 82.5 07/08/2024 03:18 PM    MCH 25.1 (L) 07/08/2024 03:18 PM    MCHC 30.4 (L) 07/08/2024 03:18 PM    MPV 9.2 07/08/2024 03:18 PM    NEUTSPOLYS 79.20 (H) 07/08/2024 03:18 PM    LYMPHOCYTES 10.00 (L) 07/08/2024 03:18 PM    MONOCYTES 8.10 07/08/2024 03:18 PM    EOSINOPHILS 1.50 07/08/2024 03:18 PM    BASOPHILS 0.40 07/08/2024 03:18 PM      Lab Results    Component Value Date/Time    CALCIUM 9.2 07/08/2024 03:18 PM    ASTSGOT 10 (L) 07/08/2024 03:18 PM    ALTSGPT 8 07/08/2024 03:18 PM    ALKPHOSPHAT 118 (H) 07/08/2024 03:18 PM    TBILIRUBIN 0.2 07/08/2024 03:18 PM    ALBUMIN 3.8 07/08/2024 03:18 PM    TOTPROTEIN 6.6 07/08/2024 03:18 PM     Lab Results   Component Value Date/Time    RHEUMFACTN 83 (H) 03/26/2020 02:20 PM    CCPANTIBODY 93 (H) 03/26/2020 02:20 PM     Lab Results   Component Value Date/Time    SEDRATEWES 34 (H) 10/03/2023 03:48 PM     Lab Results   Component Value Date/Time    HBA1C 6.3 (A) 07/25/2024 05:44 PM     Lab Results   Component Value Date/Time    G6PD 13.9 03/12/2022 09:05 AM     Assessment and Plan:     1. Rheumatoid arthritis, involving unspecified site, unspecified whether rheumatoid factor present (HCC)  Continue Humira 40 mg subcu every 2 weeks, restart methotrexate 15 mg p.o. weekly with folic acid 1 mg a day  - Comp Metabolic Panel; Future  - CBC WITH DIFFERENTIAL; Future  - methotrexate 2.5 MG tablet; TAKE 6 TABLETS BY MOUTH ONCE A WEEK **  EVERY  SUNDAY  **  Dispense: 24 Tablet; Refill: 0  - VITAMIN D,25 HYDROXY (DEFICIENCY); Future  - folic acid (FOLVITE) 1 MG Tab; 1 tab po qday  Dispense: 90 Tablet; Refill: 0  - meloxicam (MOBIC) 15 MG tablet; 1 tab po qday with food prn joint pain  Dispense: 90 Tablet; Refill: 0    2. Adalimumab (Humira) long-term use  Currently on Humira 40 mg subcu every 2 weeks, screening labs are up-to-date, next screening labs due July 2026, patient needs monitoring labs every 6 months, patient due for labs now, labs ordered for patient  We reviewed risks of biological medications with patient including hematological pathology, cancer risks, neurological and infection issues, patient states understanding.  - Comp Metabolic Panel; Future  - CBC WITH DIFFERENTIAL; Future  - methotrexate 2.5 MG tablet; TAKE 6 TABLETS BY MOUTH ONCE A WEEK **  EVERY  SUNDAY  **  Dispense: 24 Tablet; Refill: 0  - VITAMIN D,25  HYDROXY (DEFICIENCY); Future  - folic acid (FOLVITE) 1 MG Tab; 1 tab po qday  Dispense: 90 Tablet; Refill: 0  - meloxicam (MOBIC) 15 MG tablet; 1 tab po qday with food prn joint pain  Dispense: 90 Tablet; Refill: 0    3. Methotrexate, long term, current use  Restart methotrexate 15 mg p.o. weekly and folic acid 1 mg a day, patient needs monitoring labs every 3 months, patient overdue for labs, labs ordered for patient  - Comp Metabolic Panel; Future  - CBC WITH DIFFERENTIAL; Future  - methotrexate 2.5 MG tablet; TAKE 6 TABLETS BY MOUTH ONCE A WEEK **  EVERY  SUNDAY  **  Dispense: 24 Tablet; Refill: 0  - VITAMIN D,25 HYDROXY (DEFICIENCY); Future  - folic acid (FOLVITE) 1 MG Tab; 1 tab po qday  Dispense: 90 Tablet; Refill: 0  - meloxicam (MOBIC) 15 MG tablet; 1 tab po qday with food prn joint pain  Dispense: 90 Tablet; Refill: 0    4. Osteoarthritis, unspecified osteoarthritis type, unspecified site  Patient states sulindac not helping, would like an alternative treatment option, will do a trial meloxicam 15 mg p.o. daily  - Comp Metabolic Panel; Future  - CBC WITH DIFFERENTIAL; Future  - methotrexate 2.5 MG tablet; TAKE 6 TABLETS BY MOUTH ONCE A WEEK **  EVERY  SUNDAY  **  Dispense: 24 Tablet; Refill: 0  - VITAMIN D,25 HYDROXY (DEFICIENCY); Future  - folic acid (FOLVITE) 1 MG Tab; 1 tab po qday  Dispense: 90 Tablet; Refill: 0  - meloxicam (MOBIC) 15 MG tablet; 1 tab po qday with food prn joint pain  Dispense: 90 Tablet; Refill: 0    5. Essential hypertension  May impact the type of medications we can use for this patient's arthritis. We will have to keep this under advisement.  - methotrexate 2.5 MG tablet; TAKE 6 TABLETS BY MOUTH ONCE A WEEK **  EVERY  SUNDAY  **  Dispense: 24 Tablet; Refill: 0  - VITAMIN D,25 HYDROXY (DEFICIENCY); Future  - folic acid (FOLVITE) 1 MG Tab; 1 tab po qday  Dispense: 90 Tablet; Refill: 0  - meloxicam (MOBIC) 15 MG tablet; 1 tab po qday with food prn joint pain  Dispense: 90 Tablet;  Refill: 0    6. Type 2 diabetes mellitus with hyperglycemia, without long-term current use of insulin (HCC)  Followed by patients PCP, high blood sugar can exacerbate inflammatory state of patient's arthritis, discussed with patient the need to monitor and control blood sugars, patient states understanding  - methotrexate 2.5 MG tablet; TAKE 6 TABLETS BY MOUTH ONCE A WEEK **  EVERY  SUNDAY  **  Dispense: 24 Tablet; Refill: 0  - VITAMIN D,25 HYDROXY (DEFICIENCY); Future  - folic acid (FOLVITE) 1 MG Tab; 1 tab po qday  Dispense: 90 Tablet; Refill: 0  - meloxicam (MOBIC) 15 MG tablet; 1 tab po qday with food prn joint pain  Dispense: 90 Tablet; Refill: 0    7. Vitamin D deficiency  Today check vitamin D levels  - methotrexate 2.5 MG tablet; TAKE 6 TABLETS BY MOUTH ONCE A WEEK **  EVERY  SUNDAY  **  Dispense: 24 Tablet; Refill: 0  - VITAMIN D,25 HYDROXY (DEFICIENCY); Future  - folic acid (FOLVITE) 1 MG Tab; 1 tab po qday  Dispense: 90 Tablet; Refill: 0  - meloxicam (MOBIC) 15 MG tablet; 1 tab po qday with food prn joint pain  Dispense: 90 Tablet; Refill: 0    Followup: Return in about 3 months (around 2/14/2025). or sooner prn      Please note that this dictation was created using voice recognition software. I have made every reasonable attempt to correct obvious errors, but I expect that there are errors of grammar and possibly content that I did not discover before finalizing the note.

## 2024-11-18 ENCOUNTER — TELEPHONE (OUTPATIENT)
Dept: RHEUMATOLOGY | Facility: MEDICAL CENTER | Age: 59
End: 2024-11-18

## 2024-11-18 NOTE — TELEPHONE ENCOUNTER
I think patient is [place]    Please notify patient that her vitamin D level is quite low at 20, we would like it about 50 or 60, recommend to take over-the-counter vitamin D 2000 units a day, if already doing that then recommend checking in with your primary care doctor about getting vitamin D supplementation.

## 2024-11-19 NOTE — TELEPHONE ENCOUNTER
I called and spoke with her son Shawn and relayed your message to him. He will let his mother know and get her started on the medication. Thank you

## 2024-12-09 ENCOUNTER — TELEPHONE (OUTPATIENT)
Dept: RHEUMATOLOGY | Facility: MEDICAL CENTER | Age: 59
End: 2024-12-09
Payer: COMMERCIAL

## 2024-12-09 NOTE — TELEPHONE ENCOUNTER
Prior Authorization for Humira (2 Pen) (CF) 40MG/0.4ML auto-injector kit  has been approved for a quantity of 2 , day supply 28    Prior Authorization reference number: [id number]  Insurance: [place]  Effective dates: 12/09/2024 to 12/09/2025

## 2024-12-09 NOTE — TELEPHONE ENCOUNTER
Prior Authorization for Humira (2 Pen) (CF) 40MG/0.4ML auto-injector kit (Quantity: 2, Days: 28) has been submitted via Cover My Meds: Key ([id number])    Insurance: [place]    Will follow up in 24-48 business hours.

## 2024-12-26 DIAGNOSIS — M19.90 OSTEOARTHRITIS, UNSPECIFIED OSTEOARTHRITIS TYPE, UNSPECIFIED SITE: ICD-10-CM

## 2024-12-26 DIAGNOSIS — Z79.631 METHOTREXATE, LONG TERM, CURRENT USE: ICD-10-CM

## 2024-12-26 DIAGNOSIS — M06.9 RHEUMATOID ARTHRITIS, INVOLVING UNSPECIFIED SITE, UNSPECIFIED WHETHER RHEUMATOID FACTOR PRESENT (HCC): ICD-10-CM

## 2024-12-26 DIAGNOSIS — Z79.899 LONG-TERM USE OF PLAQUENIL: ICD-10-CM

## 2024-12-26 DIAGNOSIS — Z79.620 ADALIMUMAB (HUMIRA) LONG-TERM USE: ICD-10-CM

## 2024-12-30 RX ORDER — ADALIMUMAB 40MG/0.4ML
1 KIT SUBCUTANEOUS
Qty: 3 EACH | Refills: 0 | Status: SHIPPED | OUTPATIENT
Start: 2024-12-30

## 2025-04-08 ENCOUNTER — HOSPITAL ENCOUNTER (OUTPATIENT)
Dept: LAB | Facility: MEDICAL CENTER | Age: 60
End: 2025-04-08
Attending: STUDENT IN AN ORGANIZED HEALTH CARE EDUCATION/TRAINING PROGRAM
Payer: COMMERCIAL

## 2025-04-08 ENCOUNTER — OFFICE VISIT (OUTPATIENT)
Dept: RHEUMATOLOGY | Facility: MEDICAL CENTER | Age: 60
End: 2025-04-08
Attending: STUDENT IN AN ORGANIZED HEALTH CARE EDUCATION/TRAINING PROGRAM
Payer: COMMERCIAL

## 2025-04-08 VITALS
OXYGEN SATURATION: 94 % | WEIGHT: 109 LBS | TEMPERATURE: 99.2 F | RESPIRATION RATE: 14 BRPM | DIASTOLIC BLOOD PRESSURE: 60 MMHG | BODY MASS INDEX: 24.52 KG/M2 | SYSTOLIC BLOOD PRESSURE: 122 MMHG | HEART RATE: 92 BPM | HEIGHT: 56 IN

## 2025-04-08 DIAGNOSIS — M16.12 OSTEOARTHRITIS OF LEFT HIP, UNSPECIFIED OSTEOARTHRITIS TYPE: ICD-10-CM

## 2025-04-08 DIAGNOSIS — G89.29 CHRONIC LEFT-SIDED LOW BACK PAIN WITH LEFT-SIDED SCIATICA: ICD-10-CM

## 2025-04-08 DIAGNOSIS — E55.9 VITAMIN D DEFICIENCY: ICD-10-CM

## 2025-04-08 DIAGNOSIS — D84.9 IMMUNOSUPPRESSED STATUS (HCC): ICD-10-CM

## 2025-04-08 DIAGNOSIS — M05.9 SEROPOSITIVE RHEUMATOID ARTHRITIS (HCC): ICD-10-CM

## 2025-04-08 DIAGNOSIS — M15.9 OSTEOARTHRITIS INVOLVING MULTIPLE JOINTS ON BOTH SIDES OF BODY: ICD-10-CM

## 2025-04-08 DIAGNOSIS — M54.42 CHRONIC LEFT-SIDED LOW BACK PAIN WITH LEFT-SIDED SCIATICA: ICD-10-CM

## 2025-04-08 LAB
ALBUMIN SERPL BCP-MCNC: 4 G/DL (ref 3.2–4.9)
ALBUMIN/GLOB SERPL: 1.1 G/DL
ALP SERPL-CCNC: 137 U/L (ref 30–99)
ALT SERPL-CCNC: 16 U/L (ref 2–50)
ANION GAP SERPL CALC-SCNC: 13 MMOL/L (ref 7–16)
AST SERPL-CCNC: 20 U/L (ref 12–45)
BASOPHILS # BLD AUTO: 0.4 % (ref 0–1.8)
BASOPHILS # BLD: 0.03 K/UL (ref 0–0.12)
BILIRUB SERPL-MCNC: 0.3 MG/DL (ref 0.1–1.5)
BUN SERPL-MCNC: 10 MG/DL (ref 8–22)
CALCIUM ALBUM COR SERPL-MCNC: 9.5 MG/DL (ref 8.5–10.5)
CALCIUM SERPL-MCNC: 9.5 MG/DL (ref 8.5–10.5)
CHLORIDE SERPL-SCNC: 101 MMOL/L (ref 96–112)
CO2 SERPL-SCNC: 25 MMOL/L (ref 20–33)
CREAT SERPL-MCNC: 0.58 MG/DL (ref 0.5–1.4)
CRP SERPL HS-MCNC: 4.38 MG/DL (ref 0–0.75)
EOSINOPHIL # BLD AUTO: 0.1 K/UL (ref 0–0.51)
EOSINOPHIL NFR BLD: 1.2 % (ref 0–6.9)
ERYTHROCYTE [DISTWIDTH] IN BLOOD BY AUTOMATED COUNT: 45.7 FL (ref 35.9–50)
ERYTHROCYTE [SEDIMENTATION RATE] IN BLOOD BY WESTERGREN METHOD: 24 MM/HOUR (ref 0–25)
GFR SERPLBLD CREATININE-BSD FMLA CKD-EPI: 103 ML/MIN/1.73 M 2
GLOBULIN SER CALC-MCNC: 3.7 G/DL (ref 1.9–3.5)
GLUCOSE SERPL-MCNC: 112 MG/DL (ref 65–99)
HCT VFR BLD AUTO: 39.6 % (ref 37–47)
HGB BLD-MCNC: 12.1 G/DL (ref 12–16)
IMM GRANULOCYTES # BLD AUTO: 0.02 K/UL (ref 0–0.11)
IMM GRANULOCYTES NFR BLD AUTO: 0.2 % (ref 0–0.9)
LYMPHOCYTES # BLD AUTO: 1.06 K/UL (ref 1–4.8)
LYMPHOCYTES NFR BLD: 12.6 % (ref 22–41)
MCH RBC QN AUTO: 24.9 PG (ref 27–33)
MCHC RBC AUTO-ENTMCNC: 30.6 G/DL (ref 32.2–35.5)
MCV RBC AUTO: 81.5 FL (ref 81.4–97.8)
MONOCYTES # BLD AUTO: 0.64 K/UL (ref 0–0.85)
MONOCYTES NFR BLD AUTO: 7.6 % (ref 0–13.4)
NEUTROPHILS # BLD AUTO: 6.59 K/UL (ref 1.82–7.42)
NEUTROPHILS NFR BLD: 78 % (ref 44–72)
NRBC # BLD AUTO: 0 K/UL
NRBC BLD-RTO: 0 /100 WBC (ref 0–0.2)
PLATELET # BLD AUTO: 396 K/UL (ref 164–446)
PMV BLD AUTO: 9.5 FL (ref 9–12.9)
POTASSIUM SERPL-SCNC: 4.1 MMOL/L (ref 3.6–5.5)
PROT SERPL-MCNC: 7.7 G/DL (ref 6–8.2)
RBC # BLD AUTO: 4.86 M/UL (ref 4.2–5.4)
SODIUM SERPL-SCNC: 139 MMOL/L (ref 135–145)
WBC # BLD AUTO: 8.4 K/UL (ref 4.8–10.8)

## 2025-04-08 PROCEDURE — 83516 IMMUNOASSAY NONANTIBODY: CPT

## 2025-04-08 PROCEDURE — 80145 DRUG ASSAY ADALIMUMAB: CPT

## 2025-04-08 PROCEDURE — 86200 CCP ANTIBODY: CPT

## 2025-04-08 PROCEDURE — 86431 RHEUMATOID FACTOR QUANT: CPT

## 2025-04-08 PROCEDURE — 99215 OFFICE O/P EST HI 40 MIN: CPT | Performed by: STUDENT IN AN ORGANIZED HEALTH CARE EDUCATION/TRAINING PROGRAM

## 2025-04-08 PROCEDURE — 82397 CHEMILUMINESCENT ASSAY: CPT

## 2025-04-08 PROCEDURE — 99417 PROLNG OP E/M EACH 15 MIN: CPT | Performed by: STUDENT IN AN ORGANIZED HEALTH CARE EDUCATION/TRAINING PROGRAM

## 2025-04-08 PROCEDURE — 3078F DIAST BP <80 MM HG: CPT | Performed by: STUDENT IN AN ORGANIZED HEALTH CARE EDUCATION/TRAINING PROGRAM

## 2025-04-08 PROCEDURE — 86140 C-REACTIVE PROTEIN: CPT

## 2025-04-08 PROCEDURE — 99212 OFFICE O/P EST SF 10 MIN: CPT | Performed by: STUDENT IN AN ORGANIZED HEALTH CARE EDUCATION/TRAINING PROGRAM

## 2025-04-08 PROCEDURE — 3074F SYST BP LT 130 MM HG: CPT | Performed by: STUDENT IN AN ORGANIZED HEALTH CARE EDUCATION/TRAINING PROGRAM

## 2025-04-08 PROCEDURE — 85025 COMPLETE CBC W/AUTO DIFF WBC: CPT

## 2025-04-08 PROCEDURE — 85652 RBC SED RATE AUTOMATED: CPT

## 2025-04-08 PROCEDURE — 36415 COLL VENOUS BLD VENIPUNCTURE: CPT

## 2025-04-08 PROCEDURE — 80053 COMPREHEN METABOLIC PANEL: CPT

## 2025-04-08 RX ORDER — METHOTREXATE 2.5 MG/1
15 TABLET ORAL
Qty: 72 TABLET | Refills: 3 | Status: SHIPPED | OUTPATIENT
Start: 2025-04-08

## 2025-04-08 RX ORDER — ERGOCALCIFEROL 1.25 MG/1
50000 CAPSULE ORAL
Qty: 12 CAPSULE | Refills: 1 | Status: SHIPPED | OUTPATIENT
Start: 2025-04-08

## 2025-04-08 RX ORDER — PREDNISONE 5 MG/1
TABLET ORAL
Qty: 70 TABLET | Refills: 0 | Status: SHIPPED | OUTPATIENT
Start: 2025-04-08

## 2025-04-08 RX ORDER — FOLIC ACID 1 MG/1
1 TABLET ORAL DAILY
Qty: 90 TABLET | Refills: 3 | Status: SHIPPED | OUTPATIENT
Start: 2025-04-08

## 2025-04-08 RX ORDER — ADALIMUMAB 40MG/0.4ML
40 KIT SUBCUTANEOUS
Qty: 2 EACH | Refills: 5 | Status: SHIPPED | OUTPATIENT
Start: 2025-04-08

## 2025-04-08 ASSESSMENT — PATIENT HEALTH QUESTIONNAIRE - PHQ9: CLINICAL INTERPRETATION OF PHQ2 SCORE: 0

## 2025-04-08 ASSESSMENT — FIBROSIS 4 INDEX: FIB4 SCORE: 1.17

## 2025-04-08 NOTE — PATIENT INSTRUCTIONS
[facility]    Below are important guidelines to help you navigate your health care needs and assist us in caring for you safely and effectively. We encourage you to carefully read and understand this information and adhere to them accordingly.    [facility] Messaging and Phone Calls:  Diagnosis and Treatment - For a detailed explanation of your condition and treatment plan from today's visit, refer to the visit note on [facility] via the following steps:  Log in to [facility] and click on “Visits” at the top.  Scroll down to “Past Visits” under Appointments.  Click on “View Notes” under the appropriate visit date.  Questions or Concerns - MyChart messaging is for non-urgent matters that do not require immediate attention and should be brief with no more than two questions or concerns. If you have multiple questions or concerns, we ask that you schedule an appointment to have them properly addressed.  Response to Messages - [facility] messages are addressed throughout the week depending on clinical availability, so we ask that you allow up to one week for a response.  Phone Calls and Voicemails - Phone calls and voicemail messages are reserved for time-sensitive matters that cannot wait to be addressed via [employer]. We ask that you refrain from calling the office multiple times or leaving multiple voicemails regarding the same issue as doing so may lead to delays in response time.  Urgent Issues - For urgent medical matters or medical emergencies that cannot wait, you are advised to go to your nearest Urgent Care or Emergency Department for immediate attention.    Laboratory Tests and Imaging Studies:  Future Lab and Imaging Orders - We ask that you get your lab tests and imaging studies done no later than one week before your follow-up visit unless instructed otherwise.  Results Communication - You may see some test results marked as “abnormal” that are not necessarily significant or concerning. If  there are significant abnormalities on your test results that warrant further action, you will be notified via MyChart or phone call, otherwise they will be addressed at your follow-up visit.    Prescriptions and Refill Requests:  General Prescriptions (e.g. prednisone, hydroxychloroquine, leflunomide, methotrexate, etc.) - These are sent to Retail Pharmacies, so all refill requests of these medications should be directed to your local pharmacy.  Specialty Prescriptions (e.g. Enbrel, Humira, Cosentyx, Xeljanz, etc.) - These are sent to Specialty Pharmacies, so all refill requests of these medications should be directed to your designated specialty pharmacy.  Infusion Prescriptions (e.g. Remicade, Simponi Aria, Rituxan, Saphnelo, etc.) - These are sent to Outpatient Infusion Centers, so all scheduling requests of these medications should be directed to your local infusion center.    Medication Risks and Adverse Effects:  Immunosuppressed Status - Steroids and antirheumatic drugs are immunosuppressants, so they increase the risk of infections and can have side effects on various organ systems in your body, though most of them are uncommon.  Potential Side Effects - Be sure to read the drug package inserts to learn about the potential side effects of your medications before you start taking them and take them exactly as prescribed unless instructed otherwise.  In Case of Side Effects - If you experience any significant side effects, stop taking the medication immediately and promptly notify the prescriber. Depending on the severity of the side effects, consider going to an Urgent Care or Emergency Department for immediate attention.    Immunizations and Health Screening:  Vaccinations - If you are on immunosuppressive therapy, it is important that you are up to date on age-appropriate immunizations, particularly shingles and pneumonia vaccines, which you can request from your primary care provider or from us at your  next appointment.  Screening Tests - It is also important that you are up to date on age-appropriate screening tests, such as pap smear, mammography, and colonoscopy, which you can request from your primary care provider.    Educational and Supportive Resources:  [facility] ([email or web address].org/Health-Services/Rheumatology) - Visit our website to learn more about your condition and other rheumatic diseases, and gain access to many helpful resources for them.  Disposal of Old Medications (www.[place].gov/everyday-takeback-day) - Visit the Drug Enforcement Administration website to find a nearby location where you can properly dispose of old medications you no longer need.  Disposal of Used [place] (www.safeneedledisposal.org) - Visit the Safe Needle Disposal Organization website to find a nearby location where you can properly dispose of used needles from your injectable medications.

## 2025-04-08 NOTE — PROGRESS NOTES
[facility] RHEUMATOLOGY  [address], [address], [place] [address]  Phone: [phone] ? Fax: [phone]  [facility]/Health-Services/Rheumatology    NEW CONSULT VISIT NOTE         Subjective     DATE OF SERVICE: 04/08/2025    REFERRING PRACTITIONER:  ESTEBAN Dooley  [address] [place] [address]    PATIENT IDENTIFICATION:  Kary Shah  [address] [place] [address]    YOB: 1965    MEDICAL RECORD NUMBER: [id number]         CHIEF COMPLAINT:   Chief Complaint   Patient presents with    New Patient     Rheumatoid arthritis       HISTORY OF PRESENT ILLNESS:  Kary Shah is a 59 y.o. female with pertinent history notable for seropositive rheumatoid arthritis diagnosed in 10/2021, osteoarthritis of multiple joints (hands, shoulders, hips, and feet), left wrist fracture of distal radius in 12/2018, T2DM, GERD, and sporadic vitamin D deficiency among other comorbidities. Previously under the care of former [facility] rheumatologist Dr. Malathi Che who is retired (last visit on 11/14/24), she presents to establish care for continued evaluation and management of her RA. 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 She notes that her symptoms have progressively worsened over the past 1-2 months since she ran out of refills for methotrexate and Humira, so hoping to resume treatment soon. Reports supplemental aspects of her symptoms and medical history as noted on the questionnaire below or scanned under media tab.    Pertinent treatments: Cymbalta 60 mg daily (7/2024-present, helpful), sulindac (ineffective), meloxicam (on/off prior to  7/2024-present, helpful), hydroxychloroquine (discontinued for biologic therapy), methotrexate 15 mg PO weekly with folic acid 1 mg daily (11/2021-present), Humira 40 mg SC every 2 weeks (10/2022-present).    Pertinent positive labs: Positive RF 83 and anti-CCP 93 (in 3/2020); elevated CRP 5.03 (in 5/2022) and ESR 34 (in 10/2023); low vitamin D25 of 20, and elevated  with normal AST/ALT (in 11/2024).    Pertinent negative labs: Negative G6PD (in 3/2022), UACR (1/2024), HBV, HCV, HIV, and QTB (in 7/2024), CBC, eGFR and creatinine (11/2024).    Pertinent XR imaging: Hips (in 6/2024) with joint space narrowing moderate on the left and severe on the right with erosions favored over subchondral cysts, suspicious for inflammatory arthropathy versus osteoarthritis. Hands (in 3/2020) with asymmetric degenerative changes of the IP joints diffusely which may represent asymmetric osteoarthritic changes with differentials including psoriatic arthritis, with no erosive arthritic changes.              REVIEW OF SYSTEMS:  Except as noted in the history above, relevant review of systems with emphasis on autoimmune rheumatic diseases was otherwise negative.      CURRENT PROBLEM LIST:  Patient Active Problem List    Diagnosis Date Noted    Osteoarthritis involving multiple joints on both sides of body 04/08/2025    Immunosuppressed status (HCC) 04/08/2025    Chronic left-sided low back pain with left-sided sciatica 04/08/2025    Chronic left shoulder pain 01/23/2024    Bilateral chronic knee pain 01/25/2023    Pain of right lower extremity 06/06/2022    Arthritis of right knee 05/10/2022    Dry skin 04/08/2021    Dyslipidemia 04/08/2021    Pain in deltoid, bilateral 01/07/2021    Vitamin D deficiency 01/07/2021    Cyclic citrullinated peptide (CCP) antibody positive 07/01/2020    Seropositive rheumatoid arthritis (HCC) 03/25/2020    Watery eyes 03/25/2020    Type 2 diabetes mellitus without complication, without long-term  current use of insulin ([facility]) 03/25/2020    Primary osteoarthritis of both hands 02/26/2020    Essential hypertension 02/26/2020    Obesity (BMI 30-39.9) 02/26/2020    Gastroesophageal reflux disease without esophagitis 02/26/2020       PAST MEDICAL HISTORY:  Past Medical History:   Diagnosis Date    Closed nondisplaced fracture of greater tuberosity of right humerus 1/14/2022    Diabetes ([facility])     GERD (gastroesophageal reflux disease)     Hypertension     Infection of right knee ([facility]) 6/6/2022    Osteoarthritis of both hands 2015       PAST SURGICAL HISTORY:  Past Surgical History:   Procedure Laterality Date    PB SHLDR ARTHROSCOP,SURG,W/ROTAT CUFF REPB Right 7/27/2022    Procedure: RIGHT SHOULDER ARTHROSCOPY WITH ROTATOR CUFF REPAIR, SUBACROMIAL DECOMPRESSION, DEBRIDEMENT, BALLOON;  Surgeon: Edmund Menjivar M.D.;  Location: [facility];  Service: Orthopedics    [facility] ACROMIOPLAS Right 7/27/2022    Procedure: DECOMPRESSION, SHOULDER, ARTHROSCOPIC;  Surgeon: Edmund Menjivar M.D.;  Location: [facility];  Service: Orthopedics    [facility] EXTEN DEBRIDE 3+ Right 7/27/2022    Procedure: ARTHROSCOPY, SHOULDER, WITH EXTENSIVE DEBRIDEMENT;  Surgeon: Edmund Menjivar M.D.;  Location: [facility];  Service: Orthopedics    WOUND IRRIGATION & DEBRIDEMENT Right 5/18/2022    Procedure: RIGHT KNEE DRAIN REMOVAL;  Surgeon: Martin Montesinos M.D.;  Location: [facility];  Service: Orthopedics    WOUND IRRIGATION & DEBRIDEMENT Right 5/11/2022    Procedure: IRRIGATION AND DEBRIDEMENT, WOUND;  Surgeon: Martin Montesinos M.D.;  Location: [facility];  Service: Orthopedics    PRIMARY C SECTION      x2       SOCIAL HISTORY:  Social History     Socioeconomic History    Marital status: Single     Spouse name: Not on file    Number of children: Not on file    Years of education: Not on file    Highest education level: Not on file   Occupational History     Not on file   Tobacco Use    Smoking status: Never    Smokeless tobacco: Never   Vaping Use    Vaping status: Never Used   Substance and Sexual Activity    Alcohol use: No    Drug use: No    Sexual activity: Not Currently   Other Topics Concern    Not on file   Social History Narrative    Not on file     Social Drivers of Health     Financial Resource Strain: Not on file   Food Insecurity: Not on file   Transportation Needs: Not on file   Physical Activity: Not on file   Stress: Not on file   Social Connections: Not on file   Intimate Partner Violence: Not on file   Housing Stability: Not on file       FAMILY HISTORY:  Family History   Problem Relation Age of Onset    Heart Disease Mother 64        heart attack    Arthritis Mother     Heart Disease Father 65        heart attack    Other Sister         blood clot    Heart Disease Brother         heart attack    Heart Disease Brother         heart attack       MEDICATIONS:  Current Outpatient Medications   Medication Sig    ergocalciferol (DRISDOL) 78825 UNIT capsule Take 1 Capsule by mouth every 7 days.    predniSONE (DELTASONE) 5 MG Tab Take 4 tablets every day for 7 days, then decrease by 1 tablet every 7 days until finished. Take in the morning with food.    Adalimumab (HUMIRA, 2 PEN,) 40 MG/0.4ML Auto-injector Kit Inject 40 mg under the skin every 14 days.    methotrexate 2.5 MG tablet Take 6 Tablets by mouth every 7 days.    folic acid (FOLVITE) 1 MG Tab Take 1 Tablet by mouth every day.    DULoxetine (CYMBALTA) 60 MG Cap DR Particles delayed-release capsule Take 1 Capsule by mouth every day.    acetaminophen (TYLENOL) 500 MG Tab Take 500-1,000 mg by mouth every 6 hours as needed.    metFORMIN ER (GLUCOPHAGE XR) 750 MG TABLET SR 24 HR Take 1 Tablet by mouth 2 times a day.    atorvastatin (LIPITOR) 20 MG Tab Take 1 Tablet by mouth every evening.    lisinopril (PRINIVIL) 10 MG Tab Take 1 Tablet by mouth every day.    meloxicam (MOBIC) 15 MG tablet 1 tab po qday  "with food prn joint pain (Patient not taking: Reported on 4/8/2025)       ALLERGIES:   No Known Allergies    IMMUNIZATIONS:  Immunization History   Administered Date(s) Administered    Influenza Vac Subunit Quad Inj (Pf) 11/08/2022    Influenza Vaccine Quad Inj (Pf) 10/16/2014, 10/15/2015, 10/13/2016, 10/02/2018, 03/25/2020, 10/01/2020, 10/19/2021, 01/23/2024    MMR Vaccine 08/24/2012    MODERNA SARS-COV-2 VACCINE (12+) 04/03/2021, 05/01/2021, 02/02/2022    Pneumococcal Conjugate Vaccine (PCV20) 01/31/2023    Pneumococcal polysaccharide vaccine (PPSV-23) 07/13/2020    Tdap Vaccine 08/24/2012, 02/26/2020    Varicella Vaccine Live 09/21/2012            Objective     Vital Signs: /60   Pulse 92   Temp 37.3 °C (99.2 °F) (Temporal)   Resp 14   Ht 1.422 m (4' 8\")   Wt 49.4 kg (109 lb)   SpO2 94% Body mass index is 24.44 kg/m².    General: Appears uncomfortable due to left hip pain  Eyes: No scleral or conjunctival lesions  ENT: No apparent oral, nasal, or ear lesions  Head/Neck: No apparent scalp or neck lesions  Cardiovascular: Regular rate and rhythm; no pericardial rubs  Respiratory: Breathing quiet and unlabored; no rales or pleural rubs  Gastrointestinal: No apparent organomegaly or abdominal masses  Integumentary: No significant cutaneous lesions or dyspigmentation  Musculoskeletal: Joint tenderness moderate to severe in the left hip (with restricted ROM), mild to moderate in the knees (over medial joint line with with crepitus anteriorly worse on the left), mild in the wrists (over dorsal aspect worse on the right), and minimal in the hands (on PIP and DIP joints with bony hypertrophy consistent with Mian's and Heberden's nodes)  Neurologic: No focal sensory or motor deficits  Psychiatric: Mood and affect appropriate      LABORATORY RESULTS REVIEWED AND INTERPRETED:  Lab Results   Component Value Date/Time    CREACTPROT 5.03 (H) 05/10/2022 06:41 PM    SEDRATEWES 34 (H) 10/03/2023 03:48 PM     Lab " Results   Component Value Date/Time    RHEUMFACTN 83 (H) 03/26/2020 02:20 PM    CCPANTIBODY 93 (H) 03/26/2020 02:20 PM     Lab Results   Component Value Date/Time    TSHULTRASEN 1.910 02/29/2020 07:20 AM     Lab Results   Component Value Date/Time    25HYDROXY 20 (L) 11/14/2024 02:35 PM     Lab Results   Component Value Date/Time    G6PD 13.9 03/12/2022 09:05 AM    PROTHROMBTM 15.3 (H) 05/10/2022 10:55 PM    INR 1.25 (H) 05/10/2022 10:55 PM     Lab Results   Component Value Date/Time    WBC 7.3 11/14/2024 02:35 PM    RBC 4.17 (L) 11/14/2024 02:35 PM    HEMOGLOBIN 12.4 11/14/2024 02:35 PM    HEMATOCRIT 37.3 11/14/2024 02:35 PM    MCV 89.4 11/14/2024 02:35 PM    MCH 29.7 11/14/2024 02:35 PM    MCHC 33.2 11/14/2024 02:35 PM    RDW 52.8 (H) 11/14/2024 02:35 PM    PLATELETCT 309 11/14/2024 02:35 PM    MPV 10.1 11/14/2024 02:35 PM    NEUTS 4.32 11/14/2024 02:35 PM    LYMPHOCYTES 26.30 11/14/2024 02:35 PM    MONOCYTES 10.50 11/14/2024 02:35 PM    EOSINOPHILS 2.60 11/14/2024 02:35 PM    BASOPHILS 0.70 11/14/2024 02:35 PM     Lab Results   Component Value Date/Time    ASTSGOT 17 11/14/2024 02:35 PM    ALTSGPT 8 11/14/2024 02:35 PM    ALKPHOSPHAT 149 (H) 11/14/2024 02:35 PM    TBILIRUBIN 0.3 11/14/2024 02:35 PM    TOTPROTEIN 6.9 11/14/2024 02:35 PM    ALBUMIN 3.8 11/14/2024 02:35 PM     Lab Results   Component Value Date/Time    SODIUM 137 11/14/2024 02:35 PM    POTASSIUM 4.0 11/14/2024 02:35 PM    CHLORIDE 103 11/14/2024 02:35 PM    CO2 23 11/14/2024 02:35 PM    GLUCOSE 88 11/14/2024 02:35 PM    BUN 17 11/14/2024 02:35 PM    CREATININE 0.54 11/14/2024 02:35 PM    CALCIUM 8.7 11/14/2024 02:35 PM    MAGNESIUM 1.6 05/28/2022 12:27 AM     Lab Results   Component Value Date/Time    MICROALBUR <1.2 01/23/2024 05:48 PM    MALBCRT see below 01/23/2024 05:48 PM     Lab Results   Component Value Date/Time    COLORURINE Yellow 05/11/2022 06:28 AM    SPECGRAVITY 1.043 05/11/2022 06:28 AM    PHURINE 5.0 05/11/2022 06:28 AM    GLUCOSEUR  >=1000 (A) 05/11/2022 06:28 AM    KETONES 15 (A) 05/11/2022 06:28 AM    PROTEINURIN Negative 05/11/2022 06:28 AM     Lab Results   Component Value Date/Time    HEPBSAG Non-Reactive 07/08/2024 03:18 PM    HEPBCORIGM Non-Reactive 07/08/2024 03:18 PM    HEPCAB Non-Reactive 07/08/2024 03:18 PM     Lab Results   Component Value Date/Time    CHOLSTRLTOT 165 07/27/2024 07:55 AM     (H) 07/27/2024 07:55 AM    HDL 32 (A) 07/27/2024 07:55 AM    TRIGLYCERIDE 105 07/27/2024 07:55 AM    HBA1C 6.3 (A) 07/25/2024 05:44 PM       RADIOLOGY RESULTS REVIEWED AND INTERPRETED:  Results for orders placed during the hospital encounter of 05/17/21    DX-FOOT-COMPLETE 3+ LEFT    Impression  Degenerative changes as above described, most prominent at the first MTP joint.    Soft tissue swelling along the medial foot.    Calcaneal spurring.    Results for orders placed in visit on 07/05/22    DX-KNEE 2- RIGHT    Results for orders placed during the hospital encounter of 05/17/21    DX-WRIST-COMPLETE 3+ RIGHT    Impression  No acute osseous abnormality.    Results for orders placed during the hospital encounter of 05/17/21    DX-HAND 3+ RIGHT    Impression  No evidence of acute fracture or dislocation.    Osteoarthritis as above described particularly involving the distal interphalangeal joints of the third and fifth digits.    Mild soft tissue swelling about the distal third digit.    Demineralization which limits evaluation.    Results for orders placed during the hospital encounter of 03/26/20    DX-JOINT SURVEY-HANDS SINGLE VIEW    Impression  1.  Asymmetric degenerative change of the IP joints diffusely which may represent asymmetric osteoarthritic change. Differential diagnosis includes psoriatic arthritis.    2.  No erosive arthritic change.    Results for orders placed during the hospital encounter of 01/23/24    DX-SHOULDER 2+ LEFT    Impression  1.  Stable mild degenerative changes in the left shoulder.    Results for orders  placed during the hospital encounter of 05/10/22    US-RENAL    Impression  1.  Normal renal ultrasound.      All relevant laboratory and imaging results reported on this note were reviewed and interpreted by me.         Assessment & Plan     Kary Shah is a 59 y.o. female with history and physical as noted above whose presentation merits the following clinical impressions and recommendations:    1. Seropositive rheumatoid arthritis (HCC)  Clinically active disease as she has been off of DMARD therapy, so need to resume treatment right away with methotrexate and Humira. Given that these medications can take up to 6-8 weeks to exert clinical effect, need to bridge with a course of steroid taper for quicker symptom relief in the meantime. Prior to next visit, need to reassess her immunologic profile and markers of disease activity to gauge overall trajectory.  - RHEUMATOID ARTHRITIS PANEL; Future  - CRP QUANTITIVE (NON-CARDIAC); Future  - Sed Rate; Future  - ADALIMUMAB AND ABS, QUANT; Future  - methotrexate 2.5 MG tablet; Take 6 Tablets by mouth every 7 days.  Dispense: 72 Tablet; Refill: 3  - folic acid (FOLVITE) 1 MG Tab; Take 1 Tablet by mouth every day.  Dispense: 90 Tablet; Refill: 3  - Adalimumab (HUMIRA, 2 PEN,) 40 MG/0.4ML Auto-injector Kit; Inject 40 mg under the skin every 14 days.  Dispense: 2 Each; Refill: 5  - predniSONE (DELTASONE) 5 MG Tab; Take 4 tablets every day for 7 days, then decrease by 1 tablet every 7 days until finished. Take in the morning with food.  Dispense: 70 Tablet; Refill: 0    2. Osteoarthritis involving multiple joints on both sides of body  Significant etiology of biomechanical arthralgia which can be managed supportively.  - Tylenol Arthritis and Voltaren 1% gel with or without oral NSAIDs as needed  - Plan for ultrasound-guided intra-articular steroid injection of the left hip as ordered below    3. Osteoarthritis of left hip, unspecified osteoarthritis type  Most  bothersome joint at this time which would likely benefit from ultrasound-guided intra-articular steroid injection.  - US-POCT US ARTHROCENTESIS ASP/INJ LARGE JOINT/BURSAA    4. Chronic left-sided low back pain with left-sided sciatica  Suspect disc herniation causing radiculopathy, so given the severity, need to obtain MRI as soon as possible.  - MR-LUMBAR SPINE-W/O; Future    5. Vitamin D deficiency  Hypovitaminosis D or deficiency can contribute to diffuse musculoskeletal aches, fatigue, malaise, and risk of decrease in bone density predisposing to osteopenia, especially in the setting of a rheumatic disease, so need supplementation and follow-up retesting.  - Vitamin D level (timing TBD)  - ergocalciferol (DRISDOL) 92485 UNIT capsule; Take 1 Capsule by mouth every 7 days.  Dispense: 12 Capsule; Refill: 1    6. Immunosuppressed status (HCC)  High risk immunosuppressant use requiring routine monitoring labs prior to every visit to assess for possible side effects including but not limited to myelotoxicity, hepatotoxicity, and opportunistic infections.  - CBC WITH DIFFERENTIAL; Future  - Comp Metabolic Panel; Future  - Need to ascertain age-appropriate vaccines in addition to the ones listed above under immunizations      The above assessment and plan were discussed with the patient who acknowledged understanding of the plan.    FOLLOW-UP: Return in about 3 months (around 7/8/2025) for Short.      Note: 85 minutes were spent on this encounter including extensive chart review for data acquisition and interpretation, educating and counseling of the patient about diagnosis, treatment, and prognosis, and care coordination for follow-up visit.           Thank you for the opportunity to participate in the care of Kary Patric Wallergas.    Nelson Caro MD, MS, FACR  Rheumatologist, [facility] Rheumatology, [facility]   of Clinical Medicine, Department of Internal Medicine  [facility]  Health ? [facility] [employer]

## 2025-04-09 LAB
CARBAMYLATED PROTEIN ANTIBODY, IGG Q6043: 55 UNITS (ref 0–19)
CCP IGA+IGG SERPL IA-ACNC: >250 UNITS (ref 0–19)
RHEUMATOID FACT SER NEPH-ACNC: 246 IU/ML (ref 0–14)

## 2025-04-11 ENCOUNTER — HOSPITAL ENCOUNTER (OUTPATIENT)
Dept: RADIOLOGY | Facility: MEDICAL CENTER | Age: 60
End: 2025-04-11
Attending: STUDENT IN AN ORGANIZED HEALTH CARE EDUCATION/TRAINING PROGRAM
Payer: COMMERCIAL

## 2025-04-11 DIAGNOSIS — M54.42 CHRONIC LEFT-SIDED LOW BACK PAIN WITH LEFT-SIDED SCIATICA: ICD-10-CM

## 2025-04-11 DIAGNOSIS — G89.29 CHRONIC LEFT-SIDED LOW BACK PAIN WITH LEFT-SIDED SCIATICA: ICD-10-CM

## 2025-04-11 LAB
ADALIMUMAB AB SERPL IA-MCNC: <20 NG/ML
ADALIMUMAB SERPL IA-MCNC: 1.2 UG/ML

## 2025-04-11 PROCEDURE — 72148 MRI LUMBAR SPINE W/O DYE: CPT

## 2025-05-15 ENCOUNTER — ANCILLARY PROCEDURE (OUTPATIENT)
Dept: RHEUMATOLOGY | Facility: MEDICAL CENTER | Age: 60
End: 2025-05-15
Attending: STUDENT IN AN ORGANIZED HEALTH CARE EDUCATION/TRAINING PROGRAM
Payer: COMMERCIAL

## 2025-05-15 VITALS
TEMPERATURE: 97.7 F | BODY MASS INDEX: 26.15 KG/M2 | HEIGHT: 55 IN | RESPIRATION RATE: 14 BRPM | SYSTOLIC BLOOD PRESSURE: 120 MMHG | HEART RATE: 76 BPM | WEIGHT: 113 LBS | DIASTOLIC BLOOD PRESSURE: 70 MMHG | OXYGEN SATURATION: 97 %

## 2025-05-15 DIAGNOSIS — M16.12 OSTEOARTHRITIS OF LEFT HIP, UNSPECIFIED OSTEOARTHRITIS TYPE: Primary | ICD-10-CM

## 2025-05-15 PROCEDURE — 700111 HCHG RX REV CODE 636 W/ 250 OVERRIDE (IP): Mod: JZ | Performed by: STUDENT IN AN ORGANIZED HEALTH CARE EDUCATION/TRAINING PROGRAM

## 2025-05-15 PROCEDURE — 20611 DRAIN/INJ JOINT/BURSA W/US: CPT | Performed by: STUDENT IN AN ORGANIZED HEALTH CARE EDUCATION/TRAINING PROGRAM

## 2025-05-15 PROCEDURE — 700101 HCHG RX REV CODE 250: Performed by: STUDENT IN AN ORGANIZED HEALTH CARE EDUCATION/TRAINING PROGRAM

## 2025-05-15 RX ORDER — METHYLPREDNISOLONE ACETATE 40 MG/ML
40 INJECTION, SUSPENSION INTRA-ARTICULAR; INTRALESIONAL; INTRAMUSCULAR; SOFT TISSUE ONCE
Status: COMPLETED | OUTPATIENT
Start: 2025-05-15 | End: 2025-05-15

## 2025-05-15 RX ADMIN — METHYLPREDNISOLONE ACETATE 40 MG: 40 INJECTION, SUSPENSION INTRA-ARTICULAR; INTRALESIONAL; INTRAMUSCULAR; SOFT TISSUE at 12:08

## 2025-05-15 RX ADMIN — LIDOCAINE HYDROCHLORIDE 2 ML: 10 INJECTION, SOLUTION EPIDURAL; INFILTRATION; INTRACAUDAL; PERINEURAL at 12:10

## 2025-05-15 ASSESSMENT — FIBROSIS 4 INDEX: FIB4 SCORE: .7575757575757575758

## 2025-05-15 NOTE — PROCEDURES
"    [facility] RHEUMATOLOGY  [address] [place], [place] [address]e [facility], [address], [place] [address]  Phone: [phone] ? Fax: [phone]  [facility]/Health-Services/Rheumatology    PROCEDURE NOTE      DATE OF SERVICE: 05/15/2025         YOB: 1965    MEDICAL RECORD NUMBER: [id number]    CHIEF COMPLAINT:   Chief Complaint   Patient presents with    Follow-Up     Osteoarthritis of left hip  Ultrasound guided L Hip injection       VITAL SIGNS: /70   Pulse 76   Temp 36.5 °C (97.7 °F) (Temporal)   Resp 14   Ht 1.397 m (4' 7\")   Wt 51.3 kg (113 lb)   SpO2 97% Body mass index is 26.26 kg/m².         Procedure Performed: US-guided steroid/anesthetic injection    Clinical Indication: Osteoarthritis of left hip    Informed Consent: Verbal informed consent was obtained from the patient after discussion of the risks, benefits, and alternatives to the procedure.    Pre-Procedure Checklist/Time-Out: Standard pre-procedure checklist and time-out were performed and the required elements of the procedure were reviewed with the patient.    Procedure Technique Description: Skin overlying the joint surface was prepped by cleaning and disinfecting with ChloraPrep antiseptic. Local anesthesia was achieved by using ethyl chloride topical anesthetic spray. An 18-gauge 1.5-inch needle and a 5-cc syringe were used to withdraw and mix methylprednisolone 1 mL and lidocaine 2 mL. A 22-gauge 3.5-inch needle and the 5-cc syringe containing the methylprednisolone and lidocaine mix were used for the US-guided injection. The entire procedure was performed under standard aseptic techniques.    Medications Used: Methylprednisolone 40 mg / 1 ml (NDC 5381-2951-59, LOT [id number], EXP 09/30/2026) from single use vials, and Lidocaine 1% / 2 ml (NDC 76951-890-59, LOT [id number], EXP 05/31/2027) from single use vials.    Outcome/Complications: Patient tolerated the procedure well and no complications were observed.         DIAGNOSES & ORDERS:  1. Osteoarthritis of " left hip, unspecified osteoarthritis type  - methylPREDNISolone acetate (Depo-Medrol) injection 40 mg  - lidocaine (Xylocaine) 1 % injection 2 mL      FOLLOW-UP: Return if symptoms worsen or fail to improve.         Nelson Caro MD, MS, FACR  Rheumatologist, [facility] Rheumatology, [facility]   of Clinical Medicine, Department of Internal Medicine  [facility]

## 2025-07-16 ENCOUNTER — OFFICE VISIT (OUTPATIENT)
Dept: RHEUMATOLOGY | Facility: MEDICAL CENTER | Age: 60
End: 2025-07-16
Attending: STUDENT IN AN ORGANIZED HEALTH CARE EDUCATION/TRAINING PROGRAM
Payer: COMMERCIAL

## 2025-07-16 VITALS
BODY MASS INDEX: 25.8 KG/M2 | RESPIRATION RATE: 18 BRPM | DIASTOLIC BLOOD PRESSURE: 60 MMHG | SYSTOLIC BLOOD PRESSURE: 122 MMHG | WEIGHT: 119.6 LBS | OXYGEN SATURATION: 97 % | TEMPERATURE: 98.5 F | HEART RATE: 75 BPM | HEIGHT: 57 IN

## 2025-07-16 DIAGNOSIS — M05.9 SEROPOSITIVE RHEUMATOID ARTHRITIS (HCC): Primary | ICD-10-CM

## 2025-07-16 DIAGNOSIS — G57.02 PIRIFORMIS SYNDROME OF LEFT SIDE: ICD-10-CM

## 2025-07-16 DIAGNOSIS — E55.9 VITAMIN D DEFICIENCY: ICD-10-CM

## 2025-07-16 DIAGNOSIS — D84.9 IMMUNOSUPPRESSED STATUS (HCC): ICD-10-CM

## 2025-07-16 DIAGNOSIS — M15.0 PRIMARY OSTEOARTHRITIS INVOLVING MULTIPLE JOINTS: ICD-10-CM

## 2025-07-16 PROCEDURE — 3074F SYST BP LT 130 MM HG: CPT | Performed by: STUDENT IN AN ORGANIZED HEALTH CARE EDUCATION/TRAINING PROGRAM

## 2025-07-16 PROCEDURE — 99214 OFFICE O/P EST MOD 30 MIN: CPT | Performed by: STUDENT IN AN ORGANIZED HEALTH CARE EDUCATION/TRAINING PROGRAM

## 2025-07-16 PROCEDURE — 3078F DIAST BP <80 MM HG: CPT | Performed by: STUDENT IN AN ORGANIZED HEALTH CARE EDUCATION/TRAINING PROGRAM

## 2025-07-16 ASSESSMENT — FIBROSIS 4 INDEX: FIB4 SCORE: 0.77

## 2025-07-16 NOTE — PROGRESS NOTES
[facility] RHEUMATOLOGY  [address] [facility] [address], [address], [place] [address]  Phone: [phone] ? Fax: [phone]  [facility]/Health-Services/Rheumatology    FOLLOW-UP VISIT NOTE      DATE OF SERVICE: 07/16/2025         Subjective     PRIMARY CARE PRACTITIONER:  ESTEBAN Dooley  [address] [place] [address]    PATIENT IDENTIFICATION:  Kary Shah  [address] [facility] [place] [address]    YOB: 1965    MEDICAL RECORD NUMBER: [id number]          CHIEF COMPLAINT:   Chief Complaint   Patient presents with    Follow-Up     Seropositive rheumatoid arthritis (HCC)       RHEUMATOLOGIC HISTORY:  Kary Shah is a 60 y.o. female with pertinent history notable for seropositive rheumatoid arthritis diagnosed in 10/2021, osteoarthritis of multiple joints (hands, shoulders, hips, and feet), left wrist fracture of distal radius in 12/2018, T2DM, GERD, and sporadic vitamin D deficiency among other comorbidities. Previously under the care of former [facility] rheumatologist Dr. Malathi Che who is retired (last visit on 11/14/24), she initially presented on 4/8/2025 to establish care for continued evaluation and management of her RA. 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 She noted that her symptoms have progressively worsened over the past 1-2 months since she ran out of refills for methotrexate and Humira, so hoping to resume treatment soon.     Pertinent treatments: Cymbalta 60 mg daily (7/2024-present, helpful), sulindac (ineffective), meloxicam (on/off prior to 7/2024-present, helpful), hydroxychloroquine (discontinued for biologic therapy), methotrexate 15 mg PO  weekly with folic acid 1 mg daily (11/2021-present), Humira 40 mg SC every 2 weeks (10/2022-present).     Pertinent positive labs: Positive <83 and anti-<93 (in 4/2025<3/2020); elevated CRP 4.38<5.03 (in 4/2025<5/2022) and ESR 24<34 (in 4/2025<10/2023); low vitamin D25 of 20, and elevated  with normal AST/ALT (in 11/2024).     Pertinent negative labs: Negative G6PD (in 3/2022), UACR (1/2024), HBV, HCV, HIV, and QTB (in 7/2024), CBC, eGFR and creatinine (11/2024).     Pertinent XR imaging: Hips (in 6/2024) with joint space narrowing moderate on the left and severe on the right with erosions favored over subchondral cysts, suspicious for inflammatory arthropathy versus osteoarthritis. Hands (in 3/2020) with asymmetric degenerative changes of the IP joints diffusely which may represent asymmetric osteoarthritic changes with differentials including psoriatic arthritis, with no erosive arthritic changes.      INTERVAL HISTORY:  Reports interval history as noted on the questionnaire below or scanned under media tab.            REVIEW OF SYSTEMS:  Except as noted in the history above, relevant review of systems with emphasis on autoimmune rheumatic diseases was otherwise negative.      CURRENT PROBLEM LIST:  Patient Active Problem List    Diagnosis Date Noted    Piriformis syndrome of left side 07/16/2025    Primary osteoarthritis involving multiple joints 04/08/2025    Immunosuppressed status (HCC) 04/08/2025    Chronic left-sided low back pain with left-sided sciatica 04/08/2025    Chronic left shoulder pain 01/23/2024    Bilateral chronic knee pain 01/25/2023    Pain of right lower extremity 06/06/2022    Arthritis of right knee 05/10/2022    Dry skin 04/08/2021    Dyslipidemia 04/08/2021    Pain in deltoid, bilateral 01/07/2021    Vitamin D deficiency 01/07/2021    Seropositive rheumatoid arthritis (HCC) 03/25/2020    Watery eyes 03/25/2020    Type 2 diabetes mellitus without complication, without  long-term current use of insulin (HCC) 03/25/2020    Primary osteoarthritis of both hands 02/26/2020    Essential hypertension 02/26/2020    Obesity (BMI 30-39.9) 02/26/2020    Gastroesophageal reflux disease without esophagitis 02/26/2020       PAST MEDICAL HISTORY:  Past Medical History[1]    PAST SURGICAL HISTORY:  Past Surgical History[2]    SOCIAL HISTORY:  Social History     Socioeconomic History    Marital status: Single     Spouse name: Not on file    Number of children: Not on file    Years of education: Not on file    Highest education level: Not on file   Occupational History    Not on file   Tobacco Use    Smoking status: Never    Smokeless tobacco: Never   Vaping Use    Vaping status: Never Used   Substance and Sexual Activity    Alcohol use: No    Drug use: No    Sexual activity: Not Currently   Other Topics Concern    Not on file   Social History Narrative    Not on file     Social Drivers of Health     Financial Resource Strain: Not on file   Food Insecurity: Not on file   Transportation Needs: Not on file   Physical Activity: Not on file   Stress: Not on file   Social Connections: Not on file   Intimate Partner Violence: Not on file   Housing Stability: Not on file       FAMILY HISTORY:  Family History   Problem Relation Age of Onset    Heart Disease Mother 64        heart attack    Arthritis Mother     Heart Disease Father 65        heart attack    Other Sister         blood clot    Heart Disease Brother         heart attack    Heart Disease Brother         heart attack       MEDICATIONS:  Current Outpatient Medications   Medication Sig    diclofenac DR (VOLTAREN) 50 MG Tablet Delayed Response Take 50 mg by mouth 2 times a day.    ergocalciferol (DRISDOL) 59932 UNIT capsule Take 1 Capsule by mouth every 7 days.    Adalimumab (HUMIRA, 2 PEN,) 40 MG/0.4ML Auto-injector Kit Inject 40 mg under the skin every 14 days.    methotrexate 2.5 MG tablet Take 6 Tablets by mouth every 7 days.    folic acid  "(FOLVITE) 1 MG Tab Take 1 Tablet by mouth every day.    predniSONE (DELTASONE) 5 MG Tab Take 4 tablets every day for 7 days, then decrease by 1 tablet every 7 days until finished. Take in the morning with food. (Patient not taking: Reported on 7/16/2025)    meloxicam (MOBIC) 15 MG tablet 1 tab po qday with food prn joint pain (Patient not taking: Reported on 7/16/2025)    DULoxetine (CYMBALTA) 60 MG Cap DR Particles delayed-release capsule Take 1 Capsule by mouth every day. (Patient not taking: Reported on 7/16/2025)    acetaminophen (TYLENOL) 500 MG Tab Take 500-1,000 mg by mouth every 6 hours as needed. (Patient not taking: Reported on 7/16/2025)    metFORMIN ER (GLUCOPHAGE XR) 750 MG TABLET SR 24 HR Take 1 Tablet by mouth 2 times a day. (Patient not taking: Reported on 7/16/2025)    atorvastatin (LIPITOR) 20 MG Tab Take 1 Tablet by mouth every evening. (Patient not taking: Reported on 7/16/2025)    lisinopril (PRINIVIL) 10 MG Tab Take 1 Tablet by mouth every day. (Patient not taking: Reported on 7/16/2025)       ALLERGIES:   Allergies[3]    IMMUNIZATIONS:  Immunization History   Administered Date(s) Administered    Influenza Vac Subunit Quad Inj (Pf) 11/08/2022    Influenza Vaccine Quad Inj (Pf) 10/16/2014, 10/15/2015, 10/13/2016, 10/02/2018, 03/25/2020, 10/01/2020, 10/19/2021, 01/23/2024    MMR Vaccine 08/24/2012    MODERNA SARS-COV-2 VACCINE (12+) 04/03/2021, 05/01/2021, 02/02/2022    Pneumococcal Conjugate Vaccine (PCV20) 01/31/2023    Pneumococcal polysaccharide vaccine (PPSV-23) 07/13/2020    Tdap Vaccine 08/24/2012, 02/26/2020    Varicella Vaccine Live 09/21/2012            Objective     Vital Signs: /60 (BP Location: Left arm, Patient Position: Sitting, BP Cuff Size: Large adult)   Pulse 75   Temp 36.9 °C (98.5 °F) (Temporal)   Resp 18   Ht 1.448 m (4' 9\")   Wt 54.3 kg (119 lb 9.6 oz)   SpO2 97% Body mass index is 25.88 kg/m².    General: Appears uncomfortable due to left hip/thigh pain  Eyes: " No scleral or conjunctival lesions  ENT: No apparent oral, nasal, or ear lesions  Head/Neck: No apparent scalp or neck lesions  Cardiovascular: Regular rate and rhythm  Respiratory: Breathing quiet and unlabored  Gastrointestinal: No apparent organomegaly or abdominal masses  Integumentary: No significant cutaneous lesions or dyspigmentation  Musculoskeletal: Moderate tenderness in the left buttock, hip and thigh; mild tenderness in the left shoulder (with moderately restricted ROM), and minimal in the left hand (on PIP joint of left index finger); both hands with bony hypertrophy consistent with Mian's and Heberden's nodes.  Neurologic: No focal sensory or motor deficits  Psychiatric: Mood and affect appropriate      LABORATORY RESULTS REVIEWED AND INTERPRETED:  Lab Results   Component Value Date/Time    CREACTPROT 4.38 (H) 04/08/2025 09:49 AM    SEDRATEWES 24 04/08/2025 09:49 AM     Lab Results   Component Value Date/Time    RHEUMFACTN 246 (H) 04/08/2025 09:49 AM    CCPANTIBODY 93 (H) 03/26/2020 02:20 PM     Lab Results   Component Value Date/Time    TSHULTRASEN 1.910 02/29/2020 07:20 AM     Lab Results   Component Value Date/Time    25HYDROXY 20 (L) 11/14/2024 02:35 PM     Lab Results   Component Value Date/Time    G6PD 13.9 03/12/2022 09:05 AM    PROTHROMBTM 15.3 (H) 05/10/2022 10:55 PM    INR 1.25 (H) 05/10/2022 10:55 PM     Lab Results   Component Value Date/Time    WBC 8.4 04/08/2025 09:49 AM    RBC 4.86 04/08/2025 09:49 AM    HEMOGLOBIN 12.1 04/08/2025 09:49 AM    HEMATOCRIT 39.6 04/08/2025 09:49 AM    MCV 81.5 04/08/2025 09:49 AM    MCH 24.9 (L) 04/08/2025 09:49 AM    MCHC 30.6 (L) 04/08/2025 09:49 AM    RDW 45.7 04/08/2025 09:49 AM    PLATELETCT 396 04/08/2025 09:49 AM    MPV 9.5 04/08/2025 09:49 AM    NEUTS 6.59 04/08/2025 09:49 AM    LYMPHOCYTES 12.60 (L) 04/08/2025 09:49 AM    MONOCYTES 7.60 04/08/2025 09:49 AM    EOSINOPHILS 1.20 04/08/2025 09:49 AM    BASOPHILS 0.40 04/08/2025 09:49 AM     Lab  Results   Component Value Date/Time    ASTSGOT 20 04/08/2025 09:49 AM    ALTSGPT 16 04/08/2025 09:49 AM    ALKPHOSPHAT 137 (H) 04/08/2025 09:49 AM    TBILIRUBIN 0.3 04/08/2025 09:49 AM    TOTPROTEIN 7.7 04/08/2025 09:49 AM    ALBUMIN 4.0 04/08/2025 09:49 AM     Lab Results   Component Value Date/Time    SODIUM 139 04/08/2025 09:49 AM    POTASSIUM 4.1 04/08/2025 09:49 AM    CHLORIDE 101 04/08/2025 09:49 AM    CO2 25 04/08/2025 09:49 AM    GLUCOSE 112 (H) 04/08/2025 09:49 AM    BUN 10 04/08/2025 09:49 AM    CREATININE 0.58 04/08/2025 09:49 AM    CALCIUM 9.5 04/08/2025 09:49 AM    MAGNESIUM 1.6 05/28/2022 12:27 AM     Lab Results   Component Value Date/Time    MICROALBUR <1.2 01/23/2024 05:48 PM    MALBCRT see below 01/23/2024 05:48 PM     Lab Results   Component Value Date/Time    COLORURINE Yellow 05/11/2022 06:28 AM    SPECGRAVITY 1.043 05/11/2022 06:28 AM    PHURINE 5.0 05/11/2022 06:28 AM    GLUCOSEUR >=1000 (A) 05/11/2022 06:28 AM    KETONES 15 (A) 05/11/2022 06:28 AM    PROTEINURIN Negative 05/11/2022 06:28 AM     Lab Results   Component Value Date/Time    HEPBSAG Non-Reactive 07/08/2024 03:18 PM    HEPBCORIGM Non-Reactive 07/08/2024 03:18 PM    HEPCAB Non-Reactive 07/08/2024 03:18 PM     Lab Results   Component Value Date/Time    CHOLSTRLTOT 165 07/27/2024 07:55 AM     (H) 07/27/2024 07:55 AM    HDL 32 (A) 07/27/2024 07:55 AM    TRIGLYCERIDE 105 07/27/2024 07:55 AM    HBA1C 6.3 (A) 07/25/2024 05:44 PM       RADIOLOGY RESULTS REVIEWED AND INTERPRETED:  Results for orders placed during the hospital encounter of 05/17/21    DX-FOOT-COMPLETE 3+ LEFT    Impression  Degenerative changes as above described, most prominent at the first MTP joint.    Soft tissue swelling along the medial foot.    Calcaneal spurring.    Results for orders placed in visit on 07/05/22    DX-KNEE 2- RIGHT    Results for orders placed during the hospital encounter of 05/17/21    DX-WRIST-COMPLETE 3+ RIGHT    Impression  No acute  osseous abnormality.    Results for orders placed during the hospital encounter of 05/17/21    DX-HAND 3+ RIGHT    Impression  No evidence of acute fracture or dislocation.    Osteoarthritis as above described particularly involving the distal interphalangeal joints of the third and fifth digits.    Mild soft tissue swelling about the distal third digit.    Demineralization which limits evaluation.    Results for orders placed during the hospital encounter of 03/26/20    DX-JOINT SURVEY-HANDS SINGLE VIEW    Impression  1.  Asymmetric degenerative change of the IP joints diffusely which may represent asymmetric osteoarthritic change. Differential diagnosis includes psoriatic arthritis.    2.  No erosive arthritic change.    Results for orders placed during the hospital encounter of 01/23/24    DX-SHOULDER 2+ LEFT    Impression  1.  Stable mild degenerative changes in the left shoulder.    Results for orders placed during the hospital encounter of 04/11/25    MR-LUMBAR SPINE-W/O    Impression  1.  Mild degenerative disease in the lumbar spine as described above.  2.  There are focal nonenhancing areas in the uterus likely representing fibroid.    Results for orders placed during the hospital encounter of 05/10/22    US-RENAL    Impression  1.  Normal renal ultrasound.      All relevant laboratory and imaging results reported on this note were reviewed and interpreted by me.         Assessment & Plan     Kary Shah is a 60 y.o. female with history and physical as noted above whose presentation merits the following clinical impressions and recommendations:    1. Seropositive rheumatoid arthritis (HCC)  Clinically and serologically without significant evidence of disease activity on the recently resumed regimen of methotrexate and Humira, so no need for treatment escalation. However, given the potential for smoldering disease activity with limited clinical manifestations, reasonable to occasionally reassess  markers of disease activity and risk stratification to monitor trends.   - CRP QUANTITIVE (NON-CARDIAC); Future  - Sed Rate; Future  - Continue methotrexate 2.5 MG tablet; Take 6 Tablets by mouth every 7 days.    - Continue folic acid (FOLVITE) 1 MG Tab; Take 1 Tablet by mouth every day.   - Continue adalimumab (HUMIRA, 2 PEN,) 40 MG/0.4ML Auto-injector Kit; Inject 40 mg under the skin every 14 days.      2. Piriformis syndrome of left side  Clinical picture based on the nature of her presentation with pain in the left buttock radiating down the posterior thigh that reproduced on deep palpation with no improvement after recent left hip intra-articular steroid injection.  - Referral to Pain Management  - Oral NSAIDs and Lidocaine 4% patches as needed at bedtime  - Activity modification to avoid provocative positions  - Consider PT if deemed to be necessary    3. Primary osteoarthritis involving multiple joints  Significant etiology of biomechanical arthralgia which can be managed supportively.  - Tylenol Arthritis and Voltaren 1% gel with or without oral NSAIDs as needed  - Consider repeat intra-articular steroid injection depending on her clinical trajectory    4. Vitamin D deficiency  Hypovitaminosis D or deficiency can contribute to diffuse musculoskeletal aches, fatigue, malaise, and risk of decrease in bone density predisposing to osteopenia, especially in the setting of a rheumatic disease, so need repletion and follow-up retesting.  - Vitamin D level (timing TBD)  - Continue ergocalciferol 26119 IU every 7 days.    5. Immunosuppressed status (HCC)  High risk immunosuppressant use requiring routine monitoring labs prior to every visit to assess for possible side effects including but not limited to myelotoxicity, hepatotoxicity, and opportunistic infections.  - CBC WITH DIFFERENTIAL; Future  - Comp Metabolic Panel; Future  - Need to ascertain age-appropriate vaccines in addition to the ones listed above under  immunizations      The above assessment and plan were discussed with the patient who acknowledged understanding of the plan.    FOLLOW-UP: Return in about 4 months (around 11/16/2025) for Short.         Thank you for the opportunity to participate in the care of Kary Shah.    Willem Moss M.D.   Internal Medicine Resident, PGY-3      ATTENDING ATTESTATION:  I personally saw and examined this patient, supervised and discussed the case with the resident who participated in the care of the patient. I have carefully reviewed the note in its entirety, made modifications as deemed appropriate, and agree with its content. I hereby attest that the documentation accurately reflects the history, physical exam, assessment and plan of care for the patient.    Nelson Caro MD, MS, FACR  Rheumatologist, [facility], [facility]   of Clinical Medicine, Department of Internal Medicine  [facility]       [1]   Past Medical History:  Diagnosis Date    Closed nondisplaced fracture of greater tuberosity of right humerus 1/14/2022    Diabetes ([facility])     GERD (gastroesophageal reflux disease)     Hypertension     Infection of right knee ([facility]) 6/6/2022    Osteoarthritis of both hands 2015   [2]   Past Surgical History:  Procedure Laterality Date    PB SHLDR ARTHROSCOP,SURG,W/ROTAT CUFF REPB Right 7/27/2022    Procedure: RIGHT SHOULDER ARTHROSCOPY WITH ROTATOR CUFF REPAIR, SUBACROMIAL DECOMPRESSION, DEBRIDEMENT, BALLOON;  Surgeon: Edmund Menjivar M.D.;  Location: SURGERY [facility];  Service: Orthopedics    CO SHLDR ARTHROSCOP,PART ACROMIOPLAS Right 7/27/2022    Procedure: DECOMPRESSION, SHOULDER, ARTHROSCOPIC;  Surgeon: Edmund Menjivar M.D.;  Location: SURGERY [facility];  Service: Orthopedics    CO SHLDR ARTHROSCOP EXTEN DEBRIDE 3+ Right 7/27/2022    Procedure: ARTHROSCOPY, SHOULDER, WITH EXTENSIVE  DEBRIDEMENT;  Surgeon: Edmund Menjivar M.D.;  Location: SURGERY [facility];  Service: Orthopedics    WOUND IRRIGATION & DEBRIDEMENT Right 5/18/2022    Procedure: RIGHT KNEE DRAIN REMOVAL;  Surgeon: Martin Montesinos M.D.;  Location: SURGERY [facility];  Service: Orthopedics    WOUND IRRIGATION & DEBRIDEMENT Right 5/11/2022    Procedure: IRRIGATION AND DEBRIDEMENT, WOUND;  Surgeon: Martin Montesinos M.D.;  Location: SURGERY [facility];  Service: Orthopedics    PRIMARY C SECTION      x2   [3] No Known Allergies

## 2025-07-16 NOTE — PATIENT INSTRUCTIONS
[facility]    Below are important guidelines to help you navigate your health care needs and assist us in caring for you safely and effectively. We encourage you to carefully read and understand this information and adhere to them accordingly.    [employer] Messaging and Phone Calls:  Diagnosis and Treatment - For a detailed explanation of your condition and treatment plan from today's visit, refer to the visit note on [employer] via the following steps:  Log in to [employer] and click on “Visits” at the top.  Scroll down to “Past Visits” under Appointments.  Click on “View Notes” under the appropriate visit date.  Questions or Concerns - MyChart messaging is for non-urgent matters that do not require immediate attention and should be brief with no more than two questions or concerns. If you have multiple questions or concerns, we ask that you schedule an appointment to have them properly addressed.  Response to Messages - [employer] messages are addressed throughout the week depending on clinical availability, so we ask that you allow up to one week for a response.  Phone Calls and Voicemails - Phone calls and voicemail messages are reserved for time-sensitive matters that cannot wait to be addressed via [employer]. We ask that you refrain from calling the office multiple times or leaving multiple voicemails regarding the same issue as doing so may lead to delays in response time.  Urgent Issues - For urgent medical matters or medical emergencies that cannot wait, you are advised to go to your nearest Urgent Care or Emergency Department for immediate attention.    Laboratory Tests and Imaging Studies:  Future Lab and Imaging Orders - We ask that you get your lab tests and imaging studies done no later than one week before your follow-up visit unless instructed otherwise.  Results Communication - You may see some test results marked as “abnormal” that are not necessarily significant or concerning. If  there are significant abnormalities on your test results that warrant further action, you will be notified via MyChart or phone call, otherwise they will be addressed at your follow-up visit.    Prescriptions and Refill Requests:  General Prescriptions (e.g. prednisone, hydroxychloroquine, leflunomide, methotrexate, etc.) - These are sent to Retail Pharmacies, so all refill requests of these medications should be directed to your local pharmacy.  Specialty Prescriptions (e.g. Enbrel, Humira, Cosentyx, Xeljanz, etc.) - These are sent to Specialty Pharmacies, so all refill requests of these medications should be directed to your designated specialty pharmacy.  Infusion Prescriptions (e.g. Remicade, Simponi Aria, Rituxan, Saphnelo, etc.) - These are sent to Outpatient Infusion Centers, so all scheduling requests of these medications should be directed to your local infusion center.    Medication Risks and Adverse Effects:  Immunosuppressed Status - Steroids and antirheumatic drugs are immunosuppressants, so they increase the risk of infections and can have side effects on various organ systems in your body, though most of them are uncommon.  Potential Side Effects - Be sure to read the drug package inserts to learn about the potential side effects of your medications before you start taking them and take them exactly as prescribed unless instructed otherwise.  In Case of Side Effects - If you experience any significant side effects, stop taking the medication immediately and promptly notify the prescriber. Depending on the severity of the side effects, consider going to an Urgent Care or Emergency Department for immediate attention.    Immunizations and Health Screening:  Vaccinations - If you are on immunosuppressive therapy, it is important that you are up to date on age-appropriate immunizations, particularly shingles and pneumonia vaccines, which you can request from your primary care provider or from us at your  next appointment.  Screening Tests - It is also important that you are up to date on age-appropriate screening tests, such as pap smear, mammography, and colonoscopy, which you can request from your primary care provider.    Educational and Supportive Resources:  [facility] ([email or web address]) - Visit our website to learn more about your condition and other rheumatic diseases, and gain access to many helpful resources for them.  Disposal of Old Medications (www.lori.gov/everyday-takeback-day) - Visit the Drug Enforcement Administration website to find a nearby location where you can properly dispose of old medications you no longer need.  Disposal of Used [place] (www.safeneedledisposal.org) - Visit the Safe Needle Disposal Organization website to find a nearby location where you can properly dispose of used needles from your injectable medications.

## 2025-07-31 ENCOUNTER — OFFICE VISIT (OUTPATIENT)
Dept: PHYSICAL MEDICINE AND REHAB | Facility: MEDICAL CENTER | Age: 60
End: 2025-07-31
Payer: COMMERCIAL

## 2025-07-31 VITALS
SYSTOLIC BLOOD PRESSURE: 152 MMHG | BODY MASS INDEX: 25.59 KG/M2 | HEIGHT: 57 IN | TEMPERATURE: 97.5 F | DIASTOLIC BLOOD PRESSURE: 78 MMHG | HEART RATE: 75 BPM | OXYGEN SATURATION: 96 % | WEIGHT: 118.6 LBS

## 2025-07-31 DIAGNOSIS — M25.651 DECREASED RANGE OF MOTION OF BOTH HIPS: ICD-10-CM

## 2025-07-31 DIAGNOSIS — M25.551 CHRONIC PAIN OF BOTH HIPS: ICD-10-CM

## 2025-07-31 DIAGNOSIS — M47.816 LUMBAR SPONDYLOSIS: ICD-10-CM

## 2025-07-31 DIAGNOSIS — M54.16 LUMBAR RADICULOPATHY: ICD-10-CM

## 2025-07-31 DIAGNOSIS — M25.552 CHRONIC PAIN OF BOTH HIPS: ICD-10-CM

## 2025-07-31 DIAGNOSIS — M53.3 SI (SACROILIAC) JOINT DYSFUNCTION: Primary | ICD-10-CM

## 2025-07-31 DIAGNOSIS — M16.0 ARTHRITIS OF BOTH HIPS: ICD-10-CM

## 2025-07-31 DIAGNOSIS — M25.652 DECREASED RANGE OF MOTION OF BOTH HIPS: ICD-10-CM

## 2025-07-31 DIAGNOSIS — G89.29 CHRONIC PAIN OF BOTH HIPS: ICD-10-CM

## 2025-07-31 ASSESSMENT — PATIENT HEALTH QUESTIONNAIRE - PHQ9
CLINICAL INTERPRETATION OF PHQ2 SCORE: 4
5. POOR APPETITE OR OVEREATING: 0 - NOT AT ALL
SUM OF ALL RESPONSES TO PHQ QUESTIONS 1-9: 8

## 2025-07-31 ASSESSMENT — FIBROSIS 4 INDEX: FIB4 SCORE: 0.77

## 2025-07-31 ASSESSMENT — PAIN SCALES - GENERAL: PAINLEVEL_OUTOF10: 10=SEVERE PAIN

## 2025-07-31 NOTE — PROGRESS NOTES
[facility] (Physical Medicine and Rehabilitation)  Interventional Pain and Spine   New Patient Visit      Date of service: See epic    Chief complaint:   Chief Complaint   Patient presents with    New Patient     Back pain        Referring provider: Nelson Caro M.*     HISTORY    HPI: Kary Shah DOB 1965  who presents today with The primary encounter diagnosis was SI (sacroiliac) joint dysfunction. Diagnoses of Lumbar spondylosis, Lumbar radiculopathy, severe right and moderate left Arthritis of both hips, Chronic pain of both hips, and Decreased range of motion of both hips were also pertinent to this visit.    HPI    Verbal consent was obtained for Conor copilot: Yes      History of Present Illness  The patient, a 60-year-old female, presents for an initial consultation, accompanied by her son who also serves as her .  The patient declined  services.    Hip and Back Pain  She reports experiencing severe pain in the right anterior hip and bilateral lateral hips, persisting for approximately one year. The pain is rated 10 out of 10 in intensity and is exacerbated by activities such as lifting and bending. Additionally, she reports associated low back pain, which intensifies at night. The pain is described as more severe on the left side, radiating from the left buttock down the posterior aspect of the left leg. On the right side, the pain is primarily localized to the groin and anterior thigh. Activities such as entering a vehicle, ambulation, and sitting in a wheelchair exacerbate her discomfort. She also reports difficulty with bed mobility due to the pain.  - Onset: Approximately one year ago.  - Location: Right anterior hip, bilateral lateral hips, low back, left buttock, posterior aspect of the left leg, groin, and anterior thigh.  - Duration: Persistent for approximately one year.  - Character: Severe pain rated 10 out of 10, more severe on the left side,  radiating pain.  - Alleviating/Aggravating Factors: Exacerbated by lifting, bending, entering a vehicle, ambulation, sitting in a wheelchair, and bed mobility.  - Timing: Intensifies at night.  - Severity: Severe pain rated 10 out of 10.    Knee Crepitus  She experiences crepitus in her knees without associated pain.  - Character: Crepitus without associated pain.    Management Attempts  Her management attempts have included prednisone and a home exercise program. She received an intra-articular injection from Dr. Marin on 05/15/2025 and has been utilizing over-the-counter diclofenac for analgesia.          Medical records review:  I reviewed the note from the referring provider Nelson Caro M.* .           ROS:   Red Flags ROS:   Fever, Chills, Sweats: Denies  Involuntary Weight Loss: Denies  Bladder Incontinence: Denies  Bowel Incontinence: denies  Saddle Anesthesia: Denies    All other systems reviewed and negative.       PMHx:   Past Medical History[1]      Medications Ordered Prior to Encounter[2]     PSHx:   Past Surgical History[3]    Family history   Family History   Problem Relation Age of Onset    Heart Disease Mother 64        heart attack    Arthritis Mother     Heart Disease Father 65        heart attack    Other Sister         blood clot    Heart Disease Brother         heart attack    Heart Disease Brother         heart attack         Medications: reviewed on epic.   Active Medications[4]     Allergies:   Allergies[5]    Social Hx:   Social History     Socioeconomic History    Marital status: Single     Spouse name: Not on file    Number of children: Not on file    Years of education: Not on file    Highest education level: Not on file   Occupational History    Not on file   Tobacco Use    Smoking status: Never    Smokeless tobacco: Never   Vaping Use    Vaping status: Never Used   Substance and Sexual Activity    Alcohol use: No    Drug use: No    Sexual activity: Not Currently   Other Topics  "Concern    Not on file   Social History Narrative    Not on file     Social Drivers of Health     Financial Resource Strain: Not on file   Food Insecurity: Not on file   Transportation Needs: Not on file   Physical Activity: Not on file   Stress: Not on file   Social Connections: Not on file   Intimate Partner Violence: Not on file   Housing Stability: Not on file         EXAMINATION     Physical Exam:   Vitals: BP (!) 152/78 (BP Location: Right arm, Patient Position: Sitting, BP Cuff Size: Adult)   Pulse 75   Temp 36.4 °C (97.5 °F) (Temporal)   Ht 1.448 m (4' 9\")   Wt 53.8 kg (118 lb 9.6 oz)   SpO2 96%     Constitutional:   Body Habitus: Body mass index is 25.66 kg/m².  Cooperation: Fully cooperates with exam  Appearance: Well-groomed, well-nourished, not disheveled     Eyes: No scleral icterus to suggest severe liver disease, no proptosis to suggest severe hyperthyroid    ENT -no obvious auditory deficits, no obvious tongue lesions, tongue midline, no facial droop     Skin -no rashes or lesions noted     Respiratory-  breathing comfortable on room air, no audible wheezing    Cardiovascular- capillary refills less than 2 seconds.     Psychiatric- alert and oriented ×3. Normal affect.     Gait - normal gait, no use of ambulatory device, nonantalgic.     Musculoskeletal and Neuro -     Physical Exam  - Bilateral hips: Decreased range of motion  - Bilateral hips: Positive Ab's maneuver and thigh thrust, left worse than right  - Left sacroiliac joint: Positive tenderness to palpation  - Right sacroiliac joint: Mild positive tenderness to palpation  - Bilateral legs: Positive straight leg raise maneuver  - Lumbar spine: Decreased range of motion with flexion and extension  - Lumbar spine: Positive facet loading maneuver bilaterally           MEDICAL DECISION MAKING    Medical records review: see under HPI section.     DATA    Labs:   Lab Results   Component Value Date/Time    SODIUM 139 04/08/2025 09:49 AM    " POTASSIUM 4.1 04/08/2025 09:49 AM    CHLORIDE 101 04/08/2025 09:49 AM    CO2 25 04/08/2025 09:49 AM    ANION 13.0 04/08/2025 09:49 AM    GLUCOSE 112 (H) 04/08/2025 09:49 AM    BUN 10 04/08/2025 09:49 AM    CREATININE 0.58 04/08/2025 09:49 AM    CALCIUM 9.5 04/08/2025 09:49 AM    ASTSGOT 20 04/08/2025 09:49 AM    ALTSGPT 16 04/08/2025 09:49 AM    TBILIRUBIN 0.3 04/08/2025 09:49 AM    ALBUMIN 4.0 04/08/2025 09:49 AM    TOTPROTEIN 7.7 04/08/2025 09:49 AM    GLOBULIN 3.7 (H) 04/08/2025 09:49 AM    AGRATIO 1.1 04/08/2025 09:49 AM   ]    Lab Results   Component Value Date/Time    PROTHROMBTM 15.3 (H) 05/10/2022 10:55 PM    INR 1.25 (H) 05/10/2022 10:55 PM        Lab Results   Component Value Date/Time    WBC 8.4 04/08/2025 09:49 AM    RBC 4.86 04/08/2025 09:49 AM    HEMOGLOBIN 12.1 04/08/2025 09:49 AM    HEMATOCRIT 39.6 04/08/2025 09:49 AM    MCV 81.5 04/08/2025 09:49 AM    MCH 24.9 (L) 04/08/2025 09:49 AM    MCHC 30.6 (L) 04/08/2025 09:49 AM    MPV 9.5 04/08/2025 09:49 AM    NEUTSPOLYS 78.00 (H) 04/08/2025 09:49 AM    LYMPHOCYTES 12.60 (L) 04/08/2025 09:49 AM    MONOCYTES 7.60 04/08/2025 09:49 AM    EOSINOPHILS 1.20 04/08/2025 09:49 AM    BASOPHILS 0.40 04/08/2025 09:49 AM        Lab Results   Component Value Date/Time    HBA1C 6.3 (A) 07/25/2024 05:44 PM        Imaging:   I personally reviewed following images, these are my reads      Results  X-ray of the right hip and AP pelvis  On 06/11/2024, severe arthritis and degenerative changes were observed in the right hip, with moderate degenerative changes in the left hip.  MRI of the lumbar spine  On 04/11/2025, mild bilateral foraminal stenosis at L5-S1 and facet arthropathy bilaterally at L4-5 and L5-S1 were noted.         IMAGING radiology reads. I reviewed the following radiology reads                      Results for orders placed during the hospital encounter of 04/11/25    MR-LUMBAR SPINE-W/O    Impression  1.  Mild degenerative disease in the lumbar spine as  described above.  2.  There are focal nonenhancing areas in the uterus likely representing fibroid.        Results for orders placed during the hospital encounter of 04/11/25    MR-LUMBAR SPINE-W/O    Impression  1.  Mild degenerative disease in the lumbar spine as described above.  2.  There are focal nonenhancing areas in the uterus likely representing fibroid.                                                 Results for orders placed during the hospital encounter of 05/17/21    DX-FOOT-COMPLETE 3+ LEFT    Impression  Degenerative changes as above described, most prominent at the first MTP joint.    Soft tissue swelling along the medial foot.    Calcaneal spurring.     Results for orders placed during the hospital encounter of 05/17/21    DX-HAND 3+ RIGHT    Impression  No evidence of acute fracture or dislocation.    Osteoarthritis as above described particularly involving the distal interphalangeal joints of the third and fifth digits.    Mild soft tissue swelling about the distal third digit.    Demineralization which limits evaluation.       Results for orders placed during the hospital encounter of 01/04/22    DX-HUMERUS 2+ RIGHT    Impression  No radiographic evidence of acute traumatic injury.   Results for orders placed in visit on 07/05/22    DX-KNEE 2- RIGHT   Results for orders placed during the hospital encounter of 01/25/23    DX-KNEE 3 VIEWS LEFT    Impression  1.  No acute bony process.  2.  There is a small joint effusion.                Results for orders placed during the hospital encounter of 01/23/24    DX-SHOULDER 2+ LEFT    Impression  1.  Stable mild degenerative changes in the left shoulder.       Results for orders placed during the hospital encounter of 05/17/21    DX-WRIST-COMPLETE 3+ RIGHT    Impression  No acute osseous abnormality.         Diagnosis   Visit Diagnoses     ICD-10-CM   1. SI (sacroiliac) joint dysfunction  M53.3   2. Lumbar spondylosis  M47.816   3. Lumbar radiculopathy   M54.16   4. severe right and moderate left Arthritis of both hips  M16.0   5. Chronic pain of both hips  M25.551    M25.552    G89.29   6. Decreased range of motion of both hips  M25.651    M25.652           ASSESSMENT AND PLAN:  Kary Shah  1965 female      Kary was seen today for new patient.    Diagnoses and all orders for this visit:    SI (sacroiliac) joint dysfunction  -     Pain Hospital Procedure; Future    Lumbar spondylosis    Lumbar radiculopathy    severe right and moderate left Arthritis of both hips    Chronic pain of both hips    Decreased range of motion of both hips            Assessment & Plan  The pain is severe, 10 out of 10 in intensity, and worse with lifting, bending, and at nighttime. Physical exam shows decreased range of motion in the bilateral hips, positive Ab's maneuver, thigh thrust, and tenderness to palpation of the sacroiliac joints. X-ray from 2024 shows severe arthritis in the right hip and moderate arthritis in the left hip.  The majority the patient's pain is likely coming from the sacroiliac joint.  - An injection into the left sacroiliac joint is recommended to help with the pain.  This will be done for diagnostic and therapeutic purposes with fluoroscopic guidance  - The risks benefits and alternatives to this procedure were discussed and the patient wishes to proceed with the procedure. Risks include but are not limited to damage to surrounding structures, infection, bleeding, worsening of pain which can be permanent, weakness which can be permanent. Benefits include pain relief, improved function. Alternatives includes not doing the procedure.    - Continue taking over-the-counter diclofenac and Cymbalta, along with other medications prescribed by her rheumatologist.    The pain radiates from the left buttocks down the back of the left leg and is associated with low back pain. MRI from 2025 indicates mild bilateral foraminal stenosis at  L5-S1 and facet arthropathy bilaterally at L4-5 and L5-S1. The primary source of discomfort appears to be the sacroiliac joint, which can cause pain that extends to the hip and leg area on the left side. There is also arthritis present in both hip joints.  Lumbar radiculopathy is also considered.    Follow-up: The patient will follow up in 4 weeks after the injection.          Please note that this dictation was created using voice recognition software. I have made every reasonable attempt to correct obvious errors but there may be errors of grammar and content that I may have overlooked prior to finalization of this note.      Jewel Guadalupe MD  Physical Medicine and Rehabilitation  Interventional Spine and Sports Physiatry  [facility] [place]   CC Nelson Caro M.*          [1]   Past Medical History:  Diagnosis Date    Closed nondisplaced fracture of greater tuberosity of right humerus 1/14/2022    Diabetes (HCC)     GERD (gastroesophageal reflux disease)     Hypertension     Infection of right knee (HCC) 6/6/2022    Osteoarthritis of both hands 2015   [2]   Current Outpatient Medications on File Prior to Visit   Medication Sig Dispense Refill    ergocalciferol (DRISDOL) 60848 UNIT capsule Take 1 Capsule by mouth every 7 days. 12 Capsule 1    methotrexate 2.5 MG tablet Take 6 Tablets by mouth every 7 days. 72 Tablet 3    DULoxetine (CYMBALTA) 60 MG Cap DR Particles delayed-release capsule Take 1 Capsule by mouth every day. 90 Capsule 3    atorvastatin (LIPITOR) 20 MG Tab Take 1 Tablet by mouth every evening. 90 Tablet 3    lisinopril (PRINIVIL) 10 MG Tab Take 1 Tablet by mouth every day. 90 Tablet 3    Adalimumab (HUMIRA, 2 PEN,) 40 MG/0.4ML Auto-injector Kit Inject 40 mg under the skin every 14 days. (Patient not taking: Reported on 7/31/2025) 2 Each 5    folic acid (FOLVITE) 1 MG Tab Take 1 Tablet by mouth every day. (Patient not taking: Reported on 7/31/2025)  90 Tablet 3    meloxicam (MOBIC) 15 MG tablet 1 tab po qday with food prn joint pain (Patient not taking: Reported on 7/31/2025) 90 Tablet 0    acetaminophen (TYLENOL) 500 MG Tab Take 500-1,000 mg by mouth every 6 hours as needed. (Patient not taking: Reported on 7/31/2025)      metFORMIN ER (GLUCOPHAGE XR) 750 MG TABLET SR 24 HR Take 1 Tablet by mouth 2 times a day. (Patient not taking: Reported on 7/31/2025) 180 Tablet 3     No current facility-administered medications on file prior to visit.   [3]   Past Surgical History:  Procedure Laterality Date    PB SHLDR ARTHROSCOP,SURG,W/ROTAT CUFF REPB Right 7/27/2022    Procedure: RIGHT SHOULDER ARTHROSCOPY WITH ROTATOR CUFF REPAIR, SUBACROMIAL DECOMPRESSION, DEBRIDEMENT, BALLOON;  Surgeon: Edmund Menjivar M.D.;  Location: [facility];  Service: Orthopedics    [facility] 7/27/2022    Procedure: DECOMPRESSION, SHOULDER, ARTHROSCOPIC;  Surgeon: Edmund Menjivar M.D.;  Location: [facility];  Service: Orthopedics    FL LDR ARTHROSCOP EXTEN DEBRIDE 3+ Right 7/27/2022    Procedure: ARTHROSCOPY, SHOULDER, WITH EXTENSIVE DEBRIDEMENT;  Surgeon: Edmund Menjivar M.D.;  Location: [facility];  Service: Orthopedics    WOUND IRRIGATION & DEBRIDEMENT Right 5/18/2022    Procedure: RIGHT KNEE DRAIN REMOVAL;  Surgeon: Martin Montesinos M.D.;  Location: [facility];  Service: Orthopedics    WOUND IRRIGATION & DEBRIDEMENT Right 5/11/2022    Procedure: IRRIGATION AND DEBRIDEMENT, WOUND;  Surgeon: Martin Montesinos M.D.;  Location: [facility];  Service: Orthopedics    PRIMARY C SECTION      x2   [4]   Outpatient Medications Marked as Taking for the 7/31/25 encounter (Office Visit) with Jewel Guadalupe M.D.   Medication Sig Dispense Refill    ergocalciferol (DRISDOL) 15192 UNIT capsule Take 1 Capsule by mouth every 7 days. 12 Capsule 1    methotrexate 2.5 MG tablet Take 6 Tablets by mouth every 7  days. 72 Tablet 3    DULoxetine (CYMBALTA) 60 MG Cap DR Particles delayed-release capsule Take 1 Capsule by mouth every day. 90 Capsule 3    atorvastatin (LIPITOR) 20 MG Tab Take 1 Tablet by mouth every evening. 90 Tablet 3    lisinopril (PRINIVIL) 10 MG Tab Take 1 Tablet by mouth every day. 90 Tablet 3   [5] No Known Allergies

## 2025-08-06 ENCOUNTER — HOSPITAL ENCOUNTER (OUTPATIENT)
Facility: REHABILITATION | Age: 60
End: 2025-08-06
Attending: PHYSICAL MEDICINE & REHABILITATION | Admitting: PHYSICAL MEDICINE & REHABILITATION
Payer: COMMERCIAL

## 2025-08-06 ENCOUNTER — APPOINTMENT (OUTPATIENT)
Dept: RADIOLOGY | Facility: REHABILITATION | Age: 60
End: 2025-08-06
Attending: PHYSICAL MEDICINE & REHABILITATION
Payer: COMMERCIAL

## 2025-08-06 VITALS
BODY MASS INDEX: 25.78 KG/M2 | HEART RATE: 72 BPM | HEIGHT: 57 IN | TEMPERATURE: 97.4 F | RESPIRATION RATE: 16 BRPM | DIASTOLIC BLOOD PRESSURE: 66 MMHG | OXYGEN SATURATION: 100 % | SYSTOLIC BLOOD PRESSURE: 137 MMHG | WEIGHT: 119.49 LBS

## 2025-08-06 LAB — GLUCOSE BLD STRIP.AUTO-MCNC: 89 MG/DL (ref 65–99)

## 2025-08-06 PROCEDURE — 27096 INJECT SACROILIAC JOINT: CPT | Mod: LT,PO

## 2025-08-06 PROCEDURE — G0260 INJ FOR SACROILIAC JT ANESTH: HCPCS

## 2025-08-06 PROCEDURE — 82962 GLUCOSE BLOOD TEST: CPT | Performed by: PHYSICAL MEDICINE & REHABILITATION

## 2025-08-06 PROCEDURE — 77002 NEEDLE LOCALIZATION BY XRAY: CPT

## 2025-08-06 PROCEDURE — 700111 HCHG RX REV CODE 636 W/ 250 OVERRIDE (IP): Mod: JZ

## 2025-08-06 PROCEDURE — 700117 HCHG RX CONTRAST REV CODE 255

## 2025-08-06 RX ORDER — DEXAMETHASONE SODIUM PHOSPHATE 10 MG/ML
INJECTION, SOLUTION INTRAMUSCULAR; INTRAVENOUS
Status: COMPLETED
Start: 2025-08-06 | End: 2025-08-06

## 2025-08-06 RX ORDER — LIDOCAINE HYDROCHLORIDE 10 MG/ML
INJECTION, SOLUTION EPIDURAL; INFILTRATION; INTRACAUDAL; PERINEURAL
Status: COMPLETED
Start: 2025-08-06 | End: 2025-08-06

## 2025-08-06 RX ORDER — ROPIVACAINE HYDROCHLORIDE 5 MG/ML
INJECTION, SOLUTION EPIDURAL; INFILTRATION; PERINEURAL
Status: COMPLETED
Start: 2025-08-06 | End: 2025-08-06

## 2025-08-06 RX ADMIN — LIDOCAINE HYDROCHLORIDE 10 MG: 10 INJECTION, SOLUTION EPIDURAL; INFILTRATION; INTRACAUDAL at 11:25

## 2025-08-06 RX ADMIN — IOHEXOL 5 ML: 240 INJECTION, SOLUTION INTRATHECAL; INTRAVASCULAR; INTRAVENOUS; ORAL at 11:25

## 2025-08-06 RX ADMIN — DEXAMETHASONE SODIUM PHOSPHATE 10 MG: 10 INJECTION, SOLUTION INTRAMUSCULAR; INTRAVENOUS at 11:25

## 2025-08-06 ASSESSMENT — FIBROSIS 4 INDEX: FIB4 SCORE: 0.77

## 2025-08-06 ASSESSMENT — PAIN DESCRIPTION - PAIN TYPE: TYPE: CHRONIC PAIN

## 2025-08-07 ENCOUNTER — TELEPHONE (OUTPATIENT)
Dept: PHYSICAL MEDICINE AND REHAB | Facility: MEDICAL CENTER | Age: 60
End: 2025-08-07
Payer: COMMERCIAL

## 2025-08-28 ENCOUNTER — HOSPITAL ENCOUNTER (OUTPATIENT)
Dept: LAB | Facility: MEDICAL CENTER | Age: 60
End: 2025-08-28
Attending: NURSE PRACTITIONER
Payer: COMMERCIAL

## 2025-08-28 ENCOUNTER — HOSPITAL ENCOUNTER (OUTPATIENT)
Facility: MEDICAL CENTER | Age: 60
End: 2025-08-28
Attending: NURSE PRACTITIONER
Payer: COMMERCIAL

## 2025-08-28 ENCOUNTER — OFFICE VISIT (OUTPATIENT)
Dept: MEDICAL GROUP | Facility: PHYSICIAN GROUP | Age: 60
End: 2025-08-28
Payer: COMMERCIAL

## 2025-08-28 VITALS
HEART RATE: 78 BPM | SYSTOLIC BLOOD PRESSURE: 138 MMHG | TEMPERATURE: 98.4 F | HEIGHT: 55 IN | DIASTOLIC BLOOD PRESSURE: 74 MMHG | OXYGEN SATURATION: 97 % | WEIGHT: 121 LBS | BODY MASS INDEX: 28 KG/M2

## 2025-08-28 DIAGNOSIS — M25.551 CHRONIC HIP PAIN, BILATERAL: ICD-10-CM

## 2025-08-28 DIAGNOSIS — E78.5 DYSLIPIDEMIA: ICD-10-CM

## 2025-08-28 DIAGNOSIS — Z12.11 SCREENING FOR COLORECTAL CANCER: ICD-10-CM

## 2025-08-28 DIAGNOSIS — E11.9 TYPE 2 DIABETES MELLITUS WITHOUT COMPLICATION, WITHOUT LONG-TERM CURRENT USE OF INSULIN (HCC): ICD-10-CM

## 2025-08-28 DIAGNOSIS — Z12.12 SCREENING FOR COLORECTAL CANCER: ICD-10-CM

## 2025-08-28 DIAGNOSIS — Z12.31 ENCOUNTER FOR SCREENING MAMMOGRAM FOR BREAST CANCER: ICD-10-CM

## 2025-08-28 DIAGNOSIS — M25.552 CHRONIC HIP PAIN, BILATERAL: ICD-10-CM

## 2025-08-28 DIAGNOSIS — M19.042 PRIMARY OSTEOARTHRITIS OF BOTH HANDS: ICD-10-CM

## 2025-08-28 DIAGNOSIS — M19.041 PRIMARY OSTEOARTHRITIS OF BOTH HANDS: ICD-10-CM

## 2025-08-28 DIAGNOSIS — G89.29 BILATERAL CHRONIC KNEE PAIN: ICD-10-CM

## 2025-08-28 DIAGNOSIS — M25.561 BILATERAL CHRONIC KNEE PAIN: ICD-10-CM

## 2025-08-28 DIAGNOSIS — G89.29 CHRONIC PAIN OF BOTH FEET: ICD-10-CM

## 2025-08-28 DIAGNOSIS — M79.672 CHRONIC PAIN OF BOTH FEET: ICD-10-CM

## 2025-08-28 DIAGNOSIS — M79.671 CHRONIC PAIN OF BOTH FEET: ICD-10-CM

## 2025-08-28 DIAGNOSIS — M25.562 BILATERAL CHRONIC KNEE PAIN: ICD-10-CM

## 2025-08-28 DIAGNOSIS — M05.9 SEROPOSITIVE RHEUMATOID ARTHRITIS (HCC): ICD-10-CM

## 2025-08-28 DIAGNOSIS — I10 ESSENTIAL HYPERTENSION: ICD-10-CM

## 2025-08-28 DIAGNOSIS — G89.29 CHRONIC HIP PAIN, BILATERAL: ICD-10-CM

## 2025-08-28 LAB
ALBUMIN SERPL BCP-MCNC: 4.2 G/DL (ref 3.2–4.9)
ALBUMIN/GLOB SERPL: 1.4 G/DL
ALP SERPL-CCNC: 154 U/L (ref 30–99)
ALT SERPL-CCNC: 26 U/L (ref 2–50)
ANION GAP SERPL CALC-SCNC: 12 MMOL/L (ref 7–16)
AST SERPL-CCNC: 31 U/L (ref 12–45)
BILIRUB SERPL-MCNC: 0.4 MG/DL (ref 0.1–1.5)
BUN SERPL-MCNC: 16 MG/DL (ref 8–22)
CALCIUM ALBUM COR SERPL-MCNC: 9 MG/DL (ref 8.5–10.5)
CALCIUM SERPL-MCNC: 9.2 MG/DL (ref 8.5–10.5)
CHLORIDE SERPL-SCNC: 104 MMOL/L (ref 96–112)
CHOLEST SERPL-MCNC: 221 MG/DL (ref 100–199)
CO2 SERPL-SCNC: 25 MMOL/L (ref 20–33)
CREAT SERPL-MCNC: 0.57 MG/DL (ref 0.5–1.4)
CREAT UR-MCNC: 78.7 MG/DL
FASTING STATUS PATIENT QL REPORTED: NORMAL
GFR SERPLBLD CREATININE-BSD FMLA CKD-EPI: 103 ML/MIN/1.73 M 2
GLOBULIN SER CALC-MCNC: 2.9 G/DL (ref 1.9–3.5)
GLUCOSE SERPL-MCNC: 105 MG/DL (ref 65–99)
HBA1C MFR BLD: 5.7 % (ref ?–5.8)
HDLC SERPL-MCNC: 46 MG/DL
LDLC SERPL CALC-MCNC: 147 MG/DL
MICROALBUMIN UR-MCNC: <1.2 MG/DL
MICROALBUMIN/CREAT UR: NORMAL MG/G (ref 0–30)
POCT INT CON NEG: NEGATIVE
POCT INT CON POS: POSITIVE
POTASSIUM SERPL-SCNC: 4 MMOL/L (ref 3.6–5.5)
PROT SERPL-MCNC: 7.1 G/DL (ref 6–8.2)
RETINAL SCREEN: NEGATIVE
SODIUM SERPL-SCNC: 141 MMOL/L (ref 135–145)
TRIGL SERPL-MCNC: 142 MG/DL (ref 0–149)

## 2025-08-28 PROCEDURE — 92250 FUNDUS PHOTOGRAPHY W/I&R: CPT | Mod: 26 | Performed by: NURSE PRACTITIONER

## 2025-08-28 PROCEDURE — 80061 LIPID PANEL: CPT

## 2025-08-28 PROCEDURE — 36415 COLL VENOUS BLD VENIPUNCTURE: CPT

## 2025-08-28 PROCEDURE — 80053 COMPREHEN METABOLIC PANEL: CPT

## 2025-08-28 PROCEDURE — 3075F SYST BP GE 130 - 139MM HG: CPT | Performed by: NURSE PRACTITIONER

## 2025-08-28 PROCEDURE — 82570 ASSAY OF URINE CREATININE: CPT

## 2025-08-28 PROCEDURE — 82043 UR ALBUMIN QUANTITATIVE: CPT

## 2025-08-28 PROCEDURE — 83036 HEMOGLOBIN GLYCOSYLATED A1C: CPT | Performed by: NURSE PRACTITIONER

## 2025-08-28 PROCEDURE — 99214 OFFICE O/P EST MOD 30 MIN: CPT | Performed by: NURSE PRACTITIONER

## 2025-08-28 PROCEDURE — 3078F DIAST BP <80 MM HG: CPT | Performed by: NURSE PRACTITIONER

## 2025-08-28 RX ORDER — LISINOPRIL 10 MG/1
10 TABLET ORAL DAILY
Qty: 90 TABLET | Refills: 3 | Status: SHIPPED | OUTPATIENT
Start: 2025-08-28

## 2025-08-28 RX ORDER — METFORMIN HYDROCHLORIDE 750 MG/1
750 TABLET, EXTENDED RELEASE ORAL 2 TIMES DAILY
Qty: 180 TABLET | Refills: 0 | Status: CANCELLED | OUTPATIENT
Start: 2025-08-28

## 2025-08-28 RX ORDER — ATORVASTATIN CALCIUM 20 MG/1
20 TABLET, FILM COATED ORAL EVERY EVENING
Qty: 90 TABLET | Refills: 3 | Status: SHIPPED | OUTPATIENT
Start: 2025-08-28

## 2025-08-28 RX ORDER — METFORMIN HYDROCHLORIDE 500 MG/1
500 TABLET, EXTENDED RELEASE ORAL 2 TIMES DAILY
Qty: 200 TABLET | Refills: 3 | Status: SHIPPED | OUTPATIENT
Start: 2025-08-28

## 2025-08-28 RX ORDER — DULOXETIN HYDROCHLORIDE 60 MG/1
60 CAPSULE, DELAYED RELEASE ORAL DAILY
Qty: 90 CAPSULE | Refills: 3 | Status: SHIPPED | OUTPATIENT
Start: 2025-08-28

## 2025-08-28 RX ORDER — DICLOFENAC POTASSIUM 50 MG/1
50 TABLET, FILM COATED ORAL 2 TIMES DAILY
Qty: 180 TABLET | Refills: 3 | Status: SHIPPED | OUTPATIENT
Start: 2025-08-28

## 2025-08-28 ASSESSMENT — LIFESTYLE VARIABLES
SKIP TO QUESTIONS 9-10: 1
HOW OFTEN DO YOU HAVE A DRINK CONTAINING ALCOHOL: NEVER
AUDIT-C TOTAL SCORE: 0
HOW MANY STANDARD DRINKS CONTAINING ALCOHOL DO YOU HAVE ON A TYPICAL DAY: PATIENT DOES NOT DRINK
HOW OFTEN DO YOU HAVE SIX OR MORE DRINKS ON ONE OCCASION: NEVER

## 2025-08-28 ASSESSMENT — FIBROSIS 4 INDEX: FIB4 SCORE: 0.77

## 2025-09-03 ENCOUNTER — APPOINTMENT (OUTPATIENT)
Dept: PHYSICAL MEDICINE AND REHAB | Facility: MEDICAL CENTER | Age: 60
End: 2025-09-03
Payer: COMMERCIAL

## (undated) DEVICE — DRESSING TRANSPARENT FILM TEGADERM 4 X 4.75" (50EA/BX)"

## (undated) DEVICE — SUTURE GENERAL

## (undated) DEVICE — CHLORAPREP 26 ML APPLICATOR - ORANGE TINT(25/CA)

## (undated) DEVICE — ABLATOR WAND SERFAS 90-S CRUISE

## (undated) DEVICE — SODIUM CHL. IRRIGATION 0.9% 3000ML (4EA/CA 65CA/PF)

## (undated) DEVICE — GLOVE BIOGEL PI INDICATOR SZ 7.0 SURGICAL PF LF - (50/BX 4BX/CA)

## (undated) DEVICE — PAD LAP STERILE 18 X 18 - (5/PK 40PK/CA)

## (undated) DEVICE — PACK SHOULDER ARTHROSCOPY SM - (2EA/CA)

## (undated) DEVICE — CANNULA THREADED 5X75 (5EA/BX)

## (undated) DEVICE — SET IRRIGATION CYSTOSCOPY TUBE L80 IN (20EA/CA)

## (undated) DEVICE — GLOVE BIOGEL SZ 6.5 SURGICAL PF LTX (50PR/BX 4BX/CA)

## (undated) DEVICE — SET LEADWIRE 5 LEAD BEDSIDE DISPOSABLE ECG (1SET OF 5/EA)

## (undated) DEVICE — GLOVE BIOGEL INDICATOR SZ 7.5 SURGICAL PF LTX - (50PR/BX 4BX/CA)

## (undated) DEVICE — DEVICE SKIN CLOSURE SURGICAL ZIP 16 (10EA/PK)

## (undated) DEVICE — GOWN WARMING STANDARD FLEX - (30/CA)

## (undated) DEVICE — SUTURE 3-0 MONOCRYL PLUS PS-2 - (12/BX)

## (undated) DEVICE — ELECTRODE 850 FOAM ADHESIVE - HYDROGEL RADIOTRNSPRNT (50/PK)

## (undated) DEVICE — DRAPETIBURON SHOULDER W/POUCH - (5EA/CA)

## (undated) DEVICE — HUMID-VENT HEAT AND MOISTURE EXCHANGE- (50/BX)

## (undated) DEVICE — BAG SPONGE COUNT 10.25 X 32 - BLUE (250/CA)

## (undated) DEVICE — HEAD HOLDER JUNIOR/ADULT

## (undated) DEVICE — CANNULA TWIST IN 8.25MM X 7CM (5/BX)

## (undated) DEVICE — GLOVE BIOGEL INDICATOR SZ 8 SURGICAL PF LTX - (50/BX 4BX/CA)

## (undated) DEVICE — SPIDER SHOULDER HOLDER (12EA/BX)

## (undated) DEVICE — SUTURE 3-0 VICRYL PLUS CT-1 - 8 X 18 INCH (12/BX)

## (undated) DEVICE — NEEDLE DAVIS TONSIL 1/2 CIR - (2EA/PK20PK/BX)

## (undated) DEVICE — LACTATED RINGERS INJ 1000 ML - (14EA/CA 60CA/PF)

## (undated) DEVICE — CANNULA FULLY THREADED 8 X 75 (5EA/BX)

## (undated) DEVICE — SENSOR OXIMETER ADULT SPO2 RD SET (20EA/BX)

## (undated) DEVICE — TUBING DAY USE W/CARTRIDGE (10EA/BX)

## (undated) DEVICE — BANDAGE ELASTIC 6 HONEYCOMB - 6X5YD LF (20/CA)"

## (undated) DEVICE — SUTURE 3-0 ETHILON FSLX 30 (36PK/BX)"

## (undated) DEVICE — DEVICE SUTURING PASSER SLINGSHOT LEFT 45 DEGREE

## (undated) DEVICE — SET EXTENSION WITH 2 PORTS (48EA/CA) ***PART #2C8610 IS A SUBSTITUTE*****

## (undated) DEVICE — SUTURE 3-0 MONOCRYL PLUS PS-1 - 27 INCH (36/BX)

## (undated) DEVICE — GLOVE, LITE (PAIR)

## (undated) DEVICE — WATER IRRIGATION STERILE 1000ML (12EA/CA)

## (undated) DEVICE — KIT ANESTHESIA W/CIRCUIT & 3/LT BAG W/FILTER (20EA/CA)

## (undated) DEVICE — SYRINGE DISP. 60 CC LL - (30/BX, 12BX/CA)**WHEN THESE ARE GONE ORDER #500206**

## (undated) DEVICE — PROTECTOR ULNA NERVE - (36PR/CA)

## (undated) DEVICE — PACK UPPER EXTREMITY SM OR - (3/CA)

## (undated) DEVICE — DRAPE LARGE 3 QUARTER - (20/CA)

## (undated) DEVICE — PACK LOWER EXTREMITY - (2/CA)

## (undated) DEVICE — SUTURE 0 PDS CT-1 CR 8 X 18 (12PK/BX)"

## (undated) DEVICE — ELECTRODE DUAL RETURN W/ CORD - (50/PK)

## (undated) DEVICE — TOWEL STOP TIMEOUT SAFETY FLAG (40EA/CA)

## (undated) DEVICE — SHAVER 5.5 BRL FORMULA 6 FLTE (5EA/BX)

## (undated) DEVICE — DRAPE IOBAN II INCISE 23X17 - (10EA/BX 4BX/CA)

## (undated) DEVICE — SHAVER4.0 AGGRESSIVE + FORMLA (5EA/BX)

## (undated) DEVICE — SODIUM CHL IRRIGATION 0.9% 1000ML (12EA/CA)

## (undated) DEVICE — PENCIL ELECTSURG 10FT HLSTR - WITH BLADE (50EA/CA)

## (undated) DEVICE — SUTURE ABSORBABLE VIOLET PDS 2-0 CT-1 L18 IN (12EA/BX)

## (undated) DEVICE — DRAIN JACKSON PRATT 15FR - (10EA/CA)

## (undated) DEVICE — BANDAGE ELASTIC 4 HONEYCOMB - 4"X5YD LF (20/CA)"

## (undated) DEVICE — SUCTION INSTRUMENT YANKAUER BULBOUS TIP W/O VENT (50EA/CA)

## (undated) DEVICE — GLOVE BIOGEL SZ 7 SURGICAL PF LTX - (50PR/BX 4BX/CA)

## (undated) DEVICE — GLOVE BIOGEL SZ 8 SURGICAL PF LTX - (50PR/BX 4BX/CA)

## (undated) DEVICE — PADDING CAST 6 IN STERILE - 6 X 4 YDS (24/CA)

## (undated) DEVICE — SUTURE 2-0 VICRYL PLUS CT-1 36 (36PK/BX)"

## (undated) DEVICE — PADDING CAST 4 IN STERILE - 4 X 4 YDS (24/CA)

## (undated) DEVICE — SUTURE 2.0MM TAPE VIOLET MUST ORDER 12 EACH AT A TIME

## (undated) DEVICE — SUTURE 3-0 ETHILON PS-1 (36PK/BX)

## (undated) DEVICE — TUBING CASSETTE CROSSFLOW INTEGRATED (10EA/CA)

## (undated) DEVICE — TUBE CONNECTING SUCTION - CLEAR PLASTIC STERILE 72 IN (50EA/CA)

## (undated) DEVICE — MASK ANESTHESIA ADULT  - (100/CA)

## (undated) DEVICE — SLEEVE, VASO, THIGH, MED

## (undated) DEVICE — SWAB ANAEROBIC SPEC.COLLECTOR - (25/PK 4PK/CA 100EA/CA)

## (undated) DEVICE — SUTURE 3-0 ETHILON FS-1 - (36/BX) 30 INCH

## (undated) DEVICE — DRAPE U SPLIT IMP 54 X 76 - (24/CA)

## (undated) DEVICE — BLADE BEAVER 6900 MINI SHARP ALL AROUND (20/CA)

## (undated) DEVICE — CANISTER SUCTION 3000ML MECHANICAL FILTER AUTO SHUTOFF MEDI-VAC NONSTERILE LF DISP  (40EA/CA)

## (undated) DEVICE — RESERVOIR SUCTION 100 CC - SILICONE (20EA/CA)

## (undated) DEVICE — NEPTUNE 4 PORT MANIFOLD - (20/PK)

## (undated) DEVICE — TUBING CLEARLINK DUO-VENT - C-FLO (48EA/CA)

## (undated) DEVICE — CANISTER SUCTION RIGID RED 1500CC (40EA/CA)

## (undated) DEVICE — SHAVER 5.5 RESECTOR FORMULA (5EA/BX )